# Patient Record
Sex: FEMALE | Race: WHITE | HISPANIC OR LATINO | Employment: UNEMPLOYED | URBAN - METROPOLITAN AREA
[De-identification: names, ages, dates, MRNs, and addresses within clinical notes are randomized per-mention and may not be internally consistent; named-entity substitution may affect disease eponyms.]

---

## 2017-02-01 ENCOUNTER — APPOINTMENT (EMERGENCY)
Dept: RADIOLOGY | Facility: HOSPITAL | Age: 57
End: 2017-02-01
Payer: COMMERCIAL

## 2017-02-01 ENCOUNTER — HOSPITAL ENCOUNTER (EMERGENCY)
Facility: HOSPITAL | Age: 57
Discharge: HOME | End: 2017-02-01
Attending: EMERGENCY MEDICINE | Admitting: EMERGENCY MEDICINE
Payer: COMMERCIAL

## 2017-02-01 VITALS
DIASTOLIC BLOOD PRESSURE: 63 MMHG | RESPIRATION RATE: 16 BRPM | SYSTOLIC BLOOD PRESSURE: 113 MMHG | OXYGEN SATURATION: 98 % | HEART RATE: 95 BPM | TEMPERATURE: 98.6 F

## 2017-02-01 DIAGNOSIS — K57.92 DIVERTICULITIS: Primary | ICD-10-CM

## 2017-02-01 LAB
ALBUMIN SERPL BCP-MCNC: 3.7 G/DL (ref 3.5–5)
ALP SERPL-CCNC: 72 U/L (ref 46–116)
ALT SERPL W P-5'-P-CCNC: 40 U/L (ref 12–78)
ANION GAP SERPL CALCULATED.3IONS-SCNC: 8 MMOL/L (ref 4–13)
AST SERPL W P-5'-P-CCNC: 17 U/L (ref 5–45)
BACTERIA UR QL AUTO: ABNORMAL /HPF
BASOPHILS # BLD AUTO: 0.02 THOUSANDS/ΜL (ref 0–0.1)
BASOPHILS NFR BLD AUTO: 0 % (ref 0–1)
BILIRUB SERPL-MCNC: 0.48 MG/DL (ref 0.2–1)
BILIRUB UR QL STRIP: NEGATIVE
BUN SERPL-MCNC: 13 MG/DL (ref 5–25)
CALCIUM SERPL-MCNC: 9.3 MG/DL (ref 8.3–10.1)
CHLORIDE SERPL-SCNC: 105 MMOL/L (ref 100–108)
CLARITY UR: CLEAR
CO2 SERPL-SCNC: 28 MMOL/L (ref 21–32)
COLOR UR: ABNORMAL
COLOR, POC: NORMAL
CREAT SERPL-MCNC: 1.03 MG/DL (ref 0.6–1.3)
EOSINOPHIL # BLD AUTO: 0.05 THOUSAND/ΜL (ref 0–0.61)
EOSINOPHIL NFR BLD AUTO: 1 % (ref 0–6)
ERYTHROCYTE [DISTWIDTH] IN BLOOD BY AUTOMATED COUNT: 13.4 % (ref 11.6–15.1)
GFR SERPL CREATININE-BSD FRML MDRD: 55.2 ML/MIN/1.73SQ M
GLUCOSE SERPL-MCNC: 104 MG/DL (ref 65–140)
GLUCOSE UR STRIP-MCNC: NEGATIVE MG/DL
HCG UR QL: NORMAL
HCT VFR BLD AUTO: 41.2 % (ref 34.8–46.1)
HGB BLD-MCNC: 13.8 G/DL (ref 11.5–15.4)
HGB UR QL STRIP.AUTO: ABNORMAL
HYALINE CASTS #/AREA URNS LPF: ABNORMAL /LPF
KETONES UR STRIP-MCNC: NEGATIVE MG/DL
LEUKOCYTE ESTERASE UR QL STRIP: ABNORMAL
LYMPHOCYTES # BLD AUTO: 2.55 THOUSANDS/ΜL (ref 0.6–4.47)
LYMPHOCYTES NFR BLD AUTO: 27 % (ref 14–44)
MCH RBC QN AUTO: 29 PG (ref 26.8–34.3)
MCHC RBC AUTO-ENTMCNC: 33.5 G/DL (ref 31.4–37.4)
MCV RBC AUTO: 87 FL (ref 82–98)
MONOCYTES # BLD AUTO: 1.03 THOUSAND/ΜL (ref 0.17–1.22)
MONOCYTES NFR BLD AUTO: 11 % (ref 4–12)
NEUTROPHILS # BLD AUTO: 5.87 THOUSANDS/ΜL (ref 1.85–7.62)
NEUTS SEG NFR BLD AUTO: 61 % (ref 43–75)
NITRITE UR QL STRIP: NEGATIVE
NON-SQ EPI CELLS URNS QL MICRO: ABNORMAL /HPF
NRBC BLD AUTO-RTO: 0 /100 WBCS
PH UR STRIP.AUTO: 6 [PH] (ref 4.5–8)
PLATELET # BLD AUTO: 263 THOUSANDS/UL (ref 149–390)
PMV BLD AUTO: 9.7 FL (ref 8.9–12.7)
POTASSIUM SERPL-SCNC: 4.1 MMOL/L (ref 3.5–5.3)
PROT SERPL-MCNC: 8.2 G/DL (ref 6.4–8.2)
PROT UR STRIP-MCNC: NEGATIVE MG/DL
RBC # BLD AUTO: 4.76 MILLION/UL (ref 3.81–5.12)
RBC #/AREA URNS AUTO: ABNORMAL /HPF
SODIUM SERPL-SCNC: 141 MMOL/L (ref 136–145)
SP GR UR STRIP.AUTO: 1.02 (ref 1–1.03)
UROBILINOGEN UR QL STRIP.AUTO: 0.2 E.U./DL
WBC # BLD AUTO: 9.54 THOUSAND/UL (ref 4.31–10.16)
WBC #/AREA URNS AUTO: ABNORMAL /HPF

## 2017-02-01 PROCEDURE — 80053 COMPREHEN METABOLIC PANEL: CPT | Performed by: EMERGENCY MEDICINE

## 2017-02-01 PROCEDURE — 85025 COMPLETE CBC W/AUTO DIFF WBC: CPT | Performed by: EMERGENCY MEDICINE

## 2017-02-01 PROCEDURE — 74177 CT ABD & PELVIS W/CONTRAST: CPT

## 2017-02-01 PROCEDURE — 81002 URINALYSIS NONAUTO W/O SCOPE: CPT | Performed by: EMERGENCY MEDICINE

## 2017-02-01 PROCEDURE — 87086 URINE CULTURE/COLONY COUNT: CPT

## 2017-02-01 PROCEDURE — 96360 HYDRATION IV INFUSION INIT: CPT

## 2017-02-01 PROCEDURE — 81025 URINE PREGNANCY TEST: CPT | Performed by: EMERGENCY MEDICINE

## 2017-02-01 PROCEDURE — 96361 HYDRATE IV INFUSION ADD-ON: CPT

## 2017-02-01 PROCEDURE — 99284 EMERGENCY DEPT VISIT MOD MDM: CPT

## 2017-02-01 PROCEDURE — 81001 URINALYSIS AUTO W/SCOPE: CPT

## 2017-02-01 PROCEDURE — 36415 COLL VENOUS BLD VENIPUNCTURE: CPT | Performed by: EMERGENCY MEDICINE

## 2017-02-01 RX ORDER — ACETAMINOPHEN 325 MG/1
650 TABLET ORAL ONCE
Status: COMPLETED | OUTPATIENT
Start: 2017-02-01 | End: 2017-02-01

## 2017-02-01 RX ORDER — METRONIDAZOLE 500 MG/1
500 TABLET ORAL 3 TIMES DAILY
Qty: 30 TABLET | Refills: 0 | Status: SHIPPED | OUTPATIENT
Start: 2017-02-01 | End: 2017-02-11 | Stop reason: HOSPADM

## 2017-02-01 RX ORDER — CIPROFLOXACIN 500 MG/1
500 TABLET, FILM COATED ORAL 2 TIMES DAILY
Qty: 20 TABLET | Refills: 0 | Status: SHIPPED | OUTPATIENT
Start: 2017-02-01 | End: 2017-02-11 | Stop reason: HOSPADM

## 2017-02-01 RX ORDER — CIPROFLOXACIN 500 MG/1
500 TABLET, FILM COATED ORAL ONCE
Status: COMPLETED | OUTPATIENT
Start: 2017-02-01 | End: 2017-02-01

## 2017-02-01 RX ORDER — METRONIDAZOLE 500 MG/1
500 TABLET ORAL ONCE
Status: COMPLETED | OUTPATIENT
Start: 2017-02-01 | End: 2017-02-01

## 2017-02-01 RX ORDER — ACETAMINOPHEN 500 MG
500 TABLET ORAL EVERY 6 HOURS PRN
Qty: 30 TABLET | Refills: 0 | Status: SHIPPED | OUTPATIENT
Start: 2017-02-01 | End: 2017-03-03

## 2017-02-01 RX ADMIN — IOHEXOL 100 ML: 350 INJECTION, SOLUTION INTRAVENOUS at 11:37

## 2017-02-01 RX ADMIN — ACETAMINOPHEN 650 MG: 325 TABLET, FILM COATED ORAL at 10:34

## 2017-02-01 RX ADMIN — METRONIDAZOLE 500 MG: 500 TABLET ORAL at 12:43

## 2017-02-01 RX ADMIN — SODIUM CHLORIDE 1000 ML: 0.9 INJECTION, SOLUTION INTRAVENOUS at 10:43

## 2017-02-01 RX ADMIN — CIPROFLOXACIN HYDROCHLORIDE 500 MG: 500 TABLET, FILM COATED ORAL at 12:43

## 2017-02-02 LAB — BACTERIA UR CULT: NORMAL

## 2017-02-08 ENCOUNTER — APPOINTMENT (EMERGENCY)
Dept: RADIOLOGY | Facility: HOSPITAL | Age: 57
DRG: 244 | End: 2017-02-08
Payer: COMMERCIAL

## 2017-02-08 ENCOUNTER — HOSPITAL ENCOUNTER (INPATIENT)
Facility: HOSPITAL | Age: 57
LOS: 3 days | Discharge: HOME | DRG: 244 | End: 2017-02-11
Attending: EMERGENCY MEDICINE | Admitting: SURGERY
Payer: COMMERCIAL

## 2017-02-08 DIAGNOSIS — K57.92 DIVERTICULITIS: Primary | ICD-10-CM

## 2017-02-08 LAB
ALBUMIN SERPL BCP-MCNC: 3.8 G/DL (ref 3.5–5)
ALP SERPL-CCNC: 66 U/L (ref 46–116)
ALT SERPL W P-5'-P-CCNC: 25 U/L (ref 12–78)
ANION GAP SERPL CALCULATED.3IONS-SCNC: 6 MMOL/L (ref 4–13)
AST SERPL W P-5'-P-CCNC: 14 U/L (ref 5–45)
BACTERIA UR QL AUTO: ABNORMAL /HPF
BASOPHILS # BLD AUTO: 0.03 THOUSANDS/ΜL (ref 0–0.1)
BASOPHILS NFR BLD AUTO: 0 % (ref 0–1)
BILIRUB SERPL-MCNC: 0.43 MG/DL (ref 0.2–1)
BILIRUB UR QL STRIP: NEGATIVE
BUN SERPL-MCNC: 10 MG/DL (ref 5–25)
CALCIUM SERPL-MCNC: 9.3 MG/DL (ref 8.3–10.1)
CHLORIDE SERPL-SCNC: 105 MMOL/L (ref 100–108)
CLARITY UR: CLEAR
CO2 SERPL-SCNC: 29 MMOL/L (ref 21–32)
COLOR UR: YELLOW
COLOR, POC: YELLOW
CREAT SERPL-MCNC: 1.02 MG/DL (ref 0.6–1.3)
EOSINOPHIL # BLD AUTO: 0.07 THOUSAND/ΜL (ref 0–0.61)
EOSINOPHIL NFR BLD AUTO: 1 % (ref 0–6)
ERYTHROCYTE [DISTWIDTH] IN BLOOD BY AUTOMATED COUNT: 13.5 % (ref 11.6–15.1)
GFR SERPL CREATININE-BSD FRML MDRD: 55.9 ML/MIN/1.73SQ M
GLUCOSE SERPL-MCNC: 109 MG/DL (ref 65–140)
GLUCOSE UR STRIP-MCNC: NEGATIVE MG/DL
HCG UR QL: NEGATIVE
HCT VFR BLD AUTO: 40.1 % (ref 34.8–46.1)
HGB BLD-MCNC: 13 G/DL (ref 11.5–15.4)
HGB UR QL STRIP.AUTO: NEGATIVE
HYALINE CASTS #/AREA URNS LPF: ABNORMAL /LPF
KETONES UR STRIP-MCNC: NEGATIVE MG/DL
LEUKOCYTE ESTERASE UR QL STRIP: ABNORMAL
LIPASE SERPL-CCNC: 173 U/L (ref 73–393)
LYMPHOCYTES # BLD AUTO: 1.97 THOUSANDS/ΜL (ref 0.6–4.47)
LYMPHOCYTES NFR BLD AUTO: 25 % (ref 14–44)
MCH RBC QN AUTO: 28.4 PG (ref 26.8–34.3)
MCHC RBC AUTO-ENTMCNC: 32.4 G/DL (ref 31.4–37.4)
MCV RBC AUTO: 88 FL (ref 82–98)
MONOCYTES # BLD AUTO: 0.71 THOUSAND/ΜL (ref 0.17–1.22)
MONOCYTES NFR BLD AUTO: 9 % (ref 4–12)
NEUTROPHILS # BLD AUTO: 5.18 THOUSANDS/ΜL (ref 1.85–7.62)
NEUTS SEG NFR BLD AUTO: 65 % (ref 43–75)
NITRITE UR QL STRIP: NEGATIVE
NON-SQ EPI CELLS URNS QL MICRO: ABNORMAL /HPF
NRBC BLD AUTO-RTO: 0 /100 WBCS
PH UR STRIP.AUTO: 7.5 [PH] (ref 4.5–8)
PLATELET # BLD AUTO: 329 THOUSANDS/UL (ref 149–390)
PMV BLD AUTO: 9.6 FL (ref 8.9–12.7)
POTASSIUM SERPL-SCNC: 4.3 MMOL/L (ref 3.5–5.3)
PROT SERPL-MCNC: 8.2 G/DL (ref 6.4–8.2)
PROT UR STRIP-MCNC: NEGATIVE MG/DL
RBC # BLD AUTO: 4.57 MILLION/UL (ref 3.81–5.12)
RBC #/AREA URNS AUTO: ABNORMAL /HPF
SODIUM SERPL-SCNC: 140 MMOL/L (ref 136–145)
SP GR UR STRIP.AUTO: 1.02 (ref 1–1.03)
UROBILINOGEN UR QL STRIP.AUTO: 0.2 E.U./DL
WBC # BLD AUTO: 7.97 THOUSAND/UL (ref 4.31–10.16)
WBC #/AREA URNS AUTO: ABNORMAL /HPF

## 2017-02-08 PROCEDURE — 81001 URINALYSIS AUTO W/SCOPE: CPT

## 2017-02-08 PROCEDURE — 81002 URINALYSIS NONAUTO W/O SCOPE: CPT | Performed by: EMERGENCY MEDICINE

## 2017-02-08 PROCEDURE — 81025 URINE PREGNANCY TEST: CPT | Performed by: EMERGENCY MEDICINE

## 2017-02-08 PROCEDURE — 74177 CT ABD & PELVIS W/CONTRAST: CPT

## 2017-02-08 PROCEDURE — 85025 COMPLETE CBC W/AUTO DIFF WBC: CPT | Performed by: EMERGENCY MEDICINE

## 2017-02-08 PROCEDURE — 99285 EMERGENCY DEPT VISIT HI MDM: CPT

## 2017-02-08 PROCEDURE — 87086 URINE CULTURE/COLONY COUNT: CPT

## 2017-02-08 PROCEDURE — 36415 COLL VENOUS BLD VENIPUNCTURE: CPT | Performed by: EMERGENCY MEDICINE

## 2017-02-08 PROCEDURE — 83690 ASSAY OF LIPASE: CPT | Performed by: EMERGENCY MEDICINE

## 2017-02-08 PROCEDURE — 96361 HYDRATE IV INFUSION ADD-ON: CPT

## 2017-02-08 PROCEDURE — 80053 COMPREHEN METABOLIC PANEL: CPT | Performed by: EMERGENCY MEDICINE

## 2017-02-08 PROCEDURE — 96374 THER/PROPH/DIAG INJ IV PUSH: CPT

## 2017-02-08 RX ORDER — ACETAMINOPHEN 325 MG/1
650 TABLET ORAL EVERY 6 HOURS PRN
Status: DISCONTINUED | OUTPATIENT
Start: 2017-02-08 | End: 2017-02-09

## 2017-02-08 RX ORDER — MORPHINE SULFATE 4 MG/ML
4 INJECTION, SOLUTION INTRAMUSCULAR; INTRAVENOUS ONCE
Status: COMPLETED | OUTPATIENT
Start: 2017-02-08 | End: 2017-02-08

## 2017-02-08 RX ORDER — SODIUM CHLORIDE, SODIUM LACTATE, POTASSIUM CHLORIDE, CALCIUM CHLORIDE 600; 310; 30; 20 MG/100ML; MG/100ML; MG/100ML; MG/100ML
125 INJECTION, SOLUTION INTRAVENOUS CONTINUOUS
Status: DISCONTINUED | OUTPATIENT
Start: 2017-02-08 | End: 2017-02-10

## 2017-02-08 RX ORDER — OXYCODONE HYDROCHLORIDE 5 MG/1
5 TABLET ORAL EVERY 4 HOURS PRN
Status: DISCONTINUED | OUTPATIENT
Start: 2017-02-08 | End: 2017-02-11 | Stop reason: HOSPADM

## 2017-02-08 RX ORDER — ONDANSETRON 2 MG/ML
4 INJECTION INTRAMUSCULAR; INTRAVENOUS EVERY 4 HOURS PRN
Status: DISCONTINUED | OUTPATIENT
Start: 2017-02-08 | End: 2017-02-11 | Stop reason: HOSPADM

## 2017-02-08 RX ADMIN — PIPERACILLIN AND TAZOBACTAM 3.38 G: 3; .375 INJECTION, POWDER, LYOPHILIZED, FOR SOLUTION INTRAVENOUS; PARENTERAL at 23:06

## 2017-02-08 RX ADMIN — SODIUM CHLORIDE, SODIUM LACTATE, POTASSIUM CHLORIDE, AND CALCIUM CHLORIDE 125 ML/HR: .6; .31; .03; .02 INJECTION, SOLUTION INTRAVENOUS at 13:51

## 2017-02-08 RX ADMIN — SODIUM CHLORIDE 1000 ML: 0.9 INJECTION, SOLUTION INTRAVENOUS at 12:13

## 2017-02-08 RX ADMIN — HYDROMORPHONE HYDROCHLORIDE 1 MG: 1 INJECTION, SOLUTION INTRAMUSCULAR; INTRAVENOUS; SUBCUTANEOUS at 22:20

## 2017-02-08 RX ADMIN — IOHEXOL 100 ML: 350 INJECTION, SOLUTION INTRAVENOUS at 10:18

## 2017-02-08 RX ADMIN — SODIUM CHLORIDE, SODIUM LACTATE, POTASSIUM CHLORIDE, AND CALCIUM CHLORIDE 125 ML/HR: .6; .31; .03; .02 INJECTION, SOLUTION INTRAVENOUS at 22:00

## 2017-02-08 RX ADMIN — SODIUM CHLORIDE 1000 ML: 0.9 INJECTION, SOLUTION INTRAVENOUS at 08:45

## 2017-02-08 RX ADMIN — MORPHINE SULFATE 4 MG: 4 INJECTION, SOLUTION INTRAMUSCULAR; INTRAVENOUS at 08:46

## 2017-02-08 RX ADMIN — HYDROMORPHONE HYDROCHLORIDE 1 MG: 1 INJECTION, SOLUTION INTRAMUSCULAR; INTRAVENOUS; SUBCUTANEOUS at 15:02

## 2017-02-08 RX ADMIN — PIPERACILLIN AND TAZOBACTAM 3.38 G: 3; .375 INJECTION, POWDER, LYOPHILIZED, FOR SOLUTION INTRAVENOUS; PARENTERAL at 12:23

## 2017-02-09 PROBLEM — K57.92 DIVERTICULITIS: Status: ACTIVE | Noted: 2017-02-09

## 2017-02-09 LAB
ANION GAP SERPL CALCULATED.3IONS-SCNC: 9 MMOL/L (ref 4–13)
BACTERIA UR CULT: NORMAL
BASOPHILS # BLD AUTO: 0.03 THOUSANDS/ΜL (ref 0–0.1)
BASOPHILS NFR BLD AUTO: 1 % (ref 0–1)
BUN SERPL-MCNC: 6 MG/DL (ref 5–25)
CALCIUM SERPL-MCNC: 8.5 MG/DL (ref 8.3–10.1)
CHLORIDE SERPL-SCNC: 104 MMOL/L (ref 100–108)
CO2 SERPL-SCNC: 27 MMOL/L (ref 21–32)
CREAT SERPL-MCNC: 0.88 MG/DL (ref 0.6–1.3)
EOSINOPHIL # BLD AUTO: 0.06 THOUSAND/ΜL (ref 0–0.61)
EOSINOPHIL NFR BLD AUTO: 1 % (ref 0–6)
ERYTHROCYTE [DISTWIDTH] IN BLOOD BY AUTOMATED COUNT: 13.4 % (ref 11.6–15.1)
GFR SERPL CREATININE-BSD FRML MDRD: >60 ML/MIN/1.73SQ M
GLUCOSE SERPL-MCNC: 95 MG/DL (ref 65–140)
HCT VFR BLD AUTO: 36.1 % (ref 34.8–46.1)
HGB BLD-MCNC: 11.6 G/DL (ref 11.5–15.4)
LYMPHOCYTES # BLD AUTO: 1.61 THOUSANDS/ΜL (ref 0.6–4.47)
LYMPHOCYTES NFR BLD AUTO: 32 % (ref 14–44)
MCH RBC QN AUTO: 28 PG (ref 26.8–34.3)
MCHC RBC AUTO-ENTMCNC: 32.1 G/DL (ref 31.4–37.4)
MCV RBC AUTO: 87 FL (ref 82–98)
MONOCYTES # BLD AUTO: 0.61 THOUSAND/ΜL (ref 0.17–1.22)
MONOCYTES NFR BLD AUTO: 12 % (ref 4–12)
NEUTROPHILS # BLD AUTO: 2.72 THOUSANDS/ΜL (ref 1.85–7.62)
NEUTS SEG NFR BLD AUTO: 54 % (ref 43–75)
NRBC BLD AUTO-RTO: 0 /100 WBCS
PLATELET # BLD AUTO: 296 THOUSANDS/UL (ref 149–390)
PMV BLD AUTO: 9.6 FL (ref 8.9–12.7)
POTASSIUM SERPL-SCNC: 3.9 MMOL/L (ref 3.5–5.3)
RBC # BLD AUTO: 4.14 MILLION/UL (ref 3.81–5.12)
SODIUM SERPL-SCNC: 140 MMOL/L (ref 136–145)
WBC # BLD AUTO: 5.05 THOUSAND/UL (ref 4.31–10.16)

## 2017-02-09 PROCEDURE — G8980 MOBILITY D/C STATUS: HCPCS

## 2017-02-09 PROCEDURE — G8978 MOBILITY CURRENT STATUS: HCPCS

## 2017-02-09 PROCEDURE — G8989 SELF CARE D/C STATUS: HCPCS

## 2017-02-09 PROCEDURE — 97165 OT EVAL LOW COMPLEX 30 MIN: CPT

## 2017-02-09 PROCEDURE — 97162 PT EVAL MOD COMPLEX 30 MIN: CPT

## 2017-02-09 PROCEDURE — 80048 BASIC METABOLIC PNL TOTAL CA: CPT | Performed by: SURGERY

## 2017-02-09 PROCEDURE — G8979 MOBILITY GOAL STATUS: HCPCS

## 2017-02-09 PROCEDURE — G8987 SELF CARE CURRENT STATUS: HCPCS

## 2017-02-09 PROCEDURE — 85025 COMPLETE CBC W/AUTO DIFF WBC: CPT | Performed by: SURGERY

## 2017-02-09 PROCEDURE — G8988 SELF CARE GOAL STATUS: HCPCS

## 2017-02-09 RX ORDER — BUTALBITAL, ACETAMINOPHEN AND CAFFEINE 50; 325; 40 MG/1; MG/1; MG/1
1 TABLET ORAL EVERY 4 HOURS PRN
Status: DISCONTINUED | OUTPATIENT
Start: 2017-02-09 | End: 2017-02-09

## 2017-02-09 RX ORDER — ACETAMINOPHEN 325 MG/1
325 TABLET ORAL EVERY 6 HOURS PRN
Status: DISCONTINUED | OUTPATIENT
Start: 2017-02-09 | End: 2017-02-09

## 2017-02-09 RX ORDER — BUTALBITAL, ACETAMINOPHEN AND CAFFEINE 50; 325; 40 MG/1; MG/1; MG/1
1 TABLET ORAL ONCE
Status: COMPLETED | OUTPATIENT
Start: 2017-02-09 | End: 2017-02-09

## 2017-02-09 RX ORDER — ACETAMINOPHEN 325 MG/1
650 TABLET ORAL EVERY 6 HOURS PRN
Status: DISCONTINUED | OUTPATIENT
Start: 2017-02-09 | End: 2017-02-11 | Stop reason: HOSPADM

## 2017-02-09 RX ADMIN — PIPERACILLIN AND TAZOBACTAM 3.38 G: 3; .375 INJECTION, POWDER, LYOPHILIZED, FOR SOLUTION INTRAVENOUS; PARENTERAL at 05:01

## 2017-02-09 RX ADMIN — PIPERACILLIN AND TAZOBACTAM 3.38 G: 3; .375 INJECTION, POWDER, LYOPHILIZED, FOR SOLUTION INTRAVENOUS; PARENTERAL at 12:49

## 2017-02-09 RX ADMIN — SODIUM CHLORIDE, SODIUM LACTATE, POTASSIUM CHLORIDE, AND CALCIUM CHLORIDE 125 ML/HR: .6; .31; .03; .02 INJECTION, SOLUTION INTRAVENOUS at 16:52

## 2017-02-09 RX ADMIN — ENOXAPARIN SODIUM 40 MG: 40 INJECTION SUBCUTANEOUS at 08:55

## 2017-02-09 RX ADMIN — HYDROMORPHONE HYDROCHLORIDE 1 MG: 1 INJECTION, SOLUTION INTRAMUSCULAR; INTRAVENOUS; SUBCUTANEOUS at 13:18

## 2017-02-09 RX ADMIN — PIPERACILLIN AND TAZOBACTAM 3.38 G: 3; .375 INJECTION, POWDER, LYOPHILIZED, FOR SOLUTION INTRAVENOUS; PARENTERAL at 16:54

## 2017-02-09 RX ADMIN — ACETAMINOPHEN 650 MG: 325 TABLET, FILM COATED ORAL at 05:11

## 2017-02-09 RX ADMIN — ENOXAPARIN SODIUM 40 MG: 40 INJECTION SUBCUTANEOUS at 18:38

## 2017-02-09 RX ADMIN — BUTALBITAL, ACETAMINOPHEN, AND CAFFEINE 1 TABLET: 50; 325; 40 TABLET ORAL at 14:14

## 2017-02-09 RX ADMIN — SODIUM CHLORIDE, SODIUM LACTATE, POTASSIUM CHLORIDE, AND CALCIUM CHLORIDE 125 ML/HR: .6; .31; .03; .02 INJECTION, SOLUTION INTRAVENOUS at 07:52

## 2017-02-09 RX ADMIN — OXYCODONE HYDROCHLORIDE 5 MG: 5 TABLET ORAL at 21:58

## 2017-02-10 LAB
BASOPHILS # BLD AUTO: 0.02 THOUSANDS/ΜL (ref 0–0.1)
BASOPHILS NFR BLD AUTO: 0 % (ref 0–1)
EOSINOPHIL # BLD AUTO: 0.07 THOUSAND/ΜL (ref 0–0.61)
EOSINOPHIL NFR BLD AUTO: 2 % (ref 0–6)
ERYTHROCYTE [DISTWIDTH] IN BLOOD BY AUTOMATED COUNT: 13 % (ref 11.6–15.1)
HCT VFR BLD AUTO: 36.5 % (ref 34.8–46.1)
HGB BLD-MCNC: 11.9 G/DL (ref 11.5–15.4)
LYMPHOCYTES # BLD AUTO: 1.97 THOUSANDS/ΜL (ref 0.6–4.47)
LYMPHOCYTES NFR BLD AUTO: 41 % (ref 14–44)
MCH RBC QN AUTO: 28.3 PG (ref 26.8–34.3)
MCHC RBC AUTO-ENTMCNC: 32.6 G/DL (ref 31.4–37.4)
MCV RBC AUTO: 87 FL (ref 82–98)
MONOCYTES # BLD AUTO: 0.67 THOUSAND/ΜL (ref 0.17–1.22)
MONOCYTES NFR BLD AUTO: 14 % (ref 4–12)
NEUTROPHILS # BLD AUTO: 2.03 THOUSANDS/ΜL (ref 1.85–7.62)
NEUTS SEG NFR BLD AUTO: 43 % (ref 43–75)
NRBC BLD AUTO-RTO: 0 /100 WBCS
PLATELET # BLD AUTO: 284 THOUSANDS/UL (ref 149–390)
PMV BLD AUTO: 9.4 FL (ref 8.9–12.7)
RBC # BLD AUTO: 4.21 MILLION/UL (ref 3.81–5.12)
WBC # BLD AUTO: 4.77 THOUSAND/UL (ref 4.31–10.16)

## 2017-02-10 PROCEDURE — 85025 COMPLETE CBC W/AUTO DIFF WBC: CPT | Performed by: SURGERY

## 2017-02-10 RX ORDER — CLONAZEPAM 0.5 MG/1
0.5 TABLET ORAL
Status: DISCONTINUED | OUTPATIENT
Start: 2017-02-10 | End: 2017-02-11 | Stop reason: HOSPADM

## 2017-02-10 RX ORDER — DEXTROSE, SODIUM CHLORIDE, AND POTASSIUM CHLORIDE 5; .45; .15 G/100ML; G/100ML; G/100ML
75 INJECTION INTRAVENOUS CONTINUOUS
Status: DISCONTINUED | OUTPATIENT
Start: 2017-02-10 | End: 2017-02-11 | Stop reason: HOSPADM

## 2017-02-10 RX ADMIN — DEXTROSE, SODIUM CHLORIDE, AND POTASSIUM CHLORIDE 75 ML/HR: 5; .45; .15 INJECTION INTRAVENOUS at 07:59

## 2017-02-10 RX ADMIN — PIPERACILLIN AND TAZOBACTAM 3.38 G: 3; .375 INJECTION, POWDER, LYOPHILIZED, FOR SOLUTION INTRAVENOUS; PARENTERAL at 10:45

## 2017-02-10 RX ADMIN — PIPERACILLIN AND TAZOBACTAM 3.38 G: 3; .375 INJECTION, POWDER, LYOPHILIZED, FOR SOLUTION INTRAVENOUS; PARENTERAL at 20:01

## 2017-02-10 RX ADMIN — PIPERACILLIN AND TAZOBACTAM 3.38 G: 3; .375 INJECTION, POWDER, LYOPHILIZED, FOR SOLUTION INTRAVENOUS; PARENTERAL at 05:17

## 2017-02-10 RX ADMIN — ENOXAPARIN SODIUM 40 MG: 40 INJECTION SUBCUTANEOUS at 08:02

## 2017-02-10 RX ADMIN — ENOXAPARIN SODIUM 40 MG: 40 INJECTION SUBCUTANEOUS at 20:01

## 2017-02-10 RX ADMIN — PIPERACILLIN AND TAZOBACTAM 3.38 G: 3; .375 INJECTION, POWDER, LYOPHILIZED, FOR SOLUTION INTRAVENOUS; PARENTERAL at 00:37

## 2017-02-10 RX ADMIN — DEXTROSE, SODIUM CHLORIDE, AND POTASSIUM CHLORIDE 75 ML/HR: 5; .45; .15 INJECTION INTRAVENOUS at 23:39

## 2017-02-11 VITALS
HEART RATE: 81 BPM | HEIGHT: 62 IN | SYSTOLIC BLOOD PRESSURE: 120 MMHG | RESPIRATION RATE: 18 BRPM | OXYGEN SATURATION: 98 % | BODY MASS INDEX: 27.43 KG/M2 | WEIGHT: 149.03 LBS | TEMPERATURE: 98.6 F | DIASTOLIC BLOOD PRESSURE: 68 MMHG

## 2017-02-11 RX ORDER — AMOXICILLIN AND CLAVULANATE POTASSIUM 875; 125 MG/1; MG/1
1 TABLET, FILM COATED ORAL 2 TIMES DAILY
Qty: 20 TABLET | Refills: 0 | Status: SHIPPED | OUTPATIENT
Start: 2017-02-11 | End: 2017-02-21

## 2017-02-11 RX ADMIN — PIPERACILLIN AND TAZOBACTAM 3.38 G: 3; .375 INJECTION, POWDER, LYOPHILIZED, FOR SOLUTION INTRAVENOUS; PARENTERAL at 08:41

## 2017-02-11 RX ADMIN — ENOXAPARIN SODIUM 40 MG: 40 INJECTION SUBCUTANEOUS at 08:41

## 2017-02-11 RX ADMIN — PIPERACILLIN AND TAZOBACTAM 3.38 G: 3; .375 INJECTION, POWDER, LYOPHILIZED, FOR SOLUTION INTRAVENOUS; PARENTERAL at 14:55

## 2017-02-11 RX ADMIN — PIPERACILLIN AND TAZOBACTAM 3.38 G: 3; .375 INJECTION, POWDER, LYOPHILIZED, FOR SOLUTION INTRAVENOUS; PARENTERAL at 02:07

## 2017-02-11 RX ADMIN — DEXTROSE, SODIUM CHLORIDE, AND POTASSIUM CHLORIDE 75 ML/HR: 5; .45; .15 INJECTION INTRAVENOUS at 14:55

## 2017-02-11 RX ADMIN — CLONAZEPAM 0.5 MG: 0.5 TABLET ORAL at 02:07

## 2017-02-20 ENCOUNTER — GENERIC CONVERSION - ENCOUNTER (OUTPATIENT)
Dept: OTHER | Facility: OTHER | Age: 57
End: 2017-02-20

## 2017-02-27 ENCOUNTER — ALLSCRIPTS OFFICE VISIT (OUTPATIENT)
Dept: OTHER | Facility: OTHER | Age: 57
End: 2017-02-27

## 2017-03-29 ENCOUNTER — ALLSCRIPTS OFFICE VISIT (OUTPATIENT)
Dept: OTHER | Facility: OTHER | Age: 57
End: 2017-03-29

## 2017-04-04 ENCOUNTER — APPOINTMENT (OUTPATIENT)
Dept: LAB | Facility: HOSPITAL | Age: 57
End: 2017-04-04
Payer: COMMERCIAL

## 2017-04-04 ENCOUNTER — ALLSCRIPTS OFFICE VISIT (OUTPATIENT)
Dept: OTHER | Facility: OTHER | Age: 57
End: 2017-04-04

## 2017-04-04 DIAGNOSIS — L60.9 NAIL DISORDER: ICD-10-CM

## 2017-04-04 PROCEDURE — 87102 FUNGUS ISOLATION CULTURE: CPT

## 2017-04-06 ENCOUNTER — ANESTHESIA EVENT (OUTPATIENT)
Dept: GASTROENTEROLOGY | Facility: HOSPITAL | Age: 57
End: 2017-04-06

## 2017-04-06 ENCOUNTER — ANESTHESIA (OUTPATIENT)
Dept: GASTROENTEROLOGY | Facility: HOSPITAL | Age: 57
End: 2017-04-06

## 2017-04-28 ENCOUNTER — TRANSCRIBE ORDERS (OUTPATIENT)
Dept: RADIOLOGY | Facility: HOSPITAL | Age: 57
End: 2017-04-28

## 2017-04-28 ENCOUNTER — HOSPITAL ENCOUNTER (OUTPATIENT)
Dept: RADIOLOGY | Facility: HOSPITAL | Age: 57
Discharge: HOME | End: 2017-04-28
Payer: COMMERCIAL

## 2017-04-28 DIAGNOSIS — L60.9 NAIL DISORDER: ICD-10-CM

## 2017-04-28 PROCEDURE — 73660 X-RAY EXAM OF TOE(S): CPT

## 2017-05-08 LAB — FUNGUS SPEC CULT: NORMAL

## 2017-05-09 ENCOUNTER — ALLSCRIPTS OFFICE VISIT (OUTPATIENT)
Dept: OTHER | Facility: OTHER | Age: 57
End: 2017-05-09

## 2017-05-19 ENCOUNTER — ALLSCRIPTS OFFICE VISIT (OUTPATIENT)
Dept: OTHER | Facility: OTHER | Age: 57
End: 2017-05-19

## 2017-05-28 ENCOUNTER — HOSPITAL ENCOUNTER (EMERGENCY)
Facility: HOSPITAL | Age: 57
Discharge: HOME | End: 2017-05-28
Payer: COMMERCIAL

## 2017-05-28 ENCOUNTER — APPOINTMENT (EMERGENCY)
Dept: RADIOLOGY | Facility: HOSPITAL | Age: 57
End: 2017-05-28
Payer: COMMERCIAL

## 2017-05-28 VITALS
BODY MASS INDEX: 27.44 KG/M2 | DIASTOLIC BLOOD PRESSURE: 74 MMHG | WEIGHT: 150 LBS | SYSTOLIC BLOOD PRESSURE: 148 MMHG | RESPIRATION RATE: 19 BRPM | OXYGEN SATURATION: 99 % | TEMPERATURE: 97.6 F | HEART RATE: 78 BPM

## 2017-05-28 DIAGNOSIS — S39.012A STRAIN OF MUSCLE, FASCIA AND TENDON OF LOWER BACK, INITIAL ENCOUNTER: Primary | ICD-10-CM

## 2017-05-28 LAB
ALBUMIN SERPL BCP-MCNC: 3.8 G/DL (ref 3.5–5)
ALP SERPL-CCNC: 65 U/L (ref 46–116)
ALT SERPL W P-5'-P-CCNC: 33 U/L (ref 12–78)
ANION GAP SERPL CALCULATED.3IONS-SCNC: 4 MMOL/L (ref 4–13)
AST SERPL W P-5'-P-CCNC: 24 U/L (ref 5–45)
BACTERIA UR QL AUTO: NORMAL /HPF
BASOPHILS # BLD AUTO: 0.02 THOUSANDS/ΜL (ref 0–0.1)
BASOPHILS NFR BLD AUTO: 0 % (ref 0–1)
BILIRUB SERPL-MCNC: 0.27 MG/DL (ref 0.2–1)
BILIRUB UR QL STRIP: NEGATIVE
BUN SERPL-MCNC: 13 MG/DL (ref 5–25)
CALCIUM SERPL-MCNC: 9.4 MG/DL (ref 8.3–10.1)
CHLORIDE SERPL-SCNC: 106 MMOL/L (ref 100–108)
CLARITY UR: CLEAR
CO2 SERPL-SCNC: 31 MMOL/L (ref 21–32)
COLOR UR: YELLOW
COLOR, POC: NORMAL
CREAT SERPL-MCNC: 0.9 MG/DL (ref 0.6–1.3)
EOSINOPHIL # BLD AUTO: 0.12 THOUSAND/ΜL (ref 0–0.61)
EOSINOPHIL NFR BLD AUTO: 2 % (ref 0–6)
ERYTHROCYTE [DISTWIDTH] IN BLOOD BY AUTOMATED COUNT: 14 % (ref 11.6–15.1)
GFR SERPL CREATININE-BSD FRML MDRD: >60 ML/MIN/1.73SQ M
GLUCOSE SERPL-MCNC: 103 MG/DL (ref 65–140)
GLUCOSE UR STRIP-MCNC: NEGATIVE MG/DL
HCT VFR BLD AUTO: 40.2 % (ref 34.8–46.1)
HGB BLD-MCNC: 13 G/DL (ref 11.5–15.4)
HGB UR QL STRIP.AUTO: ABNORMAL
HYALINE CASTS #/AREA URNS LPF: NORMAL /LPF
KETONES UR STRIP-MCNC: NEGATIVE MG/DL
LEUKOCYTE ESTERASE UR QL STRIP: NEGATIVE
LIPASE SERPL-CCNC: 231 U/L (ref 73–393)
LYMPHOCYTES # BLD AUTO: 2.18 THOUSANDS/ΜL (ref 0.6–4.47)
LYMPHOCYTES NFR BLD AUTO: 45 % (ref 14–44)
MCH RBC QN AUTO: 28.6 PG (ref 26.8–34.3)
MCHC RBC AUTO-ENTMCNC: 32.3 G/DL (ref 31.4–37.4)
MCV RBC AUTO: 88 FL (ref 82–98)
MONOCYTES # BLD AUTO: 0.62 THOUSAND/ΜL (ref 0.17–1.22)
MONOCYTES NFR BLD AUTO: 12 % (ref 4–12)
NEUTROPHILS # BLD AUTO: 2.03 THOUSANDS/ΜL (ref 1.85–7.62)
NEUTS SEG NFR BLD AUTO: 41 % (ref 43–75)
NITRITE UR QL STRIP: NEGATIVE
NON-SQ EPI CELLS URNS QL MICRO: NORMAL /HPF
NRBC BLD AUTO-RTO: 0 /100 WBCS
PH UR STRIP.AUTO: 6 [PH] (ref 4.5–8)
PLATELET # BLD AUTO: 232 THOUSANDS/UL (ref 149–390)
PMV BLD AUTO: 9.7 FL (ref 8.9–12.7)
POTASSIUM SERPL-SCNC: 4.3 MMOL/L (ref 3.5–5.3)
PROT SERPL-MCNC: 7.6 G/DL (ref 6.4–8.2)
PROT UR STRIP-MCNC: NEGATIVE MG/DL
RBC # BLD AUTO: 4.55 MILLION/UL (ref 3.81–5.12)
RBC #/AREA URNS AUTO: NORMAL /HPF
SODIUM SERPL-SCNC: 141 MMOL/L (ref 136–145)
SP GR UR STRIP.AUTO: 1.02 (ref 1–1.03)
UROBILINOGEN UR QL STRIP.AUTO: 0.2 E.U./DL
WBC # BLD AUTO: 4.98 THOUSAND/UL (ref 4.31–10.16)
WBC #/AREA URNS AUTO: NORMAL /HPF

## 2017-05-28 PROCEDURE — 36415 COLL VENOUS BLD VENIPUNCTURE: CPT | Performed by: PHYSICIAN ASSISTANT

## 2017-05-28 PROCEDURE — 81002 URINALYSIS NONAUTO W/O SCOPE: CPT | Performed by: PHYSICIAN ASSISTANT

## 2017-05-28 PROCEDURE — 74177 CT ABD & PELVIS W/CONTRAST: CPT

## 2017-05-28 PROCEDURE — 96374 THER/PROPH/DIAG INJ IV PUSH: CPT

## 2017-05-28 PROCEDURE — 80053 COMPREHEN METABOLIC PANEL: CPT | Performed by: PHYSICIAN ASSISTANT

## 2017-05-28 PROCEDURE — 99284 EMERGENCY DEPT VISIT MOD MDM: CPT

## 2017-05-28 PROCEDURE — 85025 COMPLETE CBC W/AUTO DIFF WBC: CPT | Performed by: PHYSICIAN ASSISTANT

## 2017-05-28 PROCEDURE — 83690 ASSAY OF LIPASE: CPT | Performed by: PHYSICIAN ASSISTANT

## 2017-05-28 PROCEDURE — 81001 URINALYSIS AUTO W/SCOPE: CPT

## 2017-05-28 RX ORDER — CYCLOBENZAPRINE HCL 5 MG
5 TABLET ORAL 3 TIMES DAILY PRN
Qty: 10 TABLET | Refills: 0 | Status: SHIPPED | OUTPATIENT
Start: 2017-05-28 | End: 2020-03-16 | Stop reason: ALTCHOICE

## 2017-05-28 RX ORDER — MORPHINE SULFATE 2 MG/ML
2 INJECTION, SOLUTION INTRAMUSCULAR; INTRAVENOUS ONCE
Status: COMPLETED | OUTPATIENT
Start: 2017-05-28 | End: 2017-05-28

## 2017-05-28 RX ORDER — NAPROXEN 500 MG/1
500 TABLET ORAL 2 TIMES DAILY WITH MEALS
Qty: 30 TABLET | Refills: 0 | Status: SHIPPED | OUTPATIENT
Start: 2017-05-28 | End: 2018-07-19 | Stop reason: ALTCHOICE

## 2017-05-28 RX ADMIN — IOHEXOL 100 ML: 350 INJECTION, SOLUTION INTRAVENOUS at 12:01

## 2017-05-28 RX ADMIN — MORPHINE SULFATE 2 MG: 2 INJECTION, SOLUTION INTRAMUSCULAR; INTRAVENOUS at 11:26

## 2017-06-05 ENCOUNTER — ALLSCRIPTS OFFICE VISIT (OUTPATIENT)
Dept: OTHER | Facility: OTHER | Age: 57
End: 2017-06-05

## 2017-06-06 ENCOUNTER — TRANSCRIBE ORDERS (OUTPATIENT)
Dept: LAB | Facility: HOSPITAL | Age: 57
End: 2017-06-06

## 2017-06-06 ENCOUNTER — APPOINTMENT (OUTPATIENT)
Dept: LAB | Facility: HOSPITAL | Age: 57
End: 2017-06-06
Payer: COMMERCIAL

## 2017-06-06 DIAGNOSIS — E88.81 METABOLIC SYNDROME: ICD-10-CM

## 2017-06-06 DIAGNOSIS — F31.0 BIPOLAR I DISORDER, MOST RECENT EPISODE HYPOMANIC (HCC): ICD-10-CM

## 2017-06-06 DIAGNOSIS — F31.0 BIPOLAR I DISORDER, MOST RECENT EPISODE HYPOMANIC (HCC): Primary | ICD-10-CM

## 2017-06-06 LAB
ALBUMIN SERPL BCP-MCNC: 3.9 G/DL (ref 3.5–5)
ALP SERPL-CCNC: 59 U/L (ref 46–116)
ALT SERPL W P-5'-P-CCNC: 23 U/L (ref 12–78)
ANION GAP SERPL CALCULATED.3IONS-SCNC: 7 MMOL/L (ref 4–13)
AST SERPL W P-5'-P-CCNC: 14 U/L (ref 5–45)
BASOPHILS # BLD AUTO: 0.03 THOUSANDS/ΜL (ref 0–0.1)
BASOPHILS NFR BLD AUTO: 1 % (ref 0–1)
BILIRUB SERPL-MCNC: 0.38 MG/DL (ref 0.2–1)
BUN SERPL-MCNC: 19 MG/DL (ref 5–25)
CALCIUM SERPL-MCNC: 9.1 MG/DL (ref 8.3–10.1)
CHLORIDE SERPL-SCNC: 108 MMOL/L (ref 100–108)
CHOLEST SERPL-MCNC: 185 MG/DL (ref 50–200)
CO2 SERPL-SCNC: 28 MMOL/L (ref 21–32)
CREAT SERPL-MCNC: 0.95 MG/DL (ref 0.6–1.3)
EOSINOPHIL # BLD AUTO: 0.13 THOUSAND/ΜL (ref 0–0.61)
EOSINOPHIL NFR BLD AUTO: 3 % (ref 0–6)
ERYTHROCYTE [DISTWIDTH] IN BLOOD BY AUTOMATED COUNT: 13.8 % (ref 11.6–15.1)
GFR SERPL CREATININE-BSD FRML MDRD: >60 ML/MIN/1.73SQ M
GLUCOSE P FAST SERPL-MCNC: 94 MG/DL (ref 65–99)
HCT VFR BLD AUTO: 40.4 % (ref 34.8–46.1)
HDLC SERPL-MCNC: 52 MG/DL (ref 40–60)
HGB BLD-MCNC: 12.7 G/DL (ref 11.5–15.4)
LDLC SERPL CALC-MCNC: 118 MG/DL (ref 0–100)
LYMPHOCYTES # BLD AUTO: 2.36 THOUSANDS/ΜL (ref 0.6–4.47)
LYMPHOCYTES NFR BLD AUTO: 44 % (ref 14–44)
MCH RBC QN AUTO: 27.7 PG (ref 26.8–34.3)
MCHC RBC AUTO-ENTMCNC: 31.4 G/DL (ref 31.4–37.4)
MCV RBC AUTO: 88 FL (ref 82–98)
MONOCYTES # BLD AUTO: 0.51 THOUSAND/ΜL (ref 0.17–1.22)
MONOCYTES NFR BLD AUTO: 10 % (ref 4–12)
NEUTROPHILS # BLD AUTO: 2.24 THOUSANDS/ΜL (ref 1.85–7.62)
NEUTS SEG NFR BLD AUTO: 42 % (ref 43–75)
NRBC BLD AUTO-RTO: 0 /100 WBCS
PLATELET # BLD AUTO: 270 THOUSANDS/UL (ref 149–390)
PMV BLD AUTO: 9.9 FL (ref 8.9–12.7)
POTASSIUM SERPL-SCNC: 4.4 MMOL/L (ref 3.5–5.3)
PROLACTIN SERPL-MCNC: 9.2 NG/ML
PROT SERPL-MCNC: 7.4 G/DL (ref 6.4–8.2)
RBC # BLD AUTO: 4.58 MILLION/UL (ref 3.81–5.12)
SODIUM SERPL-SCNC: 143 MMOL/L (ref 136–145)
TRIGL SERPL-MCNC: 77 MG/DL
TSH SERPL DL<=0.05 MIU/L-ACNC: 2.42 UIU/ML (ref 0.36–3.74)
WBC # BLD AUTO: 5.27 THOUSAND/UL (ref 4.31–10.16)

## 2017-06-06 PROCEDURE — 84443 ASSAY THYROID STIM HORMONE: CPT

## 2017-06-06 PROCEDURE — 84146 ASSAY OF PROLACTIN: CPT

## 2017-06-06 PROCEDURE — 80061 LIPID PANEL: CPT

## 2017-06-06 PROCEDURE — 85025 COMPLETE CBC W/AUTO DIFF WBC: CPT

## 2017-06-06 PROCEDURE — 36415 COLL VENOUS BLD VENIPUNCTURE: CPT

## 2017-06-06 PROCEDURE — 80053 COMPREHEN METABOLIC PANEL: CPT

## 2017-08-07 ENCOUNTER — APPOINTMENT (OUTPATIENT)
Dept: LAB | Facility: CLINIC | Age: 57
End: 2017-08-07
Payer: COMMERCIAL

## 2017-08-07 ENCOUNTER — ALLSCRIPTS OFFICE VISIT (OUTPATIENT)
Dept: OTHER | Facility: OTHER | Age: 57
End: 2017-08-07

## 2017-08-07 DIAGNOSIS — Z12.31 ENCOUNTER FOR SCREENING MAMMOGRAM FOR MALIGNANT NEOPLASM OF BREAST: ICD-10-CM

## 2017-08-07 DIAGNOSIS — R73.09 OTHER ABNORMAL GLUCOSE: ICD-10-CM

## 2017-08-07 DIAGNOSIS — R92.8 OTHER ABNORMAL AND INCONCLUSIVE FINDINGS ON DIAGNOSTIC IMAGING OF BREAST: ICD-10-CM

## 2017-08-07 LAB
EST. AVERAGE GLUCOSE BLD GHB EST-MCNC: 134 MG/DL
HBA1C MFR BLD: 6.3 % (ref 4.2–6.3)

## 2017-08-07 PROCEDURE — 36415 COLL VENOUS BLD VENIPUNCTURE: CPT

## 2017-08-07 PROCEDURE — 83036 HEMOGLOBIN GLYCOSYLATED A1C: CPT

## 2017-08-09 ENCOUNTER — TRANSCRIBE ORDERS (OUTPATIENT)
Dept: ADMINISTRATIVE | Facility: HOSPITAL | Age: 57
End: 2017-08-09

## 2017-08-09 DIAGNOSIS — G47.33 OBSTRUCTIVE SLEEP APNEA (ADULT) (PEDIATRIC): Primary | ICD-10-CM

## 2017-08-16 ENCOUNTER — HOSPITAL ENCOUNTER (OUTPATIENT)
Dept: SLEEP CENTER | Facility: CLINIC | Age: 57
Discharge: HOME | End: 2017-08-16
Payer: COMMERCIAL

## 2017-08-16 ENCOUNTER — TRANSCRIBE ORDERS (OUTPATIENT)
Dept: SLEEP CENTER | Facility: CLINIC | Age: 57
End: 2017-08-16

## 2017-08-16 DIAGNOSIS — G47.33 OBSTRUCTIVE SLEEP APNEA (ADULT) (PEDIATRIC): ICD-10-CM

## 2017-08-16 DIAGNOSIS — J44.9 OSA AND COPD OVERLAP SYNDROME (HCC): Primary | ICD-10-CM

## 2017-08-16 DIAGNOSIS — G47.33 OSA AND COPD OVERLAP SYNDROME (HCC): Primary | ICD-10-CM

## 2017-08-18 ENCOUNTER — TRANSCRIBE ORDERS (OUTPATIENT)
Dept: SLEEP CENTER | Facility: CLINIC | Age: 57
End: 2017-08-18

## 2017-08-22 ENCOUNTER — HOSPITAL ENCOUNTER (OUTPATIENT)
Dept: RADIOLOGY | Age: 57
Discharge: HOME | End: 2017-08-22
Payer: COMMERCIAL

## 2017-08-22 DIAGNOSIS — Z12.31 ENCOUNTER FOR SCREENING MAMMOGRAM FOR MALIGNANT NEOPLASM OF BREAST: ICD-10-CM

## 2017-08-22 PROCEDURE — G0202 SCR MAMMO BI INCL CAD: HCPCS

## 2017-08-28 ENCOUNTER — ALLSCRIPTS OFFICE VISIT (OUTPATIENT)
Dept: OTHER | Facility: OTHER | Age: 57
End: 2017-08-28

## 2017-08-28 ENCOUNTER — ANESTHESIA EVENT (OUTPATIENT)
Dept: GASTROENTEROLOGY | Facility: MEDICAL CENTER | Age: 57
End: 2017-08-28
Payer: COMMERCIAL

## 2017-08-28 RX ORDER — VENLAFAXINE 100 MG/1
150 TABLET ORAL
COMMUNITY
End: 2020-03-16 | Stop reason: ALTCHOICE

## 2017-08-28 RX ORDER — IBUPROFEN 400 MG/1
400 TABLET ORAL 3 TIMES DAILY PRN
COMMUNITY
End: 2020-03-16 | Stop reason: ALTCHOICE

## 2017-08-29 ENCOUNTER — HOSPITAL ENCOUNTER (OUTPATIENT)
Facility: MEDICAL CENTER | Age: 57
Setting detail: OUTPATIENT SURGERY
Discharge: HOME | End: 2017-08-29
Attending: INTERNAL MEDICINE | Admitting: INTERNAL MEDICINE
Payer: COMMERCIAL

## 2017-08-29 ENCOUNTER — ANESTHESIA (OUTPATIENT)
Dept: GASTROENTEROLOGY | Facility: MEDICAL CENTER | Age: 57
End: 2017-08-29
Payer: COMMERCIAL

## 2017-08-29 ENCOUNTER — GENERIC CONVERSION - ENCOUNTER (OUTPATIENT)
Dept: GASTROENTEROLOGY | Facility: MEDICAL CENTER | Age: 57
End: 2017-08-29

## 2017-08-29 VITALS
TEMPERATURE: 96.2 F | RESPIRATION RATE: 15 BRPM | WEIGHT: 150 LBS | DIASTOLIC BLOOD PRESSURE: 67 MMHG | OXYGEN SATURATION: 99 % | BODY MASS INDEX: 27.6 KG/M2 | SYSTOLIC BLOOD PRESSURE: 138 MMHG | HEART RATE: 80 BPM | HEIGHT: 62 IN

## 2017-08-29 DIAGNOSIS — K57.92 DIVERTICULITIS OF INTESTINE WITHOUT PERFORATION OR ABSCESS WITHOUT BLEEDING: ICD-10-CM

## 2017-08-29 DIAGNOSIS — A04.8 HELICOBACTER PYLORI INFECTION: ICD-10-CM

## 2017-08-29 DIAGNOSIS — R10.13 EPIGASTRIC PAIN: ICD-10-CM

## 2017-08-29 PROCEDURE — 88342 IMHCHEM/IMCYTCHM 1ST ANTB: CPT | Performed by: INTERNAL MEDICINE

## 2017-08-29 PROCEDURE — 88305 TISSUE EXAM BY PATHOLOGIST: CPT | Performed by: INTERNAL MEDICINE

## 2017-08-29 RX ORDER — SODIUM CHLORIDE 9 MG/ML
125 INJECTION, SOLUTION INTRAVENOUS CONTINUOUS
Status: DISCONTINUED | OUTPATIENT
Start: 2017-08-29 | End: 2017-08-29 | Stop reason: HOSPADM

## 2017-08-29 RX ORDER — PROPOFOL 10 MG/ML
INJECTION, EMULSION INTRAVENOUS AS NEEDED
Status: DISCONTINUED | OUTPATIENT
Start: 2017-08-29 | End: 2017-08-29 | Stop reason: SURG

## 2017-08-29 RX ADMIN — PROPOFOL 50 MG: 10 INJECTION, EMULSION INTRAVENOUS at 13:57

## 2017-08-29 RX ADMIN — SODIUM CHLORIDE 125 ML/HR: 0.9 INJECTION, SOLUTION INTRAVENOUS at 12:49

## 2017-08-29 RX ADMIN — PROPOFOL 200 MG: 10 INJECTION, EMULSION INTRAVENOUS at 13:50

## 2017-08-29 RX ADMIN — PROPOFOL 50 MG: 10 INJECTION, EMULSION INTRAVENOUS at 14:05

## 2017-08-31 ENCOUNTER — HOSPITAL ENCOUNTER (OUTPATIENT)
Dept: MAMMOGRAPHY | Facility: CLINIC | Age: 57
Discharge: HOME | End: 2017-08-31
Payer: COMMERCIAL

## 2017-08-31 ENCOUNTER — HOSPITAL ENCOUNTER (OUTPATIENT)
Dept: ULTRASOUND IMAGING | Facility: CLINIC | Age: 57
Discharge: HOME | End: 2017-08-31
Payer: COMMERCIAL

## 2017-08-31 DIAGNOSIS — R92.8 OTHER ABNORMAL AND INCONCLUSIVE FINDINGS ON DIAGNOSTIC IMAGING OF BREAST: ICD-10-CM

## 2017-08-31 PROCEDURE — G0279 TOMOSYNTHESIS, MAMMO: HCPCS

## 2017-08-31 PROCEDURE — G0206 DX MAMMO INCL CAD UNI: HCPCS

## 2017-09-08 ENCOUNTER — GENERIC CONVERSION - ENCOUNTER (OUTPATIENT)
Dept: OTHER | Facility: OTHER | Age: 57
End: 2017-09-08

## 2017-09-12 ENCOUNTER — GENERIC CONVERSION - ENCOUNTER (OUTPATIENT)
Dept: OTHER | Facility: OTHER | Age: 57
End: 2017-09-12

## 2017-09-28 ENCOUNTER — ALLSCRIPTS OFFICE VISIT (OUTPATIENT)
Dept: OTHER | Facility: OTHER | Age: 57
End: 2017-09-28

## 2017-10-24 NOTE — PROGRESS NOTES
Assessment  1  Abdominal pain, epigastric (789 06) (R10 13)   2  Helicobacter pylori infection (041 86) (A04 8)    Plan  Abdominal pain, epigastric    · EGD; Status:Active; Requested for:31Oct2016;    Perform:[facility]; Order Comments:west; THM:[id number]; Last Updated By:Savage Nash; 8/28/2017 10:21:06 AM;Ordered; For:Abdominal pain, epigastric; Ordered By:Bart Espinoza;  Abdominal pain, epigastric, Helicobacter pylori infection    · Follow Up After Tests Complete Evaluation and Treatment  Follow-up  Status: Hold For -  Scheduling  Requested for: 52Ttu0898   Ordered; For: Abdominal pain, epigastric, Helicobacter pylori infection; Ordered By: Claus Carrion Performed:  Due: 80JFB7321   · EGD; Status:Hold For - Scheduling; Requested for:47Nsl8207;    Perform:[facility]; POI:15XWA5302;IXPXAZR; For:Abdominal pain, epigastric, Helicobacter pylori infection; Ordered By:Bart Espinoza;  Acute diverticulitis    · MoviPrep 100 GM Oral Solution Reconstituted; USE AS DIRECTED   Rx By: Claus Carrion; Dispense: 0 Days ; #:1 Solution Reconstituted; Refill: 0;For: Acute diverticulitis; RODO = N; Verified Transmission to [address] ; Last Updated By: System, SureScriideeli; 8/28/2017 10:14:53 AM   · COLONOSCOPY (GI, SURG); Status:Active; Requested for:80Bfb9109;    Perform:[facility]; Order Comments:west; Due:96Yfo8656; Last Updated Chetna Canas; 8/28/2017 10:21:06 AM;Ordered; For:Acute diverticulitis; Ordered By:Radha Espinoza;    Discussion/Summary  Discussion Summary:   #1 Epigastric pain/Hx H pylori: Her epigastric pain could be due to recurrent Helicobacter pylori infection, peptic ulcer disease, reflux esophagitis or hiatal hernia  I will schedule her for an upper endoscopy to evaluate further  I also asked her to continue taking the omeprazole daily  Hx rectal bleeding: She was found to have hemorrhoids during her recent colonoscopy   Fortunately her rectal bleeding has resolved  Diverticulitis: She was diagnosed with acute diverticulitis and treated with antibiotics  I will schedule her for a repeat colonoscopy to evaluate for diverticulosis and to rule out for colon polyps, colon cancer, and Crohn's disease  Chief Complaint  Chief Complaint Free Text Note Form: follow up for stomach issues   Chief Complaint Chronic Condition [facility]: Patient is here today for follow up of chronic conditions described in HPI  History of Present Illness  HPI: She presents for followup of her epigastric pain and after a recent diagnosis of acute diverticulitis  She said this pain is associated with some reflux and nausea but no vomiting  She also complains of bloating and her previous lower abdominal pain and rectal bleeding have resolved  She had a colonoscopy performed last year which revealed hemorrhoids and diverticulosis but no polyps  She believes she had an upper endoscopy many years ago  She denies any dysphagia or weight loss  did not follow up for her scheduled upper endoscopy last year because she could not get a ride  In February 2017, she was diagnosed with acute diverticulitis which was treated with antibiotics and her symptoms completely resolved  History Reviewed: The history was obtained today from the patient and I agree with the documented history  Review of Systems  Complete-Female GI Adult:   Constitutional: No fever, no chills, feels well, no tiredness, no recent weight gain or weight loss  Eyes: eyesight problems, but-- No complaints of eye pain, no red eyes, no eyesight problems, no discharge, no dry eyes, no itching of eyes,-- no eye pain,-- no dryness of the eyes,-- eyes not red,-- no purulent discharge from the eyes-- and-- no itching of the eyes  ENT: no complaints of earache, no loss of hearing, no nose bleeds, no nasal discharge, no sore throat, no hoarseness     Cardiovascular: No complaints of slow heart rate, no fast heart rate, no chest pain, no palpitations, no leg claudication, no lower extremity edema  Respiratory: shortness of breath, but-- No complaints of shortness of breath, no wheezing, no cough, no SOB on exertion, no orthopnea, no PND,-- no cough,-- no orthopnea,-- no wheezing,-- no shortness of breath during exertion-- and-- no PND  Gastrointestinal: abdominal pain-- and-- hemorrhoids, but-- No complaints of abdominal pain, no constipation, no nausea or vomiting, no diarrhea, no bloody stools,-- no nausea,-- no vomiting,-- no constipation,-- no diarrhea-- and-- no blood in stools  Genitourinary: No complaints of dysuria, no incontinence, no pelvic pain, no dysmenorrhea, no vaginal discharge or bleeding  Musculoskeletal: No complaints of arthralgias, no myalgias, no joint swelling or stiffness, no limb pain or swelling  Integumentary: No complaints of skin rash or lesions, no itching, no skin wounds, no breast pain or lump  Neurological: No complaints of headache, no confusion, no convulsions, no numbness, no dizziness or fainting, no tingling, no limb weakness, no difficulty walking  Psychiatric: Not suicidal, no sleep disturbance, no anxiety or depression, no change in personality, no emotional problems  Endocrine: No complaints of proptosis, no hot flashes, no muscle weakness, no deepening of the voice, no feelings of weakness  Hematologic/Lymphatic: No complaints of swollen glands, no swollen glands in the neck, does not bleed easily, does not bruise easily  ROS Reviewed:   ROS reviewed  Active Problems  1  Abdominal pain, epigastric (789 06) (R10 13)   2  Abnormal mammogram (793 80) (R92 8)   3  Abnormal serum glucose level (790 29) (R73 09)   4  Acute diverticulitis (562 11) (K57 92)   5  Arm pain, anterior, right (729 5) (M79 601)   6  Chronic gastritis (535 10) (K29 50)   7  Colon, diverticulosis (562 10) (K57 30)   8  Constipation (564 00) (K59 00)   9   Encounter for routine gynecological examination (V72 31) (Z01 419)   10  Encounter for screening colonoscopy (V76 51) (Z12 11)   11  Encounter for screening mammogram for malignant neoplasm of breast (V76 12)    (Z12 31)   12  Fatigue (780 79) (R53 83)   13  Helicobacter pylori infection (041 86) (A04 8)   14  History of rectal bleeding (V12 79) (Z87 19)   15  Hospital discharge follow-up (V67 59) (Z09)   16  Incomplete bladder emptying (788 21) (R33 9)   17  Influenza vaccine needed (V04 81) (Z23)   18  Muscle spasm (728 85) (M62 838)   19  Nail abnormalities (703 9) (L60 9)   20  Nasal mucosa dry (478 19) (J34 89)   21  Obesity (278 00) (E66 9)   22  Obstructive sleep apnea (327 23) (G47 33)   23  Osteoarthritis of knee (715 36) (M17 10)   24  Seborrheic keratosis (702 19) (L82 1)    Past Medical History  1  History of Complaint Of Allergic Reaction Seasonal   2  History of Depression with anxiety (300 4) (F41 8)   3  History of Elective  (635 90)   4  History of Helicobacter infection (V12 09) (Z86 19)   5  Normal delivery (650) (O80,Z37 9)   6  Seborrheic keratosis (702 19) (L82 1)   7  History of Urge incontinence of urine (788 31) (N39 41)   8  History of Urinary frequency (788 41) (R35 0)   9  History of Vaginal Itching Or Burning (625 8)  Active Problems And Past Medical History Reviewed: The active problems and past medical history were reviewed and updated today  Surgical History  1  History of Abdominoplasty   2  History of Tubal Ligation  Surgical History Reviewed: The surgical history was reviewed and updated today  Family History  Mother    1  Denied: Family history of Colon cancer   2  Family history of Diabetes Mellitus (V18 0)   3  Denied: Family history of colitis   4  Denied: Family history of colonic polyps   5  Denied: Family history of Crohn's disease   6  Denied: Family history of liver disease   7  Family history of Hypertension (V17 49)   8  Family history of Reported Family History Of Heart Disease  Father    5  Denied: Family history of Colon cancer   10  Denied: Family history of colitis   11  Denied: Family history of colonic polyps   12  Denied: Family history of Crohn's disease   15  Denied: Family history of liver disease  Paternal Grandfather    15  Family history of Stomach cancer  Family History Reviewed: The family history was reviewed and updated today  Social History   · Never A Smoker   · No alcohol use   · No drug use  Social History Reviewed: The social history was reviewed and updated today  Current Meds   1  ClonazePAM 1 MG Oral Tablet; Therapy: (Recorded:21Lbd3933) to Recorded   2  GaviLyte-G 236 GM Oral Solution Reconstituted; Therapy: 88VEH0094 to Recorded   3  Ibuprofen 400 MG Oral Tablet; tid with meals prn for pain; Therapy: 43Zfx0879 to (Evaluate:19Iki0573)  Requested for: 15Ytj1959; Last   Rx:94Xtg4834 Ordered   4  Omeprazole 20 MG Oral Capsule Delayed Release; take 1 capsule by mouth once daily; Therapy: 46VLU5547 to (Evaluate:71Vuk0667)  Requested for: 51Hai5663; Last   Rx:28Baj9732 Ordered   5  PARoxetine HCl - 40 MG Oral Tablet; Therapy: 57SXR7605 to Recorded   6  RisperiDONE 2 MG Oral Tablet; Therapy: 65LOE2667 to Recorded   7  Saline Nasal Spray 0 65 % Nasal Solution; USE 1 SPRAY IN EACH NOSTRIL TWICE   DAILY; Therapy: 01PAY9379 to (Last Rx:79Gee0694)  Requested for: 52XZL4710 Ordered   8  Tums Ultra 1000 CHEW; CHEW 1 TABLET Every 6 hours; Therapy: 45ELY1032 to  Requested for: 43Mfc8032 Recorded   9  Venlafaxine HCl  MG Oral Capsule Extended Release 24 Hour; Therapy: 44Ihx4142 to Recorded  Medication List Reviewed: The medication list was reviewed and updated today  Allergies  1   No Known Drug Allergies    Vitals  Vital Signs    Recorded: 28Aug2017 09:10AM   Temperature 97 2 F, Tympanic   Heart Rate 72   Systolic 929, LUE, Sitting   Diastolic 66, LUE, Sitting   Height 5 ft 0 5 in   Weight 160 lb    BMI Calculated 30 73   BSA Calculated 1 71   O2 Saturation 97     Physical Exam    Constitutional   General appearance: No acute distress, well appearing and well nourished  Eyes   Conjunctiva and lids: No swelling, erythema or discharge  Pupils and irises: Equal, round and reactive to light  Ears, Nose, Mouth, and Throat   External inspection of ears and nose: Normal     Nasal mucosa, septum, and turbinates: Normal without edema or erythema  Oropharynx: Normal with no erythema, edema, exudate or lesions  Pulmonary   Respiratory effort: No increased work of breathing or signs of respiratory distress  Auscultation of lungs: Clear to auscultation  Cardiovascular   Auscultation of heart: Normal rate and rhythm, normal S1 and S2, without murmurs  Examination of extremities for edema and/or varicosities: Normal     Abdomen   Abdomen: Abnormal  -- tender epigastrium  Liver and spleen: No hepatomegaly or splenomegaly  Lymphatic   Palpation of lymph nodes in neck: No lymphadenopathy  Musculoskeletal   Gait and station: Normal     Digits and nails: Normal without clubbing or cyanosis  Inspection/palpation of joints, bones, and muscles: Normal     Skin   Skin and subcutaneous tissue: Normal without rashes or lesions  Psychiatric   Orientation to person, place, and time: Normal     Mood and affect: Normal          Health Management  Encounter for screening colonoscopy   COLONOSCOPY; every 10 years; Last 11Aug2016; Next Due: 11Aug2026;  Active    Future Appointments    Date/Time Provider Specialty Site   08/29/2017 02:00 PM Shelby Griffin MD Gastroenterology Adult [address] ENDOSCOPY     Signatures   Electronically signed by : Miranda Louis MD; Aug 28 2017 12:09PM EST                       (Author)

## 2017-10-27 NOTE — PROGRESS NOTES
Assessment  1  Abdominal pain, epigastric (789 06) (R10 13)   2  History of rectal bleeding (V12 79) (Z87 19)   3  Adenomatous colon polyp (211 3) (D12 6)    Plan  Abdominal pain, epigastric, Adenomatous colon polyp, History of rectal bleeding    · Follow-up visit in 3 months Evaluation and Treatment  Follow-up  Status: Hold For -  Scheduling  Requested for: 65CNF4968   Ordered; For: Abdominal pain, epigastric, Adenomatous colon polyp, History of rectal bleeding; Ordered By: Erich Zendejas Performed:  Due: 31JCL2974   · Follow-up visit in 6 months Evaluation and Treatment  Follow-up NM  Status: Hold For -  Scheduling  Requested for: 00CPN9903   Ordered; For: Abdominal pain, epigastric, Adenomatous colon polyp, History of rectal bleeding; Ordered By: Erich Zendejas Performed:  Due: 49HDN1219    Discussion/Summary  Discussion Summary:   #1 Epigastric pain/Hx H pylori: Her epigastric pain was probably due to functional dyspepsia or gastritis  Fortunately it has resolved on the omeprazole  I have asked her to continue taking omeprazole daily  Hx rectal bleeding: She was found to have hemorrhoids during her recent colonoscopy  Fortunately her rectal bleeding has resolved  Diverticulitis: She was diagnosed with acute diverticulitis and treated with antibiotics  She was noted to have diverticulosis on colonoscopy and there is no evidence of Crohn's disease or colon cancer  Serrated adenoma: She is due for her next colonoscopy in 5 years  Chief Complaint  Chief Complaint Free Text Note Form: Pt follow up after EGD;complains of abdominal pain  History of Present Illness  HPI: She presents for follow-up after her recent upper endoscopy and colonoscopy because of epigastric pain, history of diverticulitis, history of Helicobacter pylori infection, and rectal bleeding  She was found to have hemorrhoids, diverticulosis, a serrated adenoma that was removed from her colon, and gastritis   Biopsies were negative for Helicobacter pylori infection  Overall she is feeling better and has been taking omeprazole daily  She denies any new complaints and feels the bleeding and epigastric pain have resolved  History Reviewed: The history was obtained today from the patient and I agree with the documented history  Review of Systems  Complete-Female GI Adult:   Constitutional: No fever, no chills, feels well, no tiredness, no recent weight gain or weight loss  Eyes: eyesight problems, but-- No complaints of eye pain, no red eyes, no eyesight problems, no discharge, no dry eyes, no itching of eyes,-- no eye pain,-- no dryness of the eyes,-- eyes not red,-- no purulent discharge from the eyes-- and-- no itching of the eyes  ENT: no complaints of earache, no loss of hearing, no nose bleeds, no nasal discharge, no sore throat, no hoarseness  Cardiovascular: No complaints of slow heart rate, no fast heart rate, no chest pain, no palpitations, no leg claudication, no lower extremity edema  Respiratory: shortness of breath, but-- No complaints of shortness of breath, no wheezing, no cough, no SOB on exertion, no orthopnea, no PND,-- no cough,-- no orthopnea,-- no wheezing,-- no shortness of breath during exertion-- and-- no PND  Gastrointestinal: abdominal pain-- and-- hemorrhoids, but-- No complaints of abdominal pain, no constipation, no nausea or vomiting, no diarrhea, no bloody stools,-- no nausea,-- no vomiting,-- no constipation,-- no diarrhea-- and-- no blood in stools  Genitourinary: No complaints of dysuria, no incontinence, no pelvic pain, no dysmenorrhea, no vaginal discharge or bleeding  Musculoskeletal: No complaints of arthralgias, no myalgias, no joint swelling or stiffness, no limb pain or swelling  Integumentary: No complaints of skin rash or lesions, no itching, no skin wounds, no breast pain or lump     Neurological: No complaints of headache, no confusion, no convulsions, no numbness, no dizziness or fainting, no tingling, no limb weakness, no difficulty walking  Psychiatric: Not suicidal, no sleep disturbance, no anxiety or depression, no change in personality, no emotional problems  Endocrine: No complaints of proptosis, no hot flashes, no muscle weakness, no deepening of the voice, no feelings of weakness  Hematologic/Lymphatic: No complaints of swollen glands, no swollen glands in the neck, does not bleed easily, does not bruise easily  ROS Reviewed:   ROS reviewed  Active Problems  1  Abdominal pain, epigastric (789 06) (R10 13)   2  Abnormal mammogram (793 80) (R92 8)   3  Abnormal serum glucose level (790 29) (R73 09)   4  Acute diverticulitis (562 11) (K57 92)   5  Arm pain, anterior, right (729 5) (M79 601)   6  Chronic gastritis (535 10) (K29 50)   7  Colon, diverticulosis (562 10) (K57 30)   8  Constipation (564 00) (K59 00)   9  Encounter for routine gynecological examination (V72 31) (Z01 419)   10  Encounter for screening colonoscopy (V76 51) (Z12 11)   11  Encounter for screening mammogram for malignant neoplasm of breast (V76 12)    (Z12 31)   12  Fatigue (780 79) (R53 83)   13  Helicobacter pylori infection (041 86) (A04 8)   14  History of rectal bleeding (V12 79) (Z87 19)   15  Hospital discharge follow-up (V67 59) (Z09)   16  Incomplete bladder emptying (788 21) (R33 9)   17  Influenza vaccine needed (V04 81) (Z23)   18  Muscle spasm (728 85) (M62 838)   19  Nail abnormalities (703 9) (L60 9)   20  Nasal mucosa dry (478 19) (J34 89)   21  Obesity (278 00) (E66 9)   22  Obstructive sleep apnea (327 23) (G47 33)   23  Osteoarthritis of knee (715 36) (M17 10)   24  Seborrheic keratosis (702 19) (L82 1)    Past Medical History  1  History of Complaint Of Allergic Reaction Seasonal   2  History of Depression with anxiety (300 4) (F41 8)   3  History of Elective  (635 90)   4  History of Helicobacter infection (V12 09) (Z86 19)   5  Normal delivery (650) (O80,Z37 9)   6  Seborrheic keratosis (702 19) (L82 1)   7  History of Urge incontinence of urine (788 31) (N39 41)   8  History of Urinary frequency (788 41) (R35 0)   9  History of Vaginal Itching Or Burning (625 8)  Active Problems And Past Medical History Reviewed: The active problems and past medical history were reviewed and updated today  Surgical History  1  History of Abdominoplasty   2  History of Tubal Ligation  Surgical History Reviewed: The surgical history was reviewed and updated today  Family History  Mother    1  Denied: Family history of Colon cancer   2  Family history of Diabetes Mellitus (V18 0)   3  Denied: Family history of colitis   4  Denied: Family history of colonic polyps   5  Denied: Family history of Crohn's disease   6  Denied: Family history of liver disease   7  Family history of Hypertension (V17 49)   8  Family history of Reported Family History Of Heart Disease  Father    5  Denied: Family history of Colon cancer   10  Denied: Family history of colitis   11  Denied: Family history of colonic polyps   12  Denied: Family history of Crohn's disease   15  Denied: Family history of liver disease  Paternal Grandfather    15  Family history of Stomach cancer  Family History Reviewed: The family history was reviewed and updated today  Social History   · Never A Smoker   · No alcohol use   · No drug use  Social History Reviewed: The social history was reviewed and updated today  Current Meds   1  ClonazePAM 1 MG Oral Tablet; Therapy: (Recorded:14Ega1568) to Recorded   2  GaviLyte-G 236 GM Oral Solution Reconstituted; Therapy: 02KNH3665 to Recorded   3  Ibuprofen 400 MG Oral Tablet; tid with meals prn for pain; Therapy: 08Gyi6520 to (Evaluate:71Ngr7289)  Requested for: 07Aug2017; Last   Rx:07Aug2017 Ordered   4  Omeprazole 20 MG Oral Capsule Delayed Release; take 1 capsule by mouth once daily;    Therapy: 15SQZ0926 to (Evaluate:57Tut1374)  Requested for: 23Aug2017; Last Rx: 69TLN8598 Ordered   5  PARoxetine HCl - 40 MG Oral Tablet; Therapy: 33VWZ9494 to Recorded   6  RisperiDONE 2 MG Oral Tablet; Therapy: 34MXK2806 to Recorded   7  Saline Nasal Spray 0 65 % Nasal Solution; USE 1 SPRAY IN EACH NOSTRIL TWICE   DAILY; Therapy: 10QEJ8880 to (Last Rx:47Ukc4594)  Requested for: 99TMP1635 Ordered   8  Tums Ultra 1000 CHEW; CHEW 1 TABLET Every 6 hours; Therapy: 05PIY2897 to  Requested for: 75Tce4346 Recorded   9  Venlafaxine HCl  MG Oral Capsule Extended Release 24 Hour; Therapy: 80Orv5478 to Recorded  Medication List Reviewed: The medication list was reviewed and updated today  Allergies  1  No Known Drug Allergies    Vitals  Vital Signs    Recorded: 43BSQ4089 09:13AM   Temperature 98 1 F   Heart Rate 74   Systolic 386, LUE, Sitting   Diastolic 78, LUE, Sitting   Height 5 ft 0 5 in   Weight 160 lb    BMI Calculated 30 73   BSA Calculated 1 71   O2 Saturation 97, RA     Physical Exam    Constitutional   General appearance: No acute distress, well appearing and well nourished  Eyes   Conjunctiva and lids: No swelling, erythema or discharge  Pupils and irises: Equal, round and reactive to light  Ears, Nose, Mouth, and Throat   External inspection of ears and nose: Normal     Nasal mucosa, septum, and turbinates: Normal without edema or erythema  Oropharynx: Normal with no erythema, edema, exudate or lesions  Pulmonary   Respiratory effort: No increased work of breathing or signs of respiratory distress  Auscultation of lungs: Clear to auscultation  Cardiovascular   Auscultation of heart: Normal rate and rhythm, normal S1 and S2, without murmurs  Examination of extremities for edema and/or varicosities: Normal     Abdomen   Abdomen: Abnormal  -- tender epigastrium  Liver and spleen: No hepatomegaly or splenomegaly  Lymphatic   Palpation of lymph nodes in neck: No lymphadenopathy      Musculoskeletal   Gait and station: Normal     Digits and nails: Normal without clubbing or cyanosis  Inspection/palpation of joints, bones, and muscles: Normal     Skin   Skin and subcutaneous tissue: Normal without rashes or lesions  Psychiatric   Orientation to person, place, and time: Normal     Mood and affect: Normal          Health Management  Encounter for screening colonoscopy   COLONOSCOPY; every 5 years; Last 29Aug2017; Next Due: 11Aug2021;  Active    Future Appointments    Date/Time Provider Specialty Site   11/14/2017 09:15 AM Susannah Gomes,  Internal Medicine [address] PCP     Signatures   Electronically signed by : Rahgav Spring MD; Sep 28 2017  9:54AM EST                       (Author)

## 2017-11-14 ENCOUNTER — ALLSCRIPTS OFFICE VISIT (OUTPATIENT)
Dept: OTHER | Facility: OTHER | Age: 57
End: 2017-11-14

## 2017-11-14 ENCOUNTER — HOSPITAL ENCOUNTER (OUTPATIENT)
Dept: SLEEP CENTER | Facility: CLINIC | Age: 57
Discharge: HOME | End: 2017-11-14
Payer: COMMERCIAL

## 2017-11-14 ENCOUNTER — TRANSCRIBE ORDERS (OUTPATIENT)
Dept: SLEEP CENTER | Facility: CLINIC | Age: 57
End: 2017-11-14

## 2017-11-14 ENCOUNTER — APPOINTMENT (OUTPATIENT)
Dept: LAB | Facility: CLINIC | Age: 57
End: 2017-11-14
Payer: COMMERCIAL

## 2017-11-14 DIAGNOSIS — G47.33 OSA (OBSTRUCTIVE SLEEP APNEA): Primary | ICD-10-CM

## 2017-11-14 DIAGNOSIS — J44.9 OSA AND COPD OVERLAP SYNDROME (HCC): ICD-10-CM

## 2017-11-14 DIAGNOSIS — R73.09 OTHER ABNORMAL GLUCOSE: ICD-10-CM

## 2017-11-14 DIAGNOSIS — G47.33 OSA AND COPD OVERLAP SYNDROME (HCC): ICD-10-CM

## 2017-11-14 LAB
EST. AVERAGE GLUCOSE BLD GHB EST-MCNC: 131 MG/DL
HBA1C MFR BLD: 6.2 % (ref 4.2–6.3)

## 2017-11-14 PROCEDURE — 36415 COLL VENOUS BLD VENIPUNCTURE: CPT

## 2017-11-14 PROCEDURE — 83036 HEMOGLOBIN GLYCOSYLATED A1C: CPT

## 2017-11-14 NOTE — PROGRESS NOTES
Progress Note - Sleep Center   Magdiel Mayers [facility]:[birth year] MRN: [id number]      Reason for Visit:    61 y  o female with gum and dental pain when she awakens after using her CPAP    Assessment:  I suspect that mouth dryness is responsible  The patient has not been using the higher settings on her humidifier  I instructed her to do so  I also recommended that she try Biotene mouth rinse  Consideration may be given to using a nasal mask with chin strap to keep her mouth from drying out  Plan:  The patient will call in 2 weeks if she is no better  Follow up:  3 months    History of Present Illness: The patient is dentist noticed that her gums were red and her teeth hurt after using CPAP  She uses an [employer] full face mask  She sleeps with her mouth open  She does complain of dryness in the morning  Historical Information    Past Medical History:   Past Medical History:   Diagnosis Date    Anxiety     Anxiety     Arthritis     Depression     Depression     Diverticulosis     GERD (gastroesophageal reflux disease)     Helicobacter pylori infection     Hyperlipidemia     Sleep apnea     Sleep apnea          Past Surgical History:   Past Surgical History:   Procedure Laterality Date    ABDOMINOPLASTY      abdomin    TN COLONOSCOPY FLX DX W/COLLJ SPEC WHEN PFRMD N/A 8/11/2016    Procedure: COLONOSCOPY;  Surgeon: Miranda Louis MD;  Location:  GI LAB; Service: Gastroenterology    TN COLONOSCOPY FLX DX W/COLLJ [facility] WHEN PFRMD N/A 8/29/2017    Procedure: EGD AND COLONOSCOPY;  Surgeon: Miranda Louis MD;  Location: [facility] GI LAB; Service: Gastroenterology    TUBAL LIGATION      TUBAL LIGATION           Social History - see chart  History   Alcohol use: Not on file     History   Drug Use Not on file     History   Smoking Status    Not on file   Smokeless Tobacco    Not on file     Family History: No family history on file      Medications/Allergies:      Current Outpatient Prescriptions:     calcium carbonate (TUMS ULTRA) 1000 MG chewable tablet, Chew 1 tablet every 6 (six) hours as needed for indigestion or heartburn  , Disp: , Rfl:     clonazePAM (KlonoPIN) 1 mg tablet, Take 1 mg by mouth daily at bedtime as needed for anxiety  , Disp: , Rfl:     cyclobenzaprine (FLEXERIL) 5 mg tablet, Take 1 tablet by mouth 3 (three) times a day as needed for muscle spasms, Disp: 10 tablet, Rfl: 0    ibuprofen (MOTRIN) 400 mg tablet, Take 400 mg by mouth 3 (three) times a day as needed for mild pain, Disp: , Rfl:     naproxen (NAPROSYN) 500 mg tablet, Take 1 tablet by mouth 2 (two) times a day with meals, Disp: 30 tablet, Rfl: 0    omeprazole (PriLOSEC) 20 mg delayed release capsule, Take 20 mg by mouth daily  , Disp: , Rfl:     PARoxetine (PAXIL) 40 MG tablet, Take 40 mg by mouth every morning , Disp: , Rfl:     polyethylene glycol (GAVILYTE-G) 4000 mL solution, Take by mouth once, Disp: , Rfl:     ranitidine (ZANTAC) 150 mg tablet, Take 150 mg by mouth daily  , Disp: , Rfl:     risperiDONE (RisperDAL) 2 mg tablet, Take 2 mg by mouth 2 (two) times a day , Disp: , Rfl:     Saline 0 65 % SOLN, 0 65 % into each nostril daily, Disp: , Rfl:     traZODone (DESYREL) 150 mg tablet, Take 150 mg by mouth daily at bedtime  , Disp: , Rfl:     venlafaxine (EFFEXOR) 100 MG tablet, Take 150 mg by mouth, Disp: , Rfl:       Objective    Vital Signs:   See Chart    Physical Exam:    General: Alert, appropriate, cooperative, overweight    Head: NC/AT    Skin: Warm, dry    Neuro: No motor abnormalities, cranial nerves appear intact    Psych: Normal affect      Counseling / Coordination of Care  Total clinic time spent today 15 minutes  Greater than 50% of total time was spent with the patient and / or family counseling and / or coordination of care  A description of the counseling / coordination of care: treatment was discussed    JUNE Marcus    Board Certified Sleep Specialist

## 2017-11-15 NOTE — PROGRESS NOTES
Assessment    1  Obstructive sleep apnea (327 23) (G47 33)   2  Chronic gastritis (535 10) (K29 50)    Plan  Abnormal serum glucose level    · (1) HEMOGLOBIN A1C; Status:Active; Requested for:15Fxp0670;    · Follow-up visit in 6 months Evaluation and Treatment  Follow-up  Status: Hold For - Scheduling Requested for: 67QTF2909  Chronic gastritis    · Omeprazole 20 MG Oral Capsule Delayed Release; take 1 capsule by mouth once daily  Encounter for routine gynecological examination    · *1 - [facility] (Obstetrics/ Gynecology) Co-Management  *  Status: Hold For - Scheduling Requested for: 82HKP2362  Care Summary provided  : Yes  Influenza vaccine needed    · Fluzone Quadrivalent 0 5 ML Intramuscular Suspension Prefilled Syringe    Discussion/Summary  Discussion Summary:   Pre-DM: A1c 6 3% in August  Strong family history of DM  Will check lab A1c today  Gastritis- Per EGD 8/2017  EGD otherwise normal  C/t Protonix, sx currently well-controlled  Follows with GI  [facility]- uses CPAP nightly  Seeing Sleep medicine doctor today  colonoscopy done on 08/17: Serrated polyp removed  Repeat C-scope in 5 years  Diagnostic Mammogram 8/2017- scattered fibroglandular densities, otherwise normal  Follow-up in 1 year  Lipids June 2017 were normal  No history of hypertension  in 6 months  Counseling Documentation With Imm: The patient was counseled regarding diagnostic results,-- instructions for management,-- risks and benefits of treatment options,-- importance of compliance with treatment  total time of encounter was 25 minutes  Medication SE Review and Pt Understands Tx: Possible side effects of new medications were reviewed with the patient/guardian today  The treatment plan was reviewed with the patient/guardian   The patient/guardian understands and agrees with the treatment plan      Chief Complaint  Chief Complaint Free Text Note Form: Pt here for test result follow-up      History of Present Illness  HPI: Pt here for 4 month follow-up and for confirmation of her EGD, colonoscopy and diagnostic Mammogram results  She would also like to see OB/GYN for an annual visit  She was inquiring about her lipid panel from June as well  She has been using her CPAP nightly  She is feeling well  No new events since last visit  Hospital Based Practices Required Assessment:  Pain Assessment  the patient states they do not have pain  (on a scale of 0 to 10, the patient rates the pain at 0 )   Prefered Language is  Norwegian  Primary Language is  Norwegian  Review of Systems  Complete-Female:  Constitutional: no fever,-- not feeling poorly,-- no recent weight gain-- and-- no chills  Cardiovascular: no chest pain  Respiratory: no shortness of breath  Genitourinary: no pelvic pain  ROS Reviewed:   ROS reviewed  Active Problems  1  Abdominal pain, epigastric (789 06) (R10 13)   2  Abnormal serum glucose level (790 29) (R73 09)   3  Acute diverticulitis (562 11) (K57 92)   4  Adenomatous colon polyp (211 3) (D12 6)   5  Arm pain, anterior, right (729 5) (M79 601)   6  Chronic gastritis (535 10) (K29 50)   7  Colon, diverticulosis (562 10) (K57 30)   8  Constipation (564 00) (K59 00)   9  Encounter for routine gynecological examination (V72 31) (Z01 419)   10  Encounter for screening colonoscopy (V76 51) (Z12 11)   11  Encounter for screening mammogram for malignant neoplasm of breast (V76 12) (Z12 31)   12  Fatigue (780 79) (R53 83)   13  Helicobacter pylori infection (041 86) (A04 8)   14  History of rectal bleeding (V12 79) (Z87 19)   15  Hospital discharge follow-up (V67 59) (Z09)   16  Incomplete bladder emptying (788 21) (R33 9)   17  Influenza vaccine needed (V04 81) (Z23)   18  Muscle spasm (728 85) (M62 838)   19  Nail abnormalities (703 9) (L60 9)   20  Nasal mucosa dry (478 19) (J34 89)   21  Obesity (278 00) (E66 9)   22  Obstructive sleep apnea (327 23) (G47 33)   23  Osteoarthritis of knee (715 36) (M17 10)   24   Seborrheic keratosis (702 19) (L82 1)    Past Medical History  1  History of Complaint Of Allergic Reaction Seasonal   2  History of Depression with anxiety (300 4) (F41 8)   3  History of Elective  (635 90)   4  History of abnormal mammogram (V15 89) (Z20 325)   5  History of Helicobacter infection (V12 09) (Z86 19)   6  Normal delivery (650) (O80,Z37 9)   7  Seborrheic keratosis (702 19) (L82 1)   8  History of Urge incontinence of urine (788 31) (N39 41)   9  History of Urinary frequency (788 41) (R35 0)   10  History of Vaginal Itching Or Burning (625 8)  Active Problems And Past Medical History Reviewed: The active problems and past medical history were reviewed and updated today  Surgical History  1  History of Abdominoplasty   2  History of Tubal Ligation  Surgical History Reviewed: The surgical history was reviewed and updated today  Family History  Mother    1  Denied: Family history of Colon cancer   2  Family history of Diabetes Mellitus (V18 0)   3  Denied: Family history of colitis   4  Denied: Family history of colonic polyps   5  Denied: Family history of Crohn's disease   6  Denied: Family history of liver disease   7  Family history of Hypertension (V17 49)   8  Family history of Reported Family History Of Heart Disease  Father    5  Denied: Family history of Colon cancer   10  Denied: Family history of colitis   11  Denied: Family history of colonic polyps   12  Denied: Family history of Crohn's disease   15  Denied: Family history of liver disease  Paternal Grandfather    15  Family history of Stomach cancer  Family History Reviewed: The family history was reviewed and updated today  Social History     · Never A Smoker   · No alcohol use   · No drug use  Social History Reviewed: The social history was reviewed and updated today  The social history was reviewed and is unchanged  Current Meds   1  ClonazePAM 1 MG Oral Tablet; Therapy: (Recorded:15Tbk2142) to Recorded   2  GaviLyte-G 236 GM Oral Solution Reconstituted; Therapy: 97GVG0058 to Recorded   3  Ibuprofen 400 MG Oral Tablet; tid with meals prn for pain; Therapy: 94But4763 to (Evaluate:76Rme3857)  Requested for: 74Uai2593; Last Rx:07Uud1246 Ordered   4  Omeprazole 20 MG Oral Capsule Delayed Release; take 1 capsule by mouth once daily; Therapy: 38NXR0845 to (Evaluate:13Rld1309)  Requested for: 79Ljx6548; Last Rx:75Ifu9084 Ordered   5  PARoxetine HCl - 40 MG Oral Tablet; Therapy: 43NMO4666 to Recorded   6  RisperiDONE 2 MG Oral Tablet; Therapy: 15QDO1435 to Recorded   7  Saline Nasal Spray 0 65 % Nasal Solution; USE 1 SPRAY IN EACH NOSTRIL TWICE DAILY; Therapy: 63XFZ9635 to (Last Rx:01Stg3380)  Requested for: 78UME4221 Ordered   8  Tums Ultra 1000 CHEW; CHEW 1 TABLET Every 6 hours; Therapy: 08IMC2942 to  Requested for: 45Kzn2783 Recorded   9  Venlafaxine HCl  MG Oral Capsule Extended Release 24 Hour; Therapy: 57Sgr9556 to Recorded    Allergies  1  No Known Drug Allergies    Vitals  Vital Signs    Recorded: 76NUK4346 09:12AM   Temperature 97 9 F   Heart Rate 68   Systolic 531   Diastolic 70   Height 5 ft 0 5 in   Weight 162 lb 4 11 oz   BMI Calculated 31 17   BSA Calculated 1 72       Physical Exam   Constitutional  General appearance: Abnormal  -- Slightly overweight  Eyes  Conjunctiva and lids: No swelling, erythema or discharge  Ears, Nose, Mouth, and Throat  External inspection of ears and nose: Normal    Nasal mucosa, septum, and turbinates: Normal without edema or erythema  Oropharynx: Normal with no erythema, edema, exudate or lesions  Pulmonary  Auscultation of lungs: Clear to auscultation  Cardiovascular  Auscultation of heart: Normal rate and rhythm, normal S1 and S2, without murmurs  Skin  Skin and subcutaneous tissue: Normal without rashes or lesions     Psychiatric  Orientation to person, place, and time: Normal    Mood and affect: Normal          Health Management  Encounter for screening colonoscopy COLONOSCOPY; every 5 years; Last 29Aug2017; Next Due: 23Xmj2937; Active    Attending Note  Attending Note: Attending Note: I discussed the case with the Resident and reviewed the Resident's note,-- I supervised the Resident-- and-- I agree with the Resident management plan as it was presented to me  Level of Participation: I was present in clinic, but did not examine the patient  Patient's History: Patient is a 29-year-old female who presents for four-month follow-up and review of recent EGD colonoscopy and diagnostic mammogram  Patient also requesting gyn appointment  has been using CPAP as directed and has no acute complaints  Key Parts of the Exam: Recent A1c 6 3%has strong family history of type 2 diabetes mellitus  Diagnosis and Plan: 1  Pre diabetes-will recheck patient's A1c to see if her levels have progressedGastritis-per EGD symptoms currently well controlled on Protonix, patient to follow up with GIIsolated adenomatous polyp, sessile recent C scope patient have repeat colonoscopy in 5 yearsOSA patient to continue nightly CPAP usageHealth maintenance-recent diagnostic mammogram notable in for scattered fibroglandular changes 1 year follow-up recommended  I agree with the Resident's note        Future Appointments    Date/Time Provider Specialty Site   12/07/2017 01:00 PM Mauri Carlin [facility] G[facility]enterology Adult [facility] 9       Signatures   Electronically signed by : Lola Bledsoe DO; Nov 14 2017  9:55AM EST                       (Author)    Electronically signed by : JUNE Gonzalez ; Nov 14 2017 10:29AM EST                       (Author)

## 2017-12-04 ENCOUNTER — GENERIC CONVERSION - ENCOUNTER (OUTPATIENT)
Dept: OTHER | Facility: OTHER | Age: 57
End: 2017-12-04

## 2017-12-07 ENCOUNTER — ALLSCRIPTS OFFICE VISIT (OUTPATIENT)
Dept: OTHER | Facility: OTHER | Age: 57
End: 2017-12-07

## 2017-12-07 ENCOUNTER — GENERIC CONVERSION - ENCOUNTER (OUTPATIENT)
Dept: OTHER | Facility: OTHER | Age: 57
End: 2017-12-07

## 2017-12-09 NOTE — PROGRESS NOTES
Assessment    1  Abdominal pain, epigastric (789 06) (R10 13)   2  History of rectal bleeding (V12 79) (Z87 19)   3  Colon, diverticulosis (562 10) (K57 30)   4  Chronic gastritis (535 10) (K29 50)    Plan  Chronic gastritis    · Omeprazole 20 MG Oral Capsule Delayed Release; take 1 capsule by mouthonce daily   Rx By: Bart Mendenhall; Dispense: 90 Days ; #:1 X 90 Capsule Delayed Release Bottle; Refill: 3;Chronic gastritis; RODO = N; Verified Transmission to [address] ; Last Updated By: SystemTip or Skip; 12/7/2017 1:34:13 PM   · Follow-up visit in 6 months Evaluation and Treatment  Follow-up  Status: Hold For -Scheduling  Requested for: 05BFD6465   Ordered;Chronic gastritis; Ordered By: Bart Mendenhall Performed:  Due: 70WPP1904    Discussion/Summary  Discussion Summary:   Pleasant 58-year-old Macedonian-speaking only female here for follow-up of chronic gastritis and epigastric abdominal painchronic gastritis: EGD August 2017 revealed gastritis biopsies negative for H pylori  Her epigastric abdominal pain resolved with omeprazole  We discussed tapering her omeprazole to every other day for 2 weeks and then stopping  She knows that if she has recurrence of symptoms she may go back on omeprazole daily  We will follow up with her in 6 months timetake omeprazole 20 mg every other day for 2 weeks, then stopif symptoms recur may continue taking omeprazole 20 mg dailyfollow-up in 6 months, if she is off omeprazole and doing well at that time she may cancel her appointment and follow up as needed  diverticulosis with history of diverticulitis: colonoscopy done August 2017 revealed diverticulosis  Polypectomy revealed serrated adenoma  Recommended repeat colonoscopy in 5 years  Will add a reminder  history of rectal bleeding: patient had colonoscopy done August 2017, which revealed hemorrhoids, diverticulosis and serrated adenoma  Recommended repeat colonoscopy in 5 years   Fortunately she has had no evidence of rectal bleeding  Counseling Documentation With Imm: The patient was counseled regarding diagnostic results,-- instructions for management  Patient's Capacity to Self-Care: Patient is able to Self-Care  Patient Education: Educational resources provided: Diverticulosis handout  Medication SE Review and Pt Understands Tx: Possible side effects of new medications were reviewed with the patient/guardian today  Chief Complaint  Chief Complaint Free Text Note Form: follow up for abd pain      History of Present Illness  HPI: 79-year-old Setswana-speaking female here for follow-up of epigastric abdominal pain and follow up after EGD colonoscopy  Patient eating colonoscopy done August 2017  EGD revealed gastritis biopsies negative for H pylori  Colonoscopy revealed hemorrhoids, diverticulosis and serrated adenoma  She is recommended to have repeat colonoscopy in 5 years  She had questions today regarding her diverticulosis this and diverticulitis  All questions were answered  Today she feels well admits to improvement in her abdominal pain after starting omeprazole  She denies any nausea, vomiting, abdominal pain, diarrhea, melena or hematochezia   History Reviewed: The history was obtained today from the patient and I agree with the documented history  Review of Systems  Complete-Female GI Adult:  Constitutional: No fever, no chills, feels well, no tiredness, no recent weight gain or weight loss  Eyes: No complaints of eye pain, no red eyes, no eyesight problems, no discharge, no dry eyes, no itching of eyes  ENT: no complaints of earache, no loss of hearing, no nose bleeds, no nasal discharge, no sore throat, no hoarseness  Cardiovascular: No complaints of slow heart rate, no fast heart rate, no chest pain, no palpitations, no leg claudication, no lower extremity edema  Respiratory: No complaints of shortness of breath, no wheezing, no cough, no SOB on exertion, no orthopnea, no PND    Gastrointestinal: No complaints of abdominal pain, no constipation, no nausea or vomiting, no diarrhea, no bloody stools,-- as noted in HPI-- and-- no abdominal pain  Genitourinary: No complaints of dysuria, no incontinence, no pelvic pain, no dysmenorrhea, no vaginal discharge or bleeding  Musculoskeletal: No complaints of arthralgias, no myalgias, no joint swelling or stiffness, no limb pain or swelling  Integumentary: No complaints of skin rash or lesions, no itching, no skin wounds, no breast pain or lump  Neurological: No complaints of headache, no confusion, no convulsions, no numbness, no dizziness or fainting, no tingling, no limb weakness, no difficulty walking  Psychiatric: Not suicidal, no sleep disturbance, no anxiety or depression, no change in personality, no emotional problems  Endocrine: No complaints of proptosis, no hot flashes, no muscle weakness, no deepening of the voice, no feelings of weakness  Hematologic/Lymphatic: No complaints of swollen glands, no swollen glands in the neck, does not bleed easily, does not bruise easily  ROS Reviewed:   ROS reviewed  Active Problems    1  Abdominal pain, epigastric (789 06) (R10 13)   2  Abnormal serum glucose level (790 29) (R73 09)   3  Acute diverticulitis (562 11) (K57 92)   4  Adenomatous colon polyp (211 3) (D12 6)   5  Arm pain, anterior, right (729 5) (M79 601)   6  Chronic gastritis (535 10) (K29 50)   7  Colon, diverticulosis (562 10) (K57 30)   8  Constipation (564 00) (K59 00)   9  Encounter for routine gynecological examination (V72 31) (Z01 419)   10  Encounter for screening colonoscopy (V76 51) (Z12 11)   11  Encounter for screening mammogram for malignant neoplasm of breast (V76 12)  (Z12 31)   12  Fatigue (780 79) (R53 83)   13  Helicobacter pylori infection (041 86) (A04 8)   14  History of rectal bleeding (V12 79) (Z87 19)   15  Hospital discharge follow-up (V67 59) (Z09)   16  Incomplete bladder emptying (788 21) (R33 9)   17   Influenza vaccine needed (V04 81) (Z23)   18  Muscle spasm (728 85) (M62 838)   19  Nail abnormalities (703 9) (L60 9)   20  Nasal mucosa dry (478 19) (J34 89)   21  Obesity (278 00) (E66 9)   22  Obstructive sleep apnea (327 23) (G47 33)   23  Osteoarthritis of knee (715 36) (M17 10)   24  Pre-diabetes (790 29) (R73 03)   25  Seborrheic keratosis (702 19) (L82 1)    Past Medical History  1  History of Complaint Of Allergic Reaction Seasonal   2  History of Depression with anxiety (300 4) (F41 8)   3  History of Elective  (635 90)   4  History of abnormal mammogram (V15 89) (D61 474)   5  History of Helicobacter infection (V12 09) (Z86 19)   6  Normal delivery (650) (O80,Z37 9)   7  Seborrheic keratosis (702 19) (L82 1)   8  History of Urge incontinence of urine (788 31) (N39 41)   9  History of Urinary frequency (788 41) (R35 0)   10  History of Vaginal Itching Or Burning (625 8)  Active Problems And Past Medical History Reviewed: The active problems and past medical history were reviewed and updated today  Surgical History  1  History of Abdominoplasty   2  History of Tubal Ligation  Surgical History Reviewed: The surgical history was reviewed and updated today  Family History  Mother    1  Denied: Family history of Colon cancer   2  Family history of Diabetes Mellitus (V18 0)   3  Denied: Family history of colitis   4  Denied: Family history of colonic polyps   5  Denied: Family history of Crohn's disease   6  Denied: Family history of liver disease   7  Family history of Hypertension (V17 49)   8  Family history of Reported Family History Of Heart Disease  Father    5  Denied: Family history of Colon cancer   10  Denied: Family history of colitis   11  Denied: Family history of colonic polyps   12  Denied: Family history of Crohn's disease   15  Denied: Family history of liver disease  Paternal Grandfather    15  Family history of Stomach cancer  Family History Reviewed:    The family history was reviewed and updated today  Social History     · Never A Smoker   · No alcohol use   · No drug use  Social History Reviewed: The social history was reviewed and updated today  Current Meds   1  ClonazePAM 1 MG Oral Tablet; Therapy: (Recorded:14Jan2016) to Recorded   2  Ibuprofen 400 MG Oral Tablet; tid with meals prn for pain; Therapy: 00Jpi5775 to (Evaluate:58Ved2575)  Requested for: 92Nfw2839; Last Rx:46Axl2342 Ordered   3  Omeprazole 20 MG Oral Capsule Delayed Release; take 1 capsule by mouth once daily; Therapy: 66JCQ6969 to (Gama Navarro)  Requested for: 28OVT2322; Last Rx:73Xmi9474 Ordered   4  RisperiDONE 2 MG Oral Tablet; Therapy: 18DSW4294 to Recorded   5  Saline Nasal Spray 0 65 % Nasal Solution; USE 1 SPRAY IN EACH NOSTRIL TWICE DAILY; Therapy: 62HWY0642 to (Last Rx:77Cub1336)  Requested for: 51LPP7391 Ordered   6  Tums Ultra 1000 CHEW; CHEW 1 TABLET Every 6 hours; Therapy: 73QPR2083 to  Requested for: 60Mqv6031 Recorded   7  Venlafaxine HCl  MG Oral Capsule Extended Release 24 Hour; Therapy: 09Xlc0517 to Recorded  Medication List Reviewed: The medication list was reviewed and updated today  Allergies  1  No Known Drug Allergies    Vitals  Vital Signs    Recorded: 34ABG6039 01:04PM   Temperature 98 5 F, Tympanic   Heart Rate 80   Systolic 204, LUE, Sitting   Diastolic 62, LUE, Sitting   Height 5 ft    Weight 163 lb    BMI Calculated 31 83   BSA Calculated 1 71   O2 Saturation 98       Physical Exam   Constitutional  General appearance: No acute distress, well appearing and well nourished  Eyes  Conjunctiva and lids: No swelling, erythema or discharge  Ears, Nose, Mouth, and Throat  External inspection of ears and nose: Normal    Pulmonary  Respiratory effort: No increased work of breathing or signs of respiratory distress  Auscultation of lungs: Clear to auscultation  Cardiovascular  Auscultation of heart: Normal rate and rhythm, normal S1 and S2, without murmurs  Abdomen  Abdomen: Non-tender, no masses  The abdomen was rounded  Bowel sounds were normal  The abdomen was soft and nontender  The abdomen was not firm-- and-- not rigid  No rebound tenderness  No guarding  Liver and spleen: No hepatomegaly or splenomegaly  Musculoskeletal  Gait and station: Normal    Psychiatric  Orientation to person, place, and time: Normal    Mood and affect: Normal          Health Management  Encounter for screening colonoscopy   COLONOSCOPY; every 5 years; Last 29Aug2017; Next Due: 11Aug2021;  Active    Future Appointments    Date/Time Provider Specialty Site   01/23/2018 09:45 AM Mina Tidwell,  Internal Medicine [address] PCP       Signatures   Electronically signed by : Neema Rutherford, [facility]; Dec  7 2017  1:37PM EST                       (Author)    Electronically signed by : Keenan Vazquez MD; Dec  8 2017  8:37AM EST                       (Author)    Electronically signed by : Keenan Vazquez MD; Dec  8 2017  8:46AM EST                       (Author)

## 2018-01-09 NOTE — MISCELLANEOUS
Message  GI Reminder Recall [address] [place] [address] [address]:   Date: 09/12/2017   Dear Francis Leigh:     Review of our records shows you are due for the following: Follow Up Visit  Please call the following office to schedule your appointment:   [address], [address], [place], [place], [address] [facility] [phone]  We look forward to hearing from you!      Sincerely,     [facility]      Signatures   Electronically signed by : Rodger Ramachandran, ; Sep 12 2017  2:58PM EST                       (Author)

## 2018-01-12 VITALS
TEMPERATURE: 97.2 F | OXYGEN SATURATION: 97 % | HEIGHT: 61 IN | HEART RATE: 72 BPM | DIASTOLIC BLOOD PRESSURE: 66 MMHG | BODY MASS INDEX: 30.21 KG/M2 | SYSTOLIC BLOOD PRESSURE: 116 MMHG | WEIGHT: 160 LBS

## 2018-01-12 VITALS
HEART RATE: 74 BPM | OXYGEN SATURATION: 97 % | HEIGHT: 61 IN | BODY MASS INDEX: 30.21 KG/M2 | DIASTOLIC BLOOD PRESSURE: 78 MMHG | SYSTOLIC BLOOD PRESSURE: 117 MMHG | TEMPERATURE: 98.1 F | WEIGHT: 160 LBS

## 2018-01-13 VITALS
HEART RATE: 92 BPM | DIASTOLIC BLOOD PRESSURE: 70 MMHG | HEIGHT: 61 IN | TEMPERATURE: 97.9 F | SYSTOLIC BLOOD PRESSURE: 110 MMHG | WEIGHT: 156.09 LBS | BODY MASS INDEX: 29.47 KG/M2

## 2018-01-13 VITALS
SYSTOLIC BLOOD PRESSURE: 102 MMHG | HEART RATE: 92 BPM | HEIGHT: 61 IN | WEIGHT: 154.32 LBS | BODY MASS INDEX: 29.14 KG/M2 | DIASTOLIC BLOOD PRESSURE: 70 MMHG | TEMPERATURE: 97.7 F

## 2018-01-13 VITALS
WEIGHT: 150.35 LBS | TEMPERATURE: 97.9 F | BODY MASS INDEX: 28.39 KG/M2 | DIASTOLIC BLOOD PRESSURE: 62 MMHG | SYSTOLIC BLOOD PRESSURE: 110 MMHG | HEIGHT: 61 IN | HEART RATE: 68 BPM

## 2018-01-13 VITALS
TEMPERATURE: 97.9 F | SYSTOLIC BLOOD PRESSURE: 110 MMHG | WEIGHT: 153.88 LBS | BODY MASS INDEX: 30.21 KG/M2 | DIASTOLIC BLOOD PRESSURE: 78 MMHG | HEART RATE: 74 BPM | HEIGHT: 60 IN

## 2018-01-14 VITALS
HEIGHT: 61 IN | BODY MASS INDEX: 30.05 KG/M2 | TEMPERATURE: 97.9 F | HEART RATE: 72 BPM | DIASTOLIC BLOOD PRESSURE: 72 MMHG | SYSTOLIC BLOOD PRESSURE: 118 MMHG | WEIGHT: 159.17 LBS

## 2018-01-14 VITALS
SYSTOLIC BLOOD PRESSURE: 120 MMHG | HEIGHT: 61 IN | BODY MASS INDEX: 29.55 KG/M2 | HEART RATE: 80 BPM | DIASTOLIC BLOOD PRESSURE: 80 MMHG | WEIGHT: 156.53 LBS | TEMPERATURE: 97.4 F

## 2018-01-15 VITALS
HEART RATE: 64 BPM | WEIGHT: 153 LBS | SYSTOLIC BLOOD PRESSURE: 108 MMHG | BODY MASS INDEX: 28.89 KG/M2 | TEMPERATURE: 97.6 F | DIASTOLIC BLOOD PRESSURE: 80 MMHG | HEIGHT: 61 IN

## 2018-01-15 VITALS
BODY MASS INDEX: 30.63 KG/M2 | TEMPERATURE: 97.9 F | HEART RATE: 68 BPM | DIASTOLIC BLOOD PRESSURE: 70 MMHG | SYSTOLIC BLOOD PRESSURE: 110 MMHG | HEIGHT: 61 IN | WEIGHT: 162.26 LBS

## 2018-01-15 NOTE — RESULT NOTES
Discussion/Summary   Stomach biopsy was negative and colon polyp was serrated adenoma  Repeat colonoscopy in 5 years  Verified Results  (1) TISSUE EXAM 84Wjq7504 01:52PM Stu Cos     Test Name Result Flag Reference   LAB AP CASE REPORT (Report)     Surgical Pathology Report             Case: [id number]                   Authorizing Provider: Donte Mckeon MD      Collected:      08/29/2017 1352        Ordering Location:   [address] [place] [address]    Received:      08/30/2017 [facility] End[facility]                            Pathologist:      Chester Cerda MD                                 Specimens:  A) - Stomach, gastric r/o h pylori                                   B) - Polyp, Colorectal, ascending colon polyp (cold forceps)   LAB AP FINAL DIAGNOSIS (Report)     A  Stomach (biopsy):    - Mild chronic inactive gastritis with lymphoid aggregate formation   involving oxyntic and foveolar mucosa  - Immunostain for H  pylori (with appropriate positive control) is   negative  - No intestinal metaplasia, dysplasia or neoplasia identified  B  Ascending colon polyp (cold forceps):    - Polypoid colonic mucosa with focal serrated features  - No high-grade dysplasia or malignancy identified  Comment:  Serrated colon polyps from the ascending colon are more likely to   represent sessile serrated adenoma rather than hyperplastic polyp  Electronically signed by Chester Cerda MD on 9/1/2017 at 11:01 AM   LAB AP NOTE      Interpretation performed at [facility], [address] [place] [address], [address] [place] [address]  LAB AP SURGICAL ADDITIONAL INFORMATION (Report)     All controls performed with the immunohistochemical stains reported above   reacted appropriately  These tests were developed and their performance   characteristics determined by [facility] or   [facility]  They may not be cleared or approved by the [place]  Food and Drug Administration   The FDA has determined that such clearance   or approval is not necessary  These tests are used for clinical purposes  They should not be regarded as investigational or for research  This   laboratory has been approved by [place] [address], designated as a high-complexity   laboratory and is qualified to perform these tests  LAB AP GROSS DESCRIPTION (Report)     A  The specimen is received in formalin, labeled with the patient's name   and hospital number, and is designated Gastric biopsy  The specimen   consists of 4 tan soft tissue fragments ranging from 0 2-0 3 cm in   greatest dimension  Entire submitted  One cassette  B  The specimen is received in formalin, labeled with the patient's name   and hospital number, and is designated Ascending colon polyp  The   specimen consists of one tan soft tissue fragment measuring 0 4 cm in   greatest dimension  Entire submitted  One cassette  Note: The estimated total formalin fixation time based upon information   provided by the submitting clinician and the standard processing schedule   is 24 0 hours  AEK   LAB AP CLINICAL INFORMATION      Epigastric pain  Helicobacter pylori infection  Diverticulitis of intestine without perforation or abscess without bleeding

## 2018-01-22 VITALS
BODY MASS INDEX: 27.26 KG/M2 | TEMPERATURE: 97.6 F | HEIGHT: 62 IN | WEIGHT: 148.15 LBS | SYSTOLIC BLOOD PRESSURE: 110 MMHG | HEART RATE: 72 BPM | DIASTOLIC BLOOD PRESSURE: 78 MMHG

## 2018-01-23 VITALS
SYSTOLIC BLOOD PRESSURE: 118 MMHG | BODY MASS INDEX: 32 KG/M2 | TEMPERATURE: 98.5 F | OXYGEN SATURATION: 98 % | WEIGHT: 163 LBS | DIASTOLIC BLOOD PRESSURE: 62 MMHG | HEART RATE: 80 BPM | HEIGHT: 60 IN

## 2018-01-23 NOTE — MISCELLANEOUS
Reason For Visit  Reason For Visit Free Text Note Form: ALMA MET WITH 62 Y/O- S- P5-  Persian SPEAKING DISABLED WOMAN  PATIENT WAS SEEN FOR ANNUAL EXAM  PATIENT REPORTED IS SUFFERING FROM DEPRESSION  CURRENTLY RECEIVING TREATMENT  PRESENT TEARFUL AND DENIES ANY SI / HI  ASSISTED WITH INTERPRETATION DURING PROVIDER ASSESS  Active Problems    1  Abdominal pain, epigastric (789 06) (R10 13)   2  Abnormal serum glucose level (790 29) (R73 09)   3  Acute diverticulitis (562 11) (K57 92)   4  Adenomatous colon polyp (211 3) (D12 6)   5  Arm pain, anterior, right (729 5) (M79 601)   6  Chronic gastritis (535 10) (K29 50)   7  Colon, diverticulosis (562 10) (K57 30)   8  Constipation (564 00) (K59 00)   9  Encounter for routine gynecological examination (V72 31) (Z01 419)   10  Encounter for screening colonoscopy (V76 51) (Z12 11)   11  Encounter for screening mammogram for malignant neoplasm of breast (V76 12)    (Z12 31)   12  Fatigue (780 79) (R53 83)   13  Helicobacter pylori infection (041 86) (A04 8)   14  History of rectal bleeding (V12 79) (Z87 19)   15  Hospital discharge follow-up (V67 59) (Z09)   16  Incomplete bladder emptying (788 21) (R33 9)   17  Influenza vaccine needed (V04 81) (Z23)   18  Muscle spasm (728 85) (M62 838)   19  Nail abnormalities (703 9) (L60 9)   20  Nasal mucosa dry (478 19) (J34 89)   21  Obesity (278 00) (E66 9)   22  Obstructive sleep apnea (327 23) (G47 33)   23  Osteoarthritis of knee (715 36) (M17 10)   24  Pre-diabetes (790 29) (R73 03)   25  Seborrheic keratosis (702 19) (L82 1)    Current Meds   1  ClonazePAM 1 MG Oral Tablet; Therapy: (Recorded:14Jan2016) to Recorded   2  GaviLyte-G 236 GM Oral Solution Reconstituted; Therapy: 63PJB7637 to Recorded   3  Ibuprofen 400 MG Oral Tablet; tid with meals prn for pain; Therapy: 84Bat9231 to (Evaluate:50Uib3160)  Requested for: 81Dvc9785; Last   Rx:13Mhw3232 Ordered   4   Omeprazole 20 MG Oral Capsule Delayed Release; take 1 capsule by mouth once daily; Therapy: 01BDI9076 to (Yuridia Deleon)  Requested for: 87HTQ4950; Last   Rx:80Six1723 Ordered   5  PARoxetine HCl - 40 MG Oral Tablet; Therapy: 50ADF5750 to Recorded   6  RisperiDONE 2 MG Oral Tablet; Therapy: 85TDM8267 to Recorded   7  Saline Nasal Spray 0 65 % Nasal Solution; USE 1 SPRAY IN EACH NOSTRIL TWICE   DAILY; Therapy: 54LFE9669 to (Last Rx:70Ago7324)  Requested for: 17NPO3523 Ordered   8  Tums Ultra 1000 CHEW; CHEW 1 TABLET Every 6 hours; Therapy: 33NVY4090 to  Requested for: 88Sqf7251 Recorded   9  Venlafaxine HCl  MG Oral Capsule Extended Release 24 Hour; Therapy: 71Kqy6106 to Recorded    Allergies    1   No Known Drug Allergies    Future Appointments    Date/Time Provider Specialty Site   12/07/2017 01:00 PM Miguel Hardin [facility] Adult  [facility]     Signatures   Electronically signed by : EMMETT Ellison; Dec  4 2017  1:59PM EST                       (Author)

## 2018-01-24 VITALS
SYSTOLIC BLOOD PRESSURE: 128 MMHG | HEIGHT: 60 IN | DIASTOLIC BLOOD PRESSURE: 80 MMHG | WEIGHT: 165 LBS | BODY MASS INDEX: 32.39 KG/M2

## 2018-02-13 ENCOUNTER — OFFICE VISIT (OUTPATIENT)
Dept: SLEEP CENTER | Facility: CLINIC | Age: 58
End: 2018-02-13
Payer: COMMERCIAL

## 2018-02-13 VITALS
WEIGHT: 166.2 LBS | SYSTOLIC BLOOD PRESSURE: 110 MMHG | BODY MASS INDEX: 30.59 KG/M2 | HEART RATE: 80 BPM | DIASTOLIC BLOOD PRESSURE: 80 MMHG | HEIGHT: 62 IN

## 2018-02-13 DIAGNOSIS — G47.33 OSA (OBSTRUCTIVE SLEEP APNEA): ICD-10-CM

## 2018-02-13 PROCEDURE — 99213 OFFICE O/P EST LOW 20 MIN: CPT | Performed by: INTERNAL MEDICINE

## 2018-02-13 NOTE — PROGRESS NOTES
Progress Note - Sleep Center   Flo Koch OHJ:[birth year] MRN: [id number]      Reason for Visit:  61 y  o female here for annual follow-up    Assessment:  Doing well on current therapy of CPAP 13 cm for mild obstructive sleep apnea (AHI = 9)  Plan:  Continue same    Follow up: One year    History of Present Illness:  History of ALEC on PAP therapy  Fully compliant and deriving benefit  Historical Information    Past Medical History:   Past Medical History:   Diagnosis Date    Anxiety     Anxiety     Arthritis     Depression     Depression     Diverticulosis     GERD (gastroesophageal reflux disease)     Helicobacter pylori infection     Hyperlipidemia     Sleep apnea     Sleep apnea          Past Surgical History:   Past Surgical History:   Procedure Laterality Date    ABDOMINOPLASTY      abdomin    PA COLONOSCOPY FLX DX W/COLLJ SPEC WHEN PFRMD N/A 8/11/2016    Procedure: COLONOSCOPY;  Surgeon: Frankie Wu MD;  Location:  GI LAB; Service: Gastroenterology    PA COLONOSCOPY FLX DX W/COLLJ [facility] WHEN PFRMD N/A 8/29/2017    Procedure: EGD AND COLONOSCOPY;  Surgeon: Frankie Wu MD;  Location: [facility] GI LAB; Service: Gastroenterology    TUBAL LIGATION      TUBAL LIGATION           Social History - see chart  History   Alcohol use: Not on file     History   Drug Use Not on file     History   Smoking Status    Not on file   Smokeless Tobacco    Not on file     Family History: History reviewed  No pertinent family history  Medications/Allergies:      Current Outpatient Prescriptions:     calcium carbonate (TUMS ULTRA) 1000 MG chewable tablet, Chew 1 tablet every 6 (six) hours as needed for indigestion or heartburn  , Disp: , Rfl:     clonazePAM (KlonoPIN) 1 mg tablet, Take 1 mg by mouth daily at bedtime as needed for anxiety    , Disp: , Rfl:     ibuprofen (MOTRIN) 400 mg tablet, Take 400 mg by mouth 3 (three) times a day as needed for mild pain, Disp: , Rfl:     omeprazole (PriLOSEC) 20 mg delayed release capsule, Take 20 mg by mouth daily  , Disp: , Rfl:     risperiDONE (RisperDAL) 2 mg tablet, Take 2 mg by mouth 2 (two) times a day , Disp: , Rfl:     venlafaxine (EFFEXOR) 100 MG tablet, Take 150 mg by mouth, Disp: , Rfl:     cyclobenzaprine (FLEXERIL) 5 mg tablet, Take 1 tablet by mouth 3 (three) times a day as needed for muscle spasms, Disp: 10 tablet, Rfl: 0    naproxen (NAPROSYN) 500 mg tablet, Take 1 tablet by mouth 2 (two) times a day with meals, Disp: 30 tablet, Rfl: 0    PARoxetine (PAXIL) 40 MG tablet, Take 40 mg by mouth every morning , Disp: , Rfl:     polyethylene glycol (GAVILYTE-G) 4000 mL solution, Take by mouth once, Disp: , Rfl:     ranitidine (ZANTAC) 150 mg tablet, Take 150 mg by mouth daily  , Disp: , Rfl:     Saline 0 65 % SOLN, 0 65 % into each nostril daily, Disp: , Rfl:     traZODone (DESYREL) 150 mg tablet, Take 150 mg by mouth daily at bedtime  , Disp: , Rfl:       Review of Systems      Genitourinary hot flashes   Cardiology palpitations/fluttering feeling in your chest   Gastrointestinal heartburn/acid reflux and abdominal pain disturbing sleep   Neurology headaches, muscle weakness, forgetfulness, decreased concentration, confusion and loss/change of vision   Constitutional chills and weight change of 10 or more pounds in the past year gain of 20 pounds   Integumentary easy brusing   Psychiatry anxiety, depression and irritability   Musculoskeletal joint pain, muscle tenderness/aching, back pain and muscle twitching   Pulmonary shortness of breath and chest tightness   ENT nasal or sinus congestion, post nasal drip, throat clearing, ringing in ears, decreased hearing and trouble swallowing   Endocrine excessive thirst and excessive hunger   Hematological easy brusing       Objective      Vital Signs:   Vitals:    02/13/18 1000   BP: 110/80   Pulse: 80         Physical Exam:    General: Alert, appropriate, cooperative, overweight    Head: NC/AT    Skin: Warm, dry    Neuro: No motor abnormalities, cranial nerves appear intact    Extremity: No clubbing, cyanosis    PAP settin cm  DME Provider:  Franco Cook / Coordination of Care  Total clinic time spent today 15 minutes  Greater than 50% of total time was spent with the patient and / or family counseling and / or coordination of care  A description of the counseling / coordination of care: Discussed equipment and response to treatment  Baby Primrose, M D    Board Certified Sleep Specialist

## 2018-03-06 ENCOUNTER — OFFICE VISIT (OUTPATIENT)
Dept: GASTROENTEROLOGY | Facility: MEDICAL CENTER | Age: 58
End: 2018-03-06
Payer: COMMERCIAL

## 2018-03-06 VITALS
HEIGHT: 62 IN | WEIGHT: 166 LBS | DIASTOLIC BLOOD PRESSURE: 60 MMHG | HEART RATE: 74 BPM | TEMPERATURE: 97.3 F | SYSTOLIC BLOOD PRESSURE: 120 MMHG | BODY MASS INDEX: 30.55 KG/M2

## 2018-03-06 DIAGNOSIS — R10.13 ABDOMINAL PAIN, EPIGASTRIC: Primary | ICD-10-CM

## 2018-03-06 DIAGNOSIS — D12.6 ADENOMATOUS POLYP OF COLON, UNSPECIFIED PART OF COLON: ICD-10-CM

## 2018-03-06 DIAGNOSIS — K57.32 DIVERTICULITIS OF LARGE INTESTINE WITHOUT PERFORATION OR ABSCESS WITHOUT BLEEDING: ICD-10-CM

## 2018-03-06 DIAGNOSIS — E66.09 CLASS 1 OBESITY DUE TO EXCESS CALORIES IN ADULT, UNSPECIFIED BMI, UNSPECIFIED WHETHER SERIOUS COMORBIDITY PRESENT: ICD-10-CM

## 2018-03-06 PROBLEM — E66.811 CLASS 1 OBESITY DUE TO EXCESS CALORIES IN ADULT: Status: ACTIVE | Noted: 2018-03-06

## 2018-03-06 PROCEDURE — 99214 OFFICE O/P EST MOD 30 MIN: CPT | Performed by: INTERNAL MEDICINE

## 2018-03-06 NOTE — PROGRESS NOTES
[facility] - Outpatient Follow-up Note  Sunshine Douglas 61 y o  female MRN: [id number]  Encounter: [id number]          ASSESSMENT AND PLAN:      #1 Epigastric pain/Hx H pylori: Her epigastric pain was probably due to functional dyspepsia or gastritis  Fortunately it has resolved on the omeprazole  I have asked her to continue taking omeprazole daily      #2 Hx rectal bleeding: She was found to have hemorrhoids during her recent colonoscopy  Fortunately her rectal bleeding has resolved      #3 Diverticulitis: She was diagnosed with acute diverticulitis and treated with antibiotics  She was noted to have diverticulosis on colonoscopy and there was no evidence of Crohn's disease or colon cancer      #4 Serrated adenoma: She is due for her next colonoscopy in 5 years  #5 Obesity:  She is interested in losing weight and would like a referral to bariatric surgery  I will refer her to Dr Eliana Blanchard to discuss her nonsurgical and surgical options  ______________________________________________________________________    SUBJECTIVE:    She presents for follow-up after her upper endoscopy and colonoscopy because of epigastric pain, history of diverticulitis, history of Helicobacter pylori infection, and rectal bleeding  She was found to have hemorrhoids, diverticulosis, a serrated adenoma that was removed from her colon, and gastritis  Biopsies were negative for Helicobacter pylori infection      Overall she is feeling better and has been taking omeprazole daily  She occasionally complains of suprapubic pain, but denies any bleeding or weight loss  REVIEW OF SYSTEMS IS OTHERWISE NEGATIVE        Historical Information   Past Medical History:   Diagnosis Date    Anxiety     Anxiety     Arthritis     Depression     Depression     Diverticulosis     GERD (gastroesophageal reflux disease)     Helicobacter pylori infection     Hyperlipidemia     Sleep apnea     Sleep apnea Past Surgical History:   Procedure Laterality Date    ABDOMINOPLASTY      abdomin    IN COLONOSCOPY FLX DX W/COLLJ SPEC WHEN PFRMD N/A 8/11/2016    Procedure: COLONOSCOPY;  Surgeon: Frankie Wu MD;  Location:  GI LAB; Service: Gastroenterology    IN COLONOSCOPY FLX DX W/COLLJ [facility] WHEN PFRMD N/A 8/29/2017    Procedure: EGD AND COLONOSCOPY;  Surgeon: Frankie Wu MD;  Location: [facility] GI LAB; Service: Gastroenterology    TUBAL LIGATION      TUBAL LIGATION       Social History   History   Alcohol Use No     History   Drug Use No     History   Smoking Status    Never Smoker   Smokeless Tobacco    Never Used     No family history on file  Meds/Allergies       Current Outpatient Prescriptions:     calcium carbonate (TUMS ULTRA) 1000 MG chewable tablet    clonazePAM (KlonoPIN) 1 mg tablet    cyclobenzaprine (FLEXERIL) 5 mg tablet    ibuprofen (MOTRIN) 400 mg tablet    naproxen (NAPROSYN) 500 mg tablet    omeprazole (PriLOSEC) 20 mg delayed release capsule    PARoxetine (PAXIL) 40 MG tablet    risperiDONE (RisperDAL) 2 mg tablet    Saline 0 65 % SOLN    traZODone (DESYREL) 150 mg tablet    venlafaxine (EFFEXOR) 100 MG tablet    polyethylene glycol (GAVILYTE-G) 4000 mL solution    ranitidine (ZANTAC) 150 mg tablet    No Known Allergies        Objective     Blood pressure 120/60, pulse 74, temperature (!) 97 3 °F (36 3 °C), temperature source Tympanic, height 5' 2" (1 575 m), weight 75 3 kg (166 lb)  Body mass index is 30 36 kg/m²  PHYSICAL EXAM:      General Appearance:   Alert, cooperative, no distress   HEENT:   Normocephalic, atraumatic, anicteric      Neck:  Supple, symmetrical, trachea midline   Lungs:   Clear to auscultation bilaterally; no rales, rhonchi or wheezing; respirations unlabored    Heart[de-identified]   Regular rate and rhythm; no murmur, rub, or gallop     Abdomen:   Soft, epigastric tenderness, non-distended; normal bowel sounds; no masses, no organomegaly    Genitalia:   Deferred    Rectal:   Deferred    Extremities:  No cyanosis, clubbing or edema    Pulses:  2+ and symmetric    Skin:  No jaundice, rashes, or lesions    Lymph nodes:  No palpable cervical lymphadenopathy        Lab Results:   No visits with results within 1 Day(s) from this visit  Latest known visit with results is:   Appointment on 11/14/2017   Component Date Value    Hemoglobin A1C 11/14/2017 6 2     EAG 11/14/2017 131          Radiology Results:   No results found

## 2018-03-06 NOTE — LETTER
March 6, 2018     [address]la [address] [address]    Patient: Chava Santana   YOB: 1960   Date of Visit: 3/6/2018       Dear Dr Maribell Ferguson:    Thank you for referring Chava Santana to me for evaluation  Below are my notes for this consultation  If you have questions, please do not hesitate to call me  I look forward to following your patient along with you           Sincerely,        Priscilla Rios MD        CC: Priscilla Rios MD

## 2018-06-18 ENCOUNTER — APPOINTMENT (OUTPATIENT)
Dept: LAB | Facility: CLINIC | Age: 58
End: 2018-06-18
Payer: COMMERCIAL

## 2018-06-18 ENCOUNTER — OFFICE VISIT (OUTPATIENT)
Dept: INTERNAL MEDICINE CLINIC | Facility: CLINIC | Age: 58
End: 2018-06-18
Payer: COMMERCIAL

## 2018-06-18 VITALS
HEART RATE: 84 BPM | BODY MASS INDEX: 31.05 KG/M2 | WEIGHT: 164.46 LBS | TEMPERATURE: 98.1 F | SYSTOLIC BLOOD PRESSURE: 118 MMHG | HEIGHT: 61 IN | DIASTOLIC BLOOD PRESSURE: 88 MMHG

## 2018-06-18 DIAGNOSIS — G47.33 OBSTRUCTIVE SLEEP APNEA: Primary | ICD-10-CM

## 2018-06-18 DIAGNOSIS — R73.03 PRE-DIABETES: ICD-10-CM

## 2018-06-18 DIAGNOSIS — F32.A DEPRESSION, UNSPECIFIED DEPRESSION TYPE: ICD-10-CM

## 2018-06-18 DIAGNOSIS — K29.60 OTHER GASTRITIS WITHOUT HEMORRHAGE, UNSPECIFIED CHRONICITY: ICD-10-CM

## 2018-06-18 PROBLEM — K29.70 GASTRITIS: Status: ACTIVE | Noted: 2018-06-18

## 2018-06-18 PROBLEM — L82.1 SEBORRHEIC KERATOSIS: Status: ACTIVE | Noted: 2017-05-19

## 2018-06-18 PROBLEM — Z86.19 HISTORY OF HELICOBACTER PYLORI INFECTION: Status: ACTIVE | Noted: 2018-06-18

## 2018-06-18 LAB
EST. AVERAGE GLUCOSE BLD GHB EST-MCNC: 146 MG/DL
HBA1C MFR BLD: 6.7 % (ref 4.2–6.3)

## 2018-06-18 PROCEDURE — 36415 COLL VENOUS BLD VENIPUNCTURE: CPT

## 2018-06-18 PROCEDURE — 99213 OFFICE O/P EST LOW 20 MIN: CPT | Performed by: INTERNAL MEDICINE

## 2018-06-18 PROCEDURE — 83036 HEMOGLOBIN GLYCOSYLATED A1C: CPT

## 2018-06-18 RX ORDER — OMEPRAZOLE 20 MG/1
20 CAPSULE, DELAYED RELEASE ORAL DAILY
Qty: 30 CAPSULE | Refills: 2 | Status: SHIPPED | OUTPATIENT
Start: 2018-06-18 | End: 2018-08-27 | Stop reason: ALTCHOICE

## 2018-06-18 NOTE — PROGRESS NOTES
ASSESSMENT/PLAN:  Diagnoses and all orders for this visit:    Obstructive sleep apnea    Pre-diabetes      Plan:  Pre-DM: A1c 6 3% in August  Strong family history of DM  Will check lab A1c today given significant weight gain recently and c/o dry mouth  Gastritis/ hx h  pylori- Per EGD 8/2017  EGD otherwise normal  Likely component of dyspepsia  Saw Gi this past March  Continue Protonix per Gi recommendations  Depression- Follows with psychiatry  Continue Effexor, Trazadone, Paxil, Klonopin, risperdal    ALEC- uses CPAP nightly  Follows with Sleep Medicine, saw them recently  Health Maintenance:  Colonoscopy done on 08/17: Serrated polyp removed  Repeat C-scope in 5 years (2023)  Diagnostic Mammogram 8/2017- scattered fibroglandular densities, otherwise normal  Follow-up in 1 year  Lipids June 2017 were normal  Due for annual OB/GYN (saw 12/2017) - will give referral at next visit  Discussed the importance of proper diet and exercise as well as stress management  Follow-up: In 3 months for chronic conditions    CHIEF COMPLAINT: Follow-up    HISTORY OF PRESENT ILLNESS:  Kyung Foster provided translation for this visit  The patient is here for routine 6 month follow-up  She did not sleep well last night, so she has a HA this morning  She gained weight recently from the stress of taking care of her family  She is 164 lb today  She exercises only infrequently  She has no other complaints today  Review of Systems   Constitutional: Negative for appetite change  +fatigue, + weight gain +stress  Respiratory: Negative for shortness of breath, wheezing, cough  Cardiovascular: Negative for chest pain and palpitations  Gastrointestinal: Negative for abdominal pain, nausea and vomiting  Negative for diarrhea or constipation     Neurological: +headache       OBJECTIVE:  Vitals:    06/18/18 0902   BP: 118/88   BP Location: Right arm   Patient Position: Sitting   Cuff Size: Adult   Pulse: 84   Temp: 98 1 °F (36 7 °C)   TempSrc: Oral   Weight: 74 6 kg (164 lb 7 4 oz)   Height: 5' 0 75" (1 543 m)       Physical Exam  GEN: AAOx3, NAD, tired/fatigued, obese/gained weight  HEENT: PEERL, MM moist  No scleral icterus  CV: RRR, nml S1/S2, no murmur appreciated  Lungs: CTA bilaterally, no w/r/r  Skin: No rashes or lesions noted  Psych: Depressed      Current Outpatient Prescriptions:     calcium carbonate (TUMS ULTRA) 1000 MG chewable tablet, Chew 1 tablet (1,000 mg total) every 6 (six) hours as needed for indigestion or heartburn, Disp: 120 tablet, Rfl: 1    clonazePAM (KlonoPIN) 1 mg tablet, Take 1 mg by mouth daily at bedtime as needed for anxiety  , Disp: , Rfl:     cyclobenzaprine (FLEXERIL) 5 mg tablet, Take 1 tablet by mouth 3 (three) times a day as needed for muscle spasms, Disp: 10 tablet, Rfl: 0    ibuprofen (MOTRIN) 400 mg tablet, Take 400 mg by mouth 3 (three) times a day as needed for mild pain, Disp: , Rfl:     omeprazole (PriLOSEC) 20 mg delayed release capsule, Take 1 capsule (20 mg total) by mouth daily, Disp: 30 capsule, Rfl: 2    PARoxetine (PAXIL) 40 MG tablet, Take 40 mg by mouth every morning , Disp: , Rfl:     risperiDONE (RisperDAL) 2 mg tablet, Take 2 mg by mouth 2 (two) times a day , Disp: , Rfl:     traZODone (DESYREL) 150 mg tablet, Take 150 mg by mouth daily at bedtime  , Disp: , Rfl:     venlafaxine (EFFEXOR) 100 MG tablet, Take 150 mg by mouth, Disp: , Rfl:     naproxen (NAPROSYN) 500 mg tablet, Take 1 tablet by mouth 2 (two) times a day with meals, Disp: 30 tablet, Rfl: 0    polyethylene glycol (GAVILYTE-G) 4000 mL solution, Take by mouth once, Disp: , Rfl:     Saline 0 65 % SOLN, 0 65 % into each nostril daily, Disp: , Rfl:     Past Medical History:   Diagnosis Date    Abnormal mammogram     RESOLVED: [id number]    Anxiety     Anxiety     Arthritis     Depression     Depression     Diverticulosis     GERD (gastroesophageal reflux disease)     Helicobacter pylori infection  Hyperlipidemia     Seborrheic keratosis     LAST ASSESSED: [id number]    Sleep apnea     Sleep apnea      Past Surgical History:   Procedure Laterality Date    ABDOMINOPLASTY      abdomin    [place] COLONOSCOPY FLX DX W/COLLJ SPEC WHEN PFRMD N/A 8/11/2016    Procedure: COLONOSCOPY;  Surgeon: Tommie Smith MD;  Location:  GI LAB; Service: Gastroenterology    [place] COLONOSCOPY FLX DX W/COLLJ [facility] WHEN PFRMD N/A 8/29/2017    Procedure: EGD AND COLONOSCOPY;  Surgeon: Tommie Smith MD;  Location: [facility] GI LAB; Service: Gastroenterology    TUBAL LIGATION      TUBAL LIGATION       Social History     Social History    Marital status: Single     Spouse name: N/A    Number of children: N/A    Years of education: N/A     Occupational History    Not on file  Social History Main Topics    Smoking status: Never Smoker    Smokeless tobacco: Never Used    Alcohol use No    Drug use: No    Sexual activity: Yes     Partners: Male     Birth control/ protection: None, Post-menopausal     Other Topics Concern    Not on file     Social History Narrative    No narrative on file     Family History   Problem Relation Age of Onset    Diabetes Mother     Hypertension Mother     Heart disease Mother     Stomach cancer Paternal Grandfather        DO Chinyere Lowery Internal Medicine PGY-2    [facility]  [address]  [address] [facility], [address] [place] [address]  [phone]

## 2018-06-19 DIAGNOSIS — E11.9 TYPE 2 DIABETES MELLITUS WITHOUT COMPLICATION, WITHOUT LONG-TERM CURRENT USE OF INSULIN (HCC): Primary | ICD-10-CM

## 2018-07-19 ENCOUNTER — OFFICE VISIT (OUTPATIENT)
Dept: INTERNAL MEDICINE CLINIC | Facility: CLINIC | Age: 58
End: 2018-07-19
Payer: COMMERCIAL

## 2018-07-19 VITALS
BODY MASS INDEX: 31.13 KG/M2 | DIASTOLIC BLOOD PRESSURE: 72 MMHG | HEIGHT: 61 IN | WEIGHT: 164.9 LBS | HEART RATE: 76 BPM | TEMPERATURE: 98.1 F | SYSTOLIC BLOOD PRESSURE: 110 MMHG

## 2018-07-19 DIAGNOSIS — R51.9 HEADACHE: Chronic | ICD-10-CM

## 2018-07-19 DIAGNOSIS — E11.9 TYPE 2 DIABETES MELLITUS (HCC): Primary | ICD-10-CM

## 2018-07-19 PROBLEM — E66.9 OBESITY (BMI 30-39.9): Chronic | Status: ACTIVE | Noted: 2018-07-19

## 2018-07-19 PROBLEM — R73.03 PRE-DIABETES: Status: RESOLVED | Noted: 2017-11-14 | Resolved: 2018-07-19

## 2018-07-19 PROCEDURE — 99213 OFFICE O/P EST LOW 20 MIN: CPT | Performed by: INTERNAL MEDICINE

## 2018-07-19 RX ORDER — ACETAMINOPHEN 500 MG
500 TABLET ORAL DAILY PRN
Qty: 30 TABLET | Refills: 3 | Status: SHIPPED | OUTPATIENT
Start: 2018-07-19 | End: 2019-01-30 | Stop reason: SDUPTHER

## 2018-07-19 NOTE — PROGRESS NOTES
FOLLOW UP VISIT    ASSESSMENT/PLAN:  Diagnoses and all orders for this visit:    Type 2 diabetes mellitus ([facility] [address] )  · Patient recently diagnosed in June of 2018 with type 2 diabetes mellitus 6 7%  · Continue metformin daily and encourage lifestyle modifications- diet and exercise    ·  Patient wants to check blood sugars at home- order glucometer and testing strips     Headache   · Patient has long tern history of Migraines per patient but does not remember what medications she used  She takes Ibuprofen to relieve headache but she is not supposed to use NSAIDs secondary to history of gastritis   · Patient started having headaches again 4 weeks ago but progressively worse/daily for the past week  Not associated with photophobia, nausea, vomiting  She states closing her eyes and ibuprofen help  · Recommend Tylenol as needed and avoid NSAIDs  Can consider riboflavin 400 mg daily  · Patient scheduled to see optometrist as she says her vision is getting weaker  Obesity   · Patient has a BMI of 31  States weight gain  · Diet consists of Rice, Meat [not fried],     Scheduled appointment in [address] [place] [address] with PCP or sooner if symptoms worsen or do not resolve  HISTORY OF PRESENTING ILLNESS:  Chester Brito is a 61 y o  with PMH significant for recently diagnosed Type 2 diabetes Mellitus, Gastritis, Obstructive Sleep Apnea on CPAP,  Obesity, and Anxiety and Depression  Chester Brito is in clinic today for follow up new diagnosis of Type 2 DM and headaches  Patient reports she started taking metformin once her diagnosis was made one month ago  Denies polyuria ,dry mouth, polyphagia, blurry vision, chest pain, palpitations  Patient has no intolerance to the metformin  Patient eats rice, meat, salads, vegetables, fruit smoothies, and diet ice creams  Patient does not exercise at all  She does not stay active     Patient also reports history of worsening frontal headaches that are not associated with photophobia, nausea, vomiting, weight loss,   Or aura  Headache does not wake patient up from sleep  Patient reports closing her eyes and taking ibuprofen relieves her headaches  Patient reports she has history of migraines in the past does not remember what medications controlled her migraines  Review of Systems   Constitutional: Negative for activity change, appetite change, chills, diaphoresis, fatigue, fever and unexpected weight change  HENT: Negative for congestion, ear discharge, ear pain, postnasal drip, sinus pain, sinus pressure, sneezing, sore throat and trouble swallowing  Eyes: Positive for visual disturbance  Negative for photophobia, pain, discharge, redness and itching  Respiratory: Positive for shortness of breath ( when climbing 18 stairs at home  )  Negative for apnea, cough, choking, chest tightness, wheezing and stridor  Cardiovascular: Negative for chest pain, palpitations and leg swelling  Gastrointestinal: Negative for abdominal distention, abdominal pain, constipation, diarrhea, nausea and vomiting  Endocrine: Negative for polyuria  Genitourinary: Negative for dysuria  Musculoskeletal: Negative for arthralgias, back pain and gait problem  Skin: Negative for color change and pallor  Neurological: Positive for headaches  Negative for dizziness, tremors, seizures, syncope, facial asymmetry, speech difficulty, weakness and light-headedness  Hematological: Negative for adenopathy  Does not bruise/bleed easily  Psychiatric/Behavioral: Negative for agitation and behavioral problems  migraines    This patient  has a past surgical history that includes Tubal ligation; Abdominoplasty; Tubal ligation; pr colonoscopy flx dx w/collj spec when pfrmd (N/A, 8/11/2016); and pr colonoscopy flx dx w/collj spec when pfrmd (N/A, 8/29/2017)      Never smoker and Alcohol: none      OBJECTIVE:  Vitals:    07/19/18 0957   BP: 110/72   Pulse: 76   Temp: 98 1 °F (36 7 °C) Weight: 74 8 kg (164 lb 14 5 oz)   Height: 5' 0 75" (1 543 m)     Physical Exam   Constitutional: She is oriented to person, place, and time  She appears well-developed and well-nourished  No distress  HENT:   Head: Normocephalic and atraumatic  Mouth/Throat: No oropharyngeal exudate  Eyes: Conjunctivae are normal  Pupils are equal, round, and reactive to light  No scleral icterus  Neck: Normal range of motion  No thyromegaly present  Cardiovascular: Normal rate, regular rhythm, normal heart sounds and intact distal pulses  Exam reveals no gallop and no friction rub  No murmur heard  Pulmonary/Chest: Effort normal and breath sounds normal  No respiratory distress  She has no wheezes  She exhibits no tenderness  Abdominal: Soft  Bowel sounds are normal  She exhibits no distension and no mass  There is no tenderness  There is no rebound and no guarding  Musculoskeletal: Normal range of motion  She exhibits no edema or tenderness  Lymphadenopathy:     She has no cervical adenopathy  Neurological: She is alert and oriented to person, place, and time  No cranial nerve deficit or sensory deficit  She exhibits normal muscle tone  Coordination normal    Skin: Skin is warm and dry  She is not diaphoretic  No erythema  Psychiatric: She has a normal mood and affect  Her behavior is normal    Vitals reviewed  Current Outpatient Prescriptions:     calcium carbonate (TUMS ULTRA) 1000 MG chewable tablet, Chew 1 tablet (1,000 mg total) every 6 (six) hours as needed for indigestion or heartburn, Disp: 120 tablet, Rfl: 1    clonazePAM (KlonoPIN) 1 mg tablet, Take 1 mg by mouth daily at bedtime as needed for anxiety    , Disp: , Rfl:     cyclobenzaprine (FLEXERIL) 5 mg tablet, Take 1 tablet by mouth 3 (three) times a day as needed for muscle spasms, Disp: 10 tablet, Rfl: 0    ibuprofen (MOTRIN) 400 mg tablet, Take 400 mg by mouth 3 (three) times a day as needed for mild pain, Disp: , Rfl:    metFORMIN (GLUCOPHAGE) 500 mg tablet, Take 1 tablet (500 mg total) by mouth daily with breakfast, Disp: 30 tablet, Rfl: 1    naproxen (NAPROSYN) 500 mg tablet, Take 1 tablet by mouth 2 (two) times a day with meals, Disp: 30 tablet, Rfl: 0    omeprazole (PriLOSEC) 20 mg delayed release capsule, Take 1 capsule (20 mg total) by mouth daily, Disp: 30 capsule, Rfl: 2    PARoxetine (PAXIL) 40 MG tablet, Take 40 mg by mouth every morning , Disp: , Rfl:     risperiDONE (RisperDAL) 2 mg tablet, Take 2 mg by mouth 2 (two) times a day , Disp: , Rfl:     Saline 0 65 % SOLN, 0 65 % into each nostril daily, Disp: , Rfl:     traZODone (DESYREL) 150 mg tablet, Take 150 mg by mouth daily at bedtime  , Disp: , Rfl:     venlafaxine (EFFEXOR) 100 MG tablet, Take 150 mg by mouth, Disp: , Rfl:     polyethylene glycol (GAVILYTE-G) 4000 mL solution, Take by mouth once, Disp: , Rfl:     Past Medical History:   Diagnosis Date    Abnormal mammogram     RESOLVED: [id number]    Anxiety     Anxiety     Arthritis     Depression     Depression     Diverticulosis     GERD (gastroesophageal reflux disease)     Helicobacter pylori infection     Hyperlipidemia     Seborrheic keratosis     LAST ASSESSED: [id number]    Sleep apnea     Sleep apnea      Past Surgical History:   Procedure Laterality Date    ABDOMINOPLASTY      abdomin    [place] COLONOSCOPY FLX DX W/COLLJ SPEC WHEN PFRMD N/A 8/11/2016    Procedure: COLONOSCOPY;  Surgeon: Laura Myles MD;  Location:  GI LAB; Service: Gastroenterology    DC COLONOSCOPY FLX DX W/COLLJ [facility] WHEN PFRMD N/A 8/29/2017    Procedure: EGD AND COLONOSCOPY;  Surgeon: Laura Myles MD;  Location: [facility] GI LAB; Service: Gastroenterology    TUBAL LIGATION      TUBAL LIGATION       Social History     Social History    Marital status: Single     Spouse name: N/A    Number of children: N/A    Years of education: N/A     Occupational History    Not on file       Social History Main Topics    Smoking status: Never Smoker    Smokeless tobacco: Never Used    Alcohol use No    Drug use: No    Sexual activity: Yes     Partners: Male     Birth control/ protection: None, Post-menopausal     Other Topics Concern    Not on file     Social History Narrative    No narrative on file     Family History   Problem Relation Age of Onset    Diabetes Mother     Hypertension Mother     Heart disease Mother     Stomach cancer Paternal Grandfather        ==  Constance Beltran MD   Internal Medicine PGY-2  7/19/2018 10:00 AM    [address] [facility] , [address] [address] [facility] [facility]  Office: [phone]  Fax: [phone]

## 2018-07-19 NOTE — PATIENT INSTRUCTIONS
PATIENT HAS NEW DIAGNOSIS OF TYPE 2 DIABETES MELLITUS WITH A HEMOGLOBIN A1C of 6 7%    Diabetes tipo 2 en adultos   CUIDADO AMBULATORIO:   La diabetes tipo 2  es mango enfermedad que afecta la forma en que el cuerpo utiliza la glucosa (azúcar)  Generalmente, cuando el nivel de azúcar en la juvenal [place], el páncreas produce más insulina  La insulina ayuda al cuerpo a extraer el azúcar de la juvenal con el fin de usarla rafael alicia de [place]  La diabetes mellitus tipo 2 se desarrolla ya sea porque el cuerpo no puede producir suficiente insulina, o es incapaz de usarla adecuadamente  Después de [place], cain páncreas podría dejar de producir insulina  Los síntomas más comunes incluyen los siguientes:   · Más hambre o sed de lo usual     · Orina con frecuencia     · Pérdida de peso involuntaria     · Visión borrosa  Llame al 911 en bravo de presentar alguno de los siguientes:   · Usted tiene alguno de los siguientes signos de derrame cerebral:      ¨ Adormecimiento o caída de un lado de acin angie     ¨ Debilidad en un brazo o mango pierna    ¨ Confusión o debilidad para hablar    ¨ Mareos o dolor de gurinder intenso, o pérdida de la visión  · Usted tiene alguno de los siguientes signos de un ataque cardíaco:      ¨ Estornudos, presión, o dolor en cain pecho que dura mas de 5 minutos o regresa  ¨ Malestar o dolor en cain espalda, carline, mandíbula, abdomen, o brazo     ¨ Dificultad para respirar    ¨ Náuseas o vómito    ¨ Siente un desvanecimiento o tiene sudores fríos especialmente en el pecho o dificultad para respirar  Busque atención médica de inmediato si:   · Usted tiene dolor abdominal intenso, o el dolor se extiende a cain espalda  Es posible que también esté vomitando  · Usted tiene dificultad para permanecer despierto o concentrarse  · Usted está temblando o sudando  · Usted tiene visión borrosa o doble  · Cain aliento huele a fruta o víctor       · Cain respiración es profunda y [place], o rápida y superficial      · Cain ritmo cardíaco es acelerado y débil  Pregúntele a cain Wenda Gamma vitaminas y minerales son adecuados para usted  · Tiene vómitos o diarrea  · Tiene malestar estomacal y no puede ingerir los alimentos de cain plan de comidas  · Usted se siente débil o más cansado de lo habitual      · Usted tiene [place], dunia de [place] o se irrita con facilidad  · Cain piel está nelly, tibia, seca o inflamada  · Usted tiene mango herida que no cicatriza  · Usted tiene entumecimiento en los brazos o piernas  · Usted tiene problemas para sobrellevar cain enfermedad o se siente ansioso o deprimido  · Usted tiene preguntas o inquietudes acerca de cain condición o cuidado  El tratamiento para la diabetes tipo 2  incluye el mantener el azúcar en la juvenal a un nivel normal  Usted debe comer los alimentos adecuados y ejercitarse regularmente  Es posible que necesite medicamentos si no puede controlar cain nivel de azúcar en la juvenal con nutrición y ejercicios  Es posible que también necesite un medicamento para prevenir enfermedades del corazón, mango complicación de la diabetes tipo 2  Es posible que usted necesite alguno de los siguientes:  · Pueden recetarse medicamentos hipoglucémicos o la insulina  se puede administrar para disminuir la cantidad de azúcar en cain juvenal  · Medicamentos para la presión arterial  podrían administrarse para disminuir cain presión arterial  Cain presión arterial debería estar a menos de 140/90  · El medicamento para disminuir el colesterol  podría administrarse para evitar mango enfermedad cardíaca  · Los medicamentos antiplaquetarios , rafael la aspirina, [facility] a prevenir coágulos de [place]  [place] alice antiplaquetarios exactamente rafael se le haya indicado  Estos medicamentos podrían causar mas probabilidad para sangrado y para desarrollar moretones  Si le araya indicado el uso de aspirina, no tome acetaminofén o ibuprofeno en cain lugar       · [place] alice medicamentos rafael se le haya indicado  Consulte con cain médico si usted tristin que cain medicamento no le está ayudando o si presenta efectos secundarios  Infórmele si es alérgico a cualquier medicamento  Mantenga mango lista actualizada de los [place], las vitaminas y los productos herbales que cruz  Incluya los siguientes datos de los medicamentos: cantidad, frecuencia y motivo de administración  Traiga con usted la lista o los envases de la píldoras a alice citas de seguimiento  Lleve la lista de los medicamentos con usted en bravo de mango emergencia  Revise cain nivel de azúcar en la juvenal  A usted le enseñarán cómo revisar mango pequeña gota de juvenal en un monitor de glucosa (glucómetro)  Usted tendrá que revisar cain nivel de azúcar en la juvenal por lo menos 3 veces al día si está usando insulina  Pregunte a cain médico cuándo y con qué frecuencia revisarlo cecilia el día  Si usted revisa cain nivel de azúcar en la juvenal  antes de mango comida , debe estar entre 80 y 130 mg/dL  Si usted revisa cain nivel de azúcar en la juvenal de 1 a 2 horas  después de mango comida , debe estar por debajo de 180 mg/dL  Pregúntele a cain médico si estas son buenas metas para usted  Anote alice resultados y muéstreselos a cain médico  Él podría usar los resultados para hacer cambios en alice medicamentos, alimentación o programa de ejercicios  En bravo que cain nivel de azúcar esté demasiado bajo: Cain nivel de azúcar en juvenal está demasiado bajo si llega a menos de 70 mg/dL  Si el nivel es demasiado bajo, coma o ziggy 15 gramos de carbohidratos de acción rápida  Estos se encuentran naturalmente en las frutas  Los carbohidratos de acción rápida aumentarán cain nivel de azúcar en la juvenal enseguida  Ejemplos de 15 gramos de carbohidrato de acción rápida son 4 onzas (1/2 taza) de jugo de fruta o 4 onzas de gaseosa regular  Otros ejemplos son 2 cucharadas de pasas de uva o entre 3 y 4 tabletas de glucosa  Revise cain nivel de azúcar en la juvenal 15 minutos más tarde  Si el nivel es todavía bajo (menos de 100 mg/dL), ingiera otros 15 gramos de carbohidratos  Cuando el nivel vuelva a 100 mg/dL, coma un refrigerio o alimento que contenga carbohidratos  [place] ayudará a evitar otra caída de cain nivel de azúcar en la juvenal  Siempre siga cuidadosamente las instrucciones de cain médico acerca de rafael tratar los niveles bajos de azúcar en la juvenal  Revise alice pies todos los días para gloria si tienen llagas:  Use zapatos y calcetines que le queden [place]  No se recorte las [place] [employer]  Consulte con cain médico para obtener más información acerca del cuidado de los pies  Siga cain plan de comidas:  Un dietista le ayudará a diseñar un plan de alimentación para mantener estable cain nivel de azúcar en la juvenal  No se salte ninguna comida  Cain nivel de azúcar en la juvenal puede bajar demasiado si usted ha tomado medicamento para la diabetes y no ha comido  · Mantenga un registro de los carbohidratos (alimentos dulces o de almidón)  Cain nivel de azúcar en la juvenal puede aumentar demasiado si usted consume demasiados carbohidratos  Cain dietista puede ayudarlo a diseñar comidas y meriendas que tengan la cantidad [place] de carbohidratos  · Consuma alimentos bajos en grasa , rafael por ejemplo presas de grant sin piel y [place] baja en grasa  · [place] menos sodio (sal)  Limite los alimentos altos en sodio rafael por ejemplo la salsa de soya, rafaela tostadas y sopas  No añada sal a la comida que usted prepare  Restrinja el uso de sal de valencia  Usted debe consumir menos de 2,300 mg de sodio por día  · Consuma alimentos altos en fibra rafael verduras, pan integral y frijoles  · Limite el consumo de alcohol  El alcohol afecta cain azúcar en la juvenal y puede hacer más difícil el control de cain diabetes  Limite el consumo de alcohol a 1 trago por día si usted es abner  Si usted es hombre, limite el consumo de alcohol a 2 tragos por día   Un trago equivale a 12 onzas de cerveza, 5 onzas de vino o 1 onza y ½ de [facility]  Mantenga un peso saludable:  Consulte con cain médico cuánto debería pesar  Un peso saludable puede ayudarlo a controlar cain diabetes  Solicite a cain médico que le ayude a crear un plan para perder peso de mango forma carpenter si usted tiene sobrepeso  Juntos podrán fijar metas de pérdida de peso alcanzables  Ejercítese según indicaciones:  La actividad física puede ayudarlo a mantener el nivel de glucosa en la juvenal estable, disminuir cain riesgo de insuficiencia cardíaca y Lindalee Stefany a bajar de [place]  [place] estiramiento antes y después de [address]  Realice mango actividad física al menos 150 minutos cada semana  Reparta esta cantidad de ejercicio cecilia al menos 3 días a la semana  No deje de ejercitar por más de 2 días seguidos  Incluya ejercicios para fortalecer los músculos de 2 a 3 días a la semana  Los [employer] deben incluir entrenamiento del equilibrio de 2 a 3 veces a la semana  Actividades que ayudan a aumentar el equilibrio incluyen yoga y jean claude chi  Colabore con cain médico para elaborar un plan de ejercicios  · Revise cain nivel de azúcar en la juvenal antes y después de hacer mango [place]  Puede que los [employer] sugieran que cambie la cantidad de insulina que usted cruz o los alimentos que consume  Si cain nivel de azúcar en la juvenal es alto, revise cain juvenal u [facility] para gloria si hay cetonas y hágalo antes de ejercitarse  No alecia ejercicio si cain nivel de azúcar en la juvenal es alto y usted tiene cetonas  · Si cain nivel de azúcar en la juvenal está por debajo de los 100 mg/dL  , coma mango merienda con carbohidratos antes de hacer ejercicio  Por ejemplo 4 a 6 galletas de soda, 1/2 banano, 1 taza (8 onzas) de [place], medio vaso (4 onzas) de jugo  [place] agua o líquidos que no contengan azúcar antes, cecilia y [facility] la [facility]  Pregúntele a cain dietista o médico cuáles líquidos debería keyshawn cuando se ejercite  · No se siente por más de 30 minutos   Si usted no puede caminar, al menos póngase de pie  Nitta Yuma le ayudará a permanecer activo y mantener la circulación sanguínea  No fume:  La nicotina puede perjudicar los vasos sanguíneos e impedir que pueda controlar cain diabetes  Pida información a cain médico si usted actualmente fuma y necesita ayuda para dejar de fumar  No use cigarrillos electrónicos o tabaco sin humo en vez de cigarrillos o para tratar de dejar de fumar  Todos estos aún contienen nicotina  Tómese la presión arterial keo rafael le indicaron:  Pregúntele a cain médico cuál debería ser cain presión arterial  La mayoría de los adultos con diabetes y presión arterial arleen deben tener mango presión arterial sistólica (primer número) inferior a 140  La presión arterial diastólica (francis número) debe ser inferior a 90  Use identificación de alerta médica:  Use un brazalete o collar de alerta médica o lleve consigo mango tarjeta que indique que tiene diabetes  Pregúntele a cain médico dónde conseguir estos artículos  Pregunte sobre las vacunas:  Usted corre un mayor riesgo de presentar enfermedades graves si usted contrae la gripe, neumonía o hepatitis  Pregúntele a cain médico si usted debe ponerse la vacuna contra la gripe, neumonía o hepatitis B y cuándo debe vacunarse  Acuda a alice consultas de control con cain médico según le indicaron  Necesitará regresar para que le realicen el examen de hemoglobina A1c cada 3 meses  Tendrá que regresar por lo menos mango vez al año para que le revisen los pies  Necesitará de un examen de la vista mango vez al año para revisar si tiene retinopatía  También necesitará exámenes de orina anuales para revisar si hay problemas renales  Es posible que usted necesite más exámenes para determinar si hay enfermedad cardíaca rafael un electrocardiograma, un examen de estrés, monitoreo de cain presión arterial y exámenes de [place]  Anote alice preguntas para que se acuerde de hacerlas cecilia alice visitas     © 2017 [facility] is for End User's use only and may not be sold, redistributed or otherwise used for commercial purposes  All illustrations and images included in CareNotes® are the copyrighted property of A D A M , Inc  or Ángel Bella  Esta información es sólo para uso en educación  Mota intención no es darle un consejo médico sobre enfermedades o tratamientos  Colsulte con mota Petra Every farmacéutico antes de seguir cualquier régimen médico para saber si es seguro y efectivo para usted  Relajación y meditación   LO QUE NECESITA SABER:   La relajación y la meditación pueden ayudar a disminuir el dolor, el estrés y la ansiedad  La relajación y meditación también sirven para regular mota respiración y disminuir mota presión arterial y latidos del corazón  INSTRUCCIONES SOBRE [facility]:   Tipos de relajación:   · La respiración lenta y profunda  puede ayudar a relajar mota cuerpo y mente  La respiración profunda se puede hacer en cualquier momento  · La relajación progresiva de los músculos  disminuye la tensión muscular  Con esta terapia usted relaja cierto carmen de músculos hasta que mota cuerpo entero esté relajado  Empiece en un extremo de mota cuerpo y Rufino Cullens hasta el otro extremo, rafael por ejemplo empiece en mota pies hasta mota gurinder  · El entrenamiento autógeno o relajación auto controlada , [place] a aumentar el flujo de [place] a alice extremidades y lo ayuda a conciliar el sueño  Con esta terapia usted reemplaza pensamientos y sentimientos incómodos o dolorosos por unos más placenteros  · La imaginación guiada  [place] mota imaginación para ayudarlo a sentirse en chandler y calmado  Usted trata de gloria, oír, oler y saborear cosas que usted imagina en mota mente  Por ejemplo, usted se puede imaginar que está tomando el sol en la playa, siente la arena y está escuchando las olas ester  · La distracción  [place] actividades que usted disfruta para ayudarle a disipar mota mente del dolor, estrés o ansiedad   David Rosas distracción también puede incluir, escuchar música, pintar, leer un libro o hacer ejercicio  Tipos de meditación:  La meditación es un ejercicio mental que ayuda a relajar mota cuerpo y liberar mota mente de preocupaciones  Usted se sienta en silencio en mango posición cómoda, imelda lala ojos y relaja lala músculos  Lala pensamientos están relajados mientras mota cuerpo se mantiene alerta  La meditación se puede realizar a solas o con otras personas  · La meditación con mantra  es cuando usted piensa o dice cierta palabra o frase mango y [place]  La palabra o frase usualmente tiene un mack [place]  El mantra se [place] rafael mango [place] de ayudarlo a concentrarse  La creencia es que romi mack produce vibraciones que tienen diferentes efectos en cada persona  · La meditación consciente  es cuando usted se concentra en lo que está pasando en mota quang en dinesh momento  Usted cruz conciencia de lala pensamientos y sentimientos en el presente, sin juzgarlos  Usted aprende a no preocuparse sobre mota pasado ni mota futuro  © 2017 2600 [address]  Information is for End User's use only and may not be sold, redistributed or otherwise used for commercial purposes  All illustrations and images included in CareNotes® are the copyrighted property of A D A M , Inc  or Ángel Bella  Esta información es sólo para uso en educación  Mota intención no es darle un consejo médico sobre enfermedades o tratamientos  Colsulte con mota Uriel So farmacéutico antes de seguir cualquier régimen médico para saber si es seguro y efectivo para usted  Dieta saludable para el corazón   LO QUE NECESITA SABER:   Leigha Shackleton vera para el corazón es un plan alimenticio bajo en grasas totales, grasas no saludables y sodio (jerome)   Leigha Shackleton saludable para el corazón ayuda a disminuir mota riesgo de sufrir de enfermedades del [facility] y un derrame cerebral  Limite la cantidad de grasa que consume entre un 25% a 35% del total de lala calorías diarias  Limite el sodio por debajo de los 2,300 mg al día  [address]:   Grasas saludables:  Las grasas saludables ayudan a mejorar los niveles de [place]  El riesgo de mango enfermedad cardíaca se disminuye cuando los niveles de colesterol son normales  Escoja grasas saludables rafael las siguientes:  · Las grasas no saturadas  se encuentran en alimentos rafael el frijol de soja, aceites de canola, de [place], de [place] y de [place]  Se encuentra también en la margarina suave hecha con aceite líquido vegetal      · Las grasas Omega 3  se encuentran en ciertos pescados, rafael el salmón, el atún y la [place], en las nueces y en la semilla de milan  Grasas no saludables:  Las grasas "malas" pueden causar niveles perjudiciales de colesterol en cain juvenal y aumentar el riesgo de mango enfermedad cardíaca  Limite el consumo de grasas no saludables, rafael los siguientes:  · El colesterol  se encuentra en alimentos de origen animal, rafael los huevos y la langosta al igual que en productos lácteos hechos con leche entera  Limite el consumo de colesterol a menos de 300 milligramos (mg) al día  Es posible que necesite limitar el colesterol a 200 mg al día si sufre de mango enfermedad cardíaca  · Las grasas saturadas  se encuentran en gary rafale el tocino y la hamburguesa  Estas se encuentran también en la piel del grant y del [place], [place] entera y [place]  Limite el consumo de grasas saturadas a menos del 7% del total de alice calorías diarias  Limite las grasas saturadas a menos del 6% si usted tiene enfermedad cardíaca o tiene un mayor riesgo para esto  · La grasa trans  se encuentran en los alimentos envasados, rafael las papitas de bolsa y las [place]  También se encuentra en la margarina dura, algunos alimentos fritos y la manteca  Evite lo más que [employer] trans    Alimentos y bebidas saludables para el corazón para incluir:  Solicite que cain nutricionista o el médico le indique cuántas porciones necesita consumir de los siguientes alimentos de cada carmen alimenticio:  · Granos:      ¨ Panes, cereales y pastas de [facility] [place] deg [place]) integral y [place] integral    ¨ Patatas fritas y galletas saladas bajas en grasa, bajas en sodio    · Verduras:      ¨ Brócoli, judías verdes, guisantes y espinacas    ¨ [place], col y habas    ¨ Zanahorias, patatas dulces, tomates y pimientos    ¨ Vegetales enlatados sin jerome añadida    · Frutas:      ¨ Plátanos, melocotones, peras y sewell    ¨ Uvas, pasas y dátiles    ¨ [place], [place], [facility], [facility] de [facility] y [facility]    ¨ [place], [place], melón y papaya    ¨ [place] y fresas    ¨ Conservas de frutas sin azúcares añadidos    · Productos lácteos bajos en grasa:      ¨ Leche sin grasa (descremada), leche 1%, leche baja en grasa de [place], anacardo o leche de soja fortificada con calcio    ¨ Queso cottage, queso bajo en grasa y yogur regular o congelado    · Bri y proteínas , islas de carne [place] de res o cerdo (chuletas, pierna, janet), el grant y el pavo sin piel, legumbres, productos de soya, las claras del huevo y nueces o guerrero secos    Alimentos y bebidas que debe limitar o evitar:  Pregúntele a cain médico o nutricionista sobre estos y otros alimentos que contienen altos niveles de miguel Gu y Moustapha que no son saludables:  · [employer] , rafael las comidas congeladas, [address], macaón con queso y cereales con más de 300 mg de sodio por porción    · Productos enlatados o mezclas secas  para pasteles, sopas o salsas    · Verduras con sodio agregada , rafael las rafaela instantáneas, verduras con salsas agregadas o conservas regulares de verduras    · Otros alimentos altos en sodio , rafael la salsa de [place], salsa de [facility], aderezo para [place], encurtidos de [place], U[place]ER ST[place], salsa de soya y miso    · Alimentos lácteos con alto contenido de grasa  rafael leche entera o 2%, queso crema o crema agria y quesos     · Alimentos con proteínas altas en grasa  islas de carne ([address], las chuletas con hueso), el grant o pavo sin piel y las vísceras de animal rafael el hígado    · Bri curadas o [place] , rafael las salchichas, el tocino y salchichones    · Aceites y grasas poco saludables , rafael la [place], margarina en Jadyn Single y aceites para cocinar rafael el aceite de cam o villa    · Alimentos y bebidas altas en azúcar , tales rafael refrescos (gaseosas), bebidas deportivas, té azucarado, dulces, pasteles, galletas, tartas y donas  Otras pautas dietéticas que debe seguir:   · Consuma más alimentos que contengan grasas Omega-3  Consuma pescado con altas cantidades de Omega-3 por lo menos 2 veces a la semana  · Limite el consumo de alcohol  Demasiado alcohol puede dañar cain corazón y elevar cain presión arterial  Las mujeres deberían limitar el consumo de alcohol a 1 bebida por día  Los hombres deberían limitar el consumo de alcohol a 2 tragos al día  Un trago equivale a 12 onzas de cerveza, 5 onzas de vino o 1 onza y ½ de licor  · Escoja alimentos bajos en sodio  Alimentos altos de sodio pueden conducir a la hipertensión  Al preparar la comida añada muy poca sal o no use sal  Use hierbas y especias en vez de la sal     · Consuma más fibra  para ayudar a bajar los niveles de [place]  Consuma al menos 5 porciones de frutas y verduras todos los días  Consuma 3 onzas de alimentos integrales al día  Senait persaud) son Girma gonzalez alicia de [place]  Pautas de estilo de quang:   · No fume  La nicotina y otros químicos contenidos en los cigarrillos y cigarros pueden causar daño a alice pulmones y el corazón  Pida información a cain médico si usted actualmente fuma y necesita ayuda para dejar de fumar  Los cigarrillos electrónicos o tabaco sin humo todavía contienen nicotina  Consulte con cain médico antes de [employer]       · Asha ejercicio regularmente  para que le ayude a mantener un peso saludable y mejorar cain presión arterial y niveles de [place]  Pregunte a mota médico acerca del mejor plan de ejercicio para usted  No empiece un programa de ejercicios sin antes consultar con mota Leandrew Lob a alice consultas de control con mota médico según le indicaron  Anote alice preguntas para que se acuerde de hacerlas cecilia alice visitas  © 2017 26[address] Dion Stewart Information is for End User's use only and may not be sold, redistributed or otherwise used for commercial purposes  All illustrations and images included in CareNotes® are the copyrighted property of A D A M , Inc  or Ángel Bella  Esta información es sólo para uso en educación  Mota intención no es darle un consejo médico sobre enfermedades o tratamientos  Colsulte con mota Melven Rimes farmacéutico antes de seguir cualquier régimen médico para saber si es seguro y efectivo para usted

## 2018-08-07 ENCOUNTER — APPOINTMENT (OUTPATIENT)
Dept: LAB | Facility: CLINIC | Age: 58
End: 2018-08-07
Payer: COMMERCIAL

## 2018-08-07 DIAGNOSIS — Z79.899 ENCOUNTER FOR LONG-TERM (CURRENT) USE OF MEDICATIONS: Primary | ICD-10-CM

## 2018-08-07 LAB
25(OH)D3 SERPL-MCNC: 21.7 NG/ML (ref 30–100)
ALBUMIN SERPL BCP-MCNC: 4 G/DL (ref 3.5–5)
ALP SERPL-CCNC: 56 U/L (ref 46–116)
ALT SERPL W P-5'-P-CCNC: 25 U/L (ref 12–78)
ANION GAP SERPL CALCULATED.3IONS-SCNC: 6 MMOL/L (ref 4–13)
AST SERPL W P-5'-P-CCNC: 19 U/L (ref 5–45)
BASOPHILS # BLD AUTO: 0.04 THOUSANDS/ΜL (ref 0–0.1)
BASOPHILS NFR BLD AUTO: 1 % (ref 0–1)
BILIRUB SERPL-MCNC: 0.36 MG/DL (ref 0.2–1)
BUN SERPL-MCNC: 13 MG/DL (ref 5–25)
CALCIUM SERPL-MCNC: 9 MG/DL (ref 8.3–10.1)
CHLORIDE SERPL-SCNC: 109 MMOL/L (ref 100–108)
CHOLEST SERPL-MCNC: 149 MG/DL (ref 50–200)
CO2 SERPL-SCNC: 26 MMOL/L (ref 21–32)
CREAT SERPL-MCNC: 1.04 MG/DL (ref 0.6–1.3)
EOSINOPHIL # BLD AUTO: 0.1 THOUSAND/ΜL (ref 0–0.61)
EOSINOPHIL NFR BLD AUTO: 2 % (ref 0–6)
ERYTHROCYTE [DISTWIDTH] IN BLOOD BY AUTOMATED COUNT: 13.6 % (ref 11.6–15.1)
GFR SERPL CREATININE-BSD FRML MDRD: 59 ML/MIN/1.73SQ M
GLUCOSE P FAST SERPL-MCNC: 96 MG/DL (ref 65–99)
HCT VFR BLD AUTO: 40.9 % (ref 34.8–46.1)
HDLC SERPL-MCNC: 46 MG/DL (ref 40–60)
HGB BLD-MCNC: 12.5 G/DL (ref 11.5–15.4)
IMM GRANULOCYTES # BLD AUTO: 0.01 THOUSAND/UL (ref 0–0.2)
IMM GRANULOCYTES NFR BLD AUTO: 0 % (ref 0–2)
LDLC SERPL CALC-MCNC: 88 MG/DL (ref 0–100)
LYMPHOCYTES # BLD AUTO: 1.84 THOUSANDS/ΜL (ref 0.6–4.47)
LYMPHOCYTES NFR BLD AUTO: 40 % (ref 14–44)
MCH RBC QN AUTO: 27.7 PG (ref 26.8–34.3)
MCHC RBC AUTO-ENTMCNC: 30.6 G/DL (ref 31.4–37.4)
MCV RBC AUTO: 91 FL (ref 82–98)
MONOCYTES # BLD AUTO: 0.56 THOUSAND/ΜL (ref 0.17–1.22)
MONOCYTES NFR BLD AUTO: 12 % (ref 4–12)
NEUTROPHILS # BLD AUTO: 2 THOUSANDS/ΜL (ref 1.85–7.62)
NEUTS SEG NFR BLD AUTO: 45 % (ref 43–75)
NONHDLC SERPL-MCNC: 103 MG/DL
NRBC BLD AUTO-RTO: 0 /100 WBCS
PLATELET # BLD AUTO: 285 THOUSANDS/UL (ref 149–390)
PMV BLD AUTO: 10.5 FL (ref 8.9–12.7)
POTASSIUM SERPL-SCNC: 4.3 MMOL/L (ref 3.5–5.3)
PROLACTIN SERPL-MCNC: 9 NG/ML
PROT SERPL-MCNC: 7.6 G/DL (ref 6.4–8.2)
RBC # BLD AUTO: 4.52 MILLION/UL (ref 3.81–5.12)
SODIUM SERPL-SCNC: 141 MMOL/L (ref 136–145)
TRIGL SERPL-MCNC: 77 MG/DL
TSH SERPL DL<=0.05 MIU/L-ACNC: 1.51 UIU/ML (ref 0.36–3.74)
WBC # BLD AUTO: 4.55 THOUSAND/UL (ref 4.31–10.16)

## 2018-08-07 PROCEDURE — 80061 LIPID PANEL: CPT

## 2018-08-07 PROCEDURE — 84443 ASSAY THYROID STIM HORMONE: CPT

## 2018-08-07 PROCEDURE — 85025 COMPLETE CBC W/AUTO DIFF WBC: CPT

## 2018-08-07 PROCEDURE — 80053 COMPREHEN METABOLIC PANEL: CPT

## 2018-08-07 PROCEDURE — 36415 COLL VENOUS BLD VENIPUNCTURE: CPT

## 2018-08-07 PROCEDURE — 84146 ASSAY OF PROLACTIN: CPT

## 2018-08-07 PROCEDURE — 82306 VITAMIN D 25 HYDROXY: CPT

## 2018-08-11 DIAGNOSIS — E11.9 TYPE 2 DIABETES MELLITUS WITHOUT COMPLICATION, WITHOUT LONG-TERM CURRENT USE OF INSULIN (HCC): ICD-10-CM

## 2018-08-27 ENCOUNTER — OFFICE VISIT (OUTPATIENT)
Dept: GASTROENTEROLOGY | Facility: CLINIC | Age: 58
End: 2018-08-27
Payer: COMMERCIAL

## 2018-08-27 VITALS
HEART RATE: 81 BPM | TEMPERATURE: 97.3 F | SYSTOLIC BLOOD PRESSURE: 126 MMHG | HEIGHT: 60 IN | BODY MASS INDEX: 31.73 KG/M2 | WEIGHT: 161.6 LBS | DIASTOLIC BLOOD PRESSURE: 79 MMHG

## 2018-08-27 DIAGNOSIS — E66.9 OBESITY (BMI 30-39.9): Chronic | ICD-10-CM

## 2018-08-27 DIAGNOSIS — D12.6 ADENOMATOUS POLYP OF COLON, UNSPECIFIED PART OF COLON: ICD-10-CM

## 2018-08-27 DIAGNOSIS — R19.4 CHANGE IN BOWEL HABIT: ICD-10-CM

## 2018-08-27 DIAGNOSIS — R10.84 GENERALIZED ABDOMINAL CRAMPING: Primary | ICD-10-CM

## 2018-08-27 PROBLEM — K57.92 DIVERTICULITIS: Status: RESOLVED | Noted: 2017-02-09 | Resolved: 2018-08-27

## 2018-08-27 PROCEDURE — 99214 OFFICE O/P EST MOD 30 MIN: CPT | Performed by: PHYSICIAN ASSISTANT

## 2018-08-27 RX ORDER — DICYCLOMINE HYDROCHLORIDE 10 MG/1
10 CAPSULE ORAL 4 TIMES DAILY PRN
Qty: 30 CAPSULE | Refills: 2 | Status: SHIPPED | OUTPATIENT
Start: 2018-08-27 | End: 2019-06-03 | Stop reason: SDUPTHER

## 2018-08-27 NOTE — LETTER
August 27, 2018     Referral [address] [facility] [address]    Patient: David Monge   YOB: 1960   Date of Visit: 8/27/2018       Dear Dr Zackary Motta:    Thank you for referring David Monge to me for evaluation  Below are my notes for this consultation  If you have questions, please do not hesitate to call me  I look forward to following your patient along with you  Sincerely,        Nathalie Barcenas PA-C        CC: Kai Brewster, [facility], PRASHANT  8/27/2018  2:23 PM  Sign at close encounter  [facility] - Outpatient Follow-up Note  David Monge 60 y o  female MRN: [id number]  Encounter: [id number]          ASSESSMENT AND PLAN:      1  Generalized abdominal cramping  2  Change in bowel habit  She reports intermittent abdominal cramping and diarrhea likely secondary to irritable bowel syndrome  She denies alarm symptoms like abnormal weight loss and rectal bleeding  Recent colonoscopy from 2017 only revealed 1 small colon polyp, diverticulosis, and external hemorrhoids  Recent CBC and TSH within normal limits  Recommend dietary changes such as avoiding dairy and artificial sugar  Start a probiotic daily such as [address]  Stop omeprazole 20 mg daily as this may be contributing to her diarrhea  Trial dicyclomine 10 mg 4 times daily as needed for abdominal pain  We discussed common side effects such as dry eye, dry mouth, blurry vision, dizziness, drowsiness  Follow-up with PCP or GI as needed  3  Adenomatous polyp of colon, unspecified part of colon  She had a 5 mm sessile serrated adenoma removed on recent colonoscopy from 2017  She will be due for repeat colonoscopy in 5 years (2022)  4  Obesity (BMI 30-39  9)  Congratulated patient on recent weight loss and encouraged continue diet, weight loss, and exercise  ______________________________________________________________________    SUBJECTIVE:  58-year-old female with history of abdominal pain presenting for office follow-up  Patient is Estonian-speaking so  number [id number] was used to communicate  She reports ongoing abdominal pain for the past few months  She states the pain is located in her epigastric region and lower abdomen  The pain feels like a cramping sensation  The pain comes and goes, lasting about 5 min  The pain is not associated with food intake  The pain does come randomly  She has diarrhea with the abdominal pain  The abdominal cramping and diarrhea happens about 3 times per week  The cramping is relieved after having a bowel movement  She states her symptoms are mild in severity  She denies any rectal bleeding or up abnormal weight loss  She has been trying to lose weight as she was recently diagnosed with prediabetes  She is not interested in starting medication  She denies any nausea, vomiting, heartburn  She does still take omeprazole 20 mg daily  She was found to have gastritis on prior EGD  Biopsies negative for H pylori  Recent colonoscopy showed 1 small adenomatous colon polyp, diverticulosis, and external hemorrhoids  REVIEW OF SYSTEMS IS OTHERWISE NEGATIVE  Historical Information   Past Medical History:   Diagnosis Date    Abnormal mammogram     RESOLVED: [id number]    Anxiety     Anxiety     Arthritis     Depression     Depression     Diverticulosis     GERD (gastroesophageal reflux disease)     Helicobacter pylori infection     Hyperlipidemia     Seborrheic keratosis     LAST ASSESSED: [id number]    Sleep apnea     Sleep apnea      Past Surgical History:   Procedure Laterality Date    ABDOMINOPLASTY      abdomin    NC COLONOSCOPY FLX DX W/COLLJ SPEC WHEN PFRMD N/A 8/11/2016    Procedure: COLONOSCOPY;  Surgeon: Shanita Kim MD;  [facility];   Service: Gastroenterology    NC COLONOSCOPY FLX DX W/COLLJ [facility] WHEN PFRMD N/A 8/29/2017    Procedure: EGD AND COLONOSCOPY;  Surgeon: Lindsay Edge MD;  Location: [facility]; Service: Gastroenterology    TUBAL LIGATION      TUBAL LIGATION       Social History   History   Alcohol Use No     History   Drug Use No     History   Smoking Status    Never Smoker   Smokeless Tobacco    Never Used     Family History   Problem Relation Age of Onset    Diabetes Mother     Hypertension Mother     Heart disease Mother     Stomach cancer Paternal Grandfather        Meds/Allergies       Current Outpatient Prescriptions:     acetaminophen (TYLENOL) 500 mg tablet    calcium carbonate (TUMS ULTRA) 1000 MG chewable tablet    clonazePAM (KlonoPIN) 1 mg tablet    cyclobenzaprine (FLEXERIL) 5 mg tablet    metFORMIN (GLUCOPHAGE) 500 mg tablet    PARoxetine (PAXIL) 40 MG tablet    polyethylene glycol (GAVILYTE-G) 4000 mL solution    risperiDONE (RisperDAL) 2 mg tablet    Saline 0 65 % SOLN    venlafaxine (EFFEXOR) 100 MG tablet    ibuprofen (MOTRIN) 400 mg tablet    Riboflavin 100 MG TABS    No Known Allergies        Objective     Blood pressure 126/79, pulse 81, temperature (!) 97 3 °F (36 3 °C), temperature source Tympanic, height 5' (1 524 m), weight 73 3 kg (161 lb 9 6 oz)  Body mass index is 31 56 kg/m²  PHYSICAL EXAM:      General Appearance:   Alert, cooperative, no distress   HEENT:   Normocephalic, atraumatic, anicteric      Neck:  Supple, symmetrical, trachea midline   Lungs:   Clear to auscultation bilaterally; no rales, rhonchi or wheezing; respirations unlabored    Heart[de-identified]   Regular rate and rhythm; no murmur, rub, or gallop     Abdomen:   Soft, non-tender, non-distended; normal bowel sounds; no masses, no organomegaly    Genitalia:   Deferred    Rectal:   Deferred    Extremities:  No cyanosis, clubbing or edema    Pulses:  2+ and symmetric    Skin:  No jaundice, rashes, or lesions    Lymph nodes:  No palpable cervical lymphadenopathy        Lab Results: No visits with results within 1 Day(s) from this visit  Latest known visit with results is:   Appointment on 08/07/2018   Component Date Value    WBC 08/07/2018 4 55     RBC 08/07/2018 4 52     Hemoglobin 08/07/2018 12 5     Hematocrit 08/07/2018 40 9     MCV 08/07/2018 91     MCH 08/07/2018 27 7     MCHC 08/07/2018 30 6*    RDW 08/07/2018 13 6     MPV 08/07/2018 10 5     Platelets 23/71/3917 285     nRBC 08/07/2018 0     Neutrophils Relative 08/07/2018 45     Immat GRANS % 08/07/2018 0     Lymphocytes Relative 08/07/2018 40     Monocytes Relative 08/07/2018 12     Eosinophils Relative 08/07/2018 2     Basophils Relative 08/07/2018 1     Neutrophils Absolute 08/07/2018 2 00     Immature Grans Absolute 08/07/2018 0 01     Lymphocytes Absolute 08/07/2018 1 84     Monocytes Absolute 08/07/2018 0 56     Eosinophils Absolute 08/07/2018 0 10     Basophils Absolute 08/07/2018 0 04     Sodium 08/07/2018 141     Potassium 08/07/2018 4 3     Chloride 08/07/2018 109*    CO2 08/07/2018 26     ANION GAP 08/07/2018 6     BUN 08/07/2018 13     Creatinine 08/07/2018 1 04     Glucose, Fasting 08/07/2018 96     Calcium 08/07/2018 9 0     AST 08/07/2018 19     ALT 08/07/2018 25     Alkaline Phosphatase 08/07/2018 56     Total Protein 08/07/2018 7 6     Albumin 08/07/2018 4 0     Total Bilirubin 08/07/2018 0 36     eGFR 08/07/2018 59     Cholesterol 08/07/2018 149     Triglycerides 08/07/2018 77     HDL, Direct 08/07/2018 46     LDL Calculated 08/07/2018 88     Non-HDL-Chol (CHOL-HDL) 08/07/2018 103     TSH 3RD GENERATON 08/07/2018 1 510     Vit D, 25-Hydroxy 08/07/2018 21 7*    Prolactin 08/07/2018 9 0          Radiology Results:   No results found

## 2018-08-27 NOTE — PROGRESS NOTES
[facility] - Outpatient Follow-up Note  Maribel Swain 60 y o  female MRN: [id number]  Encounter: [id number]          ASSESSMENT AND PLAN:      1  Generalized abdominal cramping  2  Change in bowel habit  She reports intermittent abdominal cramping and diarrhea likely secondary to irritable bowel syndrome  She denies alarm symptoms like abnormal weight loss and rectal bleeding  Recent colonoscopy from 2017 only revealed 1 small colon polyp, diverticulosis, and external hemorrhoids  Recent CBC and TSH within normal limits  Recommend dietary changes such as avoiding dairy and artificial sugar  Start a probiotic daily such as [address]  Stop omeprazole 20 mg daily as this may be contributing to her diarrhea  Trial dicyclomine 10 mg 4 times daily as needed for abdominal pain  We discussed common side effects such as dry eye, dry mouth, blurry vision, dizziness, drowsiness  Follow-up with PCP or GI as needed  3  Adenomatous polyp of colon, unspecified part of colon  She had a 5 mm sessile serrated adenoma removed on recent colonoscopy from 2017  She will be due for repeat colonoscopy in 5 years (2022)  4  Obesity (BMI 30-39  9)  Congratulated patient on recent weight loss and encouraged continue diet, weight loss, and exercise    ______________________________________________________________________    SUBJECTIVE:  58-year-old female with history of abdominal pain presenting for office follow-up  Patient is Peruvian-speaking so  number [id number] was used to communicate  She reports ongoing abdominal pain for the past few months  She states the pain is located in her epigastric region and lower abdomen  The pain feels like a cramping sensation  The pain comes and goes, lasting about 5 min  The pain is not associated with food intake  The pain does come randomly  She has diarrhea with the abdominal pain   The abdominal cramping and diarrhea happens about 3 times per week  The cramping is relieved after having a bowel movement  She states her symptoms are mild in severity  She denies any rectal bleeding or up abnormal weight loss  She has been trying to lose weight as she was recently diagnosed with prediabetes  She is not interested in starting medication  She denies any nausea, vomiting, heartburn  She does still take omeprazole 20 mg daily  She was found to have gastritis on prior EGD  Biopsies negative for H pylori  Recent colonoscopy showed 1 small adenomatous colon polyp, diverticulosis, and external hemorrhoids  REVIEW OF SYSTEMS IS OTHERWISE NEGATIVE  Historical Information   Past Medical History:   Diagnosis Date    Abnormal mammogram     RESOLVED: [id number]    Anxiety     Anxiety     Arthritis     Depression     Depression     Diverticulosis     GERD (gastroesophageal reflux disease)     Helicobacter pylori infection     Hyperlipidemia     Seborrheic keratosis     LAST ASSESSED: [id number]    Sleep apnea     Sleep apnea      Past Surgical History:   Procedure Laterality Date    ABDOMINOPLASTY      abdomin    TX COLONOSCOPY FLX DX W/COLLJ SPEC WHEN PFRMD N/A 8/11/2016    Procedure: COLONOSCOPY;  Surgeon: Lindsay Edge MD;  Location:  GI LAB; Service: Gastroenterology    TX COLONOSCOPY FLX DX W/COLLJ [address] [facility] [address] WHEN PFRMD N/A 8/29/2017    Procedure: EGD AND COLONOSCOPY;  Surgeon: Lindsay Edge MD;  Location: [facility];   Service: Gastroenterology    TUBAL LIGATION      TUBAL LIGATION       Social History   History   Alcohol Use No     History   Drug Use No     History   Smoking Status    Never Smoker   Smokeless Tobacco    Never Used     Family History   Problem Relation Age of Onset    Diabetes Mother     Hypertension Mother     Heart disease Mother     Stomach cancer Paternal Grandfather        Meds/Allergies       Current Outpatient Prescriptions:     acetaminophen (TYLENOL) 500 mg tablet    calcium carbonate (TUMS ULTRA) 1000 MG chewable tablet    clonazePAM (KlonoPIN) 1 mg tablet    cyclobenzaprine (FLEXERIL) 5 mg tablet    metFORMIN (GLUCOPHAGE) 500 mg tablet    PARoxetine (PAXIL) 40 MG tablet    polyethylene glycol (GAVILYTE-G) 4000 mL solution    risperiDONE (RisperDAL) 2 mg tablet    Saline 0 65 % SOLN    venlafaxine (EFFEXOR) 100 MG tablet    ibuprofen (MOTRIN) 400 mg tablet    Riboflavin 100 MG TABS    No Known Allergies        Objective     Blood pressure 126/79, pulse 81, temperature (!) 97 3 °F (36 3 °C), temperature source Tympanic, height 5' (1 524 m), weight 73 3 kg (161 lb 9 6 oz)  Body mass index is 31 56 kg/m²  PHYSICAL EXAM:      General Appearance:   Alert, cooperative, no distress   HEENT:   Normocephalic, atraumatic, anicteric      Neck:  Supple, symmetrical, trachea midline   Lungs:   Clear to auscultation bilaterally; no rales, rhonchi or wheezing; respirations unlabored    Heart[de-identified]   Regular rate and rhythm; no murmur, rub, or gallop  Abdomen:   Soft, non-tender, non-distended; normal bowel sounds; no masses, no organomegaly    Genitalia:   Deferred    Rectal:   Deferred    Extremities:  No cyanosis, clubbing or edema    Pulses:  2+ and symmetric    Skin:  No jaundice, rashes, or lesions    Lymph nodes:  No palpable cervical lymphadenopathy        Lab Results:   No visits with results within 1 Day(s) from this visit     Latest known visit with results is:   Appointment on 08/07/2018   Component Date Value    WBC 08/07/2018 4 55     RBC 08/07/2018 4 52     Hemoglobin 08/07/2018 12 5     Hematocrit 08/07/2018 40 9     MCV 08/07/2018 91     MCH 08/07/2018 27 7     MCHC 08/07/2018 30 6*    RDW 08/07/2018 13 6     MPV 08/07/2018 10 5     Platelets 13/53/9125 285     nRBC 08/07/2018 0     Neutrophils Relative 08/07/2018 45     Immat GRANS % 08/07/2018 0     Lymphocytes Relative 08/07/2018 40     Monocytes Relative 08/07/2018 12     Eosinophils Relative 08/07/2018 2     Basophils Relative 08/07/2018 1     Neutrophils Absolute 08/07/2018 2 00     Immature Grans Absolute 08/07/2018 0 01     Lymphocytes Absolute 08/07/2018 1 84     Monocytes Absolute 08/07/2018 0 56     Eosinophils Absolute 08/07/2018 0 10     Basophils Absolute 08/07/2018 0 04     Sodium 08/07/2018 141     Potassium 08/07/2018 4 3     Chloride 08/07/2018 109*    CO2 08/07/2018 26     ANION GAP 08/07/2018 6     BUN 08/07/2018 13     Creatinine 08/07/2018 1 04     Glucose, Fasting 08/07/2018 96     Calcium 08/07/2018 9 0     AST 08/07/2018 19     ALT 08/07/2018 25     Alkaline Phosphatase 08/07/2018 56     Total Protein 08/07/2018 7 6     Albumin 08/07/2018 4 0     Total Bilirubin 08/07/2018 0 36     eGFR 08/07/2018 59     Cholesterol 08/07/2018 149     Triglycerides 08/07/2018 77     HDL, Direct 08/07/2018 46     LDL Calculated 08/07/2018 88     Non-HDL-Chol (CHOL-HDL) 08/07/2018 103     TSH 3RD GENERATON 08/07/2018 1 510     Vit D, 25-Hydroxy 08/07/2018 21 7*    Prolactin 08/07/2018 9 0          Radiology Results:   No results found

## 2018-08-28 ENCOUNTER — HOSPITAL ENCOUNTER (OUTPATIENT)
Dept: RADIOLOGY | Age: 58
Discharge: HOME | End: 2018-08-28
Payer: COMMERCIAL

## 2018-08-28 ENCOUNTER — TELEPHONE (OUTPATIENT)
Dept: INTERNAL MEDICINE CLINIC | Facility: CLINIC | Age: 58
End: 2018-08-28

## 2018-08-28 ENCOUNTER — TRANSCRIBE ORDERS (OUTPATIENT)
Dept: ADMINISTRATIVE | Age: 58
End: 2018-08-28

## 2018-08-28 DIAGNOSIS — E11.9 TYPE 2 DIABETES MELLITUS (HCC): Primary | ICD-10-CM

## 2018-08-28 DIAGNOSIS — Z12.31 SCREENING MAMMOGRAM, ENCOUNTER FOR: ICD-10-CM

## 2018-08-28 DIAGNOSIS — Z12.31 SCREENING MAMMOGRAM, ENCOUNTER FOR: Primary | ICD-10-CM

## 2018-08-28 PROCEDURE — 77067 SCR MAMMO BI INCL CAD: CPT

## 2018-08-28 RX ORDER — BLOOD SUGAR DIAGNOSTIC
STRIP MISCELLANEOUS
Qty: 50 EACH | Refills: 4 | Status: SHIPPED | OUTPATIENT
Start: 2018-08-28 | End: 2020-03-19

## 2018-08-28 NOTE — TELEPHONE ENCOUNTER
Patient is currently at her mammo appointment  ordering doc was alberto but There was no order put into the system for her to get it done

## 2018-09-11 ENCOUNTER — OFFICE VISIT (OUTPATIENT)
Dept: INTERNAL MEDICINE CLINIC | Facility: CLINIC | Age: 58
End: 2018-09-11
Payer: COMMERCIAL

## 2018-09-11 VITALS
BODY MASS INDEX: 29.21 KG/M2 | HEART RATE: 68 BPM | HEIGHT: 62 IN | TEMPERATURE: 97.7 F | WEIGHT: 158.73 LBS | DIASTOLIC BLOOD PRESSURE: 80 MMHG | SYSTOLIC BLOOD PRESSURE: 112 MMHG

## 2018-09-11 DIAGNOSIS — E11.9 TYPE 2 DIABETES MELLITUS WITHOUT COMPLICATION, WITHOUT LONG-TERM CURRENT USE OF INSULIN (HCC): Primary | ICD-10-CM

## 2018-09-11 DIAGNOSIS — Z23 NEED FOR INFLUENZA VACCINATION: ICD-10-CM

## 2018-09-11 DIAGNOSIS — G47.33 OBSTRUCTIVE SLEEP APNEA: ICD-10-CM

## 2018-09-11 DIAGNOSIS — Z23 NEED FOR PNEUMOCOCCAL VACCINATION: ICD-10-CM

## 2018-09-11 PROCEDURE — 1036F TOBACCO NON-USER: CPT | Performed by: INTERNAL MEDICINE

## 2018-09-11 PROCEDURE — 99213 OFFICE O/P EST LOW 20 MIN: CPT | Performed by: INTERNAL MEDICINE

## 2018-09-11 PROCEDURE — 90732 PPSV23 VACC 2 YRS+ SUBQ/IM: CPT | Performed by: INTERNAL MEDICINE

## 2018-09-11 PROCEDURE — 90472 IMMUNIZATION ADMIN EACH ADD: CPT | Performed by: INTERNAL MEDICINE

## 2018-09-11 PROCEDURE — 90471 IMMUNIZATION ADMIN: CPT | Performed by: INTERNAL MEDICINE

## 2018-09-11 PROCEDURE — 90682 RIV4 VACC RECOMBINANT DNA IM: CPT | Performed by: INTERNAL MEDICINE

## 2018-09-11 NOTE — PROGRESS NOTES
ASSESSMENT/PLAN:  Diagnoses and all orders for this visit:    Need for pneumococcal vaccination  -     PPSV 23    Type 2 diabetes mellitus without complication, without long-term current use of insulin ([address] )  -     Hemoglobin A1C; Future    Obstructive sleep apnea      Plan:  DM II- Pt stopped taking Metformin 2 weeks ago due to side effects  A1c was 6 7% in June  She is now 158 pounds down from 164 last visit  Following a better diet  Check Lab A1c today  If normal, will d/c Metformin  If still in diabetic range, will change Metformin to XL form  Gastritis/ hx h  pylori- Per EGD 8/2017  EGD otherwise normal  Likely component of dyspepsia  Saw Gi this past August  Was given Bentyl for "gas pains"/change in bowel habits which is likely from the Metformin  Continue Protonix per Gi recommendations      Depression- Follows with psychiatry  Continue Effexor, Trazadone, Paxil, Klonopin, risperdal     ALEC- uses CPAP nightly  Follows with Sleep Medicine      Follow-up: In 3 months for chronic conditions    Health Maintenance:  Diabetic foot exam performed in office today  Mammo done 8/18 and was normal  Due for micro:cr ratio and diabetic eye exam - address at next visit  Colonoscopy done on 08/17: Serrated polyp removed  Repeat C-scope in 5 years (2023)  PPSV 23 given today as well as flu shot      CHIEF COMPLAINT: Follow-up    HISTORY OF PRESENT ILLNESS:  The patient is here for routine follow-up  She has not been taking Metformin for 2 weeks due to diarrhea and abdominal pain  She has been taking herbal supplements instead  She has been eating a better diet and has lost some weight recently  She has no other complaints today  Review of Systems   Constitutional: Negative for chills and fever  Respiratory: Negative for cough, chest tightness and shortness of breath  Cardiovascular: Negative for chest pain  Gastrointestinal: Positive for abdominal pain, diarrhea and nausea  Negative for vomiting  Musculoskeletal: Negative for arthralgias  OBJECTIVE:  Vitals:    09/11/18 1017   BP: 112/80   BP Location: Left arm   Patient Position: Sitting   Cuff Size: Adult   Pulse: 68   Temp: 97 7 °F (36 5 °C)   TempSrc: Oral   Weight: 72 kg (158 lb 11 7 oz)   Height: 5' 1 5" (1 562 m)       Physical Exam   Cardiovascular:   Pulses:       Dorsalis pedis pulses are 2+ on the right side, and 2+ on the left side  Musculoskeletal:        Feet:    Feet:   Right Foot:   Skin Integrity: Negative for ulcer, skin breakdown, erythema, warmth, callus or dry skin  Left Foot:   Skin Integrity: Negative for ulcer, skin breakdown, erythema, warmth, callus or dry skin  Patient's shoes and socks removed  Right Foot/Ankle   Right Foot Inspection  Skin Exam: skin normal and skin intact no dry skin, no warmth, no callus, no erythema, no maceration, no abnormal color, no pre-ulcer, no ulcer and no callus                            Sensory   Vibration: intact  Proprioception: intact   Monofilament testing: intact  Vascular    The right DP pulse is 2+  Left Foot/Ankle  Left Foot Inspection  Skin Exam: skin normal and skin intactno dry skin, no warmth, no erythema, no maceration, normal color, no pre-ulcer, no ulcer and no callus                                         Sensory   Vibration: intact  Proprioception: intact  Monofilament: intact  Vascular    The left DP pulse is 2+  Assign Risk Category:  No deformity present;  No loss of protective sensation;        Risk: 0      GEN: AAOx3, NAD, overweight  HEENT: MM moist  No scleral icterus  CV: RRR, nml S1/S2, no murmur appreciated  Lungs: CTA bilaterally, no w/r/r  Psych: Pleasant mood        Current Outpatient Prescriptions:     acetaminophen (TYLENOL) 500 mg tablet, Take 1 tablet (500 mg total) by mouth daily as needed for headaches, Disp: 30 tablet, Rfl: 3    calcium carbonate (TUMS ULTRA) 1000 MG chewable tablet, Chew 1 tablet (1,000 mg total) every 6 (six) hours as needed for indigestion or heartburn, Disp: 120 tablet, Rfl: 1    clonazePAM (KlonoPIN) 1 mg tablet, Take 1 mg by mouth daily at bedtime as needed for anxiety  , Disp: , Rfl:     cyclobenzaprine (FLEXERIL) 5 mg tablet, Take 1 tablet by mouth 3 (three) times a day as needed for muscle spasms, Disp: 10 tablet, Rfl: 0    dicyclomine (BENTYL) 10 mg capsule, Take 1 capsule (10 mg total) by mouth 4 (four) times a day as needed (abdominal pain), Disp: 30 capsule, Rfl: 2    PARoxetine (PAXIL) 40 MG tablet, Take 40 mg by mouth every morning , Disp: , Rfl:     venlafaxine (EFFEXOR) 100 MG tablet, Take 150 mg by mouth, Disp: , Rfl:     ACCU-CHEK GUIDE test strip, TEST every morning, Disp: 50 each, Rfl: 4    ibuprofen (MOTRIN) 400 mg tablet, Take 400 mg by mouth 3 (three) times a day as needed for mild pain, Disp: , Rfl:     metFORMIN (GLUCOPHAGE) 500 mg tablet, TAKE 1 TABLET (500 MG TOTAL) BY MOUTH DAILY WITH BREAKFAST   (Patient not taking: Reported on 9/11/2018 ), Disp: 90 tablet, Rfl: 1    polyethylene glycol (GAVILYTE-G) 4000 mL solution, Take by mouth once, Disp: , Rfl:     Riboflavin 100 MG TABS, Take 1 tablet (100 mg total) by mouth daily, Disp: 90 tablet, Rfl: 1    risperiDONE (RisperDAL) 2 mg tablet, Take 2 mg by mouth 2 (two) times a day , Disp: , Rfl:     Saline 0 65 % SOLN, 0 65 % into each nostril daily, Disp: , Rfl:     Past Medical History:   Diagnosis Date    Abnormal mammogram     RESOLVED: [id number]    Anxiety     Anxiety     Arthritis     Depression     Depression     Diverticulosis     GERD (gastroesophageal reflux disease)     Helicobacter pylori infection     Hyperlipidemia     Seborrheic keratosis     LAST ASSESSED: [id number]    Sleep apnea     Sleep apnea      Past Surgical History:   Procedure Laterality Date    ABDOMINOPLASTY      abdomin    SD COLONOSCOPY FLX DX W/COLLJ SPEC WHEN PFRMD N/A 8/11/2016    Procedure: COLONOSCOPY;  Surgeon: Shanita Kim MD;  [facility]; Service: Gastroenterology    GA COLONOSCOPY FLX DX W/COLLJ [facility] WHEN PFRMD N/A 8/29/2017    Procedure: EGD AND COLONOSCOPY;  Surgeon: Sudha Hall MD;  Location: [facility]; Service: Gastroenterology    TUBAL LIGATION      TUBAL LIGATION       Social History     Social History    Marital status: Single     Spouse name: N/A    Number of children: N/A    Years of education: N/A     Occupational History    Not on file  Social History Main Topics    Smoking status: Never Smoker    Smokeless tobacco: Never Used    Alcohol use No    Drug use: No    Sexual activity: Yes     Partners: Male     Birth control/ protection: None, Post-menopausal     Other Topics Concern    Not on file     Social History Narrative    No narrative on file     Family History   Problem Relation Age of Onset    Diabetes Mother     Hypertension Mother     Heart disease Mother     Stomach cancer Paternal Grandfather        DO [facility] Internal Medicine PGY-3    [facility]  [address]  [address] [facility], [address] [place] [address]  [phone]

## 2018-09-28 ENCOUNTER — APPOINTMENT (OUTPATIENT)
Dept: LAB | Facility: CLINIC | Age: 58
End: 2018-09-28
Payer: COMMERCIAL

## 2018-09-28 DIAGNOSIS — E11.9 TYPE 2 DIABETES MELLITUS WITHOUT COMPLICATION, WITHOUT LONG-TERM CURRENT USE OF INSULIN (HCC): ICD-10-CM

## 2018-09-28 LAB
EST. AVERAGE GLUCOSE BLD GHB EST-MCNC: 131 MG/DL
HBA1C MFR BLD: 6.2 % (ref 4.2–6.3)

## 2018-09-28 PROCEDURE — 36415 COLL VENOUS BLD VENIPUNCTURE: CPT

## 2018-09-28 PROCEDURE — 83036 HEMOGLOBIN GLYCOSYLATED A1C: CPT

## 2018-10-18 DIAGNOSIS — E11.9 DIABETES (HCC): Primary | ICD-10-CM

## 2018-10-18 RX ORDER — LANCETS
EACH MISCELLANEOUS
Qty: 102 EACH | Refills: 0 | Status: SHIPPED | OUTPATIENT
Start: 2018-10-18 | End: 2019-01-30 | Stop reason: SDUPTHER

## 2018-12-11 DIAGNOSIS — K21.9 GASTROESOPHAGEAL REFLUX DISEASE, ESOPHAGITIS PRESENCE NOT SPECIFIED: Primary | ICD-10-CM

## 2018-12-11 RX ORDER — OMEPRAZOLE 20 MG/1
CAPSULE, DELAYED RELEASE ORAL
Qty: 90 CAPSULE | Refills: 3 | Status: SHIPPED | OUTPATIENT
Start: 2018-12-11 | End: 2019-06-03 | Stop reason: SDUPTHER

## 2019-01-30 ENCOUNTER — OFFICE VISIT (OUTPATIENT)
Dept: INTERNAL MEDICINE CLINIC | Facility: CLINIC | Age: 59
End: 2019-01-30

## 2019-01-30 VITALS
BODY MASS INDEX: 28.3 KG/M2 | SYSTOLIC BLOOD PRESSURE: 110 MMHG | TEMPERATURE: 98 F | WEIGHT: 149.91 LBS | HEIGHT: 61 IN | HEART RATE: 76 BPM | DIASTOLIC BLOOD PRESSURE: 70 MMHG

## 2019-01-30 DIAGNOSIS — Z00.00 ANNUAL PHYSICAL EXAM: ICD-10-CM

## 2019-01-30 DIAGNOSIS — E11.9 TYPE 2 DIABETES MELLITUS WITHOUT COMPLICATION, WITHOUT LONG-TERM CURRENT USE OF INSULIN (HCC): Primary | ICD-10-CM

## 2019-01-30 DIAGNOSIS — F32.A DEPRESSION, UNSPECIFIED DEPRESSION TYPE: ICD-10-CM

## 2019-01-30 DIAGNOSIS — G44.219 EPISODIC TENSION-TYPE HEADACHE, NOT INTRACTABLE: ICD-10-CM

## 2019-01-30 DIAGNOSIS — Z86.19 HISTORY OF HELICOBACTER PYLORI INFECTION: ICD-10-CM

## 2019-01-30 DIAGNOSIS — E11.9 DIET-CONTROLLED DIABETES MELLITUS (HCC): ICD-10-CM

## 2019-01-30 LAB
LEFT EYE DIABETIC RETINOPATHY: NORMAL
LEFT EYE IMAGE QUALITY: NORMAL
LEFT EYE MACULAR EDEMA: NORMAL
LEFT EYE OTHER RETINOPATHY: NORMAL
RIGHT EYE DIABETIC RETINOPATHY: NORMAL
RIGHT EYE IMAGE QUALITY: NORMAL
RIGHT EYE MACULAR EDEMA: NORMAL
RIGHT EYE OTHER RETINOPATHY: NORMAL
SEVERITY (EYE EXAM): NORMAL

## 2019-01-30 PROCEDURE — 99213 OFFICE O/P EST LOW 20 MIN: CPT | Performed by: INTERNAL MEDICINE

## 2019-01-30 PROCEDURE — 92250 FUNDUS PHOTOGRAPHY W/I&R: CPT | Performed by: INTERNAL MEDICINE

## 2019-01-30 RX ORDER — LANCETS
EACH MISCELLANEOUS
Qty: 102 EACH | Refills: 3 | Status: SHIPPED | OUTPATIENT
Start: 2019-01-30 | End: 2020-03-19

## 2019-01-30 RX ORDER — ACETAMINOPHEN 500 MG
500 TABLET ORAL DAILY PRN
Qty: 30 TABLET | Refills: 3 | Status: SHIPPED | OUTPATIENT
Start: 2019-01-30 | End: 2022-03-08 | Stop reason: HOSPADM

## 2019-01-30 NOTE — PROGRESS NOTES
ASSESSMENT/PLAN:  Diagnoses and all orders for this visit:    Type 2 diabetes mellitus without complication, without long-term current use of insulin ([facility] [address] )  -     IRIS Diabetc eye exam  -     ACCU-CHEK FASTCLIX LANCETS MISC; Check sugars every few days  -     glucose blood (ACCU-CHEK GUIDE) test strip; Check sugars every few days    Diet-controlled diabetes mellitus ([address] )    Depression, unspecified depression type    History of Helicobacter pylori infection    Episodic tension-type headache, not intractable  -     acetaminophen (TYLENOL) 500 mg tablet; Take 1 tablet (500 mg total) by mouth daily as needed for headaches    Annual physical exam  -     Ambulatory referral to Obstetrics / Gynecology; Future    Plan:    Diet-controlled DM: A1c 6 2% in Sept down from 6 7%  Was started on Metformin previously but has not been taking it due to side effects  No need to continue Metformin at this time  Diabetic eye exam done in office today  Continue proper diabetic diet and weight management      Gastritis/ hx h  pylori- Per EGD 8/2017  EGD otherwise normal  Likely component of dyspepsia  Saw Gi recently  Continue Protonix per Gi recommendations      Depression- Follows with psychiatry  Continue Effexor, Trazadone, Paxil, Klonopin, risperdal     ALEC- uses CPAP nightly  Follows with Sleep Medicine, saw them recently       Health Maintenance:  Colonoscopy done on 08/17: Serrated polyp removed  Repeat C-scope in 5 years (2023)  Diagnostic Mammogram 8/2017- scattered fibroglandular densities, otherwise normal  Follow-up in 1 year  Lipids June 2017 were normal  Due for annual OB/GYN (saw 12/2017) - will give referral at next visit      Discussed the importance of proper diet and exercise as well as stress management      Follow-up: In 4 months for chronic conditions      CHIEF COMPLAINT: Follow-up    HISTORY OF PRESENT ILLNESS:  The patient is here for routine follow-up  She has a cut on her left thumb   It has throbbing pain but no surrounding redness or drainage  Tylenol relieves her pain  She is wondering the results of her recent mammogram which was normal  She is following a proper diet and has lost 9 lb since last visit  She has no complaints today  Review of Systems  Constitutional: Negative for appetite change  Negative for activity change, fatigue and unexpected weight change  Respiratory: Negative for shortness of breath, wheezing, cough  Cardiovascular: Negative for chest pain and palpitations  Gastrointestinal: Negative for abdominal pain, nausea and vomiting  Negative for diarrhea or constipation  Negative for blood in stool  Musculoskeletal: Negative for arthralgias  Neurological: Negative for dizziness, light-headedness and headaches  OBJECTIVE:  Vitals:    01/30/19 0945   BP: 110/70   BP Location: Right arm   Patient Position: Sitting   Cuff Size: Adult   Pulse: 76   Temp: 98 °F (36 7 °C)   TempSrc: Oral   Weight: 68 kg (149 lb 14 6 oz)   Height: 5' 0 5" (1 537 m)       Physical Exam  GEN: AAOx3, NAD, appears well-nourished  HEENT: MM moist  No scleral icterus  CV: RRR, nml S1/S2, no murmur appreciated  Lungs: CTA bilaterally, no w/r/r  Abd: Soft, NT, ND  +NABS  No rebound/guarding  Skin: No rashes or lesions noted  Psych: Normal mood and affect      Current Outpatient Prescriptions:     ACCU-CHEK FASTCLIX LANCETS MISC, TEST every morning, Disp: 102 each, Rfl: 0    ACCU-CHEK GUIDE test strip, TEST every morning, Disp: 50 each, Rfl: 4    acetaminophen (TYLENOL) 500 mg tablet, Take 1 tablet (500 mg total) by mouth daily as needed for headaches, Disp: 30 tablet, Rfl: 3    calcium carbonate (TUMS ULTRA) 1000 MG chewable tablet, Chew 1 tablet (1,000 mg total) every 6 (six) hours as needed for indigestion or heartburn, Disp: 120 tablet, Rfl: 1    clonazePAM (KlonoPIN) 1 mg tablet, Take 1 mg by mouth daily at bedtime as needed for anxiety    , Disp: , Rfl:     cyclobenzaprine (FLEXERIL) 5 mg tablet, Take 1 tablet by mouth 3 (three) times a day as needed for muscle spasms, Disp: 10 tablet, Rfl: 0    metFORMIN (GLUCOPHAGE) 500 mg tablet, TAKE 1 TABLET (500 MG TOTAL) BY MOUTH DAILY WITH BREAKFAST , Disp: 90 tablet, Rfl: 1    omeprazole (PriLOSEC) 20 mg delayed release capsule, take 1 capsule by mouth once daily, Disp: 90 capsule, Rfl: 3    PARoxetine (PAXIL) 40 MG tablet, Take 40 mg by mouth every morning , Disp: , Rfl:     risperiDONE (RisperDAL) 2 mg tablet, Take 2 mg by mouth 2 (two) times a day , Disp: , Rfl:     venlafaxine (EFFEXOR) 100 MG tablet, Take 150 mg by mouth, Disp: , Rfl:     dicyclomine (BENTYL) 10 mg capsule, Take 1 capsule (10 mg total) by mouth 4 (four) times a day as needed (abdominal pain), Disp: 30 capsule, Rfl: 2    ibuprofen (MOTRIN) 400 mg tablet, Take 400 mg by mouth 3 (three) times a day as needed for mild pain, Disp: , Rfl:     polyethylene glycol (GAVILYTE-G) 4000 mL solution, Take by mouth once, Disp: , Rfl:     Riboflavin 100 MG TABS, Take 1 tablet (100 mg total) by mouth daily, Disp: 90 tablet, Rfl: 1    Saline 0 65 % SOLN, 0 65 % into each nostril daily, Disp: , Rfl:     Past Medical History:   Diagnosis Date    Abnormal mammogram     RESOLVED: [id number]    Anxiety     Anxiety     Arthritis     Depression     Depression     Diverticulosis     GERD (gastroesophageal reflux disease)     Helicobacter pylori infection     Hyperlipidemia     Seborrheic keratosis     LAST ASSESSED: [id number]    Sleep apnea     Sleep apnea      Past Surgical History:   Procedure Laterality Date    ABDOMINOPLASTY      abdomin    DC COLONOSCOPY FLX DX W/COLLJ SPEC WHEN PFRMD N/A 8/11/2016    Procedure: COLONOSCOPY;  Surgeon: Luann Agosto MD;  Location:  GI LAB; Service: Gastroenterology    DC COLONOSCOPY FLX DX W/COLLJ [address] [facility] [address] WHEN PFRMD N/A 8/29/2017    Procedure: EGD AND COLONOSCOPY;  Surgeon: Luann Agosto MD;  Location: [facility];   Service: Gastroenterology    TUBAL LIGATION      TUBAL LIGATION       Social History     Social History    Marital status: Single     Spouse name: N/A    Number of children: N/A    Years of education: N/A     Occupational History    Not on file  Social History Main Topics    Smoking status: Never Smoker    Smokeless tobacco: Never Used    Alcohol use No    Drug use: No    Sexual activity: Yes     Partners: Male     Birth control/ protection: None, Post-menopausal     Other Topics Concern    Not on file     Social History Narrative    No narrative on file     Family History   Problem Relation Age of Onset    Diabetes Mother     Hypertension Mother     Heart disease Mother     Stomach cancer Paternal Grandfather        DO Dotty Bass [facility] Internal Medicine PGY-3    [facility] [place]  [address]  [address] [facility], [address] [place] [address]  [phone]

## 2019-02-05 ENCOUNTER — TELEPHONE (OUTPATIENT)
Dept: INTERNAL MEDICINE CLINIC | Facility: CLINIC | Age: 59
End: 2019-02-05

## 2019-02-05 NOTE — TELEPHONE ENCOUNTER
Hi this is a patient of Dr Anai Freitas, she has an upcoming appointment for sleep medicine   Please add the referral for Sleep Medicine diagnosis code [id number], thank you

## 2019-02-06 DIAGNOSIS — G47.33 OBSTRUCTIVE SLEEP APNEA: Primary | ICD-10-CM

## 2019-02-07 DIAGNOSIS — G47.30 SLEEP APNEA, UNSPECIFIED TYPE: Primary | ICD-10-CM

## 2019-02-08 ENCOUNTER — OFFICE VISIT (OUTPATIENT)
Dept: SLEEP CENTER | Facility: CLINIC | Age: 59
End: 2019-02-08
Payer: COMMERCIAL

## 2019-02-08 VITALS
SYSTOLIC BLOOD PRESSURE: 120 MMHG | HEIGHT: 60 IN | WEIGHT: 151 LBS | DIASTOLIC BLOOD PRESSURE: 76 MMHG | BODY MASS INDEX: 29.64 KG/M2 | HEART RATE: 68 BPM

## 2019-02-08 DIAGNOSIS — E11.9 TYPE 2 DIABETES MELLITUS WITHOUT COMPLICATION, WITHOUT LONG-TERM CURRENT USE OF INSULIN (HCC): ICD-10-CM

## 2019-02-08 DIAGNOSIS — G47.30 SLEEP APNEA, UNSPECIFIED TYPE: ICD-10-CM

## 2019-02-08 PROCEDURE — 99213 OFFICE O/P EST LOW 20 MIN: CPT | Performed by: INTERNAL MEDICINE

## 2019-03-11 ENCOUNTER — OFFICE VISIT (OUTPATIENT)
Dept: OBGYN CLINIC | Facility: CLINIC | Age: 59
End: 2019-03-11

## 2019-03-11 VITALS
HEIGHT: 62 IN | BODY MASS INDEX: 27.97 KG/M2 | SYSTOLIC BLOOD PRESSURE: 118 MMHG | WEIGHT: 152 LBS | HEART RATE: 87 BPM | DIASTOLIC BLOOD PRESSURE: 70 MMHG

## 2019-03-11 DIAGNOSIS — Z01.419 WOMEN'S ANNUAL ROUTINE GYNECOLOGICAL EXAMINATION: Primary | ICD-10-CM

## 2019-03-11 PROCEDURE — 99386 PREV VISIT NEW AGE 40-64: CPT | Performed by: OBSTETRICS & GYNECOLOGY

## 2019-03-11 NOTE — PROGRESS NOTES
Subjective      Sunshine Douglas is a 60 y o  female who presents for annual well woman exam  Pt is Belgian speaker and blue phone was used  Pt is post menopausal  Pt denies bleeding, spotting, or discharge  GYN:  · No vaginal discharge, labial erythema or lesions, dyspareunia  · Menarche at 14  · Pt is post-menopausal   · Patient is  sexually active with one partner  Pt does not use condoms  · Last pap smear was in   HPV negative, negative intraepithelial lesion or malignancy   Pt is due in   · No gynecologic surgeries  OB:  ·  female  · Pregnancies were normal     :  · no dysuria increased urinary frequency no urgency  · no hematuria, flank pain, incontinence  Breast:  · no skin changes, dimpling, reddening, nipple retraction  Fibroglandular structures palpated  · no breast discharge  · Last mammogram was in 2018  No evidence of malignancy   · Patient does not have a family history of breast, endometrial, or ovarian ca  General:  · Diet: rice twice a month, grilled meat, vegetables,   · Exercise: no right now  · Work: unemployed  · ETOH use: never  · Tobacco use: never  · Recreational drug use: never    Screening:  · Cervical cancer: last pap smear in   Results were negative for high risk HPV and intra-epithelial malignancy  · Breast cancer: last mammogram in 2018  Results were negative for any malignancy  · Colon cancer: last colonoscopy in 2017  Serrated adenoma found  Pt due for next colonoscopy five years from last one    Review of Systems  Pertinent items are noted in HPI  Objective      There were no vitals taken for this visit      General:   alert and oriented, in no acute distress, alert, appears stated age and cooperative   Heart: regular rate and rhythm, S1, S2 normal, no murmur, click, rub or gallop   Lungs: clear to auscultation bilaterally   Abdomen: soft, non-tender, without masses or organomegaly   Vulva: normal   Vagina: normal mucosa   Cervix: no cervical motion tenderness and no lesions   Uterus: normal size   Adnexa: normal adnexa       Assessment/Plan 60year old  here for annual well visit  -Bilateral mammogram ordered due to fibroglandular masses palpated on physical examination     -fu in 1 year for next annual well visit or sooner if needed

## 2019-03-18 ENCOUNTER — HOSPITAL ENCOUNTER (OUTPATIENT)
Dept: MAMMOGRAPHY | Facility: CLINIC | Age: 59
Discharge: HOME | End: 2019-03-18
Payer: COMMERCIAL

## 2019-03-18 VITALS — BODY MASS INDEX: 27.97 KG/M2 | WEIGHT: 152 LBS | HEIGHT: 62 IN

## 2019-03-18 DIAGNOSIS — N63.20 BILATERAL BREAST LUMP: ICD-10-CM

## 2019-03-18 DIAGNOSIS — Z01.419 WOMEN'S ANNUAL ROUTINE GYNECOLOGICAL EXAMINATION: ICD-10-CM

## 2019-03-18 DIAGNOSIS — N63.10 BILATERAL BREAST LUMP: ICD-10-CM

## 2019-03-18 PROCEDURE — G0279 TOMOSYNTHESIS, MAMMO: HCPCS

## 2019-03-18 PROCEDURE — 77066 DX MAMMO INCL CAD BI: CPT

## 2019-06-03 ENCOUNTER — OFFICE VISIT (OUTPATIENT)
Dept: INTERNAL MEDICINE CLINIC | Facility: CLINIC | Age: 59
End: 2019-06-03

## 2019-06-03 VITALS
DIASTOLIC BLOOD PRESSURE: 86 MMHG | SYSTOLIC BLOOD PRESSURE: 112 MMHG | WEIGHT: 153.88 LBS | HEART RATE: 72 BPM | HEIGHT: 61 IN | BODY MASS INDEX: 29.05 KG/M2 | TEMPERATURE: 98.1 F

## 2019-06-03 DIAGNOSIS — R10.84 GENERALIZED ABDOMINAL CRAMPING: Primary | ICD-10-CM

## 2019-06-03 DIAGNOSIS — E11.9 DIET-CONTROLLED DIABETES MELLITUS (HCC): ICD-10-CM

## 2019-06-03 DIAGNOSIS — R09.81 NASAL CONGESTION: ICD-10-CM

## 2019-06-03 DIAGNOSIS — K21.9 GASTROESOPHAGEAL REFLUX DISEASE, ESOPHAGITIS PRESENCE NOT SPECIFIED: ICD-10-CM

## 2019-06-03 DIAGNOSIS — B35.1 TOENAIL FUNGUS: ICD-10-CM

## 2019-06-03 LAB — SL AMB POCT HEMOGLOBIN AIC: 6 (ref ?–6.5)

## 2019-06-03 PROCEDURE — 83036 HEMOGLOBIN GLYCOSYLATED A1C: CPT | Performed by: INTERNAL MEDICINE

## 2019-06-03 PROCEDURE — 99213 OFFICE O/P EST LOW 20 MIN: CPT | Performed by: INTERNAL MEDICINE

## 2019-06-03 RX ORDER — OMEPRAZOLE 20 MG/1
20 CAPSULE, DELAYED RELEASE ORAL DAILY
Qty: 90 CAPSULE | Refills: 3 | Status: SHIPPED | OUTPATIENT
Start: 2019-06-03 | End: 2020-06-03 | Stop reason: SDUPTHER

## 2019-06-03 RX ORDER — KETOCONAZOLE 20 MG/G
CREAM TOPICAL DAILY
Qty: 15 G | Refills: 0 | Status: SHIPPED | OUTPATIENT
Start: 2019-06-03 | End: 2020-03-16 | Stop reason: ALTCHOICE

## 2019-06-03 RX ORDER — DICYCLOMINE HYDROCHLORIDE 10 MG/1
10 CAPSULE ORAL 4 TIMES DAILY PRN
Qty: 30 CAPSULE | Refills: 2 | Status: SHIPPED | OUTPATIENT
Start: 2019-06-03 | End: 2019-06-03 | Stop reason: SDUPTHER

## 2019-06-03 RX ORDER — DICYCLOMINE HYDROCHLORIDE 10 MG/1
10 CAPSULE ORAL 4 TIMES DAILY PRN
Qty: 30 CAPSULE | Refills: 2 | Status: SHIPPED | OUTPATIENT
Start: 2019-06-03 | End: 2020-04-28 | Stop reason: SDUPTHER

## 2019-08-08 ENCOUNTER — CLINICAL SUPPORT (OUTPATIENT)
Dept: INTERNAL MEDICINE CLINIC | Facility: CLINIC | Age: 59
End: 2019-08-08

## 2019-08-08 ENCOUNTER — APPOINTMENT (OUTPATIENT)
Dept: LAB | Facility: CLINIC | Age: 59
End: 2019-08-08
Payer: COMMERCIAL

## 2019-08-08 DIAGNOSIS — E11.9 DIET-CONTROLLED DIABETES MELLITUS (HCC): Primary | ICD-10-CM

## 2019-08-08 LAB
CREAT UR-MCNC: 65.3 MG/DL
MICROALBUMIN UR-MCNC: <5 MG/L (ref 0–20)
MICROALBUMIN/CREAT 24H UR: <8 MG/G CREATININE (ref 0–30)

## 2019-08-08 PROCEDURE — 82570 ASSAY OF URINE CREATININE: CPT

## 2019-08-08 PROCEDURE — 82043 UR ALBUMIN QUANTITATIVE: CPT

## 2019-08-08 NOTE — PROGRESS NOTES
Patient here today on nurse schedue for [employer] screening  Gave patient order for microalbumin and sent over to lab in office

## 2019-09-12 ENCOUNTER — APPOINTMENT (OUTPATIENT)
Dept: LAB | Facility: CLINIC | Age: 59
End: 2019-09-12
Payer: COMMERCIAL

## 2019-09-12 DIAGNOSIS — Z79.899 ENCOUNTER FOR LONG-TERM (CURRENT) USE OF OTHER MEDICATIONS: Primary | ICD-10-CM

## 2019-09-12 LAB
25(OH)D3 SERPL-MCNC: 22.1 NG/ML (ref 30–100)
ALBUMIN SERPL BCP-MCNC: 3.7 G/DL (ref 3.5–5)
ALP SERPL-CCNC: 71 U/L (ref 46–116)
ALT SERPL W P-5'-P-CCNC: 19 U/L (ref 12–78)
ANION GAP SERPL CALCULATED.3IONS-SCNC: 3 MMOL/L (ref 4–13)
AST SERPL W P-5'-P-CCNC: 14 U/L (ref 5–45)
BASOPHILS # BLD AUTO: 0.06 THOUSANDS/ΜL (ref 0–0.1)
BASOPHILS NFR BLD AUTO: 1 % (ref 0–1)
BILIRUB SERPL-MCNC: 0.25 MG/DL (ref 0.2–1)
BUN SERPL-MCNC: 9 MG/DL (ref 5–25)
CALCIUM SERPL-MCNC: 8.8 MG/DL (ref 8.3–10.1)
CHLORIDE SERPL-SCNC: 109 MMOL/L (ref 100–108)
CHOLEST SERPL-MCNC: 167 MG/DL (ref 50–200)
CO2 SERPL-SCNC: 28 MMOL/L (ref 21–32)
CREAT SERPL-MCNC: 0.94 MG/DL (ref 0.6–1.3)
EOSINOPHIL # BLD AUTO: 0.15 THOUSAND/ΜL (ref 0–0.61)
EOSINOPHIL NFR BLD AUTO: 4 % (ref 0–6)
ERYTHROCYTE [DISTWIDTH] IN BLOOD BY AUTOMATED COUNT: 13.7 % (ref 11.6–15.1)
GFR SERPL CREATININE-BSD FRML MDRD: 66 ML/MIN/1.73SQ M
GLUCOSE P FAST SERPL-MCNC: 93 MG/DL (ref 65–99)
HCT VFR BLD AUTO: 41.7 % (ref 34.8–46.1)
HDLC SERPL-MCNC: 52 MG/DL (ref 40–60)
HGB BLD-MCNC: 12.9 G/DL (ref 11.5–15.4)
IMM GRANULOCYTES # BLD AUTO: 0.02 THOUSAND/UL (ref 0–0.2)
IMM GRANULOCYTES NFR BLD AUTO: 1 % (ref 0–2)
LDLC SERPL CALC-MCNC: 101 MG/DL (ref 0–100)
LYMPHOCYTES # BLD AUTO: 1.43 THOUSANDS/ΜL (ref 0.6–4.47)
LYMPHOCYTES NFR BLD AUTO: 34 % (ref 14–44)
MCH RBC QN AUTO: 28.4 PG (ref 26.8–34.3)
MCHC RBC AUTO-ENTMCNC: 30.9 G/DL (ref 31.4–37.4)
MCV RBC AUTO: 92 FL (ref 82–98)
MONOCYTES # BLD AUTO: 0.42 THOUSAND/ΜL (ref 0.17–1.22)
MONOCYTES NFR BLD AUTO: 10 % (ref 4–12)
NEUTROPHILS # BLD AUTO: 2.13 THOUSANDS/ΜL (ref 1.85–7.62)
NEUTS SEG NFR BLD AUTO: 50 % (ref 43–75)
NONHDLC SERPL-MCNC: 115 MG/DL
NRBC BLD AUTO-RTO: 0 /100 WBCS
PMV BLD AUTO: 11 FL (ref 8.9–12.7)
POTASSIUM SERPL-SCNC: 4.2 MMOL/L (ref 3.5–5.3)
PROLACTIN SERPL-MCNC: 11 NG/ML
PROT SERPL-MCNC: 7.5 G/DL (ref 6.4–8.2)
RBC # BLD AUTO: 4.55 MILLION/UL (ref 3.81–5.12)
SODIUM SERPL-SCNC: 140 MMOL/L (ref 136–145)
TRIGL SERPL-MCNC: 72 MG/DL
TSH SERPL DL<=0.05 MIU/L-ACNC: 1.44 UIU/ML (ref 0.36–3.74)
WBC # BLD AUTO: 4.21 THOUSAND/UL (ref 4.31–10.16)

## 2019-09-12 PROCEDURE — 84443 ASSAY THYROID STIM HORMONE: CPT

## 2019-09-12 PROCEDURE — 36415 COLL VENOUS BLD VENIPUNCTURE: CPT

## 2019-09-12 PROCEDURE — 80053 COMPREHEN METABOLIC PANEL: CPT

## 2019-09-12 PROCEDURE — 84146 ASSAY OF PROLACTIN: CPT

## 2019-09-12 PROCEDURE — 85025 COMPLETE CBC W/AUTO DIFF WBC: CPT

## 2019-09-12 PROCEDURE — 82306 VITAMIN D 25 HYDROXY: CPT

## 2019-09-12 PROCEDURE — 80061 LIPID PANEL: CPT

## 2019-09-16 ENCOUNTER — OFFICE VISIT (OUTPATIENT)
Dept: INTERNAL MEDICINE CLINIC | Facility: CLINIC | Age: 59
End: 2019-09-16

## 2019-09-16 VITALS
SYSTOLIC BLOOD PRESSURE: 114 MMHG | TEMPERATURE: 97.8 F | DIASTOLIC BLOOD PRESSURE: 70 MMHG | HEART RATE: 80 BPM | BODY MASS INDEX: 29.3 KG/M2 | WEIGHT: 155.2 LBS | HEIGHT: 61 IN

## 2019-09-16 DIAGNOSIS — E11.9 DIET-CONTROLLED DIABETES MELLITUS (HCC): ICD-10-CM

## 2019-09-16 DIAGNOSIS — R09.81 NASAL CONGESTION: Primary | ICD-10-CM

## 2019-09-16 DIAGNOSIS — G56.01 CARPAL TUNNEL SYNDROME OF RIGHT WRIST: ICD-10-CM

## 2019-09-16 DIAGNOSIS — G47.33 OSA (OBSTRUCTIVE SLEEP APNEA): ICD-10-CM

## 2019-09-16 DIAGNOSIS — L30.9 DERMATITIS: ICD-10-CM

## 2019-09-16 PROBLEM — B35.3 TINEA PEDIS OF LEFT FOOT: Status: ACTIVE | Noted: 2019-09-16

## 2019-09-16 PROCEDURE — 99213 OFFICE O/P EST LOW 20 MIN: CPT | Performed by: INTERNAL MEDICINE

## 2019-09-16 RX ORDER — FLUTICASONE PROPIONATE 50 MCG
1 SPRAY, SUSPENSION (ML) NASAL DAILY
Qty: 15.8 ML | Refills: 0 | Status: SHIPPED | OUTPATIENT
Start: 2019-09-16 | End: 2020-03-16 | Stop reason: ALTCHOICE

## 2019-09-16 RX ORDER — CETIRIZINE HYDROCHLORIDE 10 MG/1
10 TABLET ORAL DAILY
Qty: 30 TABLET | Refills: 0 | Status: SHIPPED | OUTPATIENT
Start: 2019-09-16 | End: 2020-03-16 | Stop reason: ALTCHOICE

## 2019-09-16 RX ORDER — HYDROXYZINE HYDROCHLORIDE 25 MG/1
25 TABLET, FILM COATED ORAL EVERY 6 HOURS PRN
COMMUNITY

## 2019-09-16 RX ORDER — TRIAMCINOLONE ACETONIDE 5 MG/G
CREAM TOPICAL 3 TIMES DAILY
Qty: 30 G | Refills: 0 | Status: SHIPPED | OUTPATIENT
Start: 2019-09-16 | End: 2020-09-24

## 2019-09-16 NOTE — PROGRESS NOTES
INTERNAL MEDICINE FOLLOW-UP OFFICE VISIT  [facility]  [address] [place] [address]  [facility]    NAME: Malgorzata Alvarez  AGE: 60 y o  SEX: female    DATE OF ENCOUNTER: 9/16/2019    Assessment and Plan     1  Nasal congestion  -symptoms possibly secondary to allergic rhinitis with postnasal drip  Possibly exacerbated by dry mouth caused by consistent usage of nightly CPAP  -will trial daily Zyrtec with Flonase  Consider follow-up with sleep medicine regarding CPAP if symptoms remain persistent  - cetirizine (ZyrTEC) 10 mg tablet; Take 1 tablet (10 mg total) by mouth daily  Dispense: 30 tablet; Refill: 0  - fluticasone (FLONASE) 50 mcg/act nasal spray; 1 spray into each nostril daily  Dispense: 15 8 mL; Refill: 0    2  ALEC (obstructive sleep apnea)  -continue nightly CPAP    3  Carpal tunnel syndrome of right wrist  -no evidence of muscle weakness or seen are atrophy on today's exam   Advised patient to consistently wear wrist splint at home  Can consider orthopedic referral for injection if symptoms remain persistent    4  Diet-controlled diabetes mellitus ([facility] [address] )  -continued counseling on healthy dietary choices and exercise regimen  -foot exam completed today in the office  -last A1c 6 0 on 06/03/2019    5  Dermatitis  -symptoms did not previously respond to trial of ketoconazole cream   Consider localized dermatitis  Will trial medium potency triamcinolone cream for 2 weeks    Consider dermatology referral if symptoms do not resolve  - triamcinolone (KENALOG) 0 5 % cream; Apply topically 3 (three) times a day  Dispense: 30 g; Refill: 0    No orders of the defined types were placed in this encounter       - Counseling Documentation: patient was counseled regarding: diagnostic results, instructions for management and patient and family education    Chief Complaint     Chief Complaint   Patient presents with    Follow-up     routine       History of Present Illness Patient is a 79-year-old female with a past medical history of gastritis, depression, ALEC on nightly CPAP, diet-controlled type 2 diabetes, right sided carpal tunnel syndrome who presents to the clinic for a three-month follow-up appointment  Patient has multiple concerns today as below:    Right sided carpal tunnel syndrome  Patient complains of intermittent burning pain extending from her right wrist to her right elbow  Patient actively uses her hands throughout the day and does lots of cleaning at home  Denies any focal muscle weakness  Intermittently wears a glove with 2 metal rods" at nighttime which is not provided adequate relief  Has never seen Orthopedic surgery or received injections in the past    Left foot itching  Patient complains of persistent itching on the plantar aspect of her left foot which has been present for several months  Patient was previously prescribed ketoconazole cream in June of this year for toenail fungus which apparently did not adequately treat her problem  She has an area of dry, peeling skin beneath her 4th and 5th toes    Nasal congestion  Patient sources persistent nasal congestion with thick nasal secretions  She endorses sinus problems" with runny nose  She feels that she is constantly clearing her throat throughout the day  Denies any odynophagia  No exacerbating or alleviating factors  The following portions of the patient's history were reviewed and updated as appropriate: allergies, current medications, past family history, past medical history, past social history, past surgical history and problem list     Review of Systems     Review of Systems   Constitutional: Negative for chills and fever  HENT: Positive for postnasal drip and rhinorrhea  Respiratory: Negative for cough, chest tightness and shortness of breath  Cardiovascular: Negative for chest pain and palpitations     Gastrointestinal: Negative for abdominal distention and abdominal pain    Genitourinary: Negative for dysuria and frequency  Musculoskeletal: Negative for arthralgias and back pain  Neurological: Negative for dizziness and headaches  Psychiatric/Behavioral: Negative for agitation and behavioral problems  Active Problem List     Patient Active Problem List   Diagnosis    Adenomatous colon polyp    Abdominal pain, epigastric    Class 1 obesity due to excess calories in adult    ALEC (obstructive sleep apnea)    Osteoarthritis of knee    Seborrheic keratosis    History of Helicobacter pylori infection    Gastritis    Depression    Headache    Obesity (BMI 30-39  9)    Change in bowel habit    Diet-controlled diabetes mellitus (HCC)    Nasal congestion    Carpal tunnel syndrome of right wrist    Tinea pedis of left foot       Objective     /70   Pulse 80   Temp 97 8 °F (36 6 °C)   Ht 5' 1" (1 549 m)   Wt 70 4 kg (155 lb 3 3 oz)   LMP  (LMP Unknown)   BMI 29 33 kg/m²     Physical Exam   Constitutional: She appears well-developed and well-nourished  No distress  HENT:   Head: Normocephalic and atraumatic  Right Ear: External ear normal    Left Ear: External ear normal    Nose: Nose normal    Mouth/Throat: No oropharyngeal exudate  Mild pharyngeal erythema  No exudate noted   Eyes: Right eye exhibits no discharge  Left eye exhibits no discharge  No scleral icterus  Cardiovascular: Normal rate and regular rhythm  Pulses are no weak pulses  No murmur heard  Pulses:       Dorsalis pedis pulses are 2+ on the right side, and 2+ on the left side  Posterior tibial pulses are 2+ on the right side, and 2+ on the left side  Pulmonary/Chest: Effort normal and breath sounds normal  She has no wheezes  She has no rales  Abdominal: Soft  Bowel sounds are normal  She exhibits no distension  Musculoskeletal: She exhibits no edema or tenderness     Feet:   Right Foot:   Skin Integrity: Negative for ulcer, skin breakdown, erythema, warmth, callus or dry skin  Left Foot:   Skin Integrity: Positive for dry skin  Negative for ulcer, skin breakdown, erythema, warmth or callus  Neurological: No sensory deficit  Positive right-sided Tinel sign  5/5 strength in bilateral upper extremities   Skin: Skin is warm and dry  3 cm area of dry, peeling skin on the plantar aspect of the left foot beneath the 4th and 5th toes   Psychiatric: She has a normal mood and affect  Her behavior is normal        Pertinent Laboratory/Diagnostic Studies:  Mammo Diagnostic Bilateral W 3d & Cad    Result Date: 3/18/2019  Impression:   stable mammogram   Given the lack of any new or concerning mammographic finding and no provided direction for the areas of clinical concern, no further workup was performed at this time  ASSESSMENT/BI-RADS CATEGORY:  Overall: 2 - Benign RECOMMENDATION:      - Routine screening mammogram in 1 year for both breasts  - Clinical management for both breasts  Workstation ID: [id number]      Images and diagnostics reviewed     Current Medications     Current Outpatient Medications:     acetaminophen (TYLENOL) 500 mg tablet, Take 1 tablet (500 mg total) by mouth daily as needed for headaches, Disp: 30 tablet, Rfl: 3    clonazePAM (KlonoPIN) 1 mg tablet, Take 1 mg by mouth daily at bedtime as needed for anxiety    , Disp: , Rfl:     dicyclomine (BENTYL) 10 mg capsule, Take 1 capsule (10 mg total) by mouth 4 (four) times a day as needed (abdominal pain), Disp: 30 capsule, Rfl: 2    hydrOXYzine HCL (ATARAX) 25 mg tablet, Take 25 mg by mouth every 6 (six) hours as needed for itching, Disp: , Rfl:     risperiDONE (RisperDAL) 2 mg tablet, Take 2 mg by mouth 2 (two) times a day , Disp: , Rfl:     venlafaxine (EFFEXOR) 100 MG tablet, Take 150 mg by mouth, Disp: , Rfl:     ACCU-CHEK FASTCLIX LANCETS MISC, Check sugars every few days (Patient not taking: Reported on 6/3/2019), Disp: 102 each, Rfl: 3    ACCU-CHEK GUIDE test strip, TEST every morning (Patient not taking: Reported on 6/3/2019), Disp: 50 each, Rfl: 4    calcium carbonate (TUMS ULTRA) 1000 MG chewable tablet, Chew 1 tablet (1,000 mg total) every 6 (six) hours as needed for indigestion or heartburn (Patient not taking: Reported on 9/16/2019), Disp: 120 tablet, Rfl: 1    cyclobenzaprine (FLEXERIL) 5 mg tablet, Take 1 tablet by mouth 3 (three) times a day as needed for muscle spasms (Patient not taking: Reported on 9/16/2019), Disp: 10 tablet, Rfl: 0    glucose blood (ACCU-CHEK GUIDE) test strip, Check sugars every few days (Patient not taking: Reported on 6/3/2019), Disp: 100 each, Rfl: 3    ibuprofen (MOTRIN) 400 mg tablet, Take 400 mg by mouth 3 (three) times a day as needed for mild pain, Disp: , Rfl:     ketoconazole (NIZORAL) 2 % cream, Apply topically daily To left toe in dry area (Patient not taking: Reported on 9/16/2019), Disp: 15 g, Rfl: 0    omeprazole (PriLOSEC) 20 mg delayed release capsule, Take 1 capsule (20 mg total) by mouth daily (Patient not taking: Reported on 9/16/2019), Disp: 90 capsule, Rfl: 3    PARoxetine (PAXIL) 40 MG tablet, Take 40 mg by mouth every morning , Disp: , Rfl:     polyethylene glycol (GAVILYTE-G) 4000 mL solution, Take by mouth once, Disp: , Rfl:     Riboflavin 100 MG TABS, Take 1 tablet (100 mg total) by mouth daily (Patient not taking: Reported on 9/16/2019), Disp: 90 tablet, Rfl: 1    Saline 0 65 % SOLN, 2 actuation into each nostril daily (Patient not taking: Reported on 9/16/2019), Disp: 1 Bottle, Rfl: 1    Health Maintenance     Health Maintenance   Topic Date Due    Hepatitis C Screening  1960    BMI: Followup Plan  08/27/1977    HEPATITIS B VACCINES (1 of 3 - Risk 3-dose series) 08/27/1978    INFLUENZA VACCINE  07/01/2019    Pneumococcal Vaccine: Pediatrics (0 to 5 Years) and At-Risk Patients (6 to 59 Years) (2 of 3 - PCV13) 09/11/2019    Diabetic Foot Exam  09/11/2019    HEMOGLOBIN A1C  12/03/2019    DM Eye Exam 01/30/2020    MAMMOGRAM  03/18/2020    BMI: Adult  06/03/2020    URINE MICROALBUMIN  08/08/2020    CRC Screening: Colonoscopy  08/29/2022    Pneumococcal Vaccine: 65+ Years (1 of 2 - PCV13) 08/27/2024    DTaP,Tdap,and Td Vaccines (2 - Td) 02/12/2025     Immunization History   Administered Date(s) Administered    Influenza Quadrivalent Preservative Free 3 years and older IM 11/14/2017    Influenza TIV (IM) 08/12/2014    Influenza, recombinant, quadrivalent,injectable, preservative free 09/11/2018    Pneumococcal Polysaccharide PPV23 09/11/2018    Tdap 02/12/2015       [facility]  Internal Medicine PGY-2  [address] [facility] , [address] [place] [address]  Office: [phone]  Fax: [phone]           Patient's shoes and socks removed  Right Foot/Ankle   Right Foot Inspection  Skin Exam: skin normal and skin intact no dry skin, no warmth, no callus, no erythema, no maceration, no abnormal color, no pre-ulcer, no ulcer and no callus                          Toe Exam: ROM and strength within normal limits  Sensory       Monofilament testing: intact  Vascular    The right DP pulse is 2+  The right PT pulse is 2+  Left Foot/Ankle  Left Foot Inspection  Skin Exam: skin intact and dry skinno warmth, no erythema, no maceration, no pre-ulcer, no ulcer and no callus                         Toe Exam: ROM and strength within normal limits                   Sensory       Monofilament: intact  Vascular    The left DP pulse is 2+  The left PT pulse is 2+  Assign Risk Category:  No deformity present; No loss of protective sensation;  No weak pulses       Risk: 0

## 2020-02-05 ENCOUNTER — TELEPHONE (OUTPATIENT)
Dept: INTERNAL MEDICINE CLINIC | Facility: CLINIC | Age: 60
End: 2020-02-05

## 2020-02-05 DIAGNOSIS — G47.30 INSOMNIA WITH SLEEP APNEA: Primary | ICD-10-CM

## 2020-02-05 DIAGNOSIS — G47.00 INSOMNIA WITH SLEEP APNEA: Primary | ICD-10-CM

## 2020-02-05 NOTE — TELEPHONE ENCOUNTER
Leona Howard from Sleep Medicine callled  Patient is scheduled to see Sleep Medicine on 2/12/20 for a Follow Up appointment  Can you please have the Doc of the Day put in a new order?  The previous one expires on 2/7/20    Dx- Sleep Apnea [id number] [id number]

## 2020-03-16 ENCOUNTER — OFFICE VISIT (OUTPATIENT)
Dept: INTERNAL MEDICINE CLINIC | Facility: CLINIC | Age: 60
End: 2020-03-16

## 2020-03-16 ENCOUNTER — APPOINTMENT (OUTPATIENT)
Dept: LAB | Facility: CLINIC | Age: 60
End: 2020-03-16
Payer: COMMERCIAL

## 2020-03-16 VITALS
WEIGHT: 164.02 LBS | SYSTOLIC BLOOD PRESSURE: 122 MMHG | HEART RATE: 74 BPM | TEMPERATURE: 97.9 F | HEIGHT: 61 IN | OXYGEN SATURATION: 96 % | BODY MASS INDEX: 30.97 KG/M2 | DIASTOLIC BLOOD PRESSURE: 74 MMHG

## 2020-03-16 DIAGNOSIS — Z79.899 ENCOUNTER FOR LONG-TERM (CURRENT) USE OF OTHER MEDICATIONS: ICD-10-CM

## 2020-03-16 DIAGNOSIS — E88.89: ICD-10-CM

## 2020-03-16 DIAGNOSIS — Z11.4 SCREENING FOR HIV (HUMAN IMMUNODEFICIENCY VIRUS): ICD-10-CM

## 2020-03-16 DIAGNOSIS — F33.2 SEVERE EPISODE OF RECURRENT MAJOR DEPRESSIVE DISORDER, WITHOUT PSYCHOTIC FEATURES (HCC): Primary | ICD-10-CM

## 2020-03-16 DIAGNOSIS — E11.9 DIET-CONTROLLED DIABETES MELLITUS (HCC): Primary | ICD-10-CM

## 2020-03-16 DIAGNOSIS — Z12.31 VISIT FOR SCREENING MAMMOGRAM: ICD-10-CM

## 2020-03-16 DIAGNOSIS — K58.0 IRRITABLE BOWEL SYNDROME WITH DIARRHEA: ICD-10-CM

## 2020-03-16 DIAGNOSIS — Z12.4 SCREENING FOR CERVICAL CANCER: ICD-10-CM

## 2020-03-16 DIAGNOSIS — Z11.59 NEED FOR HEPATITIS C SCREENING TEST: ICD-10-CM

## 2020-03-16 LAB
ALBUMIN SERPL BCP-MCNC: 3.7 G/DL (ref 3.5–5)
ALP SERPL-CCNC: 67 U/L (ref 46–116)
ALT SERPL W P-5'-P-CCNC: 21 U/L (ref 12–78)
ANION GAP SERPL CALCULATED.3IONS-SCNC: 3 MMOL/L (ref 4–13)
AST SERPL W P-5'-P-CCNC: 14 U/L (ref 5–45)
BASOPHILS # BLD AUTO: 0.05 THOUSANDS/ΜL (ref 0–0.1)
BASOPHILS NFR BLD AUTO: 1 % (ref 0–1)
BILIRUB SERPL-MCNC: 0.34 MG/DL (ref 0.2–1)
BUN SERPL-MCNC: 11 MG/DL (ref 5–25)
CALCIUM SERPL-MCNC: 8.9 MG/DL (ref 8.3–10.1)
CHLORIDE SERPL-SCNC: 110 MMOL/L (ref 100–108)
CHOLEST SERPL-MCNC: 168 MG/DL (ref 50–200)
CO2 SERPL-SCNC: 28 MMOL/L (ref 21–32)
CREAT SERPL-MCNC: 0.96 MG/DL (ref 0.6–1.3)
CREAT UR-MCNC: 171 MG/DL
EOSINOPHIL # BLD AUTO: 0.08 THOUSAND/ΜL (ref 0–0.61)
EOSINOPHIL NFR BLD AUTO: 2 % (ref 0–6)
ERYTHROCYTE [DISTWIDTH] IN BLOOD BY AUTOMATED COUNT: 13.5 % (ref 11.6–15.1)
GFR SERPL CREATININE-BSD FRML MDRD: 64 ML/MIN/1.73SQ M
GLUCOSE P FAST SERPL-MCNC: 116 MG/DL (ref 65–99)
HCT VFR BLD AUTO: 39.6 % (ref 34.8–46.1)
HCV AB SER QL: NORMAL
HDLC SERPL-MCNC: 33 MG/DL
HGB BLD-MCNC: 12.2 G/DL (ref 11.5–15.4)
IMM GRANULOCYTES # BLD AUTO: 0.01 THOUSAND/UL (ref 0–0.2)
IMM GRANULOCYTES NFR BLD AUTO: 0 % (ref 0–2)
LDLC SERPL CALC-MCNC: 115 MG/DL (ref 0–100)
LEFT EYE DIABETIC RETINOPATHY: NORMAL
LEFT EYE IMAGE QUALITY: NORMAL
LEFT EYE MACULAR EDEMA: NORMAL
LEFT EYE OTHER RETINOPATHY: NORMAL
LYMPHOCYTES # BLD AUTO: 1.52 THOUSANDS/ΜL (ref 0.6–4.47)
LYMPHOCYTES NFR BLD AUTO: 35 % (ref 14–44)
MCH RBC QN AUTO: 28.5 PG (ref 26.8–34.3)
MCHC RBC AUTO-ENTMCNC: 30.8 G/DL (ref 31.4–37.4)
MCV RBC AUTO: 93 FL (ref 82–98)
MICROALBUMIN UR-MCNC: 9.7 MG/L (ref 0–20)
MICROALBUMIN/CREAT 24H UR: 6 MG/G CREATININE (ref 0–30)
MONOCYTES # BLD AUTO: 0.5 THOUSAND/ΜL (ref 0.17–1.22)
MONOCYTES NFR BLD AUTO: 12 % (ref 4–12)
NEUTROPHILS # BLD AUTO: 2.14 THOUSANDS/ΜL (ref 1.85–7.62)
NEUTS SEG NFR BLD AUTO: 50 % (ref 43–75)
NONHDLC SERPL-MCNC: 135 MG/DL
NRBC BLD AUTO-RTO: 0 /100 WBCS
PLATELET # BLD AUTO: 250 THOUSANDS/UL (ref 149–390)
PMV BLD AUTO: 10.2 FL (ref 8.9–12.7)
POTASSIUM SERPL-SCNC: 3.8 MMOL/L (ref 3.5–5.3)
PROT SERPL-MCNC: 7.3 G/DL (ref 6.4–8.2)
RBC # BLD AUTO: 4.28 MILLION/UL (ref 3.81–5.12)
RIGHT EYE DIABETIC RETINOPATHY: NORMAL
RIGHT EYE IMAGE QUALITY: NORMAL
RIGHT EYE MACULAR EDEMA: NORMAL
RIGHT EYE OTHER RETINOPATHY: NORMAL
SEVERITY (EYE EXAM): NORMAL
SL AMB POCT HEMOGLOBIN AIC: 6.2 (ref ?–6.5)
SODIUM SERPL-SCNC: 141 MMOL/L (ref 136–145)
TRIGL SERPL-MCNC: 100 MG/DL
TSH SERPL DL<=0.05 MIU/L-ACNC: 1.85 UIU/ML (ref 0.36–3.74)
WBC # BLD AUTO: 4.3 THOUSAND/UL (ref 4.31–10.16)

## 2020-03-16 PROCEDURE — 2022F DILAT RTA XM EVC RTNOPTHY: CPT | Performed by: INTERNAL MEDICINE

## 2020-03-16 PROCEDURE — T1015 CLINIC SERVICE: HCPCS | Performed by: INTERNAL MEDICINE

## 2020-03-16 PROCEDURE — 83036 HEMOGLOBIN GLYCOSYLATED A1C: CPT | Performed by: INTERNAL MEDICINE

## 2020-03-16 PROCEDURE — 3008F BODY MASS INDEX DOCD: CPT | Performed by: INTERNAL MEDICINE

## 2020-03-16 PROCEDURE — 87389 HIV-1 AG W/HIV-1&-2 AB AG IA: CPT

## 2020-03-16 PROCEDURE — 36415 COLL VENOUS BLD VENIPUNCTURE: CPT

## 2020-03-16 PROCEDURE — 84443 ASSAY THYROID STIM HORMONE: CPT

## 2020-03-16 PROCEDURE — 3061F NEG MICROALBUMINURIA REV: CPT | Performed by: PODIATRIST

## 2020-03-16 PROCEDURE — 80061 LIPID PANEL: CPT

## 2020-03-16 PROCEDURE — 82570 ASSAY OF URINE CREATININE: CPT | Performed by: STUDENT IN AN ORGANIZED HEALTH CARE EDUCATION/TRAINING PROGRAM

## 2020-03-16 PROCEDURE — 2025F 7 FLD RTA PHOTO W/O RTNOPTHY: CPT | Performed by: PODIATRIST

## 2020-03-16 PROCEDURE — 2025F 7 FLD RTA PHOTO W/O RTNOPTHY: CPT | Performed by: INTERNAL MEDICINE

## 2020-03-16 PROCEDURE — 99213 OFFICE O/P EST LOW 20 MIN: CPT | Performed by: INTERNAL MEDICINE

## 2020-03-16 PROCEDURE — 1036F TOBACCO NON-USER: CPT | Performed by: INTERNAL MEDICINE

## 2020-03-16 PROCEDURE — 3044F HG A1C LEVEL LT 7.0%: CPT | Performed by: INTERNAL MEDICINE

## 2020-03-16 PROCEDURE — 86803 HEPATITIS C AB TEST: CPT

## 2020-03-16 PROCEDURE — 3044F HG A1C LEVEL LT 7.0%: CPT | Performed by: PODIATRIST

## 2020-03-16 PROCEDURE — 80053 COMPREHEN METABOLIC PANEL: CPT

## 2020-03-16 PROCEDURE — 85025 COMPLETE CBC W/AUTO DIFF WBC: CPT

## 2020-03-16 PROCEDURE — 3061F NEG MICROALBUMINURIA REV: CPT | Performed by: INTERNAL MEDICINE

## 2020-03-16 PROCEDURE — 82043 UR ALBUMIN QUANTITATIVE: CPT | Performed by: STUDENT IN AN ORGANIZED HEALTH CARE EDUCATION/TRAINING PROGRAM

## 2020-03-16 RX ORDER — MAG HYDROX/ALUMINUM HYD/SIMETH 400-400-40
SUSPENSION, ORAL (FINAL DOSE FORM) ORAL
COMMUNITY

## 2020-03-16 RX ORDER — SIMETHICONE 20 MG/.3ML
40 EMULSION ORAL 4 TIMES DAILY PRN
Qty: 30 ML | Refills: 0 | Status: SHIPPED | OUTPATIENT
Start: 2020-03-16 | End: 2020-09-24

## 2020-03-16 RX ORDER — ARIPIPRAZOLE 5 MG/1
5 TABLET ORAL DAILY
COMMUNITY

## 2020-03-16 RX ORDER — MECOBALAMIN 5000 MCG
TABLET,DISINTEGRATING ORAL
COMMUNITY

## 2020-03-16 RX ORDER — DULOXETIN HYDROCHLORIDE 30 MG/1
30 CAPSULE, DELAYED RELEASE ORAL DAILY
COMMUNITY

## 2020-03-16 RX ORDER — DULOXETIN HYDROCHLORIDE 30 MG/1
30 CAPSULE, DELAYED RELEASE ORAL DAILY
COMMUNITY
End: 2020-03-19 | Stop reason: SDUPTHER

## 2020-03-16 NOTE — PATIENT INSTRUCTIONS
Mammogram due this year  Pap smear due 2021 with ob/gyn  Colonoscopy in 2022 (serrated adenoma found in 2017)

## 2020-03-16 NOTE — PROGRESS NOTES
ASSESSMENT/PLAN:  Diagnoses and all orders for this visit:    Diet-controlled diabetes mellitus ([facility] [address] )  -     POCT hemoglobin A1c  -     Microalbumin / creatinine urine ratio  -     IRIS Diabetic eye exam  -     HbA1c 3/16/2020 - 6 2  -     Patient is currently diet controlled; she was encouraged to lose weight and continue a diabetic diet and exercise at least 3x a week for 30 min a day  Visit for screening mammogram  -     Mammo screening bilateral w cad; Future  -     Last mammogram wnl on 3/2019; patient due for mammo this year  Screening for cervical cancer  -     Ambulatory referral to Obstetrics / Gynecology; Future  -     Last pap smear 2016 was wnl -> due 2021  Need for hepatitis C screening test  -     Hepatitis C antibody; Future  -     Patient born between [birth year] and 1966 -> HCV screening warranted    Screening for HIV (human immunodeficiency virus)  -     Rapid HIV 1/2 AB-AG Combo; Future    Irritable bowel syndrome with diarrhea  -     simethicone (MYLICON) 40 KO/2 0 mL drops; Take 0 6 mL (40 mg total) by mouth 4 (four) times a day as needed for flatulence  -     Ambulatory referral to Gastroenterology; Future  -     Patient is complaining of lower abdominal pain that is relieved by dicyclomine, however the pain is not completely relieved by it  She also states that the pain improves once she passes gas  There is no association with food  -     Patient wishes to see Dr Espinoza of gastroenterology for her history of polyps as well the abdominal pain  Rash on the left foot         - Present for the past 8 months  Patient has a single erythematous papule on the plantar surface of the left foot under the 2nd and 3rd digit  Patient would like the lesion to completely resolve            - Possibly an insect bite         - Triamcinolone cream does relieve the itch, however due to its persistence and patient preference, we'll consider a dermatology referral if the itch worsens of does not improve in 3 months  Other orders  -     Cholecalciferol (VITAMIN D3) 125 MCG (5000 UT) CAPS; Take by mouth  -     Methylcobalamin (B-12) 5000 MCG TBDP; Take by mouth  -     ARIPiprazole (ABILIFY) 5 mg tablet; Take 5 mg by mouth daily  -     DULoxetine (CYMBALTA) 30 mg delayed release capsule; Take 30 mg by mouth daily  -     DULoxetine (CYMBALTA) 30 mg delayed release capsule; Take 30 mg by mouth daily      Health Maintenance:  Advised diet and exercise  Advised to refrain from tobacco, alcohol, illicit drug use  Advised medical compliance  Patient states that she has received the flu vaccine this year, however she does not remember the date  Last pap with co-testing for HPV 2016 -> due 2021  HCV and HIV testing ordered  Colonoscopy due 2022    Schedule a follow-up appointment in 3 months  CHIEF COMPLAINT: Itch on the left foot    HISTORY OF PRESENT ILLNESS:    Patient is a 60year old female coming in for a routine check up  Patient states that she has been having an itch on her left foot for the past 8 months  She was previously tried on ketoconazole which did not help  She is currently using triamcinolone cream which relieves the itch, however the patient wants something to completely cure it  She does not believe that it was caused by an insect bite  It is on the plantar surface of the left foot  She is also complaining of lower abdominal pain that is sharp and mild in nature  It is not associated with food or bowel movements  She states that she has diarrhea approximately 6 times a day some days, and other days she has normal bowel movements  She denies any exacerbating factors  Relieving factors include passing gas  She denies any nausea or vomiting  She also denies any fevers and chills         The following portions of the patient's history were reviewed and updated as appropriate: allergies, current medications, past family history, past medical history, past social history, past surgical history and problem list     Review of Systems   Constitutional: Negative  HENT: Negative  Eyes: Negative  Respiratory: Negative  Cardiovascular: Negative  Gastrointestinal: Positive for abdominal pain and diarrhea  Negative for blood in stool, constipation, nausea and vomiting  Endocrine: Negative  Genitourinary: Negative  Musculoskeletal: Negative  Skin: Negative  Allergic/Immunologic: Negative  Neurological: Negative  Hematological: Negative  Psychiatric/Behavioral: Negative  OBJECTIVE:  Vitals:    03/16/20 0856   BP: 122/74   BP Location: Left arm   Patient Position: Sitting   Cuff Size: Adult   Pulse: 74   Temp: 97 9 °F (36 6 °C)   TempSrc: Oral   SpO2: 96%   Weight: 74 4 kg (164 lb 0 4 oz)   Height: 5' 1" (1 549 m)     Physical Exam   Constitutional: She is oriented to person, place, and time  She appears well-developed and well-nourished  No distress  HENT:   Head: Normocephalic and atraumatic  Eyes: Pupils are equal, round, and reactive to light  Conjunctivae and EOM are normal  Right eye exhibits no discharge  Left eye exhibits no discharge  No scleral icterus  Neck: Normal range of motion  Neck supple  Cardiovascular: Normal rate and regular rhythm  Exam reveals no gallop and no friction rub  No murmur heard  Pulmonary/Chest: Effort normal and breath sounds normal  No stridor  No respiratory distress  She has no wheezes  She has no rales  Abdominal: Soft  Bowel sounds are normal  She exhibits no distension and no mass  There is no tenderness  There is no rebound and no guarding  No hernia  Musculoskeletal: Normal range of motion  Neurological: She is alert and oriented to person, place, and time  Skin: Skin is warm and dry  She is not diaphoretic  On the plantar surface of the left foot there is a single erythematous papule  Psychiatric: She has a normal mood and affect   Her behavior is normal          Current Outpatient Medications:    acetaminophen (TYLENOL) 500 mg tablet, Take 1 tablet (500 mg total) by mouth daily as needed for headaches, Disp: 30 tablet, Rfl: 3    ARIPiprazole (ABILIFY) 5 mg tablet, Take 5 mg by mouth daily, Disp: , Rfl:     Cholecalciferol (VITAMIN D3) 125 MCG (5000 UT) CAPS, Take by mouth, Disp: , Rfl:     clonazePAM (KlonoPIN) 1 mg tablet, Take 1 mg by mouth daily at bedtime as needed for anxiety    , Disp: , Rfl:     dicyclomine (BENTYL) 10 mg capsule, Take 1 capsule (10 mg total) by mouth 4 (four) times a day as needed (abdominal pain), Disp: 30 capsule, Rfl: 2    DULoxetine (CYMBALTA) 30 mg delayed release capsule, Take 30 mg by mouth daily, Disp: , Rfl:     DULoxetine (CYMBALTA) 30 mg delayed release capsule, Take 30 mg by mouth daily, Disp: , Rfl:     Methylcobalamin (B-12) 5000 MCG TBDP, Take by mouth, Disp: , Rfl:     omeprazole (PriLOSEC) 20 mg delayed release capsule, Take 1 capsule (20 mg total) by mouth daily, Disp: 90 capsule, Rfl: 3    triamcinolone (KENALOG) 0 5 % cream, Apply topically 3 (three) times a day, Disp: 30 g, Rfl: 0    ACCU-CHEK FASTCLIX LANCETS MISC, Check sugars every few days (Patient not taking: Reported on 6/3/2019), Disp: 102 each, Rfl: 3    ACCU-CHEK GUIDE test strip, TEST every morning (Patient not taking: Reported on 6/3/2019), Disp: 50 each, Rfl: 4    glucose blood (ACCU-CHEK GUIDE) test strip, Check sugars every few days (Patient not taking: Reported on 6/3/2019), Disp: 100 each, Rfl: 3    hydrOXYzine HCL (ATARAX) 25 mg tablet, Take 25 mg by mouth every 6 (six) hours as needed for itching, Disp: , Rfl:     simethicone (MYLICON) 40 TS/0 6 mL drops, Take 0 6 mL (40 mg total) by mouth 4 (four) times a day as needed for flatulence, Disp: 30 mL, Rfl: 0    Past Medical History:   Diagnosis Date    Abnormal mammogram     RESOLVED: [id number]    Anxiety     Anxiety     Arthritis     Depression     Depression     Diverticulosis     GERD (gastroesophageal reflux disease)     Helicobacter pylori infection     Hyperlipidemia     Seborrheic keratosis     LAST ASSESSED: [id number]    Sleep apnea     Sleep apnea      Past Surgical History:   Procedure Laterality Date    ABDOMINOPLASTY      abdomin    NE COLONOSCOPY FLX DX W/COLLJ SPEC WHEN PFRMD N/A 8/11/2016    Procedure: COLONOSCOPY;  Surgeon: Augusto Healy MD;  Location:  GI LAB; Service: Gastroenterology    NE COLONOSCOPY FLX DX W/COLLJ [facility] WHEN PFRMD N/A 8/29/2017    Procedure: EGD AND COLONOSCOPY;  Surgeon: Augusto Healy MD;  Location: [facility] GI [facility];   Service: Gastroenterology    TUBAL LIGATION      TUBAL LIGATION       Social History     Socioeconomic History    Marital status: Single     Spouse name: Not on file    Number of children: Not on file    Years of education: Not on file    Highest education level: Not on file   Occupational History    Not on file   Social Needs    Financial resource strain: Not on file    Food insecurity:     Worry: Not on file     Inability: Not on file    Transportation needs:     Medical: Not on file     Non-medical: Not on file   Tobacco Use    Smoking status: Never Smoker    Smokeless tobacco: Never Used   Substance and Sexual Activity    Alcohol use: No    Drug use: No    Sexual activity: Yes     Partners: Male     Birth control/protection: None, Post-menopausal   Lifestyle    Physical activity:     Days per week: Not on file     Minutes per session: Not on file    Stress: Not on file   Relationships    Social connections:     Talks on phone: Not on file     Gets together: Not on file     Attends Taoism service: Not on file     Active member of club or organization: Not on file     Attends meetings of clubs or organizations: Not on file     Relationship status: Not on file    Intimate partner violence:     Fear of current or ex partner: Not on file     Emotionally abused: Not on file     Physically abused: Not on file     Forced sexual activity: Not on file   Other Topics Concern    Not on file   Social History Narrative    Not on file     Family History   Problem Relation Age of Onset    Diabetes Mother     Hypertension Mother     Heart disease Mother     Stomach cancer Paternal Grandfather        ==  MD Terrence Virk Internal Medicine PGY-1    [facility]  [address] , [address]  Office: [phone]  Fax: [phone]

## 2020-03-17 LAB — HIV 1+2 AB+HIV1 P24 AG SERPL QL IA: NORMAL

## 2020-03-19 ENCOUNTER — ANNUAL EXAM (OUTPATIENT)
Dept: OBGYN CLINIC | Facility: CLINIC | Age: 60
End: 2020-03-19

## 2020-03-19 VITALS
WEIGHT: 164.6 LBS | HEIGHT: 61 IN | SYSTOLIC BLOOD PRESSURE: 115 MMHG | HEART RATE: 82 BPM | DIASTOLIC BLOOD PRESSURE: 79 MMHG | BODY MASS INDEX: 31.08 KG/M2

## 2020-03-19 DIAGNOSIS — Z01.419 WOMEN'S ANNUAL ROUTINE GYNECOLOGICAL EXAMINATION: Primary | ICD-10-CM

## 2020-03-19 PROCEDURE — 3044F HG A1C LEVEL LT 7.0%: CPT | Performed by: NURSE PRACTITIONER

## 2020-03-19 PROCEDURE — 99396 PREV VISIT EST AGE 40-64: CPT | Performed by: NURSE PRACTITIONER

## 2020-03-19 PROCEDURE — T1015 CLINIC SERVICE: HCPCS | Performed by: NURSE PRACTITIONER

## 2020-03-19 NOTE — PROGRESS NOTES
Patient is primarily Bulgarian-speaking A  Ellis Daily bedside to assist translation     Subjective      Eileen Lara is a 60 y o  female who presents for annual exam  Last pap smear 16 resulted NILM/ HR HPV negative  Next pap smear due 2021  Last mammogram 3/18/19 resulted BiRads 2  Has mammogram scheduled 20  CRC screening - colonoscopy 17  The patient has no complaints today  The patient is sexually active  The patient is not taking hormone replacement therapy  Patient denies post-menopausal vaginal bleeding    The patient wears seatbelts: yes  The patient participates in regular exercise: no  The patient reports that there is not domestic violence in her life  Menstrual History:  OB History        6    Para   5    Term   5            AB   1    Living           SAB        TAB        Ectopic        Multiple        Live Births                    Menarche age: 14  No LMP recorded (lmp unknown)  Patient is postmenopausal        The following portions of the patient's history were reviewed and updated as appropriate: allergies, current medications, past family history, past medical history, past social history, past surgical history and problem list     Review of Systems  Pertinent items are noted in HPI       Objective      LMP  (LMP Unknown)     General:   alert and oriented, in no acute distress   Heart: regular rate and rhythm, S1, S2 normal, no murmur, click, rub or gallop   Lungs: clear to auscultation bilaterally   Abdomen: soft, non-tender, without masses or organomegaly, nondistended and normal bowel sounds   Vulva: normal, Bartholin's, Urethra, [place]'s normal   Vagina: normal mucosa, normal discharge, no palpable nodules   Cervix: no cervical motion tenderness and no lesions   Uterus: normal size, non-tender, normal shape and consistency   Adnexa: normal adnexa and no mass, fullness, tenderness   Breast: non-tender, no palpable masses, no nipple discharge and no skin changes bilaterally      Assessment/Plan     Diagnoses and all orders for this visit:    Women's annual routine gynecological examination      Pap smear due 1/2021  Encouraged healthy diet, exercise and lifestyle  Encouraged follow up with PCP for chronic conditions  Encouraged to keep appointment for mammogram   Encouraged and reviewed social distancing, good hand hygiene, minimize contact with groups, call PCP with signs or symptoms  VBI-  BMI Counseling: Body mass index is 31 1 kg/m²  The BMI is above normal  Nutrition recommendations include reducing portion sizes, decreasing overall calorie intake and 3-5 servings of fruits/vegetables daily  Exercise recommendations include moderate aerobic physical activity for 150 minutes/week, exercising 3-5 times per week and strength training exercises  Depression Screening Follow-up Plan: Patient's depression screening was positive with a PHQ-2 score of 2  Their PHQ-9 score was   Continue regular follow-up with their psychologist/therapist/psychiatrist who is managing their mental health condition(s)

## 2020-03-19 NOTE — PATIENT INSTRUCTIONS
Wellness Visit for Adults   WHAT YOU NEED TO KNOW:   What is a wellness visit? A wellness visit is when you see your healthcare provider to get screened for health problems  You can also get advice on how to stay healthy  Write down your questions so you remember to ask them  Ask your healthcare provider how often you should have a wellness visit  What happens at a wellness visit? Your healthcare provider will ask about your health, and your family history of health problems  This includes high blood pressure, heart disease, and cancer  He or she will ask if you have symptoms that concern you, if you smoke, and about your mood  You may also be asked about your intake of medicines, supplements, food, and alcohol  Any of the following may be done:  · Your weight  will be checked  Your height may also be checked so your body mass index (BMI) can be calculated  Your BMI shows if you are at a healthy weight  · Your blood pressure  and heart rate will be checked  Your temperature may also be checked  · Blood and urine tests  may be done  Blood tests may be done to check your cholesterol levels  Abnormal cholesterol levels increase your risk for heart disease and stroke  You may also need a blood or urine test to check for diabetes if you are at increased risk  Urine tests may be done to look for signs of an infection or kidney disease  · A physical exam  includes checking your heartbeat and lungs with a stethoscope  Your healthcare provider may also check your skin to look for sun damage  · Screening tests  may be recommended  A screening test is done to check for diseases that may not cause symptoms  The screening tests you may need depend on your age, gender, family history, and lifestyle habits  For example, colorectal screening may be recommended if you are 48years old or older  What screening tests do I need if I am a woman? · A Pap smear  is used to screen for cervical cancer   Pap smears are usually done every 3 to 5 years depending on your age  You may need them more often if you have had abnormal Pap smear test results in the past  Ask your healthcare provider how often you should have a Pap smear  · A mammogram  is an x-ray of your breasts to screen for breast cancer  Experts recommend mammograms every 2 years starting at age 48 years  You may need a mammogram at age 52 years or younger if you have an increased risk for breast cancer  Talk to your healthcare provider about when you should start having mammograms and how often you need them  What vaccines might I need? · Get an influenza vaccine  every year  The influenza vaccine protects you from the flu  Several types of viruses cause the flu  The viruses change over time, so new vaccines are made each year  · Get a tetanus-diphtheria (Td) booster vaccine  every 10 years  This vaccine protects you against tetanus and diphtheria  Tetanus is a severe infection that may cause painful muscle spasms and lockjaw  Diphtheria is a severe bacterial infection that causes a thick covering in the back of your mouth and throat  · Get a human papillomavirus (HPV) vaccine  if you are female and aged 23 to 32 or male 23 to 24 and never received it  This vaccine protects you from HPV infection  HPV is the most common infection spread by sexual contact  HPV may also cause vaginal, penile, and anal cancers  · Get a pneumococcal vaccine  if you are aged 72 years or older  The pneumococcal vaccine is an injection given to protect you from pneumococcal disease  Pneumococcal disease is an infection caused by pneumococcal bacteria  The infection may cause pneumonia, meningitis, or an ear infection  · Get a shingles vaccine  if you are aged 61 or older, even if you have had shingles before  The shingles vaccine is an injection to protect you from the varicella-zoster virus  This is the same virus that causes chickenpox   Shingles is a painful rash that develops in people who had chickenpox or have been exposed to the virus  How can I eat healthy? My Plate is a model for planning healthy meals  It shows the types and amounts of foods that should go on your plate  Fruits and vegetables make up about half of your plate, and grains and protein make up the other half  A serving of dairy is included on the side of your plate  The amount of calories and serving sizes you need depends on your age, gender, weight, and height  Examples of healthy foods are listed below:  · Eat a variety of vegetables  such as dark green, red, and orange vegetables  You can also include canned vegetables low in sodium (salt) and frozen vegetables without added butter or sauces  · Eat a variety of fresh fruits , canned fruit in 100% juice, frozen fruit, and dried fruit  · Include whole grains  At least half of the grains you eat should be whole grains  Examples include whole-wheat bread, wheat pasta, brown rice, and whole-grain cereals such as oatmeal     · Eat a variety of protein foods such as seafood (fish and shellfish), lean meat, and poultry without skin (turkey and chicken)  Examples of lean meats include pork leg, shoulder, or tenderloin, and beef round, sirloin, tenderloin, and extra lean ground beef  Other protein foods include eggs and egg substitutes, beans, peas, soy products, nuts, and seeds  · Choose low-fat dairy products such as skim or 1% milk or low-fat yogurt, cheese, and cottage cheese  · Limit unhealthy fats  such as butter, hard margarine, and shortening  How much exercise do I need? Exercise at least 30 minutes per day on most days of the week  Some examples of exercise include walking, biking, dancing, and swimming  You can also fit in more physical activity by taking the stairs instead of the elevator or parking farther away from stores  Include muscle strengthening activities 2 days each week  Regular exercise provides many health benefits  It helps you manage your weight, and decreases your risk for type 2 diabetes, heart disease, stroke, and high blood pressure  Exercise can also help improve your mood  Ask your healthcare provider about the best exercise plan for you  What are some general health and safety guidelines I should follow? · Do not smoke  Nicotine and other chemicals in cigarettes and cigars can cause lung damage  Ask your healthcare provider for information if you currently smoke and need help to quit  E-cigarettes or smokeless tobacco still contain nicotine  Talk to your healthcare provider before you use these products  · Limit alcohol  A drink of alcohol is 12 ounces of beer, 5 ounces of wine, or 1½ ounces of liquor  · Lose weight, if needed  Being overweight increases your risk of certain health conditions  These include heart disease, high blood pressure, type 2 diabetes, and certain types of cancer  · Protect your skin  Do not sunbathe or use tanning beds  Use sunscreen with a SPF 15 or higher  Apply sunscreen at least 15 minutes before you go outside  Reapply sunscreen every 2 hours  Wear protective clothing, hats, and sunglasses when you are outside  · Drive safely  Always wear your seatbelt  Make sure everyone in your car wears a seatbelt  A seatbelt can save your life if you are in an accident  Do not use your cell phone when you are driving  This could distract you and cause an accident  Pull over if you need to make a call or send a text message  · Practice safe sex  Use latex condoms if are sexually active and have more than one partner  Your healthcare provider may recommend screening tests for sexually transmitted infections (STIs)  · Wear helmets, lifejackets, and protective gear  Always wear a helmet when you ride a bike or motorcycle, go skiing, or play sports that could cause a head injury  Wear protective equipment when you play sports   Wear a lifejacket when you are on a boat or doing water sports  CARE AGREEMENT:   You have the right to help plan your care  Learn about your health condition and how it may be treated  Discuss treatment options with your caregivers to decide what care you want to receive  You always have the right to refuse treatment  The above information is an  only  It is not intended as medical advice for individual conditions or treatments  Talk to your doctor, nurse or pharmacist before following any medical regimen to see if it is safe and effective for you  © 2017 [employer]  Information is for End User's use only and may not be sold, redistributed or otherwise used for commercial purposes  All illustrations and images included in CareNotes® are the copyrighted property of [employer]  or [employer]  Mammogram   WHAT YOU NEED TO KNOW:   What do I need to know about a mammogram?  A mammogram is an x-ray of your breasts to screen for breast cancer  Experts recommend mammograms every 2 years starting at age 48 years  You may need a mammogram at age 52 years or younger if you have an increased risk for breast cancer  Talk to your healthcare provider about when you should start having mammograms and how often you need them  How do I prepare for a mammogram?   · Do not use deodorant, powder, lotion, or perfume  These products may cause particles to appear on your mammogram      · Wear a 2-piece outfit  · If your breasts are tender before your monthly period, do not have a mammogram during this time  Schedule your mammogram to be done 1 week after your period ends  · If you are breastfeeding, express as much milk as possible before the mammogram     · Bring a list of the dates and places of your past mammograms and other breast tests or treatments    What will happen during a mammogram?  A top view and a side view x-ray are usually done for each breast  Tell healthcare providers if you have breast implants or breast problems before you have your mammogram  You may need extra x-rays of each breast   · You will be given a hospital gown  Take off your clothes from the waist up  Wear the hospital gown so that it opens in the front  · You will sit or stand next to a small x-ray machine  The healthcare provider will help you place one of your breasts on the x-ray plate  Your arm and breast will be moved until your breast is in the correct position  · Your breast will be gently pressed between 2 plastic plates for a few seconds while the x-ray is taken  This may be uncomfortable  · You will be asked to hold your breath while the x-ray is taken  Another x-ray will be taken of the same breast after the position of the x-ray machine has been changed  · Your other breast will be x-rayed the same way  What will happen after my mammogram?  Your breasts may feel tender for a short while after the mammogram  You may resume your regular activities  Ask your healthcare provider when you should receive the results of your mammogram   What are the risks of a mammogram?  You will be exposed to a small amount of radiation  Some breast cancers may not show up on mammograms  When should I contact my healthcare provider? · You cannot make your appointment on time  · You do not receive your results when expected  · You have questions or concerns about the mammogram   CARE AGREEMENT:   You have the right to help plan your care  Learn about your health condition and how it may be treated  Discuss treatment options with your caregivers to decide what care you want to receive  You always have the right to refuse treatment  The above information is an  only  It is not intended as medical advice for individual conditions or treatments  Talk to your doctor, nurse or pharmacist before following any medical regimen to see if it is safe and effective for you    © 2017 [employer] Information is for End User's use only and may not be sold, redistributed or otherwise used for commercial purposes  All illustrations and images included in CareNotes® are the copyrighted property of [employer]  or [employer]  Breast Self Exam for Women   WHAT YOU NEED TO KNOW:   What is a breast self-exam (BSE)? A BSE is a way to check your breasts for lumps and other changes  Regular BSEs can help you know how your breasts normally look and feel  Most breast lumps or changes are not cancer, but you should always have them checked by a healthcare provider  Your healthcare provider can also watch you do a BSE and can tell you if you are doing your BSE correctly  Why should I do a BSE? Breast cancer is the most common type of cancer in women  Even if you have mammograms, you may still want to do a BSE regularly  If you know how your breasts normally feel and look, it may help you know when to contact your healthcare provider  Mammograms can miss some cancers  You may find a lump during a BSE that did not show up on your mammogram   When should I do a BSE? Tez your calendar to help you remember to do BSE on a regular schedule  One easy way to remember to do a BSE is to do the exam on the same day of each month  If you have periods, you may want to do your BSE 1 week after your period ends  This is the time when your breasts may be the least swollen, lumpy, or tender  You can do regular BSEs even if you are breastfeeding or have breast implants  How should I do a BSE? · Look at your breasts in a mirror  Look at the size and shape of each breast and nipple  Check for swelling, lumps, dimpling, scaly skin, or other skin changes  Look for nipple changes, such as a nipple that is painful or beginning to pull inward  Gently squeeze both nipples and check to see if fluid (that is not breast milk) comes out of them  If you find any of these or other breast changes, contact your healthcare provider   Check your breasts while you sit or  the following 3 positions:    [facility] your arms down at your sides  ¨ Raise your hands and join them behind your head  ¨ Put firm pressure with your hands on your hips  Bend slightly forward while you look at your breasts in the mirror  · Lie down and feel your breasts  When you lie down, your breast tissue spreads out evenly over your chest  This makes it easier for you to feel for lumps and anything that may not be normal for your breasts  Do a BSE on one breast at a time  ¨ Place a small pillow or towel under your left shoulder  Put your left arm behind your head  ¨ Use the 3 middle fingers of your right hand  Use your fingertip pads, on the top of your fingers  Your fingertip pad is the most sensitive part of your finger  ¨ Use small circles to feel your breast tissue  Use your fingertip pads to make dime-sized, overlapping circles on your breast and armpits  Use light, medium, and firm pressure  First, press lightly  Second, press with medium pressure to feel a little deeper into the breast  Last, use firm pressure to feel deep within your breast     ¨ Examine your entire breast area  Examine the breast area from above the breast to below the breast where you feel only ribs  Make small circles with your fingertips, starting in the middle of your armpit  Make circles going up and down the breast area  Continue toward your breast and all the way across it  Examine the area from your armpit all the way over to the middle of your chest (breastbone)  Stop at the middle of your chest     ¨ Move the pillow or towel to your right shoulder, and put your right arm behind your head  Use the 3 fingertip pads of your left hand, and repeat the above steps to do a BSE on your right breast        What else can I do to check for breast problems or cancer? Some experts suggest that women 36years of age or older should have a mammogram every year   Other experts suggest that women between the ages of 48and 76years old should have a mammogram every 2 years  Talk to your healthcare provider about when you should have a mammogram   When should I contact my healthcare provider? · You find any lumps or changes in your breasts  · You have breast pain or fluid coming from your nipples  · You have questions or concerns or concerns about your condition or care  CARE AGREEMENT:   You have the right to help plan your care  Learn about your health condition and how it may be treated  Discuss treatment options with your caregivers to decide what care you want to receive  You always have the right to refuse treatment  The above information is an  only  It is not intended as medical advice for individual conditions or treatments  Talk to your doctor, nurse or pharmacist before following any medical regimen to see if it is safe and effective for you  © 2017 [employer] Information is for End User's use only and may not be sold, redistributed or otherwise used for commercial purposes  All illustrations and images included in CareNotes® are the copyrighted property of [employer]  or [employer]

## 2020-04-08 ENCOUNTER — TELEPHONE (OUTPATIENT)
Dept: INTERNAL MEDICINE CLINIC | Facility: CLINIC | Age: 60
End: 2020-04-08

## 2020-04-16 DIAGNOSIS — E11.9 DIET-CONTROLLED DIABETES MELLITUS (HCC): Primary | ICD-10-CM

## 2020-04-28 ENCOUNTER — TELEMEDICINE (OUTPATIENT)
Dept: GASTROENTEROLOGY | Facility: CLINIC | Age: 60
End: 2020-04-28
Payer: COMMERCIAL

## 2020-04-28 ENCOUNTER — TELEPHONE (OUTPATIENT)
Dept: GASTROENTEROLOGY | Facility: CLINIC | Age: 60
End: 2020-04-28

## 2020-04-28 VITALS — BODY MASS INDEX: 30.96 KG/M2 | WEIGHT: 164 LBS | HEIGHT: 61 IN

## 2020-04-28 DIAGNOSIS — Z11.59 NEED FOR HEPATITIS C SCREENING TEST: ICD-10-CM

## 2020-04-28 DIAGNOSIS — E66.9 OBESITY (BMI 30-39.9): Chronic | ICD-10-CM

## 2020-04-28 DIAGNOSIS — K57.30 DIVERTICULOSIS LARGE INTESTINE W/O PERFORATION OR ABSCESS W/O BLEEDING: ICD-10-CM

## 2020-04-28 DIAGNOSIS — K64.9 HEMORRHOIDS, UNSPECIFIED HEMORRHOID TYPE: ICD-10-CM

## 2020-04-28 DIAGNOSIS — Z86.19 HISTORY OF HELICOBACTER PYLORI INFECTION: ICD-10-CM

## 2020-04-28 DIAGNOSIS — R10.84 GENERALIZED ABDOMINAL CRAMPING: ICD-10-CM

## 2020-04-28 DIAGNOSIS — D12.6 ADENOMATOUS POLYP OF COLON, UNSPECIFIED PART OF COLON: Primary | ICD-10-CM

## 2020-04-28 DIAGNOSIS — K58.9 IRRITABLE BOWEL SYNDROME, UNSPECIFIED TYPE: ICD-10-CM

## 2020-04-28 PROCEDURE — 99213 OFFICE O/P EST LOW 20 MIN: CPT | Performed by: INTERNAL MEDICINE

## 2020-04-28 RX ORDER — DICYCLOMINE HYDROCHLORIDE 10 MG/1
10 CAPSULE ORAL 4 TIMES DAILY PRN
Qty: 360 CAPSULE | Refills: 3 | Status: SHIPPED | OUTPATIENT
Start: 2020-04-28 | End: 2020-09-24

## 2020-05-15 ENCOUNTER — OFFICE VISIT (OUTPATIENT)
Dept: INTERNAL MEDICINE CLINIC | Facility: CLINIC | Age: 60
End: 2020-05-15

## 2020-05-15 VITALS
HEART RATE: 80 BPM | BODY MASS INDEX: 31.63 KG/M2 | WEIGHT: 167.55 LBS | DIASTOLIC BLOOD PRESSURE: 86 MMHG | SYSTOLIC BLOOD PRESSURE: 130 MMHG | HEIGHT: 61 IN | TEMPERATURE: 98.3 F

## 2020-05-15 DIAGNOSIS — B35.3 TINEA PEDIS OF LEFT FOOT: Primary | ICD-10-CM

## 2020-05-15 PROCEDURE — T1015 CLINIC SERVICE: HCPCS | Performed by: HOSPITALIST

## 2020-05-15 PROCEDURE — 3008F BODY MASS INDEX DOCD: CPT | Performed by: HOSPITALIST

## 2020-05-15 PROCEDURE — 1036F TOBACCO NON-USER: CPT | Performed by: HOSPITALIST

## 2020-05-15 PROCEDURE — 99213 OFFICE O/P EST LOW 20 MIN: CPT | Performed by: HOSPITALIST

## 2020-05-15 PROCEDURE — 2022F DILAT RTA XM EVC RTNOPTHY: CPT | Performed by: HOSPITALIST

## 2020-05-15 PROCEDURE — 3044F HG A1C LEVEL LT 7.0%: CPT | Performed by: HOSPITALIST

## 2020-05-20 ENCOUNTER — TELEPHONE (OUTPATIENT)
Dept: INTERNAL MEDICINE CLINIC | Facility: CLINIC | Age: 60
End: 2020-05-20

## 2020-05-26 ENCOUNTER — OFFICE VISIT (OUTPATIENT)
Dept: INTERNAL MEDICINE CLINIC | Facility: CLINIC | Age: 60
End: 2020-05-26

## 2020-05-26 ENCOUNTER — CONSULT (OUTPATIENT)
Dept: MULTI SPECIALTY CLINIC | Facility: CLINIC | Age: 60
End: 2020-05-26

## 2020-05-26 VITALS
BODY MASS INDEX: 31.55 KG/M2 | WEIGHT: 167.11 LBS | TEMPERATURE: 98.1 F | DIASTOLIC BLOOD PRESSURE: 82 MMHG | HEIGHT: 61 IN | SYSTOLIC BLOOD PRESSURE: 122 MMHG

## 2020-05-26 DIAGNOSIS — S90.822A BLISTER OF LEFT FOOT, INITIAL ENCOUNTER: Primary | ICD-10-CM

## 2020-05-26 DIAGNOSIS — S90.822A BLISTER (NONTHERMAL), LEFT FOOT, INITIAL ENCOUNTER: Primary | ICD-10-CM

## 2020-05-26 PROBLEM — K57.30 DIVERTICULOSIS OF COLON: Chronic | Status: ACTIVE | Noted: 2020-05-26

## 2020-05-26 PROBLEM — L08.9: Status: ACTIVE | Noted: 2020-05-26

## 2020-05-26 PROBLEM — S90.829A: Status: ACTIVE | Noted: 2020-05-26

## 2020-05-26 PROCEDURE — 99213 OFFICE O/P EST LOW 20 MIN: CPT | Performed by: INTERNAL MEDICINE

## 2020-05-26 PROCEDURE — T1015 CLINIC SERVICE: HCPCS | Performed by: INTERNAL MEDICINE

## 2020-05-26 PROCEDURE — 99212 OFFICE O/P EST SF 10 MIN: CPT | Performed by: PODIATRIST

## 2020-05-26 PROCEDURE — 2022F DILAT RTA XM EVC RTNOPTHY: CPT | Performed by: INTERNAL MEDICINE

## 2020-05-26 PROCEDURE — 1036F TOBACCO NON-USER: CPT | Performed by: PODIATRIST

## 2020-05-26 PROCEDURE — 3044F HG A1C LEVEL LT 7.0%: CPT | Performed by: PODIATRIST

## 2020-05-26 PROCEDURE — 3044F HG A1C LEVEL LT 7.0%: CPT | Performed by: INTERNAL MEDICINE

## 2020-05-26 PROCEDURE — 3008F BODY MASS INDEX DOCD: CPT | Performed by: INTERNAL MEDICINE

## 2020-05-26 PROCEDURE — 3008F BODY MASS INDEX DOCD: CPT | Performed by: PODIATRIST

## 2020-05-26 PROCEDURE — 1036F TOBACCO NON-USER: CPT | Performed by: INTERNAL MEDICINE

## 2020-05-26 PROCEDURE — 2022F DILAT RTA XM EVC RTNOPTHY: CPT | Performed by: PODIATRIST

## 2020-05-26 RX ORDER — CEPHALEXIN 500 MG/1
500 CAPSULE ORAL EVERY 6 HOURS SCHEDULED
Qty: 28 CAPSULE | Refills: 0 | Status: SHIPPED | OUTPATIENT
Start: 2020-05-26 | End: 2020-06-02

## 2020-05-26 RX ORDER — KETOCONAZOLE 20 MG/G
CREAM TOPICAL DAILY
Qty: 15 G | Refills: 0 | Status: SHIPPED | OUTPATIENT
Start: 2020-05-26 | End: 2020-09-24

## 2020-06-02 ENCOUNTER — TELEPHONE (OUTPATIENT)
Dept: INTERNAL MEDICINE CLINIC | Facility: CLINIC | Age: 60
End: 2020-06-02

## 2020-06-02 ENCOUNTER — OFFICE VISIT (OUTPATIENT)
Dept: MULTI SPECIALTY CLINIC | Facility: CLINIC | Age: 60
End: 2020-06-02

## 2020-06-02 VITALS
DIASTOLIC BLOOD PRESSURE: 70 MMHG | WEIGHT: 193.56 LBS | SYSTOLIC BLOOD PRESSURE: 130 MMHG | TEMPERATURE: 97 F | BODY MASS INDEX: 36.57 KG/M2

## 2020-06-02 DIAGNOSIS — S90.822A BLISTER (NONTHERMAL), LEFT FOOT, INITIAL ENCOUNTER: ICD-10-CM

## 2020-06-02 PROCEDURE — 2022F DILAT RTA XM EVC RTNOPTHY: CPT | Performed by: PODIATRIST

## 2020-06-02 PROCEDURE — 99212 OFFICE O/P EST SF 10 MIN: CPT | Performed by: PODIATRIST

## 2020-06-02 PROCEDURE — 1036F TOBACCO NON-USER: CPT | Performed by: PODIATRIST

## 2020-06-02 PROCEDURE — T1015 CLINIC SERVICE: HCPCS | Performed by: PODIATRIST

## 2020-06-02 PROCEDURE — 3044F HG A1C LEVEL LT 7.0%: CPT | Performed by: PODIATRIST

## 2020-06-03 DIAGNOSIS — K21.9 GASTROESOPHAGEAL REFLUX DISEASE, ESOPHAGITIS PRESENCE NOT SPECIFIED: ICD-10-CM

## 2020-06-03 RX ORDER — OMEPRAZOLE 20 MG/1
20 CAPSULE, DELAYED RELEASE ORAL DAILY
Qty: 90 CAPSULE | Refills: 3 | Status: SHIPPED | OUTPATIENT
Start: 2020-06-03 | End: 2021-05-31

## 2020-09-24 ENCOUNTER — OFFICE VISIT (OUTPATIENT)
Dept: INTERNAL MEDICINE CLINIC | Facility: CLINIC | Age: 60
End: 2020-09-24

## 2020-09-24 VITALS
HEART RATE: 76 BPM | TEMPERATURE: 97.6 F | DIASTOLIC BLOOD PRESSURE: 88 MMHG | WEIGHT: 157.41 LBS | BODY MASS INDEX: 29.74 KG/M2 | SYSTOLIC BLOOD PRESSURE: 128 MMHG | OXYGEN SATURATION: 96 %

## 2020-09-24 DIAGNOSIS — E11.9 DIET-CONTROLLED DIABETES MELLITUS (HCC): Primary | ICD-10-CM

## 2020-09-24 DIAGNOSIS — D17.9 LIPOMA, UNSPECIFIED SITE: ICD-10-CM

## 2020-09-24 DIAGNOSIS — Z23 NEED FOR INFLUENZA VACCINATION: ICD-10-CM

## 2020-09-24 DIAGNOSIS — G47.33 OSA (OBSTRUCTIVE SLEEP APNEA): ICD-10-CM

## 2020-09-24 PROCEDURE — 1036F TOBACCO NON-USER: CPT | Performed by: INTERNAL MEDICINE

## 2020-09-24 PROCEDURE — 90682 RIV4 VACC RECOMBINANT DNA IM: CPT | Performed by: INTERNAL MEDICINE

## 2020-09-24 PROCEDURE — 90471 IMMUNIZATION ADMIN: CPT | Performed by: INTERNAL MEDICINE

## 2020-09-24 PROCEDURE — 99213 OFFICE O/P EST LOW 20 MIN: CPT | Performed by: INTERNAL MEDICINE

## 2020-09-24 PROCEDURE — 83036 HEMOGLOBIN GLYCOSYLATED A1C: CPT | Performed by: INTERNAL MEDICINE

## 2020-09-24 NOTE — ASSESSMENT & PLAN NOTE
Lab Results   Component Value Date    HGBA1C 6 2 03/16/2020   ·  Patient's diabetes well controlled with diet and exercise at this time  · Patient continues to have well controlled A1c of 6 2%  · Patient is not on metformin currently

## 2020-09-24 NOTE — ASSESSMENT & PLAN NOTE
· Patient with small lipoma noted on lateral aspect of shoulder new line can observe at this time; if this does increase in size patient stated she would like it removed and general surgery consult can be placed

## 2020-09-24 NOTE — PROGRESS NOTES
Patient's shoes and socks removed  Right Foot/Ankle   Right Foot Inspection      Sensory       Monofilament testing: intact      Left Foot/Ankle  Left Foot Inspection                                           Sensory       Monofilament: intact

## 2020-09-24 NOTE — PROGRESS NOTES
ASSESSMENT/PLAN:  Problem List Items Addressed This Visit        Endocrine    Diet-controlled diabetes mellitus ([facility] [address] ) - Primary       Lab Results   Component Value Date    HGBA1C 6 2 03/16/2020 ·    Patient's diabetes well controlled with diet and exercise at this time  · Patient continues to have well controlled A1c of 6 2%  · Patient is not on metformin currently         Relevant Orders    POCT hemoglobin A1c       Respiratory    ALEC (obstructive sleep apnea)       Other    Lipoma     · Patient with small lipoma noted on lateral aspect of shoulder new line can observe at this time; if this does increase in size patient stated she would like it removed and general surgery consult can be placed  Other Visit Diagnoses     Need for influenza vaccination        Relevant Orders    influenza vaccine, quadrivalent, recombinant, PF, 0 5 mL, for patients 18 yr+ (FLUBLOK) (Completed)          Health Maintenance:  Colonoscopy next in 2022  Yepoppy flu shot administered today 09/24/2020    CHIEF COMPLAINT:     General Follow-up    HISTORY OF PRESENT ILLNESS:  54-year-old female with past medical history significant for diabetes mellitus type 2 well controlled with diet  Since patient's last visit she states she has been doing well  Patient's only complaint on exam was a bump that she noted on the lateral aspect of her left shoulder which she noticed about 2 weeks ago  Patient denies any trauma to this arm or any puncture wounds  Patient also denies any erythema, warmth, or pus  Patient denies any numbness, tingling, or weakness in her left upper extremity as well  Patient does believe she had a similar bump on her right leg in the past, but was uncertain what it was  She states she did have this excised and the bump has not come back  Review of Systems   All other systems reviewed and are negative        OBJECTIVE:  Vitals:    09/24/20 1603   BP: 128/88   BP Location: Left arm   Patient Position: Sitting   Cuff Size: Standard   Pulse: 76   Temp: 97 6 °F (36 4 °C)   TempSrc: Temporal   SpO2: 96%   Weight: 71 4 kg (157 lb 6 5 oz)     Physical Exam  Constitutional:       General: She is not in acute distress  Appearance: She is well-developed  HENT:      Head: Normocephalic and atraumatic  Eyes:      General: No scleral icterus  Conjunctiva/sclera: Conjunctivae normal    Cardiovascular:      Rate and Rhythm: Normal rate and regular rhythm  Heart sounds: Normal heart sounds  No murmur  No friction rub  No gallop  Pulmonary:      Effort: Pulmonary effort is normal  No respiratory distress  Breath sounds: Normal breath sounds  No wheezing or rales  Abdominal:      General: Bowel sounds are normal  There is no distension  Palpations: Abdomen is soft  Tenderness: There is no abdominal tenderness  Musculoskeletal: Normal range of motion  Comments: Small soft mobile mass noted on lateral aspect of left proximal arm  No erythema, warmth or tenderness     Skin:     General: Skin is warm  Findings: No rash  Neurological:      Mental Status: She is alert and oriented to person, place, and time  Current Outpatient Medications:     acetaminophen (TYLENOL) 500 mg tablet, Take 1 tablet (500 mg total) by mouth daily as needed for headaches, Disp: 30 tablet, Rfl: 3    Cholecalciferol (VITAMIN D3) 125 MCG (5000 UT) CAPS, Take by mouth, Disp: , Rfl:     clonazePAM (KlonoPIN) 1 mg tablet, Take 1 mg by mouth daily at bedtime as needed for anxiety    , Disp: , Rfl:     DULoxetine (CYMBALTA) 30 mg delayed release capsule, Take 30 mg by mouth daily, Disp: , Rfl:     Methylcobalamin (B-12) 5000 MCG TBDP, Take by mouth, Disp: , Rfl:     omeprazole (PriLOSEC) 20 mg delayed release capsule, Take 1 capsule (20 mg total) by mouth daily, Disp: 90 capsule, Rfl: 3    ARIPiprazole (ABILIFY) 5 mg tablet, Take 5 mg by mouth daily, Disp: , Rfl:     hydrOXYzine HCL (ATARAX) 25 mg tablet, Take 25 mg by mouth every 6 (six) hours as needed for itching, Disp: , Rfl:     Past Medical History:   Diagnosis Date    Abnormal mammogram     RESOLVED: [id number]    Anxiety     Anxiety     Arthritis     Depression     Depression     Diverticulosis     GERD (gastroesophageal reflux disease)     Helicobacter pylori infection     Hyperlipidemia     Seborrheic keratosis     LAST ASSESSED: [id number]    Sleep apnea     Sleep apnea     Tinea pedis of left foot 9/16/2019     Past Surgical History:   Procedure Laterality Date    ABDOMINOPLASTY      abdomin    COLONOSCOPY      VT COLONOSCOPY FLX DX W/COLLJ SPEC WHEN PFRMD N/A 8/11/2016    Procedure: COLONOSCOPY;  Surgeon: Bradford Montiel MD;  Location:  GI LAB; Service: Gastroenterology    VT COLONOSCOPY FLX DX W/COLLJ [facility] WHEN PFRMD N/A 8/29/2017    Procedure: EGD AND COLONOSCOPY;  Surgeon: Bradford Montiel MD;  Location: [facility] GI LAB; Service: Gastroenterology    TUBAL LIGATION      TUBAL LIGATION      UPPER GASTROINTESTINAL ENDOSCOPY       Social History     Socioeconomic History    Marital status: Single     Spouse name: Not on file    Number of children: Not on file    Years of education: Not on file    Highest education level: Not on file   Occupational History    Not on file   Social Needs    Financial resource strain: Not hard at all    Food insecurity     Worry: Never true     Inability: Sometimes true    Transportation needs     Medical: No     Non-medical: No   Tobacco Use    Smoking status: Never Smoker    Smokeless tobacco: Never Used   Substance and Sexual Activity    Alcohol use: No     Frequency: Never     Binge frequency: Never    Drug use: No    Sexual activity: Yes     Partners: Male     Birth control/protection: Post-menopausal   Lifestyle    Physical activity     Days per week: 0 days     Minutes per session: 0 min    Stress:  To some extent   Relationships    Social connections     Talks on phone: More than three times a week     Gets together: More than three times a week     Attends [employer] service: More than 4 times per year     Active member of club or organization: No     Attends meetings of clubs or organizations: Never     Relationship status:     Intimate partner violence     Fear of current or ex partner: No     Emotionally abused: No     Physically abused: No     Forced sexual activity: No   Other Topics Concern    Not on file   Social History Narrative    Not on file     Family History   Problem Relation Age of Onset    Diabetes Mother     Hypertension Mother     Heart disease Mother     Diabetes Paternal Grandfather     Alzheimer's disease Father     No Known Problems Sister     No Known Problems Brother     Depression Daughter     ADD / ADHD Son     Depression Son     Diabetes Maternal Grandmother     Stomach cancer Maternal Grandfather     No Known Problems Paternal Grandmother     No Known Problems Sister     Heart disease Sister     Intellectual disability Brother     Schizophrenia Brother     Other Son          as an infant     Other Daughter          at 2-4 mths old   Eugene Polio Other Son          as an infant      Patient's shoes and socks removed  Right Foot/Ankle   Right Foot Inspection        Sensory         Monofilament testing: intact        Left Foot/Ankle  Left Foot Inspection                                             Sensory         Monofilament: intact      Dalia Jurado DO  [facility] Internal Medicine PGY-3  [facility]  [address] [facility]  [address]  [place], [address] [facility]

## 2020-09-25 LAB — SL AMB POCT HEMOGLOBIN AIC: 6.2 (ref ?–6.5)

## 2020-09-25 PROCEDURE — 3044F HG A1C LEVEL LT 7.0%: CPT | Performed by: INTERNAL MEDICINE

## 2020-11-09 ENCOUNTER — APPOINTMENT (EMERGENCY)
Dept: RADIOLOGY | Facility: HOSPITAL | Age: 60
End: 2020-11-09
Payer: COMMERCIAL

## 2020-11-09 ENCOUNTER — HOSPITAL ENCOUNTER (OUTPATIENT)
Facility: HOSPITAL | Age: 60
Setting detail: OBSERVATION
Discharge: HOME | End: 2020-11-11
Attending: EMERGENCY MEDICINE | Admitting: INTERNAL MEDICINE
Payer: COMMERCIAL

## 2020-11-09 DIAGNOSIS — K57.92 ACUTE DIVERTICULITIS: ICD-10-CM

## 2020-11-09 DIAGNOSIS — R10.9 INTRACTABLE ABDOMINAL PAIN: Primary | ICD-10-CM

## 2020-11-09 LAB
ALBUMIN SERPL BCP-MCNC: 3.8 G/DL (ref 3.5–5)
ALP SERPL-CCNC: 69 U/L (ref 46–116)
ALT SERPL W P-5'-P-CCNC: 22 U/L (ref 12–78)
ANION GAP SERPL CALCULATED.3IONS-SCNC: 1 MMOL/L (ref 4–13)
AST SERPL W P-5'-P-CCNC: 15 U/L (ref 5–45)
BACTERIA UR QL AUTO: NORMAL /HPF
BASOPHILS # BLD AUTO: 0.06 THOUSANDS/ΜL (ref 0–0.1)
BASOPHILS NFR BLD AUTO: 1 % (ref 0–1)
BILIRUB SERPL-MCNC: 0.44 MG/DL (ref 0.2–1)
BILIRUB UR QL STRIP: NEGATIVE
BUN SERPL-MCNC: 12 MG/DL (ref 5–25)
CALCIUM SERPL-MCNC: 9.5 MG/DL (ref 8.3–10.1)
CHLORIDE SERPL-SCNC: 110 MMOL/L (ref 100–108)
CLARITY UR: CLEAR
CO2 SERPL-SCNC: 31 MMOL/L (ref 21–32)
COLOR UR: YELLOW
COLOR, POC: NORMAL
CREAT SERPL-MCNC: 0.9 MG/DL (ref 0.6–1.3)
EOSINOPHIL # BLD AUTO: 0.1 THOUSAND/ΜL (ref 0–0.61)
EOSINOPHIL NFR BLD AUTO: 1 % (ref 0–6)
ERYTHROCYTE [DISTWIDTH] IN BLOOD BY AUTOMATED COUNT: 13.4 % (ref 11.6–15.1)
EXT PREG TEST URINE: NEGATIVE
EXT. CONTROL ED NAV: NORMAL
GFR SERPL CREATININE-BSD FRML MDRD: 69 ML/MIN/1.73SQ M
GLUCOSE SERPL-MCNC: 98 MG/DL (ref 65–140)
GLUCOSE UR STRIP-MCNC: NEGATIVE MG/DL
HCT VFR BLD AUTO: 41.7 % (ref 34.8–46.1)
HGB BLD-MCNC: 13.1 G/DL (ref 11.5–15.4)
HGB UR QL STRIP.AUTO: ABNORMAL
HYALINE CASTS #/AREA URNS LPF: NORMAL /LPF
IMM GRANULOCYTES # BLD AUTO: 0.03 THOUSAND/UL (ref 0–0.2)
IMM GRANULOCYTES NFR BLD AUTO: 0 % (ref 0–2)
KETONES UR STRIP-MCNC: NEGATIVE MG/DL
LEUKOCYTE ESTERASE UR QL STRIP: NEGATIVE
LIPASE SERPL-CCNC: 140 U/L (ref 73–393)
LYMPHOCYTES # BLD AUTO: 2 THOUSANDS/ΜL (ref 0.6–4.47)
LYMPHOCYTES NFR BLD AUTO: 23 % (ref 14–44)
MCH RBC QN AUTO: 28.2 PG (ref 26.8–34.3)
MCHC RBC AUTO-ENTMCNC: 31.4 G/DL (ref 31.4–37.4)
MCV RBC AUTO: 90 FL (ref 82–98)
MONOCYTES # BLD AUTO: 0.79 THOUSAND/ΜL (ref 0.17–1.22)
MONOCYTES NFR BLD AUTO: 9 % (ref 4–12)
NEUTROPHILS # BLD AUTO: 5.72 THOUSANDS/ΜL (ref 1.85–7.62)
NEUTS SEG NFR BLD AUTO: 66 % (ref 43–75)
NITRITE UR QL STRIP: NEGATIVE
NON-SQ EPI CELLS URNS QL MICRO: NORMAL /HPF
NRBC BLD AUTO-RTO: 0 /100 WBCS
PH UR STRIP.AUTO: 6 [PH] (ref 4.5–8)
PLATELET # BLD AUTO: 300 THOUSANDS/UL (ref 149–390)
PMV BLD AUTO: 9.7 FL (ref 8.9–12.7)
POTASSIUM SERPL-SCNC: 4.4 MMOL/L (ref 3.5–5.3)
PROT SERPL-MCNC: 7.9 G/DL (ref 6.4–8.2)
PROT UR STRIP-MCNC: NEGATIVE MG/DL
RBC # BLD AUTO: 4.64 MILLION/UL (ref 3.81–5.12)
RBC #/AREA URNS AUTO: NORMAL /HPF
SODIUM SERPL-SCNC: 142 MMOL/L (ref 136–145)
SP GR UR STRIP.AUTO: >=1.03 (ref 1–1.03)
UROBILINOGEN UR QL STRIP.AUTO: 0.2 E.U./DL
WBC # BLD AUTO: 8.7 THOUSAND/UL (ref 4.31–10.16)
WBC #/AREA URNS AUTO: NORMAL /HPF

## 2020-11-09 PROCEDURE — 74177 CT ABD & PELVIS W/CONTRAST: CPT

## 2020-11-09 PROCEDURE — 81001 URINALYSIS AUTO W/SCOPE: CPT

## 2020-11-09 PROCEDURE — 36415 COLL VENOUS BLD VENIPUNCTURE: CPT | Performed by: EMERGENCY MEDICINE

## 2020-11-09 PROCEDURE — 80053 COMPREHEN METABOLIC PANEL: CPT | Performed by: EMERGENCY MEDICINE

## 2020-11-09 PROCEDURE — 96374 THER/PROPH/DIAG INJ IV PUSH: CPT

## 2020-11-09 PROCEDURE — 81025 URINE PREGNANCY TEST: CPT | Performed by: EMERGENCY MEDICINE

## 2020-11-09 PROCEDURE — 96361 HYDRATE IV INFUSION ADD-ON: CPT

## 2020-11-09 PROCEDURE — 85025 COMPLETE CBC W/AUTO DIFF WBC: CPT | Performed by: EMERGENCY MEDICINE

## 2020-11-09 PROCEDURE — 99285 EMERGENCY DEPT VISIT HI MDM: CPT | Performed by: EMERGENCY MEDICINE

## 2020-11-09 PROCEDURE — G1004 CDSM NDSC: HCPCS

## 2020-11-09 PROCEDURE — 83690 ASSAY OF LIPASE: CPT | Performed by: EMERGENCY MEDICINE

## 2020-11-09 PROCEDURE — 99285 EMERGENCY DEPT VISIT HI MDM: CPT

## 2020-11-09 RX ORDER — SODIUM CHLORIDE, SODIUM GLUCONATE, SODIUM ACETATE, POTASSIUM CHLORIDE, MAGNESIUM CHLORIDE, SODIUM PHOSPHATE, DIBASIC, AND POTASSIUM PHOSPHATE .53; .5; .37; .037; .03; .012; .00082 G/100ML; G/100ML; G/100ML; G/100ML; G/100ML; G/100ML; G/100ML
75 INJECTION, SOLUTION INTRAVENOUS CONTINUOUS
Status: DISCONTINUED | OUTPATIENT
Start: 2020-11-09 | End: 2020-11-11 | Stop reason: HOSPADM

## 2020-11-09 RX ORDER — FENTANYL CITRATE 50 UG/ML
50 INJECTION, SOLUTION INTRAMUSCULAR; INTRAVENOUS ONCE
Status: COMPLETED | OUTPATIENT
Start: 2020-11-09 | End: 2020-11-09

## 2020-11-09 RX ORDER — DULOXETIN HYDROCHLORIDE 30 MG/1
30 CAPSULE, DELAYED RELEASE ORAL DAILY
Status: DISCONTINUED | OUTPATIENT
Start: 2020-11-10 | End: 2020-11-11 | Stop reason: HOSPADM

## 2020-11-09 RX ORDER — ONDANSETRON 2 MG/ML
4 INJECTION INTRAMUSCULAR; INTRAVENOUS EVERY 6 HOURS PRN
Status: DISCONTINUED | OUTPATIENT
Start: 2020-11-09 | End: 2020-11-11 | Stop reason: HOSPADM

## 2020-11-09 RX ORDER — ONDANSETRON 2 MG/ML
4 INJECTION INTRAMUSCULAR; INTRAVENOUS ONCE
Status: COMPLETED | OUTPATIENT
Start: 2020-11-09 | End: 2020-11-09

## 2020-11-09 RX ORDER — CLONAZEPAM 0.5 MG/1
1 TABLET ORAL
Status: DISCONTINUED | OUTPATIENT
Start: 2020-11-09 | End: 2020-11-11 | Stop reason: HOSPADM

## 2020-11-09 RX ORDER — ARIPIPRAZOLE 5 MG/1
5 TABLET ORAL DAILY
Status: DISCONTINUED | OUTPATIENT
Start: 2020-11-10 | End: 2020-11-11 | Stop reason: HOSPADM

## 2020-11-09 RX ADMIN — SODIUM CHLORIDE, SODIUM GLUCONATE, SODIUM ACETATE, POTASSIUM CHLORIDE, MAGNESIUM CHLORIDE, SODIUM PHOSPHATE, DIBASIC, AND POTASSIUM PHOSPHATE 75 ML/HR: .53; .5; .37; .037; .03; .012; .00082 INJECTION, SOLUTION INTRAVENOUS at 23:54

## 2020-11-09 RX ADMIN — SODIUM CHLORIDE 1000 ML: 0.9 INJECTION, SOLUTION INTRAVENOUS at 19:28

## 2020-11-09 RX ADMIN — ONDANSETRON 4 MG: 2 INJECTION INTRAMUSCULAR; INTRAVENOUS at 22:44

## 2020-11-09 RX ADMIN — FENTANYL CITRATE 50 MCG: 50 INJECTION INTRAMUSCULAR; INTRAVENOUS at 19:30

## 2020-11-09 RX ADMIN — IOHEXOL 100 ML: 350 INJECTION, SOLUTION INTRAVENOUS at 20:23

## 2020-11-09 RX ADMIN — MORPHINE SULFATE 2 MG: 2 INJECTION, SOLUTION INTRAMUSCULAR; INTRAVENOUS at 22:44

## 2020-11-10 PROBLEM — K57.92 ACUTE DIVERTICULITIS: Status: ACTIVE | Noted: 2020-11-10

## 2020-11-10 LAB
ANION GAP SERPL CALCULATED.3IONS-SCNC: 2 MMOL/L (ref 4–13)
BUN SERPL-MCNC: 10 MG/DL (ref 5–25)
CALCIUM SERPL-MCNC: 8.5 MG/DL (ref 8.3–10.1)
CHLORIDE SERPL-SCNC: 108 MMOL/L (ref 100–108)
CO2 SERPL-SCNC: 28 MMOL/L (ref 21–32)
CREAT SERPL-MCNC: 0.92 MG/DL (ref 0.6–1.3)
ERYTHROCYTE [DISTWIDTH] IN BLOOD BY AUTOMATED COUNT: 13.5 % (ref 11.6–15.1)
GFR SERPL CREATININE-BSD FRML MDRD: 67 ML/MIN/1.73SQ M
GLUCOSE SERPL-MCNC: 126 MG/DL (ref 65–140)
HCT VFR BLD AUTO: 36.9 % (ref 34.8–46.1)
HGB BLD-MCNC: 11.3 G/DL (ref 11.5–15.4)
MCH RBC QN AUTO: 28.5 PG (ref 26.8–34.3)
MCHC RBC AUTO-ENTMCNC: 30.6 G/DL (ref 31.4–37.4)
MCV RBC AUTO: 93 FL (ref 82–98)
PMV BLD AUTO: 10.1 FL (ref 8.9–12.7)
POTASSIUM SERPL-SCNC: 4.7 MMOL/L (ref 3.5–5.3)
RBC # BLD AUTO: 3.97 MILLION/UL (ref 3.81–5.12)
SODIUM SERPL-SCNC: 138 MMOL/L (ref 136–145)
WBC # BLD AUTO: 7.42 THOUSAND/UL (ref 4.31–10.16)

## 2020-11-10 PROCEDURE — 80048 BASIC METABOLIC PNL TOTAL CA: CPT | Performed by: STUDENT IN AN ORGANIZED HEALTH CARE EDUCATION/TRAINING PROGRAM

## 2020-11-10 PROCEDURE — 85027 COMPLETE CBC AUTOMATED: CPT | Performed by: STUDENT IN AN ORGANIZED HEALTH CARE EDUCATION/TRAINING PROGRAM

## 2020-11-10 PROCEDURE — 36415 COLL VENOUS BLD VENIPUNCTURE: CPT | Performed by: STUDENT IN AN ORGANIZED HEALTH CARE EDUCATION/TRAINING PROGRAM

## 2020-11-10 RX ORDER — LEVOFLOXACIN 750 MG/1
750 TABLET ORAL EVERY 24 HOURS
Status: DISCONTINUED | OUTPATIENT
Start: 2020-11-10 | End: 2020-11-10

## 2020-11-10 RX ORDER — ACETAMINOPHEN 325 MG/1
650 TABLET ORAL EVERY 4 HOURS PRN
Status: DISCONTINUED | OUTPATIENT
Start: 2020-11-10 | End: 2020-11-11 | Stop reason: HOSPADM

## 2020-11-10 RX ORDER — CEFDINIR 300 MG/1
300 CAPSULE ORAL EVERY 12 HOURS SCHEDULED
Status: DISCONTINUED | OUTPATIENT
Start: 2020-11-10 | End: 2020-11-10

## 2020-11-10 RX ORDER — HYDROMORPHONE HCL/PF 1 MG/ML
0.5 SYRINGE (ML) INJECTION
Status: DISCONTINUED | OUTPATIENT
Start: 2020-11-10 | End: 2020-11-11 | Stop reason: HOSPADM

## 2020-11-10 RX ORDER — CIPROFLOXACIN 500 MG/1
500 TABLET, FILM COATED ORAL EVERY 12 HOURS SCHEDULED
Status: DISCONTINUED | OUTPATIENT
Start: 2020-11-10 | End: 2020-11-10

## 2020-11-10 RX ORDER — METRONIDAZOLE 500 MG/1
500 TABLET ORAL EVERY 8 HOURS SCHEDULED
Status: DISCONTINUED | OUTPATIENT
Start: 2020-11-10 | End: 2020-11-10

## 2020-11-10 RX ORDER — OXYCODONE HYDROCHLORIDE 5 MG/1
5 TABLET ORAL EVERY 4 HOURS PRN
Status: DISCONTINUED | OUTPATIENT
Start: 2020-11-10 | End: 2020-11-11 | Stop reason: HOSPADM

## 2020-11-10 RX ORDER — HYDROMORPHONE HCL/PF 1 MG/ML
1 SYRINGE (ML) INJECTION ONCE
Status: COMPLETED | OUTPATIENT
Start: 2020-11-10 | End: 2020-11-10

## 2020-11-10 RX ORDER — OXYCODONE HYDROCHLORIDE 10 MG/1
10 TABLET ORAL EVERY 4 HOURS PRN
Status: DISCONTINUED | OUTPATIENT
Start: 2020-11-10 | End: 2020-11-11 | Stop reason: HOSPADM

## 2020-11-10 RX ADMIN — OXYCODONE HYDROCHLORIDE 5 MG: 5 TABLET ORAL at 16:35

## 2020-11-10 RX ADMIN — METRONIDAZOLE 500 MG: 500 TABLET ORAL at 01:31

## 2020-11-10 RX ADMIN — PIPERACILLIN AND TAZOBACTAM 3.38 G: 36; 4.5 INJECTION, POWDER, FOR SOLUTION INTRAVENOUS at 16:23

## 2020-11-10 RX ADMIN — ENOXAPARIN SODIUM 40 MG: 40 INJECTION SUBCUTANEOUS at 09:15

## 2020-11-10 RX ADMIN — PIPERACILLIN AND TAZOBACTAM 3.38 G: 36; 4.5 INJECTION, POWDER, FOR SOLUTION INTRAVENOUS at 22:11

## 2020-11-10 RX ADMIN — ARIPIPRAZOLE 5 MG: 5 TABLET ORAL at 10:05

## 2020-11-10 RX ADMIN — VITAM B12 50 MCG: 100 TAB at 10:06

## 2020-11-10 RX ADMIN — CEFDINIR 300 MG: 300 CAPSULE ORAL at 02:52

## 2020-11-10 RX ADMIN — SODIUM CHLORIDE, SODIUM GLUCONATE, SODIUM ACETATE, POTASSIUM CHLORIDE, MAGNESIUM CHLORIDE, SODIUM PHOSPHATE, DIBASIC, AND POTASSIUM PHOSPHATE 75 ML/HR: .53; .5; .37; .037; .03; .012; .00082 INJECTION, SOLUTION INTRAVENOUS at 16:35

## 2020-11-10 RX ADMIN — HYDROMORPHONE HYDROCHLORIDE 1 MG: 1 INJECTION, SOLUTION INTRAMUSCULAR; INTRAVENOUS; SUBCUTANEOUS at 01:31

## 2020-11-10 RX ADMIN — DULOXETINE HYDROCHLORIDE 30 MG: 30 CAPSULE, DELAYED RELEASE ORAL at 10:06

## 2020-11-10 RX ADMIN — PIPERACILLIN AND TAZOBACTAM 3.38 G: 36; 4.5 INJECTION, POWDER, FOR SOLUTION INTRAVENOUS at 10:07

## 2020-11-10 RX ADMIN — OXYCODONE HYDROCHLORIDE 10 MG: 10 TABLET ORAL at 09:28

## 2020-11-11 VITALS
HEIGHT: 61 IN | RESPIRATION RATE: 18 BRPM | HEART RATE: 82 BPM | OXYGEN SATURATION: 94 % | WEIGHT: 156.97 LBS | TEMPERATURE: 98.7 F | DIASTOLIC BLOOD PRESSURE: 74 MMHG | SYSTOLIC BLOOD PRESSURE: 134 MMHG | BODY MASS INDEX: 29.64 KG/M2

## 2020-11-11 LAB
ANION GAP SERPL CALCULATED.3IONS-SCNC: 3 MMOL/L (ref 4–13)
BASOPHILS # BLD AUTO: 0.02 THOUSANDS/ΜL (ref 0–0.1)
BASOPHILS NFR BLD AUTO: 0 % (ref 0–1)
BUN SERPL-MCNC: 10 MG/DL (ref 5–25)
CALCIUM SERPL-MCNC: 8.6 MG/DL (ref 8.3–10.1)
CHLORIDE SERPL-SCNC: 104 MMOL/L (ref 100–108)
CO2 SERPL-SCNC: 30 MMOL/L (ref 21–32)
CREAT SERPL-MCNC: 0.87 MG/DL (ref 0.6–1.3)
EOSINOPHIL # BLD AUTO: 0.04 THOUSAND/ΜL (ref 0–0.61)
EOSINOPHIL NFR BLD AUTO: 1 % (ref 0–6)
ERYTHROCYTE [DISTWIDTH] IN BLOOD BY AUTOMATED COUNT: 13.2 % (ref 11.6–15.1)
GFR SERPL CREATININE-BSD FRML MDRD: 72 ML/MIN/1.73SQ M
GLUCOSE P FAST SERPL-MCNC: 91 MG/DL (ref 65–99)
GLUCOSE SERPL-MCNC: 91 MG/DL (ref 65–140)
HCT VFR BLD AUTO: 34.4 % (ref 34.8–46.1)
HGB BLD-MCNC: 10.6 G/DL (ref 11.5–15.4)
IMM GRANULOCYTES # BLD AUTO: 0.03 THOUSAND/UL (ref 0–0.2)
IMM GRANULOCYTES NFR BLD AUTO: 0 % (ref 0–2)
LYMPHOCYTES # BLD AUTO: 1.63 THOUSANDS/ΜL (ref 0.6–4.47)
LYMPHOCYTES NFR BLD AUTO: 22 % (ref 14–44)
MCH RBC QN AUTO: 28 PG (ref 26.8–34.3)
MCHC RBC AUTO-ENTMCNC: 30.8 G/DL (ref 31.4–37.4)
MCV RBC AUTO: 91 FL (ref 82–98)
MONOCYTES # BLD AUTO: 0.86 THOUSAND/ΜL (ref 0.17–1.22)
MONOCYTES NFR BLD AUTO: 11 % (ref 4–12)
NEUTROPHILS # BLD AUTO: 4.98 THOUSANDS/ΜL (ref 1.85–7.62)
NEUTS SEG NFR BLD AUTO: 66 % (ref 43–75)
NRBC BLD AUTO-RTO: 0 /100 WBCS
PLATELET # BLD AUTO: 235 THOUSANDS/UL (ref 149–390)
PMV BLD AUTO: 10.1 FL (ref 8.9–12.7)
POTASSIUM SERPL-SCNC: 3.7 MMOL/L (ref 3.5–5.3)
RBC # BLD AUTO: 3.79 MILLION/UL (ref 3.81–5.12)
SODIUM SERPL-SCNC: 137 MMOL/L (ref 136–145)
WBC # BLD AUTO: 7.56 THOUSAND/UL (ref 4.31–10.16)

## 2020-11-11 PROCEDURE — 85025 COMPLETE CBC W/AUTO DIFF WBC: CPT | Performed by: INTERNAL MEDICINE

## 2020-11-11 PROCEDURE — 80048 BASIC METABOLIC PNL TOTAL CA: CPT | Performed by: INTERNAL MEDICINE

## 2020-11-11 RX ORDER — AMOXICILLIN AND CLAVULANATE POTASSIUM 875; 125 MG/1; MG/1
1 TABLET, FILM COATED ORAL EVERY 12 HOURS SCHEDULED
Qty: 12 TABLET | Refills: 0 | Status: SHIPPED | OUTPATIENT
Start: 2020-11-11 | End: 2020-11-17

## 2020-11-11 RX ADMIN — PIPERACILLIN AND TAZOBACTAM 3.38 G: 36; 4.5 INJECTION, POWDER, FOR SOLUTION INTRAVENOUS at 15:20

## 2020-11-11 RX ADMIN — SODIUM CHLORIDE, SODIUM GLUCONATE, SODIUM ACETATE, POTASSIUM CHLORIDE, MAGNESIUM CHLORIDE, SODIUM PHOSPHATE, DIBASIC, AND POTASSIUM PHOSPHATE 75 ML/HR: .53; .5; .37; .037; .03; .012; .00082 INJECTION, SOLUTION INTRAVENOUS at 07:39

## 2020-11-11 RX ADMIN — VITAM B12 50 MCG: 100 TAB at 08:39

## 2020-11-11 RX ADMIN — PIPERACILLIN AND TAZOBACTAM 3.38 G: 36; 4.5 INJECTION, POWDER, FOR SOLUTION INTRAVENOUS at 03:51

## 2020-11-11 RX ADMIN — PIPERACILLIN AND TAZOBACTAM 3.38 G: 36; 4.5 INJECTION, POWDER, FOR SOLUTION INTRAVENOUS at 08:44

## 2020-11-11 RX ADMIN — ENOXAPARIN SODIUM 40 MG: 40 INJECTION SUBCUTANEOUS at 08:38

## 2020-11-11 RX ADMIN — ARIPIPRAZOLE 5 MG: 5 TABLET ORAL at 08:39

## 2020-11-11 RX ADMIN — DULOXETINE HYDROCHLORIDE 30 MG: 30 CAPSULE, DELAYED RELEASE ORAL at 08:39

## 2020-12-14 ENCOUNTER — TELEPHONE (OUTPATIENT)
Dept: INTERNAL MEDICINE CLINIC | Facility: CLINIC | Age: 60
End: 2020-12-14

## 2021-01-26 ENCOUNTER — TELEPHONE (OUTPATIENT)
Dept: INTERNAL MEDICINE CLINIC | Facility: CLINIC | Age: 61
End: 2021-01-26

## 2021-01-26 NOTE — TELEPHONE ENCOUNTER
[facility]- [address] [facility]     Name of [address] [place] [address]     Form to be filled out by- Fabiana Kidd    Form to be Faxed [phone]    Patient made aware of 10 business day policy

## 2021-01-27 NOTE — TELEPHONE ENCOUNTER
I called and spoke to [facility] at [address] [facility] to discuss what is needed for this form as it is only staing office notes and sleep study needed  She stated patient has RX on file for a new CPAP machine but needs baseline sleep study and recent office notes  I advised her patient had sleep study ordered in Feb 2020 and did not have done  I advised he needs to contact sleep medicine and schedule appt   She will advise CPAP team to reach out to patient

## 2021-03-20 ENCOUNTER — APPOINTMENT (EMERGENCY)
Dept: RADIOLOGY | Facility: HOSPITAL | Age: 61
End: 2021-03-20
Payer: COMMERCIAL

## 2021-03-20 ENCOUNTER — HOSPITAL ENCOUNTER (OUTPATIENT)
Facility: HOSPITAL | Age: 61
Setting detail: OBSERVATION
Discharge: HOME | End: 2021-03-21
Attending: EMERGENCY MEDICINE | Admitting: INTERNAL MEDICINE
Payer: COMMERCIAL

## 2021-03-20 DIAGNOSIS — R26.2 AMBULATORY DYSFUNCTION: ICD-10-CM

## 2021-03-20 DIAGNOSIS — M17.10 OSTEOARTHRITIS OF KNEE: ICD-10-CM

## 2021-03-20 DIAGNOSIS — M25.562 ACUTE PAIN OF LEFT KNEE: ICD-10-CM

## 2021-03-20 DIAGNOSIS — M25.562 LEFT KNEE PAIN: Primary | ICD-10-CM

## 2021-03-20 PROBLEM — F41.9 ANXIETY: Status: ACTIVE | Noted: 2021-03-20

## 2021-03-20 PROBLEM — K21.9 GERD (GASTROESOPHAGEAL REFLUX DISEASE): Status: ACTIVE | Noted: 2021-03-20

## 2021-03-20 LAB
ALBUMIN SERPL BCP-MCNC: 3.9 G/DL (ref 3.5–5)
ALP SERPL-CCNC: 64 U/L (ref 46–116)
ALT SERPL W P-5'-P-CCNC: 18 U/L (ref 12–78)
ANION GAP SERPL CALCULATED.3IONS-SCNC: 3 MMOL/L (ref 4–13)
AST SERPL W P-5'-P-CCNC: 10 U/L (ref 5–45)
BASOPHILS # BLD AUTO: 0.05 THOUSANDS/ΜL (ref 0–0.1)
BASOPHILS NFR BLD AUTO: 1 % (ref 0–1)
BILIRUB SERPL-MCNC: 0.31 MG/DL (ref 0.2–1)
BUN SERPL-MCNC: 11 MG/DL (ref 5–25)
CALCIUM SERPL-MCNC: 9.3 MG/DL (ref 8.3–10.1)
CHLORIDE SERPL-SCNC: 111 MMOL/L (ref 100–108)
CO2 SERPL-SCNC: 29 MMOL/L (ref 21–32)
CREAT SERPL-MCNC: 0.98 MG/DL (ref 0.6–1.3)
CRP SERPL QL: 6.5 MG/L
EOSINOPHIL # BLD AUTO: 0.12 THOUSAND/ΜL (ref 0–0.61)
EOSINOPHIL NFR BLD AUTO: 2 % (ref 0–6)
ERYTHROCYTE [DISTWIDTH] IN BLOOD BY AUTOMATED COUNT: 12.9 % (ref 11.6–15.1)
ERYTHROCYTE [SEDIMENTATION RATE] IN BLOOD: 38 MM/HOUR (ref 0–29)
GFR SERPL CREATININE-BSD FRML MDRD: 62 ML/MIN/1.73SQ M
GLUCOSE SERPL-MCNC: 95 MG/DL (ref 65–140)
HCT VFR BLD AUTO: 40.5 % (ref 34.8–46.1)
HGB BLD-MCNC: 12.7 G/DL (ref 11.5–15.4)
IMM GRANULOCYTES # BLD AUTO: 0.02 THOUSAND/UL (ref 0–0.2)
IMM GRANULOCYTES NFR BLD AUTO: 0 % (ref 0–2)
LYMPHOCYTES # BLD AUTO: 1.5 THOUSANDS/ΜL (ref 0.6–4.47)
LYMPHOCYTES NFR BLD AUTO: 29 % (ref 14–44)
MCH RBC QN AUTO: 28.7 PG (ref 26.8–34.3)
MCHC RBC AUTO-ENTMCNC: 31.4 G/DL (ref 31.4–37.4)
MCV RBC AUTO: 91 FL (ref 82–98)
MONOCYTES # BLD AUTO: 0.49 THOUSAND/ΜL (ref 0.17–1.22)
MONOCYTES NFR BLD AUTO: 9 % (ref 4–12)
NEUTROPHILS # BLD AUTO: 3.07 THOUSANDS/ΜL (ref 1.85–7.62)
NEUTS SEG NFR BLD AUTO: 59 % (ref 43–75)
NRBC BLD AUTO-RTO: 0 /100 WBCS
PLATELET # BLD AUTO: 329 THOUSANDS/UL (ref 149–390)
PMV BLD AUTO: 9.4 FL (ref 8.9–12.7)
POTASSIUM SERPL-SCNC: 4.1 MMOL/L (ref 3.5–5.3)
PROT SERPL-MCNC: 7.8 G/DL (ref 6.4–8.2)
RBC # BLD AUTO: 4.43 MILLION/UL (ref 3.81–5.12)
SODIUM SERPL-SCNC: 143 MMOL/L (ref 136–145)
WBC # BLD AUTO: 5.25 THOUSAND/UL (ref 4.31–10.16)

## 2021-03-20 PROCEDURE — 99285 EMERGENCY DEPT VISIT HI MDM: CPT

## 2021-03-20 PROCEDURE — 85652 RBC SED RATE AUTOMATED: CPT | Performed by: EMERGENCY MEDICINE

## 2021-03-20 PROCEDURE — 85025 COMPLETE CBC W/AUTO DIFF WBC: CPT | Performed by: EMERGENCY MEDICINE

## 2021-03-20 PROCEDURE — 99285 EMERGENCY DEPT VISIT HI MDM: CPT | Performed by: EMERGENCY MEDICINE

## 2021-03-20 PROCEDURE — NC001 PR NO CHARGE: Performed by: INTERNAL MEDICINE

## 2021-03-20 PROCEDURE — 73564 X-RAY EXAM KNEE 4 OR MORE: CPT

## 2021-03-20 PROCEDURE — 86140 C-REACTIVE PROTEIN: CPT | Performed by: EMERGENCY MEDICINE

## 2021-03-20 PROCEDURE — 96374 THER/PROPH/DIAG INJ IV PUSH: CPT

## 2021-03-20 PROCEDURE — 80053 COMPREHEN METABOLIC PANEL: CPT | Performed by: EMERGENCY MEDICINE

## 2021-03-20 PROCEDURE — 36415 COLL VENOUS BLD VENIPUNCTURE: CPT | Performed by: EMERGENCY MEDICINE

## 2021-03-20 RX ORDER — ACETAMINOPHEN 500 MG
500 TABLET ORAL DAILY PRN
Status: DISCONTINUED | OUTPATIENT
Start: 2021-03-20 | End: 2021-03-20

## 2021-03-20 RX ORDER — ARIPIPRAZOLE 5 MG/1
5 TABLET ORAL DAILY
Status: CANCELLED | OUTPATIENT
Start: 2021-03-20

## 2021-03-20 RX ORDER — HYDROXYZINE HYDROCHLORIDE 25 MG/1
25 TABLET, FILM COATED ORAL EVERY 6 HOURS PRN
Status: DISCONTINUED | OUTPATIENT
Start: 2021-03-20 | End: 2021-03-21 | Stop reason: HOSPADM

## 2021-03-20 RX ORDER — ACETAMINOPHEN 325 MG/1
650 TABLET ORAL EVERY 6 HOURS PRN
Status: DISCONTINUED | OUTPATIENT
Start: 2021-03-20 | End: 2021-03-21 | Stop reason: HOSPADM

## 2021-03-20 RX ORDER — HEPARIN SODIUM 5000 [USP'U]/ML
5000 INJECTION, SOLUTION INTRAVENOUS; SUBCUTANEOUS EVERY 8 HOURS SCHEDULED
Status: DISCONTINUED | OUTPATIENT
Start: 2021-03-20 | End: 2021-03-21 | Stop reason: HOSPADM

## 2021-03-20 RX ORDER — KETOROLAC TROMETHAMINE 30 MG/ML
15 INJECTION, SOLUTION INTRAMUSCULAR; INTRAVENOUS ONCE
Status: COMPLETED | OUTPATIENT
Start: 2021-03-20 | End: 2021-03-20

## 2021-03-20 RX ORDER — DULOXETIN HYDROCHLORIDE 30 MG/1
30 CAPSULE, DELAYED RELEASE ORAL DAILY
Status: DISCONTINUED | OUTPATIENT
Start: 2021-03-20 | End: 2021-03-21 | Stop reason: HOSPADM

## 2021-03-20 RX ORDER — IBUPROFEN 600 MG/1
600 TABLET ORAL EVERY 6 HOURS PRN
Status: DISCONTINUED | OUTPATIENT
Start: 2021-03-20 | End: 2021-03-21 | Stop reason: HOSPADM

## 2021-03-20 RX ORDER — CLONAZEPAM 1 MG/1
1 TABLET ORAL
Status: DISCONTINUED | OUTPATIENT
Start: 2021-03-20 | End: 2021-03-21 | Stop reason: HOSPADM

## 2021-03-20 RX ORDER — LIDOCAINE HYDROCHLORIDE 10 MG/ML
30 INJECTION, SOLUTION EPIDURAL; INFILTRATION; INTRACAUDAL; PERINEURAL ONCE
Status: DISCONTINUED | OUTPATIENT
Start: 2021-03-20 | End: 2021-03-21 | Stop reason: HOSPADM

## 2021-03-20 RX ORDER — ACETAMINOPHEN 325 MG/1
975 TABLET ORAL ONCE
Status: COMPLETED | OUTPATIENT
Start: 2021-03-20 | End: 2021-03-20

## 2021-03-20 RX ORDER — PANTOPRAZOLE SODIUM 40 MG/1
40 TABLET, DELAYED RELEASE ORAL
Status: DISCONTINUED | OUTPATIENT
Start: 2021-03-21 | End: 2021-03-21 | Stop reason: HOSPADM

## 2021-03-20 RX ADMIN — HEPARIN SODIUM 5000 UNITS: 5000 INJECTION INTRAVENOUS; SUBCUTANEOUS at 21:34

## 2021-03-20 RX ADMIN — KETOROLAC TROMETHAMINE 15 MG: 30 INJECTION, SOLUTION INTRAMUSCULAR at 10:13

## 2021-03-20 RX ADMIN — ACETAMINOPHEN 975 MG: 325 TABLET, FILM COATED ORAL at 10:31

## 2021-03-20 NOTE — ED NOTES
Ortho unable to aspirate synovial fluid at bedside at this time  Admitting physician at bedside, aware       Francesco Ferreira RN  03/20/21 0497

## 2021-03-20 NOTE — ASSESSMENT & PLAN NOTE
Janeth Shelton is a 27-year-old female with past history of osteoarthritis presenting with left knee pain x1 day  No prior history left knee pain  No preceeding trauma  Patient describes pain as sharp  Patient reports difficulty with ambulation  No prior history of gout  Lab significant for elevated ESR and CRP  Left knee x-ray showed chondrocalcinosis but no other acute abnormality  Vital signs stable  Physical exam demonstrated no swelling, erythema, warmth with intact passive ROM and limited active ROM secondary to severe pain  Patient received Toradol 15 mg x 1 and acetaminophen 975 mg x 1 in the ED with significant improvement in pain to 0/10  Orthopedic surgery was consulted in the ED who was unable to aspirate any fluid  Differential diagnosis includes osteoarthritis versus infection versus meniscal injury  Plan:  · Pain control  · Acetaminophen 650 mg q 6 hours p r n  · Ibuprofen 600 mg q 6 hours p r n    · Ambulate patient and weight-bearing as tolerated  · Orthopedic surgery following, appreciate recommendations  · No further intervention at this time  · Will re-evaluate in the morning  · PT/OT

## 2021-03-20 NOTE — ASSESSMENT & PLAN NOTE
Patient states she has history of arthritis  However denies any left knee pain prior to yesterday  She does not take any pain medications over-the-counter  No significant degenerative changes noted on the left knee x-ray  Mildly elevated ESR and CRP      Plan:  · PT/OT  · Pain control

## 2021-03-20 NOTE — ASSESSMENT & PLAN NOTE
Patient states she takes hydroxyzine and clonazepam at home  Plan:  · Hydroxyzine 25 mg q 6 p r n  anxiety  · Clonazepam 1 mg HS p r n   Anxiety
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
## 2021-03-20 NOTE — ASSESSMENT & PLAN NOTE
Patient states she takes omeprazole at home for reflux  Currently denies any abdominal pain, nausea, vomiting      Plan:  · Pantoprazole 40 mg daily  · Continue to monitor

## 2021-03-20 NOTE — ED NOTES
Dr Angie Dai at bedside to see patient - [facility]  from triage remains on line for translation        Netta Avilez RN  03/20/21 9158
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
## 2021-03-20 NOTE — ED NOTES
Dr Marco Springer and Dr Serena Vaca to bedside - pt reports some pain relief from medications but still adamant that she cannot bear weight or move the LLE against resistance  Per providers ortho to consult       Carmela Felix RN  03/20/21 2326
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
## 2021-03-20 NOTE — ASSESSMENT & PLAN NOTE
Patient presents with ambulatory dysfunction secondary to left knee pain  No signs of infection noted per exam   Left knee x-ray unremarkable  Physical exam shows passive ROM intact although patient has significant pain      Plan:  · Pain control  · PT/OT  · Continue to ambulate and bear weight as tolerated

## 2021-03-20 NOTE — PLAN OF CARE
Problem: PAIN - ADULT  Goal: Verbalizes/displays adequate comfort level or baseline comfort level  Description: Interventions:  - Encourage patient to monitor pain and request assistance  - Assess pain using appropriate pain scale  - Administer analgesics based on type and severity of pain and evaluate response  - Implement non-pharmacological measures as appropriate and evaluate response  - Consider cultural and social influences on pain and pain management  - Notify physician/advanced practitioner if interventions unsuccessful or patient reports new pain  Outcome: Progressing     Problem: INFECTION - ADULT  Goal: Absence or prevention of progression during hospitalization  Description: INTERVENTIONS:  - Assess and monitor for signs and symptoms of infection  - Monitor lab/diagnostic results  - Monitor all insertion sites, i e  indwelling lines, tubes, and drains  - Monitor endotracheal if appropriate and nasal secretions for changes in amount and color  - [place] appropriate cooling/warming therapies per order  - Administer medications as ordered  - Instruct and encourage patient and family to use good hand hygiene technique  - Identify and instruct in appropriate isolation precautions for identified infection/condition  Outcome: Progressing  Goal: Absence of fever/infection during neutropenic period  Description: INTERVENTIONS:  - Monitor WBC    Outcome: Progressing     Problem: SAFETY ADULT  Goal: Patient will remain free of falls  Description: INTERVENTIONS:  - Assess patient frequently for physical needs  -  Identify cognitive and physical deficits and behaviors that affect risk of falls    -  [place] fall precautions as indicated by assessment   - Educate patient/family on patient safety including physical limitations  - Instruct patient to call for assistance with activity based on assessment  - Modify environment to reduce risk of injury  - Consider OT/PT consult to assist with strengthening/mobility  Outcome: Progressing  Goal: Maintain or return to baseline ADL function  Description: INTERVENTIONS:  -  Assess patient's ability to carry out ADLs; assess patient's baseline for ADL function and identify physical deficits which impact ability to perform ADLs (bathing, care of mouth/teeth, toileting, grooming, dressing, etc )  - Assess/evaluate cause of self-care deficits   - Assess range of motion  - Assess patient's mobility; develop plan if impaired  - Assess patient's need for assistive devices and provide as appropriate  - Encourage maximum independence but intervene and supervise when necessary  - Involve family in performance of ADLs  - Assess for home care needs following discharge   - Consider OT consult to assist with ADL evaluation and planning for discharge  - Provide patient education as appropriate  Outcome: Progressing  Goal: Maintain or return mobility status to optimal level  Description: INTERVENTIONS:  - Assess patient's baseline mobility status (ambulation, transfers, stairs, etc )    - Identify cognitive and physical deficits and behaviors that affect mobility  - Identify mobility aids required to assist with transfers and/or ambulation (gait belt, sit-to-stand, lift, walker, cane, etc )  - [place] fall precautions as indicated by assessment  - Record patient progress and toleration of activity level on Mobility SBAR; progress patient to next Phase/Stage  - Instruct patient to call for assistance with activity based on assessment  - Consider rehabilitation consult to assist with strengthening/weightbearing, etc   Outcome: Progressing     Problem: DISCHARGE PLANNING  Goal: Discharge to home or other facility with appropriate resources  Description: INTERVENTIONS:  - Identify barriers to discharge w/patient and caregiver  - Arrange for needed discharge resources and transportation as appropriate  - Identify discharge learning needs (meds, wound care, etc )  - Arrange for interpretive services to assist at discharge as needed  - Refer to Case Management Department for coordinating discharge planning if the patient needs post-hospital services based on physician/advanced practitioner order or complex needs related to functional status, cognitive ability, or social support system  Outcome: Progressing     Problem: Knowledge Deficit  Goal: Patient/family/caregiver demonstrates understanding of disease process, treatment plan, medications, and discharge instructions  Description: Complete learning assessment and assess knowledge base    Interventions:  - Provide teaching at level of understanding  - Provide teaching via preferred learning methods  Outcome: Progressing     Problem: PAIN - ADULT  Goal: Verbalizes/displays adequate comfort level or baseline comfort level  Description: Interventions:  - Encourage patient to monitor pain and request assistance  - Assess pain using appropriate pain scale  - Administer analgesics based on type and severity of pain and evaluate response  - Implement non-pharmacological measures as appropriate and evaluate response  - Consider cultural and social influences on pain and pain management  - Notify physician/advanced practitioner if interventions unsuccessful or patient reports new pain  Outcome: Progressing     Problem: INFECTION - ADULT  Goal: Absence or prevention of progression during hospitalization  Description: INTERVENTIONS:  - Assess and monitor for signs and symptoms of infection  - Monitor lab/diagnostic results  - Monitor all insertion sites, i e  indwelling lines, tubes, and drains  - Monitor endotracheal if appropriate and nasal secretions for changes in amount and color  - [place] appropriate cooling/warming therapies per order  - Administer medications as ordered  - Instruct and encourage patient and family to use good hand hygiene technique  - Identify and instruct in appropriate isolation precautions for identified infection/condition  Outcome: Progressing  Goal: Absence of fever/infection during neutropenic period  Description: INTERVENTIONS:  - Monitor WBC    Outcome: Progressing     Problem: SAFETY ADULT  Goal: Patient will remain free of falls  Description: INTERVENTIONS:  - Assess patient frequently for physical needs  -  Identify cognitive and physical deficits and behaviors that affect risk of falls    -  [place] fall precautions as indicated by assessment   - Educate patient/family on patient safety including physical limitations  - Instruct patient to call for assistance with activity based on assessment  - Modify environment to reduce risk of injury  - Consider OT/PT consult to assist with strengthening/mobility  Outcome: Progressing  Goal: Maintain or return to baseline ADL function  Description: INTERVENTIONS:  -  Assess patient's ability to carry out ADLs; assess patient's baseline for ADL function and identify physical deficits which impact ability to perform ADLs (bathing, care of mouth/teeth, toileting, grooming, dressing, etc )  - Assess/evaluate cause of self-care deficits   - Assess range of motion  - Assess patient's mobility; develop plan if impaired  - Assess patient's need for assistive devices and provide as appropriate  - Encourage maximum independence but intervene and supervise when necessary  - Involve family in performance of ADLs  - Assess for home care needs following discharge   - Consider OT consult to assist with ADL evaluation and planning for discharge  - Provide patient education as appropriate  Outcome: Progressing  Goal: Maintain or return mobility status to optimal level  Description: INTERVENTIONS:  - Assess patient's baseline mobility status (ambulation, transfers, stairs, etc )    - Identify cognitive and physical deficits and behaviors that affect mobility  - Identify mobility aids required to assist with transfers and/or ambulation (gait belt, sit-to-stand, lift, walker, cane, etc )  - [place] fall precautions as indicated by assessment  - Record patient progress and toleration of activity level on Mobility SBAR; progress patient to next Phase/Stage  - Instruct patient to call for assistance with activity based on assessment  - Consider rehabilitation consult to assist with strengthening/weightbearing, etc   Outcome: Progressing     Problem: DISCHARGE PLANNING  Goal: Discharge to home or other facility with appropriate resources  Description: INTERVENTIONS:  - Identify barriers to discharge w/patient and caregiver  - Arrange for needed discharge resources and transportation as appropriate  - Identify discharge learning needs (meds, wound care, etc )  - Arrange for interpretive services to assist at discharge as needed  - Refer to Case Management Department for coordinating discharge planning if the patient needs post-hospital services based on physician/advanced practitioner order or complex needs related to functional status, cognitive ability, or social support system  Outcome: Progressing     Problem: Knowledge Deficit  Goal: Patient/family/caregiver demonstrates understanding of disease process, treatment plan, medications, and discharge instructions  Description: Complete learning assessment and assess knowledge base    Interventions:  - Provide teaching at level of understanding  - Provide teaching via preferred learning methods  Outcome: Progressing

## 2021-03-20 NOTE — PROGRESS NOTES
INTERNAL MEDICINE RESIDENCY SENIOR ADMISSION NOTE     Name: Bob Garza   Age & Sex: 63 y o  female   MRN: [id number]  Unit/Bed#: [facility] 315-01   Encounter: [id number]  Primary Care Provider: Ramirez Coa MD    Admit to team: SOD Team A    Patient seen and examined  Reviewed H&P per Dr Deng Mcgraw   Agree with the assessment and plan with any exception/addition as noted below: Will admit patient for PT/OT evaluation and further workup left knee pain as patient is unable to ambulate  Pending results of aspiration, if able to obtain  Pain control     Ortho consulted, appreciate recommendations  Likely D/c in AM    Principal Problem:    Acute pain of left knee  Active Problems:    Osteoarthritis of knee    Depression    Anxiety    GERD (gastroesophageal reflux disease)      Code Status: Level 1 - Full Code  Admission Status: OBSERVATION  Disposition: Patient requires Med/Surg  Expected Length of Stay: 1 night or more
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
## 2021-03-21 VITALS
DIASTOLIC BLOOD PRESSURE: 68 MMHG | OXYGEN SATURATION: 98 % | RESPIRATION RATE: 18 BRPM | TEMPERATURE: 98.4 F | SYSTOLIC BLOOD PRESSURE: 134 MMHG | HEIGHT: 61 IN | BODY MASS INDEX: 29.27 KG/M2 | WEIGHT: 155 LBS | HEART RATE: 87 BPM

## 2021-03-21 LAB
ANION GAP SERPL CALCULATED.3IONS-SCNC: 3 MMOL/L (ref 4–13)
BASOPHILS # BLD AUTO: 0.04 THOUSANDS/ΜL (ref 0–0.1)
BASOPHILS NFR BLD AUTO: 1 % (ref 0–1)
BUN SERPL-MCNC: 13 MG/DL (ref 5–25)
CALCIUM SERPL-MCNC: 8.9 MG/DL (ref 8.3–10.1)
CHLORIDE SERPL-SCNC: 111 MMOL/L (ref 100–108)
CO2 SERPL-SCNC: 28 MMOL/L (ref 21–32)
CREAT SERPL-MCNC: 0.99 MG/DL (ref 0.6–1.3)
CRP SERPL QL: 4.7 MG/L
EOSINOPHIL # BLD AUTO: 0.13 THOUSAND/ΜL (ref 0–0.61)
EOSINOPHIL NFR BLD AUTO: 3 % (ref 0–6)
ERYTHROCYTE [DISTWIDTH] IN BLOOD BY AUTOMATED COUNT: 13 % (ref 11.6–15.1)
GFR SERPL CREATININE-BSD FRML MDRD: 62 ML/MIN/1.73SQ M
GLUCOSE P FAST SERPL-MCNC: 104 MG/DL (ref 65–99)
GLUCOSE SERPL-MCNC: 104 MG/DL (ref 65–140)
HCT VFR BLD AUTO: 39.9 % (ref 34.8–46.1)
HGB BLD-MCNC: 12.6 G/DL (ref 11.5–15.4)
IMM GRANULOCYTES # BLD AUTO: 0.01 THOUSAND/UL (ref 0–0.2)
IMM GRANULOCYTES NFR BLD AUTO: 0 % (ref 0–2)
LYMPHOCYTES # BLD AUTO: 1.85 THOUSANDS/ΜL (ref 0.6–4.47)
LYMPHOCYTES NFR BLD AUTO: 38 % (ref 14–44)
MCH RBC QN AUTO: 28.6 PG (ref 26.8–34.3)
MCHC RBC AUTO-ENTMCNC: 31.6 G/DL (ref 31.4–37.4)
MCV RBC AUTO: 91 FL (ref 82–98)
MONOCYTES # BLD AUTO: 0.5 THOUSAND/ΜL (ref 0.17–1.22)
MONOCYTES NFR BLD AUTO: 10 % (ref 4–12)
NEUTROPHILS # BLD AUTO: 2.37 THOUSANDS/ΜL (ref 1.85–7.62)
NEUTS SEG NFR BLD AUTO: 48 % (ref 43–75)
NRBC BLD AUTO-RTO: 0 /100 WBCS
PLATELET # BLD AUTO: 309 THOUSANDS/UL (ref 149–390)
PMV BLD AUTO: 9.4 FL (ref 8.9–12.7)
POTASSIUM SERPL-SCNC: 4.3 MMOL/L (ref 3.5–5.3)
RBC # BLD AUTO: 4.41 MILLION/UL (ref 3.81–5.12)
SODIUM SERPL-SCNC: 142 MMOL/L (ref 136–145)
WBC # BLD AUTO: 4.9 THOUSAND/UL (ref 4.31–10.16)

## 2021-03-21 PROCEDURE — 99244 OFF/OP CNSLTJ NEW/EST MOD 40: CPT | Performed by: ORTHOPAEDIC SURGERY

## 2021-03-21 PROCEDURE — 86140 C-REACTIVE PROTEIN: CPT | Performed by: INTERNAL MEDICINE

## 2021-03-21 PROCEDURE — 99219 PR INITIAL OBSERVATION CARE/DAY 50 MINUTES: CPT | Performed by: INTERNAL MEDICINE

## 2021-03-21 PROCEDURE — NC001 PR NO CHARGE: Performed by: ORTHOPAEDIC SURGERY

## 2021-03-21 PROCEDURE — NC001 PR NO CHARGE: Performed by: INTERNAL MEDICINE

## 2021-03-21 PROCEDURE — 80048 BASIC METABOLIC PNL TOTAL CA: CPT | Performed by: INTERNAL MEDICINE

## 2021-03-21 PROCEDURE — 97163 PT EVAL HIGH COMPLEX 45 MIN: CPT

## 2021-03-21 PROCEDURE — 85025 COMPLETE CBC W/AUTO DIFF WBC: CPT | Performed by: INTERNAL MEDICINE

## 2021-03-21 PROCEDURE — 20610 DRAIN/INJ JOINT/BURSA W/O US: CPT | Performed by: PHYSICIAN ASSISTANT

## 2021-03-21 PROCEDURE — 77002 NEEDLE LOCALIZATION BY XRAY: CPT

## 2021-03-21 PROCEDURE — 97166 OT EVAL MOD COMPLEX 45 MIN: CPT

## 2021-03-21 RX ORDER — IBUPROFEN 600 MG/1
600 TABLET ORAL EVERY 6 HOURS PRN
Qty: 30 TABLET | Refills: 0 | Status: SHIPPED | OUTPATIENT
Start: 2021-03-21 | End: 2022-03-04

## 2021-03-21 RX ADMIN — HEPARIN SODIUM 5000 UNITS: 5000 INJECTION INTRAVENOUS; SUBCUTANEOUS at 05:17

## 2021-03-21 RX ADMIN — IBUPROFEN 600 MG: 600 TABLET, FILM COATED ORAL at 03:55

## 2021-03-21 RX ADMIN — HEPARIN SODIUM 5000 UNITS: 5000 INJECTION INTRAVENOUS; SUBCUTANEOUS at 13:31

## 2021-03-21 RX ADMIN — PANTOPRAZOLE SODIUM 40 MG: 40 TABLET, DELAYED RELEASE ORAL at 05:17

## 2021-03-21 RX ADMIN — DULOXETINE 30 MG: 30 CAPSULE, DELAYED RELEASE ORAL at 08:38

## 2021-03-21 RX ADMIN — CYANOCOBALAMIN TAB 500 MCG 1000 MCG: 500 TAB at 08:38

## 2021-03-21 NOTE — DISCHARGE INSTRUCTIONS
Discharge Instructions - Orthopedics  [unfilled]    Weight Bearing Status:                                           As tolerated    Pain:  Continue Tylenol and Motrin as required    Appt Instructions:    Follow up with family doctor and orthopedics as needed

## 2021-03-21 NOTE — OCCUPATIONAL THERAPY NOTE
Occupational Therapy Evaluation     Patient Name: Chava Santana  PXZIZ'A Date: 3/21/2021  Problem List  Principal Problem:    Acute pain of left knee  Active Problems:    Osteoarthritis of knee    Depression    Anxiety    GERD (gastroesophageal reflux disease)    Ambulatory dysfunction    Past Medical History  Past Medical History:   Diagnosis Date    Abnormal mammogram     RESOLVED: [id number]    Anxiety     Anxiety     Arthritis     Depression     Depression     Diverticulosis     GERD (gastroesophageal reflux disease)     Helicobacter pylori infection     Hyperlipidemia     Seborrheic keratosis     LAST ASSESSED: [id number]    Sleep apnea     Sleep apnea     Tinea pedis of left foot 9/16/2019     Past Surgical History  Past Surgical History:   Procedure Laterality Date    ABDOMINOPLASTY      abdomin    COLONOSCOPY      DE COLONOSCOPY FLX DX W/COLLJ SPEC WHEN PFRMD N/A 8/11/2016    Procedure: COLONOSCOPY;  Surgeon: Priscilla Rios MD;  Location:  GI LAB; Service: Gastroenterology    DE COLONOSCOPY FLX DX W/COLLJ [facility] WHEN PFRMD N/A 8/29/2017    Procedure: EGD AND COLONOSCOPY;  Surgeon: Priscilla Rios MD;  Location: [facility] GI LAB; Service: Gastroenterology    TUBAL LIGATION      TUBAL LIGATION      UPPER GASTROINTESTINAL ENDOSCOPY               03/21/21 1211   OT Last Visit   OT Visit Date 03/21/21   Note Type   Note type Evaluation   Restrictions/Precautions   Weight Bearing Precautions Per Order Yes   LLE Weight Bearing Per Order WBAT   Other Precautions Fall Risk   Pain Assessment   Pain Assessment Tool 0-10   Pain Score No Pain   Home Living   Type of [address] [place] [address] Two level;Stairs to enter with rails; Able to live on main level with bedroom/bathroom   Additional Comments Pt reports living in a 2 story home with 5 CAMILLE and 20 steps to reach bedroom  Pt reports she can stay on the first floor if needed      Prior Function   Level of [place] Independent with ADLs and functional mobility   Lives With Alone   Receives Help From Family   ADL Assistance Independent   IADLs Independent   Lifestyle   Autonomy Pt reports being I with ADLS, IADLS and mobility without device PTA  Reciprocal Relationships Pt lives alone but reports having local friends/ family who are able to stay with her and assist upon d/c    Intrinsic Gratification Active PTA   ADL   Where Assessed Edge of bed   Eating Deficit Setup   Grooming Assistance 5  Supervision/Setup   UB Bathing Assistance 5  Supervision/Setup   LB Bathing Assistance 5  Supervision/Setup   UB Dressing Assistance 5  Supervision/Setup   LB Dressing Assistance 5  Supervision/Setup   Toileting Assistance  5  Supervision/Setup   Bed Mobility   Supine to Sit 6  Modified independent   Transfers   Sit to Stand 5  Supervision   Stand to Sit 5  Supervision   Functional Mobility   Functional Mobility 5  Supervision   Additional Comments Pt demonstrated household mobility with RW and no rest breaks  Additional items Rolling walker   Balance   Static Sitting Good   Dynamic Sitting Fair +   Static Standing Fair   Dynamic Standing Fair   Ambulatory Fair -   Activity Tolerance   Activity Tolerance Patient tolerated treatment well   Medical Staff Made Aware PT [place]   Nurse Made Aware RN confirmed okay to see pt and updated after   RUE Assessment   RUE Assessment WFL   LUE Assessment   LUE Assessment WFL   Cognition   Overall Cognitive Status WFL   Arousal/Participation Alert; Cooperative   Attention Within functional limits   Orientation Level Oriented X4   Memory Within functional limits   Following Commands Follows all commands and directions without difficulty   Comments Pt is very pleasant and cooperative  Pt is mainly [place] speaking but is able to communicate through simple english and  belem  Assessment   Assessment Pt is a 63 y o  female admitted to B on 3/20/2021 w/ acute pain of left knee   Comorbidities include a h/o Abnormal mammogram, Anxiety, Anxiety, Arthritis, Depression, Diverticulosis, GERD (gastroesophageal reflux disease), Helicobacter pylori infection, Hyperlipidemia, Seborrheic keratosis, Sleep apnea, Sleep apnea, and Tinea pedis of left foot (9/16/2019)  Pt with active OT orders and activity as tolerated orders  Pt is WBAT on L LE  Pt resides in a 2 story home with 5 CAMILLE and 20 steps to reach bedroom  Pt lives alone but reports having local friends/family who would be able to stay with her and assist as needed upon d/c Pt was I w/  ADLS and IADLS, & required no use of DME PTA  Currently pt is supervision for functional transfers and functional mobility, and supervision for ADLS overall  Based on the aforementioned OT evaluation, functional performance deficits, and assessments, pt has been identified as a moderate complexity evaluation  From OT standpoint, anticipate d/c home with increased family support      Goals   Patient Goals To return home    Recommendation   OT Discharge Recommendation Return to previous environment with social support  (with increased social support)   OT - OK to Discharge Yes  (When medically appropriate)   AM-PAC Daily Activity Inpatient   Lower Body Dressing 4   Bathing 4   Toileting 4   Upper Body Dressing 4   Grooming 4   Eating 4   Daily Activity Raw Score 24   Daily Activity Standardized Score (Calc for Raw Score >=11) 57 54   AM-PAC Applied Cognition Inpatient   Following a Speech/Presentation 4   Understanding Ordinary Conversation 4   Taking Medications 4   Remembering Where Things Are Placed or Put Away 4   Remembering List of 4-5 Errands 4   Taking Care of Complicated Tasks 4   Applied Cognition Raw Score 24   Applied Cognition Standardized Score 62 21   Modified Wesley Scale   Modified Wesley Scale 3       Glendy Vivas, BINH, OTR/L

## 2021-03-21 NOTE — PLAN OF CARE
Problem: PAIN - ADULT  Goal: Verbalizes/displays adequate comfort level or baseline comfort level  Description: Interventions:  - Encourage patient to monitor pain and request assistance  - Assess pain using appropriate pain scale  - Administer analgesics based on type and severity of pain and evaluate response  - Implement non-pharmacological measures as appropriate and evaluate response  - Consider cultural and social influences on pain and pain management  - Notify physician/advanced practitioner if interventions unsuccessful or patient reports new pain  Outcome: Progressing     Problem: INFECTION - ADULT  Goal: Absence or prevention of progression during hospitalization  Description: INTERVENTIONS:  - Assess and monitor for signs and symptoms of infection  - Monitor lab/diagnostic results  - Monitor all insertion sites, i e  indwelling lines, tubes, and drains  - Monitor endotracheal if appropriate and nasal secretions for changes in amount and color  - [place] appropriate cooling/warming therapies per order  - Administer medications as ordered  - Instruct and encourage patient and family to use good hand hygiene technique  - Identify and instruct in appropriate isolation precautions for identified infection/condition  Outcome: Progressing  Goal: Absence of fever/infection during neutropenic period  Description: INTERVENTIONS:  - Monitor WBC    Outcome: Progressing     Problem: SAFETY ADULT  Goal: Patient will remain free of falls  Description: INTERVENTIONS:  - Assess patient frequently for physical needs  -  Identify cognitive and physical deficits and behaviors that affect risk of falls    -  [place] fall precautions as indicated by assessment   - Educate patient/family on patient safety including physical limitations  - Instruct patient to call for assistance with activity based on assessment  - Modify environment to reduce risk of injury  - Consider OT/PT consult to assist with strengthening/mobility  Outcome: Progressing  Goal: Maintain or return to baseline ADL function  Description: INTERVENTIONS:  -  Assess patient's ability to carry out ADLs; assess patient's baseline for ADL function and identify physical deficits which impact ability to perform ADLs (bathing, care of mouth/teeth, toileting, grooming, dressing, etc )  - Assess/evaluate cause of self-care deficits   - Assess range of motion  - Assess patient's mobility; develop plan if impaired  - Assess patient's need for assistive devices and provide as appropriate  - Encourage maximum independence but intervene and supervise when necessary  - Involve family in performance of ADLs  - Assess for home care needs following discharge   - Consider OT consult to assist with ADL evaluation and planning for discharge  - Provide patient education as appropriate  Outcome: Progressing  Goal: Maintain or return mobility status to optimal level  Description: INTERVENTIONS:  - Assess patient's baseline mobility status (ambulation, transfers, stairs, etc )    - Identify cognitive and physical deficits and behaviors that affect mobility  - Identify mobility aids required to assist with transfers and/or ambulation (gait belt, sit-to-stand, lift, walker, cane, etc )  - [place] fall precautions as indicated by assessment  - Record patient progress and toleration of activity level on Mobility SBAR; progress patient to next Phase/Stage  - Instruct patient to call for assistance with activity based on assessment  - Consider rehabilitation consult to assist with strengthening/weightbearing, etc   Outcome: Progressing     Problem: DISCHARGE PLANNING  Goal: Discharge to home or other facility with appropriate resources  Description: INTERVENTIONS:  - Identify barriers to discharge w/patient and caregiver  - Arrange for needed discharge resources and transportation as appropriate  - Identify discharge learning needs (meds, wound care, etc )  - Arrange for interpretive services to assist at discharge as needed  - Refer to Case Management Department for coordinating discharge planning if the patient needs post-hospital services based on physician/advanced practitioner order or complex needs related to functional status, cognitive ability, or social support system  Outcome: Progressing     Problem: Knowledge Deficit  Goal: Patient/family/caregiver demonstrates understanding of disease process, treatment plan, medications, and discharge instructions  Description: Complete learning assessment and assess knowledge base  Interventions:  - Provide teaching at level of understanding  - Provide teaching via preferred learning methods  Outcome: Progressing     Problem: Prexisting or High Potential for Compromised Skin Integrity  Goal: Skin integrity is maintained or improved  Description: INTERVENTIONS:  - Identify patients at risk for skin breakdown  - Assess and monitor skin integrity  - Assess and monitor nutrition and hydration status  - Monitor labs   - Assess for incontinence   - Turn and reposition patient  - Assist with mobility/ambulation  - Relieve pressure over bony prominences  - Avoid friction and shearing  - Provide appropriate hygiene as needed including keeping skin clean and dry  - Evaluate need for skin moisturizer/barrier cream  - Collaborate with interdisciplinary team   - Patient/family teaching  - Consider wound care consult   Outcome: Progressing     Problem: Potential for Falls  Goal: Patient will remain free of falls  Description: INTERVENTIONS:  - Assess patient frequently for physical needs  -  Identify cognitive and physical deficits and behaviors that affect risk of falls    -  [place] fall precautions as indicated by assessment   - Educate patient/family on patient safety including physical limitations  - Instruct patient to call for assistance with activity based on assessment  - Modify environment to reduce risk of injury  - Consider OT/PT consult to assist with strengthening/mobility  Outcome: Progressing

## 2021-03-21 NOTE — PHYSICAL THERAPY NOTE
Physical Therapy Evaluation    Patient's Name: Sandy Courser    Admitting Diagnosis  Knee pain [M25 569]  Left knee pain [M25 562]  Ambulatory dysfunction [R26 2]    Problem List  Patient Active Problem List   Diagnosis    Adenomatous colon polyp    Abdominal pain, epigastric    Class 1 obesity due to excess calories in adult    ALEC (obstructive sleep apnea)    Osteoarthritis of knee    Seborrheic keratosis    History of Helicobacter pylori infection    Gastritis    Depression    Headache    Obesity (BMI 30-39  9)    Change in bowel habit    Diet-controlled diabetes mellitus ([address] )    Nasal congestion    Carpal tunnel syndrome of right wrist    Tinea pedis of left foot    Dermatitis    Diverticulosis of colon    Blister of foot with infection, initial encounter    Lipoma    Acute diverticulitis    Acute pain of left knee    Anxiety    GERD (gastroesophageal reflux disease)    Ambulatory dysfunction       Past Medical History  Past Medical History:   Diagnosis Date    Abnormal mammogram     RESOLVED: [id number]    Anxiety     Anxiety     Arthritis     Depression     Depression     Diverticulosis     GERD (gastroesophageal reflux disease)     Helicobacter pylori infection     Hyperlipidemia     Seborrheic keratosis     LAST ASSESSED: [id number]    Sleep apnea     Sleep apnea     Tinea pedis of left foot 9/16/2019       Past Surgical History  Past Surgical History:   Procedure Laterality Date    ABDOMINOPLASTY      abdomin    COLONOSCOPY      [place] COLONOSCOPY FLX DX W/COLLJ SPEC WHEN PFRMD N/A 8/11/2016    Procedure: COLONOSCOPY;  Surgeon: Willem Harmon MD;  Location:  GI LAB; Service: Gastroenterology    [place] COLONOSCOPY FLX DX W/COLLJ [facility] WHEN PFRMD N/A 8/29/2017    Procedure: EGD AND COLONOSCOPY;  Surgeon: Willem Harmon MD;  Location: [facility] GI LAB;   Service: Gastroenterology    TUBAL LIGATION      TUBAL LIGATION      UPPER GASTROINTESTINAL ENDOSCOPY          03/21/21 1210   PT Last Visit   PT Visit Date 03/21/21   Pain Assessment   Pain Assessment Tool Pain Assessment not indicated - pt denies pain   Home Living   Type of [address] Two level;Stairs to enter with rails   Additional Comments Pt reports living in a [facility] with 5 CAMILLE and 20 steps to 2nd floor  Prior Function   Level of [place] Independent with ADLs and functional mobility   Lives With Alone   Receives Help From Family   ADL Assistance Independent   IADLs Independent   Comments Pt lives alone but reports she has a local friend that can assist if needed  Restrictions/Precautions   Weight Bearing Precautions Per Order Yes   LLE Weight Bearing Per Order WBAT   Other Precautions Fall Risk;Pain;Bed Alarm  (Northern Irish speaking)   General   Family/Caregiver Present No   Cognition   Overall Cognitive Status WFL   Attention Within functional limits   Orientation Level Oriented X4   Memory Within functional limits   Following Commands Follows all commands and directions without difficulty   Comments Pt primarily [place] speaking, but able to communicate using translate belem on iphone  RLE Assessment   RLE Assessment WFL   LLE Assessment   LLE Assessment WFL   Bed Mobility   Supine to Sit 6  Modified independent   Additional Comments Pt ended session sitting EOB eating lunch with bed alarm on and RN  updated  Transfers   Sit to Stand 5  Supervision   Additional items Assist x 1; Increased time required;Verbal cues   Stand to Sit 5  Supervision   Additional items Assist x 1; Increased time required;Verbal cues   Additional Comments Transfers with RW  VCs for proper hand placement  Ambulation/Elevation   Gait pattern Ant[place]; Excessively slow; Step to   Gait Assistance 5  Supervision  (CGA)   Additional items Assist x 1   Assistive Device Rolling walker   Distance 80ft + stair simulation using step stool x10 with HHA   Stair Management Assistance 5  Supervision  (CGA)   Additional items Assist x 1;Verbal cues   Stair Management Technique Step to pattern   Number of Stairs 10   Balance   Static Sitting Good   Dynamic Sitting Fair +   Static Standing Fair   Dynamic Standing Fair -   Ambulatory Fair -   Activity Tolerance   Activity Tolerance Patient tolerated treatment well   Medical Staff Made Aware OT [address] ok to see per RN   Assessment   Prognosis Good   Problem List Pain   Assessment Pt is a 63 y o  female seen for PT evaluation s/p admit to [facility] on 3/20/2021  Pt was admitted with a primary dx of: acute pain of L knee  "Left knee x-ray showed chondrocalcinosis but no other acute abnormality "  PT now consulted for assessment of mobility and d/c needs  Pt with Activity as tolerated, Up and OOB as tolerated , PT evaluation orders  Pt with WBAT orders for LLE  Pts current comorbidities effecting treatment include: OA of knee, depression, anxiety, GERD, ambulatory dysfunction  Pts current clinical presentation is Unstable/ Unpredictable (high complexity) due to Ongoing medical management for primary dx, Increased reliance on more restrictive AD compared to baseline, Decreased activity tolerance compared to baseline, Fall risk, Increased assistance needed from caregiver at current time  Pt primarily [address] speaking, but able to use translate belem on iphone to communicate   Prior to admission, pt was independent with ADLs and ambulating w/o AD  Pt lives alone in a [facility] with 5 CAMILLE and 20 steps to bedroom  Upon evaluation, pt currently is mod I for bed mobility; supervision for transfers; supervisoin for ambulation 80 ft w/ RW; CGA with HHA for stair simulation x10  Pt presents at PT eval functioning near baseline mobility level, limited mostly by pain  No further acute PT needs, D/C PT   Pt would benefit from continued ambulation with staff while here in the hospital  At conclusion of PT session pt returned BTB, bed alarm engaged and RN notified of session findings/recommendations with phone and call bell within reach  Pt denies any further questions at this time  The patient's AM-PAC Basic Mobility Inpatient Short Form Raw Score is 23, Standardized Score is 50  88  A standardized score greater than 42 9 suggests the patient may benefit from discharge to home  Please also refer to the recommendation of the Physical Therapist for safe discharge planning  Recommend home with increased social support and OPPT upon hospital D/C  Goals   Patient Goals to get better   Plan   PT Frequency One time visit  (D/C PT)   Recommendation   PT Discharge Recommendation Return to previous environment with social support;Home with skilled therapy  (OPPT)   Equipment Recommended [place] walker   Change/add to [place]?  No   PT - OK to Discharge Yes   AM-PAC Basic Mobility Inpatient   Turning in Bed Without Bedrails 4   Lying on Back to Sitting on Edge of Flat Bed 4   Moving Bed to Chair 3   Standing Up From Chair 4   Walk in Room 4   Climb 3-5 Stairs 4   Basic Mobility Inpatient Raw Score 23   Basic Mobility Standardized Score 50 88       Marisela Trinidad, PT, DPT
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
## 2021-03-21 NOTE — SOCIAL WORK
Care Coordination with Dr Chuy Mendes, per physician pt is medically clear for d/c home today, and she needs a rolling walker  CM made several attempts to contact pt via phone  CM processed walker via [place] and bought rw to pts room, and pt had discharge  CM called patient and spoke with her granddaughter, who stated they don't need the rolling walker  ADRIENNE canceled rolling walker order in [place] and replaced rolling walker to supply cabinet

## 2021-03-21 NOTE — PROGRESS NOTES
Progress Note - Orthopedics   Baldemar Reynold 63 y o  female MRN: [id number]  Unit/Bed#: [facility] 315-01      Subjective:    63 y  o female with left knee pain  No acute events, pain is controlled and relieved with NSAIDs   Denies fevers chills, CP, SOB    Labs:  0   Lab Value Date/Time    HCT 39 9 03/21/2021 0406    HCT 40 5 03/20/2021 1032    HCT 34 4 (L) 11/11/2020 0516    HCT 38 2 10/09/2015 0839    HCT 36 9 04/13/2015 1418    HCT 39 6 11/13/2014 0835    HGB 12 6 03/21/2021 0406    HGB 12 7 03/20/2021 1032    HGB 10 6 (L) 11/11/2020 0516    HGB 12 4 10/09/2015 0839    HGB 12 0 04/13/2015 1418    HGB 12 8 11/13/2014 0835    INR 0 99 04/13/2015 1418    WBC 4 90 03/21/2021 0406    WBC 5 25 03/20/2021 1032    WBC 7 56 11/11/2020 0516    WBC 4 87 10/09/2015 0839    WBC 5 41 04/13/2015 1418    WBC 4 93 11/13/2014 0835    ESR 38 (H) 03/20/2021 1033    CRP 4 7 (H) 03/21/2021 0406       Meds:    Current Facility-Administered Medications:     acetaminophen (TYLENOL) tablet 650 mg, 650 mg, Oral, Q6H PRN, Fermin Mar, DO    clonazePAM (KlonoPIN) tablet 1 mg, 1 mg, Oral, HS PRN, Martinez Bowels, DO    cyanocobalamin (VITAMIN B-12) tablet 1,000 mcg, 1,000 mcg, Oral, Daily, Martinez Bowels, DO    DULoxetine (CYMBALTA) delayed release capsule 30 mg, 30 mg, Oral, Daily, Martinez Bowels, DO    heparin (porcine) subcutaneous injection 5,000 Units, 5,000 Units, Subcutaneous, Q8H [facility], 5,000 Units at 03/21/21 0517 **AND** [CANCELED] Platelet count, , , Once, Martinez Bowels, DO    hydrOXYzine HCL (ATARAX) tablet 25 mg, 25 mg, Oral, Q6H PRN, Martinez Bowels, DO    ibuprofen (MOTRIN) tablet 600 mg, 600 mg, Oral, Q6H PRN, Fermin Mar, DO, 600 mg at 03/21/21 0355    lidocaine (PF) (XYLOCAINE-MPF) 1 % injection 30 mL, 30 mL, Infiltration, Once, Kayla Olson MD    pantoprazole (PROTONIX) EC tablet 40 mg, 40 mg, Oral, Early Morning, Martinez Bowels, DO, 40 mg at 03/21/21 0517    Blood Culture:   No results found for: BLOODCX    Wound Culture:   No results found for: WOUNDCULT    Ins and Outs:  I/O last 24 hours: In: -   Out: 500 [Urine:500]          Physical:  Vitals:    03/20/21 2300   BP: 122/56   Pulse: 66   Resp: 18   Temp: 98 2 °F (36 8 °C)   SpO2: 98%     Musculoskeletal: left Lower Extremity  · Skin intact, no erythema, no effusion, no warmth to touch  · Nontender to palpation   · Passive range of motion intact to knee flexion and extension without mechanical block  · Unable to tolerate active range of motion of the knee secondary to pain  · SILT   · Motor intact +fhl/ehl, +ankle dorsi/plantar flexion   · 2+ DP pulse    Assessment:    63 y  o female with left knee pain that is atraumatic and workup has revealed no acute injuries  Plan is for corticosteroid injection this morning for pain relief  No further orthopedic intervention at this time  Recommend medical management and primary care physician follow-up for ongoing left knee pain      Plan:  · WBAT LLE   · CSI left knee today   · Pain control per primary   · Follow up PCP for continued knee pain once discharged   · Dispo: Ortho signing off    Daylene Brittle, MD

## 2021-03-21 NOTE — PROCEDURES
Procedure- Orthopedics   Suri Oliver 63 y o  female MRN: [id number]  Unit/Bed#: [facility] 315-01    Procedure: left knee and injection    After sterile preparation of the skin overlying the knee local anesthetic was provided with ethyl chloride spray  A 23 gauge needle was then  inserted via a superior lateral portal   A mixture of 2cc 1% lidocaine, 2cc 0 25% Marcaine, 2cc Betamethasone was injected into the knee joint  Sterile dressing was then applied  Pt tolerated the procedure well and was neurovascularly intact both pre and post procedure      Raquel Madrid PA-C

## 2021-03-22 ENCOUNTER — TRANSITIONAL CARE MANAGEMENT (OUTPATIENT)
Dept: INTERNAL MEDICINE CLINIC | Facility: CLINIC | Age: 61
End: 2021-03-22

## 2021-03-25 ENCOUNTER — OFFICE VISIT (OUTPATIENT)
Dept: INTERNAL MEDICINE CLINIC | Facility: CLINIC | Age: 61
End: 2021-03-25

## 2021-03-25 VITALS
SYSTOLIC BLOOD PRESSURE: 121 MMHG | DIASTOLIC BLOOD PRESSURE: 84 MMHG | WEIGHT: 155 LBS | TEMPERATURE: 97.9 F | HEART RATE: 81 BPM | BODY MASS INDEX: 29.29 KG/M2

## 2021-03-25 DIAGNOSIS — M17.12 PRIMARY OSTEOARTHRITIS OF LEFT KNEE: Primary | ICD-10-CM

## 2021-03-25 PROCEDURE — 99496 TRANSJ CARE MGMT HIGH F2F 7D: CPT | Performed by: INTERNAL MEDICINE

## 2021-03-25 NOTE — PROGRESS NOTES
[address]  INTERNAL MEDICINE  [address], [facility]      NAME: Yung Ferreira  AGE: 63 y o  SEX: female    DATE OF ENCOUNTER: 3/25/2021    ASSESSMENT AND PLAN     1  Primary osteoarthritis of left knee  Patient was admitted for ambulatory dysfunction   On tylenol and voltaren gel  Advised patient to limit ibuprofen to only when pain is very severe   Fill follow-up with ortho  Doing well without use of assistive device, using a knee brace     No orders of the defined types were placed in this encounter  CHIEF COMPLAINT     No chief complaint on file  HISTORY OF PRESENT ILLNESS     43-year-old female with past history of arthritis, anxiety, depression, GERD who presented to [facility] ED with 1 day history of left knee pain  She was admitted for ambulatory dysfunction  PT/OT evaluated her and suggested home PT  She got an intra-articular steroid injection in her left knee  Today she reports feeling well and her pain is better after the intra-articular injection  She is not using the walker that was prescribed in the hospital  She says the pain is a 5/10 currently  She is using tylenol, voltaren gel and as needed ibuprofen for pain  The following portions of the patient's history were reviewed and updated as appropriate: allergies, current medications, past family history, past medical history, past social history, past surgical history and problem list     REVIEW OF SYSTEMS     Review of Systems   Musculoskeletal: Positive for arthritis  All other systems reviewed and are negative          All other ROS negative except per HPI    ACTIVE PROBLEM LIST     Patient Active Problem List   Diagnosis    Adenomatous colon polyp    Abdominal pain, epigastric    Class 1 obesity due to excess calories in adult    ALEC (obstructive sleep apnea)    Osteoarthritis of knee    Seborrheic keratosis    History of Helicobacter pylori infection    Gastritis    Depression    Headache    Obesity (BMI 30-39  9)    Change in bowel habit    Diet-controlled diabetes mellitus (HCC)    Nasal congestion    Carpal tunnel syndrome of right wrist    Tinea pedis of left foot    Dermatitis    Diverticulosis of colon    Blister of foot with infection, initial encounter    Lipoma    Acute diverticulitis    Acute pain of left knee    Anxiety    GERD (gastroesophageal reflux disease)    Ambulatory dysfunction       Past Medical History:   Diagnosis Date    Abnormal mammogram     RESOLVED: [id number]    Anxiety     Anxiety     Arthritis     Depression     Depression     Diverticulosis     GERD (gastroesophageal reflux disease)     Helicobacter pylori infection     Hyperlipidemia     Seborrheic keratosis     LAST ASSESSED: [id number]    Sleep apnea     Sleep apnea     Tinea pedis of left foot 9/16/2019       CURRENT MEDICATIONS       Current Outpatient Medications:     acetaminophen (TYLENOL) 500 mg tablet, Take 1 tablet (500 mg total) by mouth daily as needed for headaches, Disp: 30 tablet, Rfl: 3    ARIPiprazole (ABILIFY) 5 mg tablet, Take 5 mg by mouth daily, Disp: , Rfl:     Cholecalciferol (VITAMIN D3) 125 MCG (5000 UT) CAPS, Take by mouth, Disp: , Rfl:     clonazePAM (KlonoPIN) 1 mg tablet, Take 1 mg by mouth daily at bedtime as needed for anxiety    , Disp: , Rfl:     Diclofenac Sodium (VOLTAREN) 1 %, Apply 2 g topically 4 (four) times a day, Disp: 100 g, Rfl: 0    DULoxetine (CYMBALTA) 30 mg delayed release capsule, Take 30 mg by mouth daily, Disp: , Rfl:     hydrOXYzine HCL (ATARAX) 25 mg tablet, Take 25 mg by mouth every 6 (six) hours as needed for itching, Disp: , Rfl:     ibuprofen (MOTRIN) 600 mg tablet, Take 1 tablet (600 mg total) by mouth every 6 (six) hours as needed for moderate pain, Disp: 30 tablet, Rfl: 0    Methylcobalamin (B-12) 5000 MCG TBDP, Take by mouth, Disp: , Rfl:     omeprazole (PriLOSEC) 20 mg delayed release capsule, Take 1 capsule (20 mg total) by mouth daily, Disp: 90 capsule, Rfl: 3    No Known Allergies    Social History   Past Surgical History:   Procedure Laterality Date    ABDOMINOPLASTY      abdomin    COLONOSCOPY      ME COLONOSCOPY FLX DX W/COLLJ SPEC WHEN PFRMD N/A 2016    Procedure: COLONOSCOPY;  Surgeon: Marnie Calzada MD;  Location:  GI LAB; Service: Gastroenterology    ME COLONOSCOPY FLX DX W/COLLJ [facility] WHEN PFRMD N/A 2017    Procedure: EGD AND COLONOSCOPY;  Surgeon: Marnie Calzada MD;  Location: [facility] GI LAB; Service: Gastroenterology   [address] [place] [address] TUBAL LIGATION      TUBAL LIGATION      UPPER GASTROINTESTINAL ENDOSCOPY       Family History   Problem Relation Age of Onset    Diabetes Mother     Hypertension Mother     Heart disease Mother     Diabetes Paternal Grandfather     Alzheimer's disease Father     No Known Problems Sister     No Known Problems Brother     Depression Daughter     ADD / ADHD Son     Depression Son     Diabetes Maternal Grandmother     Stomach cancer Maternal Grandfather     No Known Problems Paternal Grandmother     No Known Problems Sister     Heart disease Sister     Intellectual disability Brother     Schizophrenia Brother     Other Son          as an infant    [address] [place] [address] Other Daughter          at 2-4 mths old   [address] [place] [address] Other Son          as an infant        OBJECTIVE     LMP  (LMP Unknown)     Physical Exam:   GENERAL: NAD  HEENT:  NC/AT, PERRL, EOMI, MMM, no scleral icterus  CARDIAC:  RRR, +S1/S2, no S3/S4 heard, no m/g/r  PULMONARY:  CTA B/L, no wheezing/rales/rhonci, non-labored breathing  ABDOMEN:  Soft, NT/ND, +BS, no rebound/guarding/rigidity  Extremities:  2+ Pulses in DP/PT  No edema, cyanosis, or clubbing, left knee brace present   NEUROLOGIC:  Alert/oriented x3   No motor or sensory deficits  SKIN:  No rashes or erythema    Pertinent Laboratory/Diagnostic Studies:     Xr Knee 4+ Vw Left Injury    Result Date: 3/20/2021  Impression: No acute osseous abnormality  Chondrocalcinosis   Workstation performed: [id number]       Images and diagnostics reviewed     Yomi [address] Maintenance   Topic Date Due    COVID-19 Vaccine (1) Never done    Depression Remission PHQ  03/11/2020    MAMMOGRAM  03/18/2020    Diabetic Foot Exam  09/16/2020    Cervical Cancer Screening  01/14/2021    DM Eye Exam  03/16/2021    URINE MICROALBUMIN  03/16/2021    BMI: Followup Plan  03/19/2021    Annual Physical  03/19/2021    HEMOGLOBIN A1C  03/25/2021    BMI: Adult  03/20/2022    Colonoscopy Surveillance  08/29/2022    DTaP,Tdap,and Td Vaccines (2 - Td) 02/12/2025    Colorectal Cancer Screening  08/29/2027    HIV Screening  Completed    Hepatitis C Screening  Completed    Pneumococcal Vaccine: Pediatrics (0 to 5 Years) and At-Risk Patients (6 to 59 Years)  Completed    Influenza Vaccine  Completed    HIB Vaccine  Aged Out    Hepatitis B Vaccine  Aged Out    IPV Vaccine  Aged Out    Hepatitis A Vaccine  Aged Out    Meningococcal ACWY Vaccine  Aged Out    HPV Vaccine  Aged Out     Immunization History   Administered Date(s) Administered    Influenza Quadrivalent Preservative Free 3 years and older IM 11/14/2017    Influenza, recombinant, quadrivalent,injectable, preservative free 09/11/2018, 09/24/2020    Influenza, seasonal, injectable 08/12/2014    Pneumococcal Polysaccharide PPV23 09/11/2018    Tdap 02/12/2015         Danny Daigle MD  PGY-3  Internal Medicine

## 2021-03-29 ENCOUNTER — IMMUNIZATIONS (OUTPATIENT)
Dept: FAMILY MEDICINE CLINIC | Facility: HOSPITAL | Age: 61
End: 2021-03-29

## 2021-03-29 DIAGNOSIS — Z23 ENCOUNTER FOR IMMUNIZATION: Primary | ICD-10-CM

## 2021-03-29 PROCEDURE — 0001A SARS-COV-2 / COVID-19 MRNA VACCINE (PFIZER-BIONTECH) 30 MCG: CPT

## 2021-03-29 PROCEDURE — 91300 SARS-COV-2 / COVID-19 MRNA VACCINE (PFIZER-BIONTECH) 30 MCG: CPT

## 2021-04-05 NOTE — PROGRESS NOTES
1  Arthritis of left knee     2  Chondrocalcinosis due to dicalcium phosphate crystals, of the knee, left     3  Acute pain of left knee  Ambulatory referral to Sports Medicine     No orders of the defined types were placed in this encounter  Imaging Studies (I personally reviewed images in PACS and report):    XR left knee 03/20/21:  Evidence of chondrocacinosis of left medial meniscus  Minimal degenerative changes  ASSESSMENT/PLAN:  Lengthy discussion with Adelaida Lester today via Telugu interpretor  We reviewed clinical and radiographic findings today  I suspect her recent pseudogout attack in combination of mild knee OA is what was responsible for her symptoms  Today she is remission s/p intraarticular CS injection while inpatient  She will follow-up in a few months  If symptoms return may consider repeat injection at that time  Home exercises prescribed  Return in about 3 months (around 7/6/2021) for Left knee CPPD (pseudogout) recheck  Repeat XR Next Visit: No     _____________________________________________________________  CHIEF COMPLAINT:  New patient left knee pain    HPI:  Adryan Mendoza is a 63 y o  female with a history of GERD, depression, anxiety who presents as a new patient for acute atraumatic left knee pain  The patient was hospitalized on 03/20/2021 for gait dysfunction secondary to acute onset severe knee pain  Was diagnosed with chondrocalcinosis and osteoarthritis of the joint  Today presents for initial PCSM evaluation of this issue  Visit 4/6/2021 :  Today, presents with acute atraumatic left knee pain  She denies any inciting injury  Localizes the pain to the medial aspect of the knee  The pain started two weeks ago  Since the injection of steroid she has had significant improvement and only feels it when she walks  Denies any tick bites, rashes, red hot swollen joints, discharge  The knee does "crack" but denies any locking or instability         Review of Systems Constitutional: Negative for chills, fever and unexpected weight change  HENT: Negative for hearing loss, nosebleeds and sore throat  Eyes: Negative for pain, redness and visual disturbance  Respiratory: Negative for cough, shortness of breath and wheezing  Cardiovascular: Negative for chest pain, palpitations and leg swelling  Gastrointestinal: Negative for abdominal pain, nausea and vomiting  Endocrine: Negative for polydipsia and polyuria  Genitourinary: Negative for dysuria and hematuria  Skin: Negative for rash and wound  Neurological: Negative for dizziness, numbness and headaches  Psychiatric/Behavioral: Negative for decreased concentration, dysphoric mood and suicidal ideas  The patient is not nervous/anxious  Following history reviewed and update:    Past Medical History:   Diagnosis Date    Abnormal mammogram     RESOLVED: [id number]    Anxiety     Anxiety     Arthritis     Depression     Depression     Diverticulosis     GERD (gastroesophageal reflux disease)     Helicobacter pylori infection     Hyperlipidemia     Seborrheic keratosis     LAST ASSESSED: [id number]    Sleep apnea     Sleep apnea     Tinea pedis of left foot 9/16/2019     Past Surgical History:   Procedure Laterality Date    ABDOMINOPLASTY      abdomin    COLONOSCOPY      CA COLONOSCOPY FLX DX W/COLLJ SPEC WHEN PFRMD N/A 8/11/2016    Procedure: COLONOSCOPY;  Surgeon: Griselda Nieves MD;  Location:  GI LAB; Service: Gastroenterology    CA COLONOSCOPY FLX DX W/COLLJ [address] WHEN PFRMD N/A 8/29/2017    Procedure: EGD AND COLONOSCOPY;  Surgeon: Griselda Nieves MD;  Location: [facility] GI [facility];   Service: Gastroenterology    TUBAL LIGATION      TUBAL LIGATION      UPPER GASTROINTESTINAL ENDOSCOPY       Social History   Social History     Substance and Sexual Activity   Alcohol Use No    Frequency: Never    Binge frequency: Never     Social History     Substance and Sexual Activity   Drug Use No Social History     Tobacco Use   Smoking Status Never Smoker   Smokeless Tobacco Never Used     Family History   Problem Relation Age of Onset    Diabetes Mother     Hypertension Mother     Heart disease Mother     Diabetes Paternal Grandfather     Alzheimer's disease Father     No Known Problems Sister     No Known Problems Brother     Depression Daughter     ADD / ADHD Son     Depression Son     Diabetes Maternal Grandmother     Stomach cancer Maternal Grandfather     No Known Problems Paternal Grandmother     No Known Problems Sister     Heart disease Sister     Intellectual disability Brother     Schizophrenia Brother     Other Son          as an infant    Bell Other Daughter          at 2-4 mths old   Bell Other Son          as an infant      No Known Allergies       Physical Exam  /85 (BP Location: Left arm, Patient Position: Sitting, Cuff Size: Standard)   Pulse 71   Ht 5' 2" (1 575 m)   Wt 70 kg (154 lb 6 4 oz)   LMP  (LMP Unknown)   BMI 28 24 kg/m²     Constitutional:  see vital signs  Gen: well-developed, normocephalic/atraumatic, well-groomed  Eyes: No inflammation or discharge of conjunctiva or lids; sclera clear   Pharynx: no inflammation, lesion, or mass of lips  Neck: supple, no masses, non-distended  MSK: no inflammation, lesion, mass, or clubbing of nails and digits except for other than mentioned below  SKIN: no visible rashes or skin lesions  Pulmonary/Chest: Effort normal  No respiratory distress     NEURO: cranial nerves grossly intact  PSYCH:  Alert and oriented to person, place, and time; recent and remote memory intact; mood normal, no depression, anxiety, or agitation, judgment and insight good and intact     Ortho Exam  LEFT KNEE:  Erythema: no  Swelling: no  Increased Warmth: no  Tenderness: very mild TTP over lateral joint line  Flexion: intact  Extension: intact  Patellar Displacement: negative   Patellar Tilt: negative   Patellar Apprehension: negative  Patellar Grind Swanson's: negative  Lachman's: negative  Drawer: negative  Varus laxity: negative  Valgus laxity: negative  Luisana: negative       Portions of the record may have been created with voice recognition software  Occasional wrong word or "sound alike" substitutions may have occurred due to the inherent limitations of voice recognition software  Please review the chart carefully and recognize, using context, where substitutions/typographical errors may have occurred

## 2021-04-06 ENCOUNTER — OFFICE VISIT (OUTPATIENT)
Dept: OBGYN CLINIC | Facility: OTHER | Age: 61
End: 2021-04-06
Payer: COMMERCIAL

## 2021-04-06 VITALS
WEIGHT: 154.4 LBS | HEART RATE: 71 BPM | HEIGHT: 62 IN | DIASTOLIC BLOOD PRESSURE: 85 MMHG | SYSTOLIC BLOOD PRESSURE: 147 MMHG | BODY MASS INDEX: 28.41 KG/M2

## 2021-04-06 DIAGNOSIS — M17.12 ARTHRITIS OF LEFT KNEE: Primary | ICD-10-CM

## 2021-04-06 DIAGNOSIS — M11.262 CHONDROCALCINOSIS DUE TO DICALCIUM PHOSPHATE CRYSTALS, OF THE KNEE, LEFT: ICD-10-CM

## 2021-04-06 DIAGNOSIS — M25.562 ACUTE PAIN OF LEFT KNEE: ICD-10-CM

## 2021-04-06 PROCEDURE — 1111F DSCHRG MED/CURRENT MED MERGE: CPT | Performed by: ORTHOPAEDIC SURGERY

## 2021-04-06 PROCEDURE — 3008F BODY MASS INDEX DOCD: CPT | Performed by: ORTHOPAEDIC SURGERY

## 2021-04-06 PROCEDURE — 1036F TOBACCO NON-USER: CPT | Performed by: ORTHOPAEDIC SURGERY

## 2021-04-06 PROCEDURE — 99213 OFFICE O/P EST LOW 20 MIN: CPT | Performed by: ORTHOPAEDIC SURGERY

## 2021-04-06 NOTE — PATIENT INSTRUCTIONS
Ejercicios para la rodilla   CUIDADO AMBULATORIO:   Lo que necesita saber acerca de los ejercicios para la rodilla: Los ejercicios para la rodilla ayudan a fortalecer los músculos alrededor de cain rodilla  Unos músculos jose antonio pueden ayudar a reducir el dolor y disminuir el riesgo de sufrir mango lesión en el futuro  Los ejercicios para la rodilla también pueden ayudarlo a recuperarse después de [place] Dory Mallorie  · Empiece despacio  Estos son Hilary Organ básicos  Pregúntele a cain médico si usted necesita acudir con un fisioterapeuta para que le indique ejercicios más avanzado  A medida que se sienta más montez, es posible que pueda realizar más series de cada ejercicio o añadir pesas  · Deténgase si siente dolor  Es normal que sienta cierta molestia al principio  Practicar los ejercicios con regularidad ayudará a disminuir cain incomodidad con el paso del Syd  · Realice los [address] keli  Asha esto para [address] se mantengan jose antonio  · Entre en calor antes de hacer los ejercicios para la rodilla  Use mango bicicleta estacionaria o camine cecilia 5 a 10 minutos para que alice músculos entren en calor  Cómo realizar estiramientos de la rodilla de forma carpenter: Siempre ejecute los ejercicios de calentamiento antes de hacer cualquier ejercicio de acondicionamiento  Asha estos ejercicios de estiramiento mango vez más después de hacer los ejercicios de fortalecimiento  Realice estos ejercicios de estiramiento entre 4 o 5 días a la semana o según se lo indicaron  · [employer], calentamiento para la pantorrilla: De frente a mango pared, coloque ambas judy abiertas contra la pared, o agárrese del espaldar de mango silla para mantener el equilibrio  Mantenga las rodillas ligeramente dobladas  Dé un gran paso para atrás con mango pierna  Deje cain otra pierna directamente debajo de gianfranco Luue y presione con alice caderas hacia adelante   Sostenga el estiramiento por 30 segundos  Cambie de pierna  Repita romi ejercicio 2 o 3 veces con cada pierna  · Estiramiento de los cuádriceps de pie: [employer], coloque mango mano contra mango pared, o agárrese del espaldar de mango silla para mantener el equilibrio  Cargue el peso de cain cuerpo en mango pierna, flexione la rodilla de la otra pierna y tómese del tobillo  Acerque el tobillo a alice nalgas  Sostenga el estiramiento de 30 a 60 segundos  Cambie de pierna  Repita romi ejercicio 2 o 3 veces con cada pierna  · Sentado, estiramiento del tendón de la corva, parte posterior del muslo: Siéntese en mango superficie plana en el piso con las dos piernas enfrente de usted  No flexione alice dedos de los pies ni los ponga en puntas  Coloque las baron de alice judy en el piso y deslice alice judy hacia adelante hasta que usted sienta mango resistencia o un estiramiento leve  Debe mantener la espalda derecha  Sostenga el estiramiento por 30 segundos  Repita 2 o 3 veces  Cómo realizar ejercicios de fortalecimiento de la rodilla de manera carpenter: Realice estos ejercicios 4 o 5 días a la semana o según le indicaron  · Medias sentadillas de pie: Párese con los pies a la distancia de alice hombros  Lleve cain espalda contra mango pared o agárrese del espaldar de mango silla para mantener el equilibrio, si es necesario  [address] y baje unas 10 pulgadas lentamente, rafael si fuera a sentarse en mango silla  El [place] de cain cuerpo debería encontrarse mayormente sobre alice talones  Sostenga las sentadillas por 5 segundos, luego regresa a la posición inicial  Realice 3 series de 10 sentadillas para fortalecer los glúteos y los muslos  · De pie flexión de los músculos isquiotibiales: De frente a mango pared, coloque ambas judy abiertas contra la pared, o agárrese del espaldar de mango silla para mantener el equilibrio  Cargue el peso de cain cuerpo en mango pierna, levante otra pierna y lleve cain talón tan cerca de los glúteos rafael pueda   Sostenga por 5 segundos y baje cain pierna  Realice 2 series de 10 flexiones con cada pierna  Marlene ejercicio fortalece el músculo de la parte posterior de cain muslo  · De pie levantamiento de las pantorillas o gémelos: De frente a mango pared, coloque ambas judy abiertas contra la pared, o agárrese del espaldar de mango silla para mantener el equilibrio  Póngase de pie derecho, y no se alecia hacia adelante  Coloque todo cain peso sobre mango clinton pierna levantando el otro pie del suelo  Levante el talón del pie que está en el piso tan alto rafael pueda y Kjeremybenhavn K  Realice 2 series de 10 levantamientos de pantorilla con cada pierna para fortalecer los músculos de la pantorilla  · Enderezar la pierna y levantarla: Acuéstese boca abajo con las piernas estiradas  Cruce alice brazos enfrente de usted y descanse la frente sobre alice brazos cruzados  Apriete el músculo de cain pierna y levántela tanto rafael pueda  Sostenga por 5 segundos, y luego baje cain pierna  Realice 2 series de 10 levantamientos con cada pierna para fortalecer alice glúteos  · Sentado, levantamiento de pierna: Siéntese en mango silla  Despacio enderece y levante mango pierna  Contraiga el músculo de cain muslo y sostenga por 5 segundos  Relaje y regrese cain pie al piso  Realice 2 series de 10 levantamientos con cada pierna  [place] le ayuda a fortalecer el músculo anterior de cain muslo  Comuníquese con cain médico si:  · Usted tiene un nuevo dolor o el dolor VERONIQUE  · Usted tiene preguntas o inquietudes acerca de cain condición o cuidado  © Copyright [address] Information is for End User's use only and may not be sold, redistributed or otherwise used for commercial purposes  All illustrations and images included in CareNotes® are the copyrighted property of [employer]  or [address] [place] [address] es sólo para uso en educación  Cain intención no es darle un consejo médico sobre enfermedades o tratamientos   Colsulte con cain Kristyn Christiana o farmacéutico antes de seguir cualquier régimen médico para saber si es seguro y efectivo para usted  [place]   LO QUE NECESITA SABER:   La seudogota es un tipo de artritis  También se denomina enfermedad por depósitos de dihidrato de pirofosfato cálcico  La seudogota provoca la formación de walter de calcio en el cartílago y la acumulación de líquido denominado fluido sinovial que se encuentra alrededor de las articulaciones  Los walter provocan daños al cartílago y pueden causar inflamación que produce el dolor en las articulaciones  Gurdon se conoce rafael un episodio  La seudogota por lo general afecta las articulaciones grandes, rafael la rodilla  INSTRUCCIONES SOBRE EL [facility]EL HOSPITALARIA:   Regrese a la rocio de emergencias si:   · Usted tiene dolor articular intenso intolerable  · Usted tiene fiebre o enrojecimiento que se propaga más allá del área de la articulación  Pregúntele a cain Terrilyn Greenfield vitaminas y minerales son adecuados para usted  · Usted tiene nuevos síntomas, rafael salpullido, después de [employer]  · Usted tiene preguntas o inquietudes acerca de cain condición o cuidado  Medicamentos:   · AINEs (Analgésicos antiinflamatorios no esteroides) rafael el ibuprofeno, ayudan a disminuir la inflamación, el dolor y la fiebre  Romi medicamento esta disponible con o sin mango receta médica  Los AINEs pueden causar sangrado estomacal o problemas renales en ciertas personas  Si usted cruz un medicamento anticoagulante, siempre pregúntele a cain médico si los WILI son seguros para usted  Siempre mary la etiqueta de romi medicamento y [place] instrucciones  · Esteroides  reducen la inflamación y pueden ayudarlo con la rigidez y el dolor en alice articulaciones cecilia los ataques de [place]  · El medicamento para la gota  disminuye el dolor e inflamación en las articulaciones  También se puede administrar para prevenir nuevos ataques de gota       · [place] alice medicamentos rafael se le haya indicado  Consulte con cain médico si usted tristin que cain medicamento no le está ayudando o si presenta efectos secundarios  Infórmele si es alérgico a cualquier medicamento  Mantenga mango lista actualizada de los [place], las vitaminas y los productos herbales que cruz  Incluya los siguientes datos de los medicamentos: cantidad, frecuencia y motivo de administración  Traiga con usted la lista o los envases de la píldoras a alice citas de seguimiento  Lleve la lista de los medicamentos con usted en bravo de mango emergencia  Programe mango linda con cain médico o reumatólogo rafael se le indique:  Anote alice preguntas para que se acuerde de hacerlas cecilia alice visitas  Cómo sobrellevar la seudogota:   · Descanse cain articulación adolorida para que pueda recuperarse  Cain médico podría recomendarle que use muletas o un caminador si la articulación afectada está en mango pierna  · Aplique hielo a cain articulación  El hielo disminuye el dolor y la inflamación  Use un paquete de hielo o ponga hielo molido dentro de [employer]  Cubra la compresa de hielo o bolsa con mango toalla y póngasela en la articulación adolorida de 15 a 20 minutos [address] indicaciones  · Eleve cain articulación  [place] le ayudará a disminuir la inflamación y el dolor  Eleve cain articulación por encima del nivel del corazón con la frecuencia que pueda  [place] cain articulación adolorida sobre almohadas para mantenerla cómodamente por encima del nivel del corazón  · Asista a terapia física u ocupacional rafael se le indique  Un fisioterapeuta le puede enseñar ejercicios para mejorar la flexibilidad y el rango de [place]  También le pueden mostrar ejercicios que no implican soporte de peso y que son adecuados para las articulaciones rafael la natación  El ejercicio puede ayudarle con la flexibilidad de las articulaciones y a reducir el dolor   Un terapeuta ocupacional puede ayudarle para que aprenda a hacer alice actividades cotidianas cuando alice articulaciones estén rígidas o adoloridas  · [address] se le haya indicado  Los líquidos rafael el agua ayuda a prevenir más acumulación de calcio en las articulaciones  Pregunte cuánto líquido debe keyshawn cada día y cuáles líquidos son los más adecuados para usted  © Copyright [employer] [facility] Platial Information is for End User's use only and may not be sold, redistributed or otherwise used for commercial purposes  All illustrations and images included in CareNotes® are the copyrighted property of [employer]  or [address] [place] [address] es sólo para uso en educación  Cain intención no es darle un consejo médico sobre enfermedades o tratamientos  Colsulte con cain Kaur Zuleta farmacéutico antes de seguir cualquier régimen médico para saber si es seguro y efectivo para usted

## 2021-04-19 ENCOUNTER — IMMUNIZATIONS (OUTPATIENT)
Dept: FAMILY MEDICINE CLINIC | Facility: HOSPITAL | Age: 61
End: 2021-04-19

## 2021-04-19 DIAGNOSIS — Z23 ENCOUNTER FOR IMMUNIZATION: Primary | ICD-10-CM

## 2021-04-19 PROCEDURE — 0002A SARS-COV-2 / COVID-19 MRNA VACCINE (PFIZER-BIONTECH) 30 MCG: CPT

## 2021-04-19 PROCEDURE — 91300 SARS-COV-2 / COVID-19 MRNA VACCINE (PFIZER-BIONTECH) 30 MCG: CPT

## 2021-04-30 ENCOUNTER — HOSPITAL ENCOUNTER (EMERGENCY)
Facility: HOSPITAL | Age: 61
Discharge: HOME | End: 2021-04-30
Attending: EMERGENCY MEDICINE | Admitting: EMERGENCY MEDICINE
Payer: COMMERCIAL

## 2021-04-30 VITALS
BODY MASS INDEX: 28.35 KG/M2 | DIASTOLIC BLOOD PRESSURE: 79 MMHG | RESPIRATION RATE: 18 BRPM | TEMPERATURE: 97.9 F | OXYGEN SATURATION: 100 % | HEART RATE: 68 BPM | SYSTOLIC BLOOD PRESSURE: 123 MMHG | WEIGHT: 155 LBS

## 2021-04-30 DIAGNOSIS — Z20.822 ENCOUNTER FOR LABORATORY TESTING FOR COVID-19 VIRUS: Primary | ICD-10-CM

## 2021-04-30 LAB — SARS-COV-2 RNA RESP QL NAA+PROBE: NEGATIVE

## 2021-04-30 PROCEDURE — 99282 EMERGENCY DEPT VISIT SF MDM: CPT | Performed by: EMERGENCY MEDICINE

## 2021-04-30 PROCEDURE — 99283 EMERGENCY DEPT VISIT LOW MDM: CPT

## 2021-04-30 PROCEDURE — U0005 INFEC AGEN DETEC AMPLI PROBE: HCPCS | Performed by: EMERGENCY MEDICINE

## 2021-04-30 PROCEDURE — U0003 INFECTIOUS AGENT DETECTION BY NUCLEIC ACID (DNA OR RNA); SEVERE ACUTE RESPIRATORY SYNDROME CORONAVIRUS 2 (SARS-COV-2) (CORONAVIRUS DISEASE [COVID-19]), AMPLIFIED PROBE TECHNIQUE, MAKING USE OF HIGH THROUGHPUT TECHNOLOGIES AS DESCRIBED BY CMS-2020-01-R: HCPCS | Performed by: EMERGENCY MEDICINE

## 2021-04-30 NOTE — DISCHARGE INSTRUCTIONS
Someone will call you with the results of your COVID test      Please continue to self-isolate until you receive the results  ------    Alguien lo llamará con los resultados de cain prueba de COVID  Continúe aislándose hasta que Celio Núñez

## 2021-04-30 NOTE — ED ATTENDING ATTESTATION
4/30/2021  Fay Box DO, saw and evaluated the patient  I have discussed the patient with the resident/non-physician practitioner and agree with the resident's/non-physician practitioner's findings, Plan of Care, and MDM as documented in the resident's/non-physician practitioner's note, except where noted  All available labs and Radiology studies were reviewed  I was present for key portions of any procedure(s) performed by the resident/non-physician practitioner and I was immediately available to provide assistance  At this point I agree with the current assessment done in the Emergency Department  I have conducted an independent evaluation of this patient a history and physical is as follows:    Patient presents for testing for COVID after being exposed to someone who was positive  She has no COVID related complaints except mild headache which was treated with Tylenol and ibuprofen  She did receive both doses of the Pfizer vaccine though not more than 2 weeks ago  No recent travel  ROS: No associated fever, LH/dizziness, headache, CP, SOB, n/v/d, myalgias/arthralgias  Denies complaint  PE: NAD, appears comfortable, alert; PERRL, EOMI; MMM, no posterior oropharyngeal exudate, edema or erythema; HRR, no murmur; lungs CTA w/o w/r/r, POx 100% on RA (nl); (-) LE edema, FROM extremities x4; skin p/w/d  DDx:  Possible COVID exposure without clinical evidence of COVID infection  A/P: Will send outpatient COVID test and recommend follow-up with PCP      ED Course         Critical Care Time  Procedures

## 2021-04-30 NOTE — ED PROVIDER NOTES
History  Chief Complaint   Patient presents with    Headache     Pt reports at neighbors found out they tested positive for Covid now presents with headache since yesterday  Pt denies CP/SOB/nausea/vomitting/diarrhea  Recieved second covid vaccine on 4/19     Sherial Members is a 59-year-old woman with no significant medical history presenting for COVID-19 testing  She believes that her neighbor exposed to COVID-19  He has no symptoms at this time  She has no symptoms at this time  She reports no cough, rhinorrhea, congestion, fevers, chills, chest pain  She reports mild headache at this time, is on the worst headache of her life and was not sudden in onset  She took Tylenol and ibuprofen and that has improved her headache  That is not her primary complaint today  She received 2 doses of the Pfizer vaccine, the 2nd dose was less than 14 days ago  Prior to Admission Medications   Prescriptions Last Dose Informant Patient Reported? Taking? ARIPiprazole (ABILIFY) 5 mg tablet  Self Yes No   Sig: Take 5 mg by mouth daily   Cholecalciferol (VITAMIN D3) 125 MCG (5000 UT) CAPS  Self Yes No   Sig: Take by mouth   DULoxetine (CYMBALTA) 30 mg delayed release capsule  Self Yes No   Sig: Take 30 mg by mouth daily   Diclofenac Sodium (VOLTAREN) 1 %   No No   Sig: Apply 2 g topically 4 (four) times a day   Methylcobalamin (B-12) 5000 MCG TBDP  Self Yes No   Sig: Take by mouth   acetaminophen (TYLENOL) 500 mg tablet  Self No No   Sig: Take 1 tablet (500 mg total) by mouth daily as needed for headaches   clonazePAM (KlonoPIN) 1 mg tablet  Self Yes No   Sig: Take 1 mg by mouth daily at bedtime as needed for anxiety       hydrOXYzine HCL (ATARAX) 25 mg tablet  Self Yes No   Sig: Take 25 mg by mouth every 6 (six) hours as needed for itching   ibuprofen (MOTRIN) 600 mg tablet   No No   Sig: Take 1 tablet (600 mg total) by mouth every 6 (six) hours as needed for moderate pain   Patient not taking: Reported on 2021   omeprazole (PriLOSEC) 20 mg delayed release capsule   No No   Sig: Take 1 capsule (20 mg total) by mouth daily      Facility-Administered Medications: None       Past Medical History:   Diagnosis Date    Abnormal mammogram     RESOLVED: [id number]    Anxiety     Anxiety     Arthritis     Depression     Depression     Diverticulosis     GERD (gastroesophageal reflux disease)     Helicobacter pylori infection     Hyperlipidemia     Seborrheic keratosis     LAST ASSESSED: [id number]    Sleep apnea     Sleep apnea     Tinea pedis of left foot 2019       Past Surgical History:   Procedure Laterality Date    ABDOMINOPLASTY      abdomin    COLONOSCOPY      TN COLONOSCOPY FLX DX W/COLLJ SPEC WHEN PFRMD N/A 2016    Procedure: COLONOSCOPY;  Surgeon: Frankie Wu MD;  Location:  GI LAB; Service: Gastroenterology    TN COLONOSCOPY FLX DX W/COLLJ [facility] WHEN PFRMD N/A 2017    Procedure: EGD AND COLONOSCOPY;  Surgeon: Frankie Wu MD;  Location: [facility] GI LAB; Service: Gastroenterology    TUBAL LIGATION      TUBAL LIGATION      UPPER GASTROINTESTINAL ENDOSCOPY         Family History   Problem Relation Age of Onset    Diabetes Mother     Hypertension Mother     Heart disease Mother     Diabetes Paternal Grandfather     Alzheimer's disease Father     No Known Problems Sister     No Known Problems Brother     Depression Daughter     ADD / ADHD Son     Depression Son     Diabetes Maternal Grandmother     Stomach cancer Maternal Grandfather     No Known Problems Paternal Grandmother     No Known Problems Sister     Heart disease Sister     Intellectual disability Brother     Schizophrenia Brother     Other Son          as an infant    Leta Nine Other Daughter          at 2-4 mths old   Leta Nine Other Son          as an infant      I have reviewed and agree with the history as documented      E-Cigarette/Vaping    E-Cigarette Use Never User      E-Cigarette/Vaping Substances    Nicotine No     THC No     CBD No     Flavoring No     Other No     Unknown No      Social History     Tobacco Use    Smoking status: Never Smoker    Smokeless tobacco: Never Used   Substance Use Topics    Alcohol use: No     Frequency: Never     Binge frequency: Never    Drug use: No        Review of Systems   Constitutional: Negative for chills, fatigue and fever  HENT: Negative for congestion, rhinorrhea, sinus pain and sore throat  Respiratory: Negative for shortness of breath  Cardiovascular: Negative for chest pain  Gastrointestinal: Negative for abdominal pain, nausea and vomiting  Neurological: Positive for headaches  All other systems reviewed and are negative  Physical Exam  ED Triage Vitals [04/30/21 1312]   Temperature Pulse Respirations Blood Pressure SpO2   97 9 °F (36 6 °C) 68 18 123/79 100 %      Temp Source Heart Rate Source Patient Position - Orthostatic VS BP Location FiO2 (%)   Oral Monitor Sitting Left arm --      Pain Score       3             Orthostatic Vital Signs  Vitals:    04/30/21 1312   BP: 123/79   Pulse: 68   Patient Position - Orthostatic VS: Sitting       Physical Exam  Vitals signs reviewed  Constitutional:       General: She is not in acute distress  Appearance: She is not ill-appearing or toxic-appearing  HENT:      Head: Normocephalic and atraumatic  Right Ear: External ear normal       Left Ear: External ear normal       Nose: Nose normal       Mouth/Throat:      Mouth: Mucous membranes are moist    Eyes:      Extraocular Movements: Extraocular movements intact  Conjunctiva/sclera: Conjunctivae normal       Pupils: Pupils are equal, round, and reactive to light  Cardiovascular:      Rate and Rhythm: Normal rate and regular rhythm  Pulses: Normal pulses  Heart sounds: Normal heart sounds  No murmur  Pulmonary:      Effort: Pulmonary effort is normal  No respiratory distress        Breath sounds: Normal breath sounds  Abdominal:      General: There is no distension  Musculoskeletal:         General: No deformity  Skin:     General: Skin is warm and dry  Capillary Refill: Capillary refill takes less than 2 seconds  Coloration: Skin is not jaundiced or pale  Neurological:      Mental Status: She is alert  Comments: CN2-12 intact  +5 strength and normal sensation in upper and lower extremities  Normal gait  ED Medications  Medications - No data to display    Diagnostic Studies  Results Reviewed     Procedure Component Value Units Date/Time    Novel Coronavirus (Covid-19),PCR [facility] - 24 Hour Routine [[id number]] Collected: 04/30/21 1427    Lab Status: In process Specimen: Nares from Nasopharyngeal Swab Updated: 04/30/21 1432                 No orders to display         Procedures  Procedures      ED Course                             SBIRT 22yo+      Most Recent Value   SBIRT (25 yo +)   In order to provide better care to our patients, we are screening all of our patients for alcohol and drug use  Would it be okay to ask you these screening questions? Unable to answer at this time Filed at: 04/30/2021 1354                MDM  Number of Diagnoses or Management Options  Encounter for laboratory testing for COVID-19 virus:   Diagnosis management comments: 76-year-old woman presenting for COVID-19 testing  No concern for other acute illness at this time  Have a very low suspicion that she has an active infection  Will offer send out testing, with call back results  Recommended continue isolating until she received the results  Patient discharged        Disposition  Final diagnoses:   Encounter for laboratory testing for COVID-19 virus     Time reflects when diagnosis was documented in both MDM as applicable and the Disposition within this note     Time User Action Codes Description Comment    4/30/2021  2:23 PM Beka Berger Add [[id number] [id number]] Encounter for laboratory testing for COVID-19 virus       ED Disposition     ED Disposition Condition Date/Time Comment    Discharge Stable Fri Apr 30, 2021  2:18 PM Grace Clay discharge to home/self care  Follow-up Information    None         Discharge Medication List as of 4/30/2021  2:18 PM      CONTINUE these medications which have NOT CHANGED    Details   acetaminophen (TYLENOL) 500 mg tablet Take 1 tablet (500 mg total) by mouth daily as needed for headaches, Starting Wed 1/30/2019, Normal      ARIPiprazole (ABILIFY) 5 mg tablet Take 5 mg by mouth daily, Historical Med      Cholecalciferol (VITAMIN D3) 125 MCG (5000 UT) CAPS Take by mouth, Historical Med      clonazePAM (KlonoPIN) 1 mg tablet Take 1 mg by mouth daily at bedtime as needed for anxiety  , Historical Med      Diclofenac Sodium (VOLTAREN) 1 % Apply 2 g topically 4 (four) times a day, Starting Sun 3/21/2021, Normal      DULoxetine (CYMBALTA) 30 mg delayed release capsule Take 30 mg by mouth daily, Historical Med      hydrOXYzine HCL (ATARAX) 25 mg tablet Take 25 mg by mouth every 6 (six) hours as needed for itching, Historical Med      ibuprofen (MOTRIN) 600 mg tablet Take 1 tablet (600 mg total) by mouth every 6 (six) hours as needed for moderate pain, Starting Sun 3/21/2021, Normal      Methylcobalamin (B-12) 5000 MCG TBDP Take by mouth, Historical Med      omeprazole (PriLOSEC) 20 mg delayed release capsule Take 1 capsule (20 mg total) by mouth daily, Starting Wed 6/3/2020, Normal           No discharge procedures on file  PDMP Review     None           ED Provider  Attending physically available and evaluated Grace Clay  I managed the patient along with the ED Attending      Electronically Signed by         Herber Mcclendon MD  04/30/21 0998

## 2021-05-22 DIAGNOSIS — K21.9 GASTROESOPHAGEAL REFLUX DISEASE: ICD-10-CM

## 2021-05-31 RX ORDER — OMEPRAZOLE 20 MG/1
CAPSULE, DELAYED RELEASE ORAL
Qty: 90 CAPSULE | Refills: 3 | Status: SHIPPED | OUTPATIENT
Start: 2021-05-31 | End: 2022-06-16

## 2021-07-06 ENCOUNTER — OFFICE VISIT (OUTPATIENT)
Dept: OBGYN CLINIC | Facility: OTHER | Age: 61
End: 2021-07-06
Payer: COMMERCIAL

## 2021-07-06 VITALS
WEIGHT: 154.98 LBS | SYSTOLIC BLOOD PRESSURE: 114 MMHG | HEART RATE: 73 BPM | HEIGHT: 62 IN | BODY MASS INDEX: 28.52 KG/M2 | DIASTOLIC BLOOD PRESSURE: 74 MMHG

## 2021-07-06 DIAGNOSIS — M11.262 CHONDROCALCINOSIS DUE TO DICALCIUM PHOSPHATE CRYSTALS, OF THE KNEE, LEFT: Primary | ICD-10-CM

## 2021-07-06 DIAGNOSIS — M65.311 TRIGGER THUMB OF RIGHT HAND: ICD-10-CM

## 2021-07-06 PROCEDURE — 1036F TOBACCO NON-USER: CPT | Performed by: ORTHOPAEDIC SURGERY

## 2021-07-06 PROCEDURE — 99214 OFFICE O/P EST MOD 30 MIN: CPT | Performed by: ORTHOPAEDIC SURGERY

## 2021-07-06 NOTE — PROGRESS NOTES
Assessment:       1  Chondrocalcinosis due to dicalcium phosphate crystals, of the knee, left    2  Trigger thumb of right hand          Plan:          I explained my current clinical findings to Shamir Carreno  With regards to her left knee, she is doing well with no concerning symptoms or significant abnormality on the clinical exam   Her main concern today was her right thumb discomfort  This is clinically consistent with a right trigger thumb  I did offer her a cortisone injection of the right thumb flexor tendon sheath at the A1 pulley  However, the patient would like to come back with her granddaughter in this regard and we will schedule her accordingly  Subjective:     Patient ID: Magdiel Mayers is a 63 y o  female  Chief Complaint:  Left knee follow-up and right thumb  Difficulty movement    HPI  Shamir Carreno  Is a 76-year-old lady who presents today for a follow-up of her left knee pain  She was previously seen in this regard on 4/6/2021 and noted to have   Mild left knee degenerative arthritis and meniscal chondrocalcinosis  Prior to her last office visit she had received left knee intra-articular cortisone injection for suspected pseudogout and her symptoms had subsequently improved  Today, the patient reports  That her left knee pain has resolved and she currently does not have any significant concerns in this regard  She does occasionally feel some clicking in the left knee but this is not painful and she denies any other mechanical symptoms or instability of the left knee  Today, the patient does report that she has had difficulty bending her right thumb with some pain  The symptoms have been present for several weeks  The patient is right-hand-dominant  She denies any injury of her right hand or thumb  Denies any tingling numbness or any weakness of the right hand at this time       Social History     Occupational History    Not on file   Tobacco Use    Smoking status: Never Smoker    Smokeless tobacco: Never Used   Vaping Use    Vaping Use: Never used   Substance and Sexual Activity    Alcohol use: No    Drug use: No    Sexual activity: Yes     Partners: Male     Birth control/protection: Post-menopausal      Review of Systems   Constitutional: Negative  HENT: Negative  Eyes: Negative  Respiratory: Negative  Cardiovascular: Negative  Gastrointestinal: Negative  Endocrine: Negative  Genitourinary: Negative  Skin: Negative  Allergic/Immunologic: Negative  Neurological: Negative  Hematological: Negative  Psychiatric/Behavioral: Negative  Objective:     Ortho ExamPhysical Exam  Vitals and nursing note reviewed  Constitutional:       Appearance: She is well-developed  HENT:      Head: Normocephalic and atraumatic  Eyes:      Conjunctiva/sclera: Conjunctivae normal       Pupils: Pupils are equal, round, and reactive to light  Cardiovascular:      Rate and Rhythm: Normal rate and regular rhythm  Pulmonary:      Effort: Pulmonary effort is normal  No respiratory distress  Skin:     General: Skin is warm and dry  Findings: No erythema  Neurological:      Mental Status: She is alert and oriented to person, place, and time  Cranial Nerves: No cranial nerve deficit  Psychiatric:         Behavior: Behavior normal          Thought Content: Thought content normal          Judgment: Judgment normal           left knee exam:   no effusion  No deformity  No overlying erythema or palpable warmth  There is full range of left knee motion without discomfort  No palpable areas of knee tenderness  Negative valgus and varus stress test   Negative medial and lateral Shravan's  Negative Lachman  Grade 5/5 strength of flexion extension  Right hand exam:   no noted deformity  The patient is unable to actively flex her right thumb interphalangeal joint    There is a tender palpable nodule over the volar aspect of the 1st metacarpophalangeal joint in the region of the A1 pulley  Passively, I was able to flex the IP joint as well as passively extended with a palpable triggering  Clinically intact distal neurovascular status  I have personally reviewed pertinent films in PACS

## 2021-07-07 ENCOUNTER — OFFICE VISIT (OUTPATIENT)
Dept: OBGYN CLINIC | Facility: OTHER | Age: 61
End: 2021-07-07
Payer: COMMERCIAL

## 2021-07-07 ENCOUNTER — APPOINTMENT (OUTPATIENT)
Dept: RADIOLOGY | Facility: OTHER | Age: 61
End: 2021-07-07
Payer: COMMERCIAL

## 2021-07-07 VITALS
BODY MASS INDEX: 28.52 KG/M2 | HEIGHT: 62 IN | SYSTOLIC BLOOD PRESSURE: 121 MMHG | WEIGHT: 154.98 LBS | DIASTOLIC BLOOD PRESSURE: 81 MMHG | HEART RATE: 73 BPM

## 2021-07-07 DIAGNOSIS — M65.311 TRIGGER THUMB OF RIGHT HAND: Primary | ICD-10-CM

## 2021-07-07 DIAGNOSIS — M65.311 TRIGGER THUMB OF RIGHT HAND: ICD-10-CM

## 2021-07-07 PROCEDURE — 3008F BODY MASS INDEX DOCD: CPT | Performed by: ORTHOPAEDIC SURGERY

## 2021-07-07 PROCEDURE — 73140 X-RAY EXAM OF FINGER(S): CPT

## 2021-07-07 PROCEDURE — 1036F TOBACCO NON-USER: CPT | Performed by: ORTHOPAEDIC SURGERY

## 2021-07-07 PROCEDURE — 99213 OFFICE O/P EST LOW 20 MIN: CPT | Performed by: ORTHOPAEDIC SURGERY

## 2021-07-07 PROCEDURE — 20550 NJX 1 TENDON SHEATH/LIGAMENT: CPT | Performed by: ORTHOPAEDIC SURGERY

## 2021-07-07 RX ORDER — BETAMETHASONE SODIUM PHOSPHATE AND BETAMETHASONE ACETATE 3; 3 MG/ML; MG/ML
3 INJECTION, SUSPENSION INTRA-ARTICULAR; INTRALESIONAL; INTRAMUSCULAR; SOFT TISSUE
Status: COMPLETED | OUTPATIENT
Start: 2021-07-07 | End: 2021-07-07

## 2021-07-07 RX ORDER — LIDOCAINE HYDROCHLORIDE 10 MG/ML
0.5 INJECTION, SOLUTION INFILTRATION; PERINEURAL
Status: COMPLETED | OUTPATIENT
Start: 2021-07-07 | End: 2021-07-07

## 2021-07-07 RX ADMIN — BETAMETHASONE SODIUM PHOSPHATE AND BETAMETHASONE ACETATE 3 MG: 3; 3 INJECTION, SUSPENSION INTRA-ARTICULAR; INTRALESIONAL; INTRAMUSCULAR; SOFT TISSUE at 09:30

## 2021-07-07 RX ADMIN — LIDOCAINE HYDROCHLORIDE 0.5 ML: 10 INJECTION, SOLUTION INFILTRATION; PERINEURAL at 09:30

## 2021-07-07 NOTE — LETTER
July 7, 2021     Patient: Stacey Hidalgo   YOB: 1960   Date of Visit: 7/7/2021       To Whom it May Concern:    Stacey Hidalgo is under my professional care  She was seen in my office on 7/7/2021  She   Is suggested not to lift, pull, push anything over 5 lb from the right hand for the next 2 weeks  If you have any questions or concerns, please don't hesitate to call           Sincerely,          Deacon Gracia MD        CC: Stacey Hidalgo

## 2021-07-07 NOTE — PROGRESS NOTES
Hand/upper extremity injection: R thumb A1  Universal Protocol:  Consent: Verbal consent obtained  Risks and benefits: risks, benefits and alternatives were discussed  Consent given by: patient  Time out: Immediately prior to procedure a "time out" was called to verify the correct patient, procedure, equipment, support staff and site/side marked as required  Patient understanding: patient states understanding of the procedure being performed  Patient consent: the patient's understanding of the procedure matches consent given  Site marked: the operative site was marked  Radiology Images displayed and confirmed  If images not available, report reviewed: imaging studies available  Required items: required blood products, implants, devices, and special equipment available  Patient identity confirmed: verbally with patient    Supporting Documentation  Indications: pain and therapeutic   Procedure Details  Condition:trigger finger Location: thumb - R thumb A1   Preparation: Patient was prepped and draped in the usual sterile fashion  Needle size: 25 G  Ultrasound guidance: no  Approach: volar  Medications administered: 0 5 mL lidocaine 1 %; 3 mg betamethasone acetate-betamethasone sodium phosphate 6 (3-3) mg/mL    Patient tolerance: patient tolerated the procedure well with no immediate complications  Dressing:  Sterile dressing applied     Patient is instructed to wear a thumb spica brace for the next week  Explained that improvement of symptoms may take 2-3 weeks or sometimes longer  Follow-up in 8 weeks for reassessment  If symptoms do not improve or partially improve we may consider a repeat right trigger thumb cortisone injection  Patient was explained that if symptoms do not improve despite 2 cortisone injections then she would likely be a candidate for surgical release of her trigger thumb

## 2021-07-28 ENCOUNTER — OFFICE VISIT (OUTPATIENT)
Dept: INTERNAL MEDICINE CLINIC | Facility: CLINIC | Age: 61
End: 2021-07-28

## 2021-07-28 VITALS
DIASTOLIC BLOOD PRESSURE: 84 MMHG | OXYGEN SATURATION: 97 % | TEMPERATURE: 97.2 F | BODY MASS INDEX: 29.3 KG/M2 | WEIGHT: 159.2 LBS | SYSTOLIC BLOOD PRESSURE: 133 MMHG | HEIGHT: 62 IN | HEART RATE: 71 BPM

## 2021-07-28 DIAGNOSIS — Z12.39 BREAST CANCER SCREENING: ICD-10-CM

## 2021-07-28 DIAGNOSIS — R10.9 ABDOMINAL PAIN: ICD-10-CM

## 2021-07-28 DIAGNOSIS — E11.9 DIET-CONTROLLED DIABETES MELLITUS (HCC): Primary | ICD-10-CM

## 2021-07-28 DIAGNOSIS — Z12.4 SCREENING FOR CERVICAL CANCER: ICD-10-CM

## 2021-07-28 DIAGNOSIS — Z12.31 ENCOUNTER FOR SCREENING MAMMOGRAM FOR BREAST CANCER: ICD-10-CM

## 2021-07-28 LAB — SL AMB POCT HEMOGLOBIN AIC: 6.1 (ref ?–6.5)

## 2021-07-28 PROCEDURE — 3008F BODY MASS INDEX DOCD: CPT | Performed by: ORTHOPAEDIC SURGERY

## 2021-07-28 PROCEDURE — 3044F HG A1C LEVEL LT 7.0%: CPT | Performed by: ORTHOPAEDIC SURGERY

## 2021-07-28 PROCEDURE — 99214 OFFICE O/P EST MOD 30 MIN: CPT | Performed by: INTERNAL MEDICINE

## 2021-07-28 PROCEDURE — 83036 HEMOGLOBIN GLYCOSYLATED A1C: CPT | Performed by: INTERNAL MEDICINE

## 2021-07-28 PROCEDURE — T1015 CLINIC SERVICE: HCPCS | Performed by: INTERNAL MEDICINE

## 2021-07-28 RX ORDER — DICYCLOMINE HCL 20 MG
20 TABLET ORAL EVERY 6 HOURS
Qty: 120 TABLET | Refills: 1 | Status: SHIPPED | OUTPATIENT
Start: 2021-07-28

## 2021-07-28 NOTE — PROGRESS NOTES
[facility]  INTERNAL MEDICINE OFFICE VISIT     PATIENT INFORMATION     Isdiro Garces   63 y o  female   MRN: [id number]    ASSESSMENT/PLAN     Diagnoses and all orders for this visit:    Prediabetes  -     POCT hemoglobin A1c -> 6 1 today (6 2 last year)  - Will continue lifestyle modifications including proper diet and exercise    Screening for cervical cancer  -     Ambulatory referral to Obstetrics / Gynecology; Future    Abdominal pain        -  patient has been experiencing episodes of left lower quadrant abdominal pain as well as clear mucus-like bowel movements only with exertion        -  her last colonoscopy was back in 2017 which showed 1 polyp, diverticulosis and external hemorrhoids        -  will start the patient on Bentyl 20 mg q 6 as needed and refer to Gastroenterology for further assessment of symptoms and possible repeat colonoscopy  -     dicyclomine (BENTYL) 20 mg tablet; Take 1 tablet (20 mg total) by mouth every 6 (six) hours  -     Ambulatory referral to Gastroenterology; Future    Breast cancer screening  -     Mammo screening bilateral w 3d & cad; Future    Schedule a follow-up appointment in 6 months      HEALTH MAINTENANCE     Immunization History   Administered Date(s) Administered    Influenza Quadrivalent Preservative Free 3 years and older IM 11/14/2017    Influenza, recombinant, quadrivalent,injectable, preservative free 09/11/2018, 09/24/2020    Influenza, seasonal, injectable 08/12/2014    Pneumococcal Polysaccharide PPV23 09/11/2018    SARS-CoV-2 / COVID-19 mRNA IM (Pfizer-BioNTech) 03/29/2021, 04/19/2021    Tdap 02/12/2015     CHIEF COMPLAINT     Chief Complaint   Patient presents with    Follow-up      HISTORY OF PRESENT ILLNESS     The patient is a 57-year-old female with a past medical history of prediabetes, arthritis, anxiety, GERD, ALEC, chondrocalcinosis of the left knee and right thumb trigger finger who presented to the clinic today for a follow-up visit  She is currently receiving injections in her thumb with Ortho  Overall today she is feeling well  Her primary complaint is that she experiences left lower quadrant crampy abdominal pain with exertion  During these episodes she will have a clear mucousy bowel movement as well  These episodes only occur after she has walked about 1/4 mile  And occur up to 3 times per day  She has no abdominal pain or other symptoms outside of these episodes  She has not noticed any blood in any of her bowel movements  Her bowel movements outside of these episodes are solid and brown as well  Today during the encounter she appeared well and there was no tenderness to palpation of her left lower quadrant  She denies any recent fevers, nausea, vomiting or other abdominal complaints  She denies any urinary complaints as well  She also denies any recent chest pain or shortness of breath  Her last colonoscopy was in 2017 and at that time she was found to have 1 polyp which was removed as well as diverticulosis and external hemorrhoids  It was recommended that she have a follow-up colonoscopy in 5 years  She is also due for a mammogram and Pap smear which were ordered at today's visit  Her A1c was 6 1 today down from 6 2 last year  Her sugars are well controlled with diet and exercise  Otherwise she had no other acute complaints  REVIEW OF SYSTEMS     Review of Systems   Constitutional: Negative for activity change, appetite change, chills, diaphoresis, fatigue and fever  HENT: Negative for congestion, ear discharge, ear pain, hearing loss, sinus pressure, sinus pain and sore throat  Eyes: Negative for pain, discharge, redness and itching  Respiratory: Negative for cough, chest tightness, shortness of breath and wheezing  Cardiovascular: Negative for chest pain, palpitations and leg swelling     Gastrointestinal: Negative for abdominal distention, abdominal pain, blood in stool, constipation, diarrhea, nausea and vomiting  Genitourinary: Negative for difficulty urinating, dysuria, flank pain and frequency  Musculoskeletal: Negative for arthralgias, back pain, gait problem and joint swelling  Skin: Negative for color change, pallor and rash  Neurological: Negative for dizziness, weakness, light-headedness, numbness and headaches  Psychiatric/Behavioral: Negative for agitation, behavioral problems, confusion and decreased concentration  OBJECTIVE     Vitals:    07/28/21 0757   BP: 133/84   BP Location: Left arm   Patient Position: Sitting   Cuff Size: Standard   Pulse: 71   Temp: (!) 97 2 °F (36 2 °C)   TempSrc: Temporal   SpO2: 97%   Weight: 72 2 kg (159 lb 3 2 oz)   Height: 5' 1 5" (1 562 m)     Physical Exam  Constitutional:       Appearance: She is well-developed  HENT:      Head: Normocephalic and atraumatic  Nose: Nose normal    Eyes:      Conjunctiva/sclera: Conjunctivae normal       Pupils: Pupils are equal, round, and reactive to light  Neck:      Vascular: No JVD  Cardiovascular:      Rate and Rhythm: Normal rate and regular rhythm  Heart sounds: Normal heart sounds  No murmur heard  No friction rub  No gallop  Pulmonary:      Effort: Pulmonary effort is normal  No respiratory distress  Breath sounds: Normal breath sounds  No stridor  No wheezing or rales  Chest:      Chest wall: No tenderness  Abdominal:      General: Bowel sounds are normal  There is no distension  Palpations: Abdomen is soft  There is no mass  Tenderness: There is no abdominal tenderness  There is no guarding or rebound  Hernia: No hernia is present  Musculoskeletal:         General: No tenderness or deformity  Right lower leg: No edema  Left lower leg: No edema  Skin:     General: Skin is warm and dry  Neurological:      Mental Status: She is alert and oriented to person, place, and time     Psychiatric:         Mood and Affect: Mood normal          Behavior: Behavior normal          Thought Content: Thought content normal          Judgment: Judgment normal        CURRENT MEDICATIONS     Current Outpatient Medications:     acetaminophen (TYLENOL) 500 mg tablet, Take 1 tablet (500 mg total) by mouth daily as needed for headaches, Disp: 30 tablet, Rfl: 3    ARIPiprazole (ABILIFY) 5 mg tablet, Take 5 mg by mouth daily, Disp: , Rfl:     Cholecalciferol (VITAMIN D3) 125 MCG (5000 UT) CAPS, Take by mouth, Disp: , Rfl:     clonazePAM (KlonoPIN) 1 mg tablet, Take 1 mg by mouth daily at bedtime as needed for anxiety    , Disp: , Rfl:     Diclofenac Sodium (VOLTAREN) 1 %, Apply 2 g topically 4 (four) times a day, Disp: 100 g, Rfl: 0    DULoxetine (CYMBALTA) 30 mg delayed release capsule, Take 30 mg by mouth daily, Disp: , Rfl:     hydrOXYzine HCL (ATARAX) 25 mg tablet, Take 25 mg by mouth every 6 (six) hours as needed for itching, Disp: , Rfl:     Methylcobalamin (B-12) 5000 MCG TBDP, Take by mouth, Disp: , Rfl:     omeprazole (PriLOSEC) 20 mg delayed release capsule, take 1 capsule by mouth once daily, Disp: 90 capsule, Rfl: 3    dicyclomine (BENTYL) 20 mg tablet, Take 1 tablet (20 mg total) by mouth every 6 (six) hours, Disp: 120 tablet, Rfl: 1    ibuprofen (MOTRIN) 600 mg tablet, Take 1 tablet (600 mg total) by mouth every 6 (six) hours as needed for moderate pain (Patient not taking: Reported on 4/6/2021), Disp: 30 tablet, Rfl: 0    PAST MEDICAL & SURGICAL HISTORY     Past Medical History:   Diagnosis Date    Abnormal mammogram     RESOLVED: [id number]    Anxiety     Anxiety     Arthritis     Depression     Depression     Diverticulosis     GERD (gastroesophageal reflux disease)     Helicobacter pylori infection     Hyperlipidemia     Seborrheic keratosis     LAST ASSESSED: [id number]    Sleep apnea     Sleep apnea     Tinea pedis of left foot 9/16/2019     Past Surgical History:   Procedure Laterality Date    ABDOMINOPLASTY abdomin    COLONOSCOPY      VT COLONOSCOPY FLX DX W/COLLJ SPEC WHEN PFRMD N/A 8/11/2016    Procedure: COLONOSCOPY;  Surgeon: Betty Calabrese MD;  Location:  GI LAB; Service: Gastroenterology    VT COLONOSCOPY FLX DX W/COLLJ [facility] WHEN PFRMD N/A 8/29/2017    Procedure: EGD AND COLONOSCOPY;  Surgeon: Betty Calabrese MD;  Location: [facility] GI LAB; Service: Gastroenterology   Learta January TUBAL LIGATION      TUBAL LIGATION      UPPER GASTROINTESTINAL ENDOSCOPY       SOCIAL & FAMILY HISTORY     Social History     Socioeconomic History    Marital status: Single     Spouse name: Not on file    Number of children: Not on file    Years of education: Not on file    Highest education level: Not on file   Occupational History    Not on file   Tobacco Use    Smoking status: Never Smoker    Smokeless tobacco: Never Used   Vaping Use    Vaping Use: Never used   Substance and Sexual Activity    Alcohol use: No    Drug use: No    Sexual activity: Yes     Partners: Male     Birth control/protection: Post-menopausal   Other Topics Concern    Not on file   Social History Narrative    Not on file     Social Determinants of Health     Financial Resource Strain: Low Risk     Difficulty of Paying Living Expenses: Not hard at all   Food Insecurity: Food Insecurity Present    Worried About [facility] in the Last Year: Never true    Apoorva of Food in the Last Year: Sometimes true   Transportation Needs: No Transportation Needs    Lack of Transportation (Medical): No    Lack of Transportation (Non-Medical): No   Physical Activity: Inactive    Days of Exercise per Week: 0 days    Minutes of Exercise per Session: 0 min   Stress: Stress Concern Present    Feeling of Stress : To some extent   Social Connections:  Moderately Isolated    Frequency of Communication with Friends and Family: More than three times a week    Frequency of Social Gatherings with Friends and Family: More than three times a week    Attends Mandaeism Services: More than 4 times per year    Active Member of Clubs or Organizations: No    Attends Club or Organization Meetings: Never    Marital Status:    Intimate Partner Violence: Not At Risk    Fear of Current or Ex-Partner: No    Emotionally Abused: No    Physically Abused: No    Sexually Abused: No     Social History     Substance and Sexual Activity   Alcohol Use No       Social History     Substance and Sexual Activity   Drug Use No     Social History     Tobacco Use   Smoking Status Never Smoker   Smokeless Tobacco Never Used     Family History   Problem Relation Age of Onset    Diabetes Mother     Hypertension Mother     Heart disease Mother     Diabetes Paternal Grandfather     Alzheimer's disease Father     No Known Problems Sister     No Known Problems Brother     Depression Daughter     ADD / ADHD Son     Depression Son     Diabetes Maternal Grandmother     Stomach cancer Maternal Grandfather     No Known Problems Paternal Grandmother     No Known Problems Sister     Heart disease Sister     Intellectual disability Brother     Schizophrenia Brother     Other Son          as an infant    [facility] Other Daughter          at 2-4 mths old   [facility] Other Son          as an infant      ==  Chana Carlos MD   Resident, PGY-2  [address] [facility] [address]  [address] , [address], [address] [facility]  Office: [phone]  Fax: [phone]

## 2021-07-28 NOTE — PATIENT INSTRUCTIONS
Please take Bentyl 20 mg as needed for abdominal pain  Please follow-up with gastroenterology for abdominal pain  Please obtain mammography  Please obtain Pap smear with Ob/gyn

## 2021-08-16 ENCOUNTER — ANNUAL EXAM (OUTPATIENT)
Dept: OBGYN CLINIC | Facility: CLINIC | Age: 61
End: 2021-08-16

## 2021-08-16 VITALS
SYSTOLIC BLOOD PRESSURE: 141 MMHG | WEIGHT: 157 LBS | HEIGHT: 62 IN | HEART RATE: 78 BPM | BODY MASS INDEX: 28.89 KG/M2 | DIASTOLIC BLOOD PRESSURE: 66 MMHG

## 2021-08-16 DIAGNOSIS — Z72.51 HIGH RISK SEXUAL BEHAVIOR, UNSPECIFIED TYPE: Primary | ICD-10-CM

## 2021-08-16 DIAGNOSIS — Z12.4 ENCOUNTER FOR PAPANICOLAOU SMEAR OF CERVIX: ICD-10-CM

## 2021-08-16 DIAGNOSIS — R35.0 URINE FREQUENCY: ICD-10-CM

## 2021-08-16 DIAGNOSIS — Z12.4 SCREENING FOR CERVICAL CANCER: ICD-10-CM

## 2021-08-16 PROCEDURE — G0476 HPV COMBO ASSAY CA SCREEN: HCPCS | Performed by: OBSTETRICS & GYNECOLOGY

## 2021-08-16 PROCEDURE — 99213 OFFICE O/P EST LOW 20 MIN: CPT | Performed by: OBSTETRICS & GYNECOLOGY

## 2021-08-16 PROCEDURE — G0145 SCR C/V CYTO,THINLAYER,RESCR: HCPCS | Performed by: OBSTETRICS & GYNECOLOGY

## 2021-08-16 PROCEDURE — 87086 URINE CULTURE/COLONY COUNT: CPT | Performed by: OBSTETRICS & GYNECOLOGY

## 2021-08-16 PROCEDURE — 87591 N.GONORRHOEAE DNA AMP PROB: CPT | Performed by: OBSTETRICS & GYNECOLOGY

## 2021-08-16 PROCEDURE — 87491 CHLMYD TRACH DNA AMP PROBE: CPT | Performed by: OBSTETRICS & GYNECOLOGY

## 2021-08-16 NOTE — PROGRESS NOTES
Annual Well Woman visit  Subjective      Wade Buerger is a 63 y o  female who presents for annual well woman exam  Patient is post-menopausal and denies vaginal bleeding or discharge  She reports that over the last 5-6 months she feels that she is having more frequent urination  She denies incontinent episodes  She denies dyuria  She reports that she also has to get up at night to go to the bathroom 3 times a night as well  GYN:  · No vaginal discharge, labial erythema or lesions, dyspareunia  · Contraception: menopause, abstience  · Patient is not sexually active  · No gynecologic surgeries  OB:  · [id number] female  · Pregnancies were complicated by death of  shortly after birth in three pregnancies    :  · Per HPI    Breast:  · No breast mass, skin changes, dimpling, reddening, nipple retraction  · No breast discharge  General:  · Diet: Encouraged balanced diet   · Exercise: Encouraged 150mins of aerobic exercise per week  · Work: on disability  · ETOH use: no  · Tobacco use: no  · Recreational drug use: no    Screening:  · Cervical cancer: last pap smear in   Results were normal; due today  · Breast cancer: ordered  · Colon cancer: last colonoscopy in ; she has an appointment with GI scheduled for Wednesday  Review of Systems  Pertinent items are noted in HPI        Objective      /66   Pulse 78   Ht 5' 1 5" (1 562 m)   Wt 71 2 kg (157 lb)   LMP  (LMP Unknown)   BMI 29 18 kg/m²     General:   alert, appears stated age and cooperative   Heart: regular rate and rhythm, S1, S2 normal, no murmur, click, rub or gallop   Lungs: clear to auscultation bilaterally   Abdomen: soft, non-tender, without masses or organomegaly   Vulva: normal   Vagina: normal mucosa, normal discharge; loss of rugae, moderate rectocele   Cervix: no cervical motion tenderness and no lesions   Uterus: Small, mobile, non-tender   Adnexa: no mass, fullness, tenderness     Breast: normal appearing breasts bilaterally, no masses or lesions identified, no discharge noted       Assessment: 59y/o who presents for well woman exam  Patient complaining of urinary urgency with no incontinence episodes  Discuss behavioral changes including decreasing fluid intake prior to bedtime, drink water throughout the day rather than large volume in one sitting and avoiding caffeine/tea       Plan     - F/u pap and GC/CT testing  - Mammogram ordered, patient encouraged to schedule as soon as possible  - UA and culture sent to r/o UTI as cause for urinary urgency  - Recent Hgb A1C was WNL  - RTC for annual appointment or sooner if symptoms worsen        Ronnie Sanchez MD, PGY-4  8/16/2021  9:38 AM

## 2021-08-17 LAB
C TRACH DNA SPEC QL NAA+PROBE: NEGATIVE
N GONORRHOEA DNA SPEC QL NAA+PROBE: NEGATIVE

## 2021-08-18 ENCOUNTER — OFFICE VISIT (OUTPATIENT)
Dept: GASTROENTEROLOGY | Facility: CLINIC | Age: 61
End: 2021-08-18
Payer: COMMERCIAL

## 2021-08-18 VITALS
DIASTOLIC BLOOD PRESSURE: 85 MMHG | HEIGHT: 62 IN | TEMPERATURE: 97 F | BODY MASS INDEX: 28.82 KG/M2 | HEART RATE: 82 BPM | WEIGHT: 156.6 LBS | SYSTOLIC BLOOD PRESSURE: 129 MMHG

## 2021-08-18 DIAGNOSIS — R10.9 ABDOMINAL PAIN: ICD-10-CM

## 2021-08-18 DIAGNOSIS — R14.0 BLOATING: ICD-10-CM

## 2021-08-18 DIAGNOSIS — R19.4 ENCOUNTER FOR DIAGNOSTIC COLONOSCOPY DUE TO CHANGE IN BOWEL HABITS: Primary | ICD-10-CM

## 2021-08-18 LAB
BACTERIA UR CULT: NORMAL
HPV HR 12 DNA CVX QL NAA+PROBE: POSITIVE
HPV16 DNA CVX QL NAA+PROBE: NEGATIVE
HPV18 DNA CVX QL NAA+PROBE: NEGATIVE

## 2021-08-18 PROCEDURE — 99213 OFFICE O/P EST LOW 20 MIN: CPT | Performed by: INTERNAL MEDICINE

## 2021-08-18 RX ORDER — POLYETHYLENE GLYCOL 3350 17 G/17G
238 POWDER, FOR SOLUTION ORAL ONCE
Qty: 238 G | Refills: 0 | Status: SHIPPED | OUTPATIENT
Start: 2021-08-18 | End: 2022-03-04

## 2021-08-18 RX ORDER — HYOSCYAMINE SULFATE 0.125 MG
0.12 TABLET ORAL EVERY 4 HOURS PRN
Qty: 30 TABLET | Refills: 0 | Status: SHIPPED | OUTPATIENT
Start: 2021-08-18

## 2021-08-18 RX ORDER — MULTIVIT WITH MINERALS/LUTEIN
1000 TABLET ORAL DAILY
COMMUNITY

## 2021-08-18 NOTE — PROGRESS NOTES
[facility] - Outpatient Consultation  Isidro Garces 63 y o  female MRN: [id number]  Encounter: [id number]          ASSESSMENT AND PLAN:      1  Rupal umbilical abdominal pain    Patient has been having abdominal pain for the past 6 months  It is  associated with fecal urgency and she has improvement in her pain after having a bowel movement  She has associated bloating and postprandial fullness  Denies any acid reflux, dysphagia or odynophagia  Her symptoms are likely related to irritable bowel syndrome  However, given rupal- umbilical location of pain with plan endoscopy and due to recent episode of diverticulitis plan for colonoscopy  - We will schedule for an upper endoscopy with biopsies to evaluate for peptic ulcer disease, Helicobacter pylori infection, reflux esophagitis or peptic stricture  -  Given history of diverticulitis of the descending colon in 11/2020 we will plan for colonoscopy  -  Patient had developed dizziness after using Bentyl can do a trial of hyoscyamine 0 125 MG tablet every 4 hours as needed for cramping      2  Acute diverticulitis- now resolved   -  Patient admitted with descending colon  Diverticulitis in November 2020  She was treated with oral Augmentin  -  Will plan for for colonoscopy  Her last colonoscopy was in 12/2018 and repeat recommended in 5 years  -  Dulcolax and MiraLax prep    3  Bloating  Could be related to her IBS    She denies any acid reflux, dysphagia or odynophagia    - Will plan for EGD with biopsies to rule out H pylori given longstanding symptoms  -  Continue omeprazole 20 mg daily  -  Patient given instructions to follow low FODMAP diet    Patient seen discussed with Dr Heidy Stroud in clinic in 3 months    Keena Rodas MD   Gastroenterology Fellow     ______________________________________________________________________    HPI:      Ms Rahel Mccormick is a  69-year-old female who presents to clinic for evaluation of abdominal pain  Patient is Emirati speaking and history was taken with the help of phone     She has been having periumbilical abdominal pain for the past 6 months  It happens when she is walking or standing for long hours for doing household chores  Doing these episodes of abdominal pain she has urgency and usually  would have a bowel movement  Her pain does improve after she has a bowel movement  She denies any constipation or diarrhea  However she has noticed  Increase in frequency of her bowel movements  She denies any nausea or vomiting  She denies any hematochezia,  Hematemesis or melena  Denies any weight loss  Does not use any NSAIDs or alcohol  She did have acute diverticulitis involving the descending colon in November 2020 requiring hospitalization  REVIEW OF SYSTEMS:    CONSTITUTIONAL: Denies any fever, chills, rigors, and weight loss  HEENT: No earache or tinnitus  Denies hearing loss or visual disturbances  CARDIOVASCULAR: No chest pain or palpitations  RESPIRATORY: Denies any cough, hemoptysis, shortness of breath or dyspnea on exertion  GASTROINTESTINAL: As noted in the History of Present Illness  GENITOURINARY: No problems with urination  Denies any hematuria or dysuria  NEUROLOGIC: No dizziness or vertigo, denies headaches  MUSCULOSKELETAL: Denies any muscle or joint pain  SKIN: Denies skin rashes or itching  ENDOCRINE: Denies excessive thirst  Denies intolerance to heat or cold  PSYCHOSOCIAL: Denies depression or anxiety  Denies any recent memory loss         Historical Information   Past Medical History:   Diagnosis Date    Abnormal mammogram     RESOLVED: [id number]    Anxiety     Anxiety     Arthritis     Depression     Depression     Diverticulosis     GERD (gastroesophageal reflux disease)     Helicobacter pylori infection     Hyperlipidemia     Seborrheic keratosis     LAST ASSESSED: [id number]    Sleep apnea     Sleep apnea     Tinea pedis of left foot 2019     Past Surgical History:   Procedure Laterality Date    ABDOMINOPLASTY      abdomin    COLONOSCOPY  2017    OH COLONOSCOPY FLX DX W/COLLJ SPEC WHEN PFRMD N/A 2016    Procedure: COLONOSCOPY;  Surgeon: Fabio Benavidez MD;  Location:  GI LAB; Service: Gastroenterology    OH COLONOSCOPY FLX DX W/COLLJ [facility] WHEN PFRMD N/A 2017    Procedure: EGD AND COLONOSCOPY;  Surgeon: Fabio Benavidez MD;  Location: [facility] GI [facility];   Service: Gastroenterology    TUBAL LIGATION      TUBAL LIGATION      UPPER GASTROINTESTINAL ENDOSCOPY       Social History   Social History     Substance and Sexual Activity   Alcohol Use No     Social History     Substance and Sexual Activity   Drug Use No     Social History     Tobacco Use   Smoking Status Never Smoker   Smokeless Tobacco Never Used     Family History   Problem Relation Age of Onset    Diabetes Mother     Hypertension Mother     Heart disease Mother     Diabetes Paternal Grandfather     Alzheimer's disease Father     No Known Problems Sister     No Known Problems Brother     Depression Daughter     ADD / ADHD Son     Depression Son     Diabetes Maternal Grandmother     Stomach cancer Maternal Grandfather     No Known Problems Paternal Grandmother     No Known Problems Sister     Heart disease Sister     Intellectual disability Brother     Schizophrenia Brother     Other Son          as an infant    Joseluis Jurado Other Daughter          at 2-4 mths old   Joseluis Jurado Other Son          as an infant        Meds/Allergies       Current Outpatient Medications:     acetaminophen (TYLENOL) 500 mg tablet    ARIPiprazole (ABILIFY) 5 mg tablet    Ascorbic Acid (vitamin C) 1000 MG tablet    Calcium Polycarbophil (FIBERCON PO)    Cholecalciferol (VITAMIN D3) 125 MCG (5000 UT) CAPS    clonazePAM (KlonoPIN) 1 mg tablet    dicyclomine (BENTYL) 20 mg tablet    DULoxetine (CYMBALTA) 30 mg delayed release capsule   Methylcobalamin (B-12) 5000 MCG TBDP    omeprazole (PriLOSEC) 20 mg delayed release capsule    Diclofenac Sodium (VOLTAREN) 1 %    hydrOXYzine HCL (ATARAX) 25 mg tablet    ibuprofen (MOTRIN) 600 mg tablet    No Known Allergies        Objective     Blood pressure 129/85, pulse 82, temperature (!) 97 °F (36 1 °C), temperature source Tympanic, height 5' 1 5" (1 562 m), weight 71 kg (156 lb 9 6 oz), not currently breastfeeding  Body mass index is 29 11 kg/m²  PHYSICAL EXAM:      General Appearance:   Alert, cooperative, no distress   HEENT:   Normocephalic, atraumatic, anicteric      Neck:  Supple, symmetrical, trachea midline   Lungs:   Clear to auscultation bilaterally; no rales, rhonchi or wheezing; respirations unlabored    Heart[de-identified]   Regular rate and rhythm; no murmur, rub, or gallop  Abdomen:   Soft, non-tender, non-distended; normal bowel sounds; no masses, no organomegaly    Genitalia:   Deferred    Rectal:   Deferred    Extremities:  No cyanosis, clubbing or edema    Pulses:  2+ and symmetric    Skin:  No jaundice, rashes, or lesions    Lymph nodes:  No palpable cervical lymphadenopathy        Lab Results:   No visits with results within 1 Day(s) from this visit  Latest known visit with results is:   Annual Exam on 08/16/2021   Component Date Value    N gonorrhoeae, DNA Probe 08/16/2021 Negative     Chlamydia trachomatis, D* 08/16/2021 Negative     Urine Culture 08/16/2021 Culture too young- will reincubate     HPV Other HR 08/16/2021 Positive*    HPV16 08/16/2021 Negative     HPV18 08/16/2021 Negative          Radiology Results:   No results found

## 2021-08-18 NOTE — PATIENT INSTRUCTIONS
IBGard is available over the counter   You can take it before you feel you are going to have symptoms     Please do a trial of the FODMAP diet     Schedule an endoscopy and colonoscopy for better evaluation of symptoms     Colon/egd on 9/30/21 with Dr Mylene Verdin at [facility]  Miralax/dulcolax prep instructions given by Bird Morgan in the office

## 2021-08-19 LAB
LAB AP GYN PRIMARY INTERPRETATION: NORMAL
Lab: NORMAL

## 2021-09-06 NOTE — PROGRESS NOTES
1  Trigger thumb of right hand       No orders of the defined types were placed in this encounter  Imaging Studies (I personally reviewed images in PACS and report):     x-ray of right thumb 07/07/2021: Mild degenerative changes of the 1st carpometacarpal joint complex  No acute osseous abnormality    ASSESSMENT/PLAN:    Explained my current clinical findings to DARIUSZ  She has had improvement of her right thumb pain and triggering  However, she still has some mild triggering of the   Right thumb  I offered her a repeat  right thumb flexor tendon sheath cortisone injection for her trigger thumb  However, the patient would like to reschedule this in the next 1-2 weeks  She would prefer to come with an attendant for this injection  _____________________________________________________________  CHIEF COMPLAINT: follow-up right thumb trigger finger  HPI:  Eleuterio Saini is a 57 y o  female  who presents for follow-up for trigger finger of right thumb  She was last seen and examined on 07/07/2021  She under went a cortisone steroid injection to A1  She is right-hand dominant  Visit 9/6/2021 :  Today,  The patient reports that her right thumb triggering and pain has significantly improved  However, she still reports some intermittent triggering of the right thumb  Denies any tingling or numbness of the finger distally  Denies any new injuries in this regard  Review of Systems   Constitutional: Negative  HENT: Negative  Eyes: Negative  Respiratory: Negative  Cardiovascular: Negative  Gastrointestinal: Negative  Endocrine: Negative  Genitourinary: Negative  Skin: Negative  Allergic/Immunologic: Negative  Neurological: Negative  Hematological: Negative  Psychiatric/Behavioral: Negative  All other systems reviewed and are negative        Following history reviewed and update:    Past Medical History:   Diagnosis Date    Abnormal mammogram RESOLVED: [id number]    Anxiety     Anxiety     Arthritis     Depression     Depression     Diverticulosis     GERD (gastroesophageal reflux disease)     Helicobacter pylori infection     Hyperlipidemia     Seborrheic keratosis     LAST ASSESSED: [id number]    Sleep apnea     Sleep apnea     Tinea pedis of left foot 2019     Past Surgical History:   Procedure Laterality Date    ABDOMINOPLASTY      abdomin    COLONOSCOPY  2017    [place] COLONOSCOPY FLX DX W/COLLJ SPEC WHEN PFRMD N/A 2016    Procedure: COLONOSCOPY;  Surgeon: Pancho Horn MD;  Location:  GI LAB; Service: Gastroenterology    IA COLONOSCOPY FLX DX W/COLLJ [facility] WHEN PFRMD N/A 2017    Procedure: EGD AND COLONOSCOPY;  Surgeon: Pancho Horn MD;  Location: [facility] GI LAB; Service: Gastroenterology    TUBAL LIGATION      TUBAL LIGATION      UPPER GASTROINTESTINAL ENDOSCOPY       Social History   Social History     Substance and Sexual Activity   Alcohol Use No     Social History     Substance and Sexual Activity   Drug Use No     Social History     Tobacco Use   Smoking Status Never Smoker   Smokeless Tobacco Never Used     Family History   Problem Relation Age of Onset    Diabetes Mother     Hypertension Mother     Heart disease Mother     Diabetes Paternal Grandfather     Alzheimer's disease Father     No Known Problems Sister     No Known Problems Brother     Depression Daughter     ADD / ADHD Son     Depression Son     Diabetes Maternal Grandmother     Stomach cancer Maternal Grandfather     No Known Problems Paternal Grandmother     No Known Problems Sister     Heart disease Sister     Intellectual disability Brother     Schizophrenia Brother     Other Son          as an infant    Roselia Sarabia Other Daughter          at 2-4 mths old   Roselia Sarabia Other Son          as an infant      No Known Allergies       Physical Exam  Vitals and nursing note reviewed     Constitutional:       Appearance: She is well-developed  HENT:      Head: Normocephalic and atraumatic  Eyes:      Conjunctiva/sclera: Conjunctivae normal       Pupils: Pupils are equal, round, and reactive to light  Cardiovascular:      Rate and Rhythm: Normal rate and regular rhythm  Pulmonary:      Effort: Pulmonary effort is normal  No respiratory distress  Skin:     General: Skin is warm and dry  Findings: No erythema  Neurological:      Mental Status: She is alert and oriented to person, place, and time  Cranial Nerves: No cranial nerve deficit  Psychiatric:         Behavior: Behavior normal          Thought Content: Thought content normal          Judgment: Judgment normal         right hand exam:   no deformity noted  There is a small  Nodular palpable swelling in the  right thumb at the level of metacarpophalangeal joint in the region of A1 pulley  There is associated mild triggering noted with interphalangeal joint flexion extension  The patient is able to fully extend and flex the MCP as well as the IP joint of the left thumb  Clinically intact distal neurovascular status  Portions of the record may have been created with voice recognition software  Occasional wrong word or "sound alike" substitutions may have occurred due to the inherent limitations of voice recognition software  Please review the chart carefully and recognize, using context, where substitutions/typographical errors may have occurred

## 2021-09-07 ENCOUNTER — OFFICE VISIT (OUTPATIENT)
Dept: OBGYN CLINIC | Facility: OTHER | Age: 61
End: 2021-09-07
Payer: COMMERCIAL

## 2021-09-07 VITALS
BODY MASS INDEX: 28.8 KG/M2 | WEIGHT: 156.53 LBS | HEIGHT: 62 IN | SYSTOLIC BLOOD PRESSURE: 125 MMHG | DIASTOLIC BLOOD PRESSURE: 83 MMHG | HEART RATE: 93 BPM

## 2021-09-07 DIAGNOSIS — M65.311 TRIGGER THUMB OF RIGHT HAND: Primary | ICD-10-CM

## 2021-09-07 PROCEDURE — 99213 OFFICE O/P EST LOW 20 MIN: CPT | Performed by: ORTHOPAEDIC SURGERY

## 2021-09-19 NOTE — PROGRESS NOTES
1  Trigger thumb of right hand  Hand/upper extremity injection: R thumb A1   2  Finger stiffness, right       Orders Placed This Encounter   Procedures    Hand/upper extremity injection        Imaging Studies (I personally reviewed images in PACS and report):     x-ray of right thumb 07/07/2021:  Mild degenerative changes of the carpometacarpal joint complex  No acute osseous abnormality  ASSESSMENT/PLAN:    Explained my current clinical findings to Diane Vargas today  She was agreeable to and received a right thumb trigger finger cortisone injection  I have advised her to wear a thumb spica brace over the next week  With regards to her right middle and ring fingers, she does have a small tiny palpable nodular swelling in the region of A1 pulleys of both these digits  However, there is no significant triggering noted at this time  I have advised her to do some home-based exercises  I will be happy to see her back in the future for these fingers in case there is any persistent or worsening discomfort  I will see her back in about 6 weeks time for her right trigger thumb  If symptoms do persist despite 2 cortisone injections we will consider a referral to Hand Orthopedic surgery for trigger finger release  Return in about 2 months (around 11/21/2021)  _____________________________________________________________  CHIEF COMPLAINT:  Follow up right thumb trigger finger      HPI:  Eleuterio Saini is a 57 y o  female  who presents for for follow-up for trigger finger of the right thumb  She was last seen and examined on 09/07/2021  She is right-hand dominant and had previously underwent a cortisone steroid injection to the A1 on 07/07/2021  During last visit she is still having some triggering is his head for the right thumb, she  He was offered a cortisone steroid injection but she preferred to reschedule and come with an attendant        Visit 9/19/2021 :  Today,   The patient reports that she has continued triggering of the right thumb but has not noticed any worsening since the last office visit  She also notices some mild stiffness of her right middle and ring finger which is primarily in the morning but does not report any significant triggering of these fingers at the today's visit  Review of Systems   Constitutional: Negative for chills and fever  HENT: Negative for ear pain and sore throat  Eyes: Negative for pain and visual disturbance  Respiratory: Negative for cough and shortness of breath  Cardiovascular: Negative for chest pain and palpitations  Gastrointestinal: Negative for abdominal pain and vomiting  Genitourinary: Negative for dysuria and hematuria  Musculoskeletal: Negative for arthralgias and back pain  Skin: Negative for color change and rash  Neurological: Negative for seizures and syncope  All other systems reviewed and are negative  Following history reviewed and update:    Past Medical History:   Diagnosis Date    Abnormal mammogram     RESOLVED: [id number]    Anxiety     Anxiety     Arthritis     Depression     Depression     Diverticulosis     GERD (gastroesophageal reflux disease)     Helicobacter pylori infection     Hyperlipidemia     Seborrheic keratosis     LAST ASSESSED: [id number]    Sleep apnea     Sleep apnea     Tinea pedis of left foot 9/16/2019     Past Surgical History:   Procedure Laterality Date    ABDOMINOPLASTY      abdomin    COLONOSCOPY  2017    [place] COLONOSCOPY FLX DX W/COLLJ SPEC WHEN PFRMD N/A 8/11/2016    Procedure: COLONOSCOPY;  Surgeon: Susie Long MD;  Location:  GI LAB; Service: Gastroenterology    SC COLONOSCOPY FLX DX W/COLLJ [facility] WHEN PFRMD N/A 8/29/2017    Procedure: EGD AND COLONOSCOPY;  Surgeon: Susie Long MD;  Location: [facility] GI LAB;   Service: Gastroenterology    TUBAL LIGATION      TUBAL LIGATION      UPPER GASTROINTESTINAL ENDOSCOPY  2017     Social History   Social History     Substance and Sexual Activity   Alcohol Use No     Social History     Substance and Sexual Activity   Drug Use No     Social History     Tobacco Use   Smoking Status Never Smoker   Smokeless Tobacco Never Used     Family History   Problem Relation Age of Onset    Diabetes Mother     Hypertension Mother     Heart disease Mother     Diabetes Paternal Grandfather     Alzheimer's disease Father     No Known Problems Sister     No Known Problems Brother     Depression Daughter     ADD / ADHD Son     Depression Son     Diabetes Maternal Grandmother     Stomach cancer Maternal Grandfather     No Known Problems Paternal Grandmother     No Known Problems Sister     Heart disease Sister     Intellectual disability Brother     Schizophrenia Brother     Other Son          as an infant    Bell Other Daughter          at 2-4 mths old   Bell Other Son          as an infant      No Known Allergies       Physical Exam  LMP  (LMP Unknown)     Constitutional:  see vital signs  Gen: well-developed, normocephalic/atraumatic, well-groomed  Eyes: No inflammation or discharge of conjunctiva or lids; sclera clear   Pharynx: no inflammation, lesion, or mass of lips  Neck: supple, no masses, non-distended  MSK: no inflammation, lesion, mass, or clubbing of nails and digits except for other than mentioned below  SKIN: no visible rashes or skin lesions  Pulmonary/Chest: Effort normal  No respiratory distress  NEURO: cranial nerves grossly intact  PSYCH:  Alert and oriented to person, place, and time; recent and remote memory intact; mood normal, no depression, anxiety, or agitation, judgment and insight good and intact     Ortho Exam       RIGHT HAND EXAM      Thumb -  There is a small palpable nodule at the level of metacarpophalangeal joint and A1 pulley on the volar aspect  There is a palpable triggering noted with thumb IP joint flexion  This is clinically consistent with a trigger thumb    Clinically intact distal neurovascular status  Long/middle finger -  Small palpable nodule at the level of metacarpophalangeal joint and the A1 pulley on the volar aspect  There is no significant triggering noted with middle finger flexion  Clinically intact distal neurovascular status  Ring finger -  Small palpable nodule at the level of metacarpophalangeal joint and the A1 pulley on the volar aspect  There is no significant triggering noted with the ring finger flexion  Clinically intact distal neurovascular status  Hand/upper extremity injection: R thumb A1  Universal Protocol:  Procedure performed by: (Dr Av Houston)  Consent: Verbal consent obtained  Risks and benefits: risks, benefits and alternatives were discussed  Consent given by: patient  Patient understanding: patient states understanding of the procedure being performed  Patient consent: the patient's understanding of the procedure matches consent given  Site marked: the operative site was marked  Radiology Images displayed and confirmed  If images not available, report reviewed: imaging studies available  Required items: required blood products, implants, devices, and special equipment available  Patient identity confirmed: verbally with patient    Supporting Documentation  Indications: therapeutic and pain   Procedure Details  Condition:trigger finger Location: thumb - R thumb A1   Needle size: 25 G  Ultrasound guidance: no  Approach: volar  Medications administered: 0 5 mL lidocaine 1 %; 3 mg betamethasone acetate-betamethasone sodium phosphate 6 (3-3) mg/mL    Patient tolerance: patient tolerated the procedure well with no immediate complications  Dressing:  Sterile dressing applied             Portions of the record may have been created with voice recognition software  Occasional wrong word or "sound alike" substitutions may have occurred due to the inherent limitations of voice recognition software   Please review the chart carefully and recognize, using context, where substitutions/typographical errors may have occurred

## 2021-09-21 ENCOUNTER — OFFICE VISIT (OUTPATIENT)
Dept: OBGYN CLINIC | Facility: OTHER | Age: 61
End: 2021-09-21
Payer: COMMERCIAL

## 2021-09-21 VITALS
SYSTOLIC BLOOD PRESSURE: 113 MMHG | HEIGHT: 62 IN | BODY MASS INDEX: 28.8 KG/M2 | WEIGHT: 156.53 LBS | HEART RATE: 93 BPM | DIASTOLIC BLOOD PRESSURE: 74 MMHG

## 2021-09-21 DIAGNOSIS — M65.311 TRIGGER THUMB OF RIGHT HAND: Primary | ICD-10-CM

## 2021-09-21 DIAGNOSIS — M25.641 FINGER STIFFNESS, RIGHT: ICD-10-CM

## 2021-09-21 PROCEDURE — 99213 OFFICE O/P EST LOW 20 MIN: CPT | Performed by: ORTHOPAEDIC SURGERY

## 2021-09-21 PROCEDURE — 3078F DIAST BP <80 MM HG: CPT | Performed by: ORTHOPAEDIC SURGERY

## 2021-09-21 PROCEDURE — 20550 NJX 1 TENDON SHEATH/LIGAMENT: CPT | Performed by: ORTHOPAEDIC SURGERY

## 2021-09-21 PROCEDURE — 3074F SYST BP LT 130 MM HG: CPT | Performed by: ORTHOPAEDIC SURGERY

## 2021-09-21 RX ORDER — BETAMETHASONE SODIUM PHOSPHATE AND BETAMETHASONE ACETATE 3; 3 MG/ML; MG/ML
3 INJECTION, SUSPENSION INTRA-ARTICULAR; INTRALESIONAL; INTRAMUSCULAR; SOFT TISSUE
Status: COMPLETED | OUTPATIENT
Start: 2021-09-21 | End: 2021-09-21

## 2021-09-21 RX ORDER — LIDOCAINE HYDROCHLORIDE 10 MG/ML
0.5 INJECTION, SOLUTION INFILTRATION; PERINEURAL
Status: COMPLETED | OUTPATIENT
Start: 2021-09-21 | End: 2021-09-21

## 2021-09-21 RX ADMIN — LIDOCAINE HYDROCHLORIDE 0.5 ML: 10 INJECTION, SOLUTION INFILTRATION; PERINEURAL at 10:05

## 2021-09-21 RX ADMIN — BETAMETHASONE SODIUM PHOSPHATE AND BETAMETHASONE ACETATE 3 MG: 3; 3 INJECTION, SUSPENSION INTRA-ARTICULAR; INTRALESIONAL; INTRAMUSCULAR; SOFT TISSUE at 10:05

## 2021-09-28 ENCOUNTER — NURSE TRIAGE (OUTPATIENT)
Dept: OTHER | Facility: OTHER | Age: 61
End: 2021-09-28

## 2021-09-28 NOTE — TELEPHONE ENCOUNTER
Regarding: Colonoscopy prep  ----- Message from Janay Rodriguez sent at 9/28/2021  1:00 PM EDT -----  "What can I eat/ cannot eat for my colonoscopy?"

## 2021-09-28 NOTE — TELEPHONE ENCOUNTER
Colonoscopy scheduled with Dr Loyd Mckeon on 9/30/2021  I attempted to call the patient with a [employer] twice  Both times I waited about 9 minutes and no  was available

## 2021-09-28 NOTE — TELEPHONE ENCOUNTER
Clinical pool: Please call this patient about her colonoscopy prep  I was not able to call her due to inability to reach a

## 2021-09-29 ENCOUNTER — TELEPHONE (OUTPATIENT)
Dept: GASTROENTEROLOGY | Facility: HOSPITAL | Age: 61
End: 2021-09-29

## 2021-09-30 ENCOUNTER — ANESTHESIA (OUTPATIENT)
Dept: GASTROENTEROLOGY | Facility: HOSPITAL | Age: 61
End: 2021-09-30

## 2021-09-30 ENCOUNTER — HOSPITAL ENCOUNTER (OUTPATIENT)
Dept: GASTROENTEROLOGY | Facility: HOSPITAL | Age: 61
Setting detail: OUTPATIENT SURGERY
Discharge: HOME | End: 2021-09-30
Attending: INTERNAL MEDICINE | Admitting: INTERNAL MEDICINE
Payer: COMMERCIAL

## 2021-09-30 ENCOUNTER — ANESTHESIA EVENT (OUTPATIENT)
Dept: GASTROENTEROLOGY | Facility: HOSPITAL | Age: 61
End: 2021-09-30

## 2021-09-30 VITALS
DIASTOLIC BLOOD PRESSURE: 98 MMHG | HEART RATE: 69 BPM | OXYGEN SATURATION: 97 % | RESPIRATION RATE: 20 BRPM | SYSTOLIC BLOOD PRESSURE: 170 MMHG | TEMPERATURE: 97.7 F

## 2021-09-30 DIAGNOSIS — R10.9 ABDOMINAL PAIN: ICD-10-CM

## 2021-09-30 PROCEDURE — 45380 COLONOSCOPY AND BIOPSY: CPT | Performed by: INTERNAL MEDICINE

## 2021-09-30 PROCEDURE — 88305 TISSUE EXAM BY PATHOLOGIST: CPT | Performed by: PATHOLOGY

## 2021-09-30 PROCEDURE — 43239 EGD BIOPSY SINGLE/MULTIPLE: CPT | Performed by: INTERNAL MEDICINE

## 2021-09-30 RX ORDER — LIDOCAINE HYDROCHLORIDE 10 MG/ML
INJECTION, SOLUTION EPIDURAL; INFILTRATION; INTRACAUDAL; PERINEURAL AS NEEDED
Status: DISCONTINUED | OUTPATIENT
Start: 2021-09-30 | End: 2021-09-30

## 2021-09-30 RX ORDER — PROPOFOL 10 MG/ML
INJECTION, EMULSION INTRAVENOUS AS NEEDED
Status: DISCONTINUED | OUTPATIENT
Start: 2021-09-30 | End: 2021-09-30

## 2021-09-30 RX ORDER — SODIUM CHLORIDE 9 MG/ML
INJECTION, SOLUTION INTRAVENOUS CONTINUOUS PRN
Status: DISCONTINUED | OUTPATIENT
Start: 2021-09-30 | End: 2021-09-30

## 2021-09-30 RX ADMIN — LIDOCAINE HYDROCHLORIDE 100 MG: 10 INJECTION, SOLUTION EPIDURAL; INFILTRATION; INTRACAUDAL; PERINEURAL at 09:41

## 2021-09-30 RX ADMIN — PROPOFOL 20 MG: 10 INJECTION, EMULSION INTRAVENOUS at 10:04

## 2021-09-30 RX ADMIN — PROPOFOL 40 MG: 10 INJECTION, EMULSION INTRAVENOUS at 10:00

## 2021-09-30 RX ADMIN — PROPOFOL 40 MG: 10 INJECTION, EMULSION INTRAVENOUS at 09:50

## 2021-09-30 RX ADMIN — SODIUM CHLORIDE: 0.9 INJECTION, SOLUTION INTRAVENOUS at 09:32

## 2021-09-30 RX ADMIN — PROPOFOL 40 MG: 10 INJECTION, EMULSION INTRAVENOUS at 09:55

## 2021-09-30 RX ADMIN — PROPOFOL 150 MG: 10 INJECTION, EMULSION INTRAVENOUS at 09:41

## 2021-09-30 RX ADMIN — PROPOFOL 40 MG: 10 INJECTION, EMULSION INTRAVENOUS at 09:46

## 2021-09-30 NOTE — ANESTHESIA PREPROCEDURE EVALUATION
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, of the knee, left   (+) Osteoarthritis of knee      NEURO/PSYCH   (+) Anxiety   (+) Depression   (+) Headache   (+) History of Helicobacter pylori infection      PULMONARY   (+) ALEC (obstructive sleep apnea)        Physical Exam    Airway    Mallampati score: II  TM Distance: >3 FB  Neck ROM: full     Dental   No notable dental hx     Cardiovascular  Cardiovascular exam normal    Pulmonary  Pulmonary exam normal     Other Findings        Anesthesia Plan  ASA Score- 2     Anesthesia Type- IV sedation with anesthesia with ASA Monitors  Additional Monitors:   Airway Plan:           Plan Factors-Exercise tolerance (METS): >4 METS  Chart reviewed  EKG reviewed  Existing labs reviewed  Patient summary reviewed  Patient is not a current smoker  Induction- intravenous  Postoperative Plan-     Informed Consent- Anesthetic plan and risks discussed with patient  I personally reviewed this patient with the CRNA  Discussed and agreed on the Anesthesia Plan with the CRNA  Sharon Bishop

## 2021-09-30 NOTE — ANESTHESIA POSTPROCEDURE EVALUATION
Post-Op Assessment Note    CV Status:  Stable  Pain Score: 0    Pain management: adequate     Mental Status:  Awake and alert   PONV Controlled:  None   Airway Patency:  Patent      Post Op Vitals Reviewed: Yes      Staff: CRNA         No complications documented      BP   147/88   Temp  97 7   Pulse  81   Resp   10   SpO2   96

## 2021-10-01 ENCOUNTER — TELEPHONE (OUTPATIENT)
Dept: GASTROENTEROLOGY | Facility: CLINIC | Age: 61
End: 2021-10-01

## 2021-12-10 ENCOUNTER — OFFICE VISIT (OUTPATIENT)
Dept: OBGYN CLINIC | Facility: OTHER | Age: 61
End: 2021-12-10
Payer: COMMERCIAL

## 2021-12-10 VITALS
BODY MASS INDEX: 29.08 KG/M2 | WEIGHT: 158 LBS | SYSTOLIC BLOOD PRESSURE: 143 MMHG | DIASTOLIC BLOOD PRESSURE: 81 MMHG | HEIGHT: 62 IN | HEART RATE: 73 BPM

## 2021-12-10 DIAGNOSIS — M65.311 TRIGGER THUMB OF RIGHT HAND: Primary | ICD-10-CM

## 2021-12-10 PROCEDURE — 99213 OFFICE O/P EST LOW 20 MIN: CPT | Performed by: ORTHOPAEDIC SURGERY

## 2021-12-10 PROCEDURE — 3077F SYST BP >= 140 MM HG: CPT | Performed by: ORTHOPAEDIC SURGERY

## 2021-12-10 PROCEDURE — 3079F DIAST BP 80-89 MM HG: CPT | Performed by: ORTHOPAEDIC SURGERY

## 2021-12-14 ENCOUNTER — HOSPITAL ENCOUNTER (OUTPATIENT)
Dept: RADIOLOGY | Age: 61
Discharge: HOME | End: 2021-12-14
Payer: COMMERCIAL

## 2021-12-14 VITALS — WEIGHT: 158 LBS | HEIGHT: 62 IN | BODY MASS INDEX: 29.08 KG/M2

## 2021-12-14 DIAGNOSIS — Z12.39 BREAST CANCER SCREENING: ICD-10-CM

## 2021-12-14 DIAGNOSIS — Z12.31 ENCOUNTER FOR SCREENING MAMMOGRAM FOR MALIGNANT NEOPLASM OF BREAST: ICD-10-CM

## 2021-12-14 PROCEDURE — 77067 SCR MAMMO BI INCL CAD: CPT

## 2021-12-14 PROCEDURE — 77063 BREAST TOMOSYNTHESIS BI: CPT

## 2021-12-26 ENCOUNTER — HOSPITAL ENCOUNTER (EMERGENCY)
Facility: HOSPITAL | Age: 61
Discharge: HOME | End: 2021-12-26
Attending: EMERGENCY MEDICINE | Admitting: EMERGENCY MEDICINE
Payer: COMMERCIAL

## 2021-12-26 VITALS
OXYGEN SATURATION: 98 % | RESPIRATION RATE: 18 BRPM | HEART RATE: 98 BPM | DIASTOLIC BLOOD PRESSURE: 81 MMHG | SYSTOLIC BLOOD PRESSURE: 139 MMHG | TEMPERATURE: 98.3 F

## 2021-12-26 DIAGNOSIS — Z20.822 CONTACT WITH AND (SUSPECTED) EXPOSURE TO COVID-19: Primary | ICD-10-CM

## 2021-12-26 PROCEDURE — U0003 INFECTIOUS AGENT DETECTION BY NUCLEIC ACID (DNA OR RNA); SEVERE ACUTE RESPIRATORY SYNDROME CORONAVIRUS 2 (SARS-COV-2) (CORONAVIRUS DISEASE [COVID-19]), AMPLIFIED PROBE TECHNIQUE, MAKING USE OF HIGH THROUGHPUT TECHNOLOGIES AS DESCRIBED BY CMS-2020-01-R: HCPCS | Performed by: EMERGENCY MEDICINE

## 2021-12-26 PROCEDURE — 99282 EMERGENCY DEPT VISIT SF MDM: CPT

## 2021-12-26 PROCEDURE — 99281 EMR DPT VST MAYX REQ PHY/QHP: CPT | Performed by: EMERGENCY MEDICINE

## 2021-12-26 PROCEDURE — U0005 INFEC AGEN DETEC AMPLI PROBE: HCPCS | Performed by: EMERGENCY MEDICINE

## 2021-12-26 NOTE — ED PROVIDER NOTES
HPI: Patient is a 57 y o  female who presents with  no symptoms, requires screening  The patient has had contact with people with similar symptoms  The patient was exposed to [place] during a work party 4 days ago, and there been for confirmed cases of COVID from attendees of the party  The patient has not taken any medication  No Known Allergies    Past Medical History:   Diagnosis Date    Abnormal mammogram     RESOLVED: [id number]    Anxiety     Anxiety     Arthritis     Depression     Depression     Diverticulosis     GERD (gastroesophageal reflux disease)     Helicobacter pylori infection     Hyperlipidemia     Seborrheic keratosis     LAST ASSESSED: [id number]    Sleep apnea     Sleep apnea     Tinea pedis of left foot 9/16/2019      Past Surgical History:   Procedure Laterality Date    ABDOMINOPLASTY      abdomin    COLONOSCOPY  2017    [place] COLONOSCOPY FLX DX W/COLLJ SPEC WHEN PFRMD N/A 8/11/2016    Procedure: COLONOSCOPY;  Surgeon: Noa Howell MD;  Location:  GI LAB; Service: Gastroenterology    [place] COLONOSCOPY FLX DX W/COLLJ [facility] WHEN PFRMD N/A 8/29/2017    Procedure: EGD AND COLONOSCOPY;  Surgeon: Noa Howell MD;  Location: [facility] GI LAB; Service: Gastroenterology    TUBAL LIGATION      TUBAL LIGATION      UPPER GASTROINTESTINAL ENDOSCOPY  2017     Social History     Tobacco Use    Smoking status: Never Smoker    Smokeless tobacco: Never Used   Vaping Use    Vaping Use: Never used   Substance Use Topics    Alcohol use: No    Drug use: No       Nursing notes reviewed  Physical Exam:  ED Triage Vitals [12/26/21 1700]   Temperature Pulse Respirations Blood Pressure SpO2   98 3 °F (36 8 °C) 98 18 139/81 98 %      Temp Source Heart Rate Source Patient Position - Orthostatic VS BP Location FiO2 (%)   Oral Monitor Sitting Left arm --      Pain Score       --           ROS: Positive for No sx, the remainder of a 10 organ system ROS was otherwise unremarkable    General: awake, alert, no acute distress    Head: normocephalic, atraumatic    Eyes: no scleral icterus  Ears: external ears normal, hearing grossly intact  Nose: external exam grossly normal, negative nasal discharge  Neck: symmetric, No JVD noted, trachea midline  Pulmonary: no respiratory distress, no tachypnea noted  Cardiovascular: appears well perfused  Abdomen: no distention noted  Musculoskeletal: no deformities noted, tone normal  Neuro: grossly non-focal  Psych: mood and affect appropriate    The patient is stable and has a history and physical exam consistent with a viral illness  COVID19 testing has been performed  I considered the patient's other medical conditions as applicable/noted above in my medical decision making  The patient is stable upon discharge  The plan is for supportive care at home  The patient (and any family present) verbalized understanding of the discharge instructions and warnings that would necessitate return to the Emergency Department  All questions were answered prior to discharge  Medications - No data to display  Final diagnoses:   Contact with and (suspected) exposure to covid-19     Time reflects when diagnosis was documented in both MDM as applicable and the Disposition within this note     Time User Action Codes Description Comment    12/26/2021  5:16 PM Isabel Chua Add [Z20 [id number]] Contact with and (suspected) exposure to covid-19       ED Disposition     ED Disposition Condition Date/Time Comment    Discharge Stable Sun Dec 26, 2021  5:16 PM José Luis Michele discharge to home/self care              Follow-up Information     Follow up With Specialties Details Why Contact Info Additional [address] [facility] Internal Medicine Call  As needed Sal[address] [phone] [facility] 10 [phone]  [facility] [address], [address], [address]   [address] [place] [address] [facility] Emergency Department Emergency Medicine Go to  If symptoms worsen [address]  [address] [facility] [address] Emergency Department, [address], [facility], [place], [address]        Discharge Medication List as of 12/26/2021  5:16 PM      CONTINUE these medications which have NOT CHANGED    Details   acetaminophen (TYLENOL) 500 mg tablet Take 1 tablet (500 mg total) by mouth daily as needed for headaches, Starting Wed 1/30/2019, Normal      ARIPiprazole (ABILIFY) 5 mg tablet Take 5 mg by mouth daily, Historical Med      Ascorbic Acid (vitamin C) 1000 MG tablet Take 1,000 mg by mouth daily, Historical Med      bisacodyl (DULCOLAX) 5 mg EC tablet Take 2 tablets (10 mg total) by mouth once for 1 dose Take once at 4pm evening before the procedure, Starting Wed 8/18/2021, Normal      Calcium Polycarbophil (FIBERCON PO) Take by mouth, Historical Med      Cholecalciferol (VITAMIN D3) 125 MCG (5000 UT) CAPS Take by mouth, Historical Med      clonazePAM (KlonoPIN) 1 mg tablet Take 1 mg by mouth daily at bedtime as needed for anxiety    , Historical Med      Diclofenac Sodium (VOLTAREN) 1 % Apply 2 g topically 4 (four) times a day, Starting Sun 3/21/2021, Normal      dicyclomine (BENTYL) 20 mg tablet Take 1 tablet (20 mg total) by mouth every 6 (six) hours, Starting Wed 7/28/2021, Normal      DULoxetine (CYMBALTA) 30 mg delayed release capsule Take 30 mg by mouth daily , Historical Med      hydrOXYzine HCL (ATARAX) 25 mg tablet Take 25 mg by mouth every 6 (six) hours as needed for itching  , Historical Med      hyoscyamine (ANASPAZ,LEVSIN) 0 125 MG tablet Take 1 tablet (0 125 mg total) by mouth every 4 (four) hours as needed for cramping, Starting Wed 8/18/2021, Normal      ibuprofen (MOTRIN) 600 mg tablet Take 1 tablet (600 mg total) by mouth every 6 (six) hours as needed for moderate pain, Starting Sun 3/21/2021, Normal      Methylcobalamin (B-12) 5000 MCG TBDP Take by mouth, Historical Med      omeprazole (PriLOSEC) 20 mg delayed release capsule take 1 capsule by mouth once daily, Normal      polyethylene glycol (MiraLax) 17 g packet Take 238 g by mouth once for 1 dose Please mix in 64 oz of water /Gatorade  At 4:00 p m  evening of procedure Drink 32 oz at the rate of 8 oz every 15 minutes  Drink the remaining 32 oz at the rate of 8 oz every 15 minutes at 4:00 a m morning of joyce luna , Starting Wed 8/18/2021, Normal           No discharge procedures on file      Electronically Signed by       Tyler Alexis MD  12/26/21 [id number]

## 2021-12-26 NOTE — ED ATTENDING ATTESTATION
12/26/2021  IAmauri MD, saw and evaluated the patient  I have discussed the patient with the resident/non-physician practitioner and agree with the resident's/non-physician practitioner's findings, Plan of Care, and MDM as documented in the resident's/non-physician practitioner's note, except where noted  All available labs and Radiology studies were reviewed  I was present for key portions of any procedure(s) performed by the resident/non-physician practitioner and I was immediately available to provide assistance  At this point I agree with the current assessment done in the Emergency Department  I have conducted an independent evaluation of this patient a history and physical is as follows:  Patient has been vaccinated and received booster, patient is here with COVID exposure 4 days ago at work    Patient does not have any symptoms and has benign physical exam   Agree with documentation  ED Course         Critical Care Time  Procedures

## 2021-12-26 NOTE — Clinical Note
Pamela Martinez was seen and treated in our emergency department on 12/26/2021  Other - See Comments    N/A    Diagnosis: Contact with Shai Patricia  is off the rest of the shift today, may return to work on return date  She may return on this date: 01/05/2022    Asymptomatic COVID exposure  COVID test results are pending  If positive, please quarantine for 10 days  If you have any questions or concerns, please don't hesitate to call        Misael Oconnor MD    ______________________________           _______________          _______________  JOVANNY BARNES VENITA [facility] Representative                              Date                                Time

## 2021-12-27 LAB — SARS-COV-2 RNA RESP QL NAA+PROBE: NEGATIVE

## 2022-01-03 ENCOUNTER — OFFICE VISIT (OUTPATIENT)
Dept: INTERNAL MEDICINE CLINIC | Facility: CLINIC | Age: 62
End: 2022-01-03

## 2022-01-03 VITALS
SYSTOLIC BLOOD PRESSURE: 129 MMHG | TEMPERATURE: 98 F | DIASTOLIC BLOOD PRESSURE: 81 MMHG | HEIGHT: 61 IN | HEART RATE: 80 BPM | BODY MASS INDEX: 30.25 KG/M2 | WEIGHT: 160.2 LBS

## 2022-01-03 DIAGNOSIS — R22.32 ARM MASS, LEFT: Primary | ICD-10-CM

## 2022-01-03 DIAGNOSIS — E78.5 HYPERLIPIDEMIA, UNSPECIFIED HYPERLIPIDEMIA TYPE: ICD-10-CM

## 2022-01-03 PROCEDURE — 99213 OFFICE O/P EST LOW 20 MIN: CPT | Performed by: INTERNAL MEDICINE

## 2022-01-03 RX ORDER — ATORVASTATIN CALCIUM 10 MG/1
10 TABLET, FILM COATED ORAL DAILY
Qty: 30 TABLET | Refills: 2 | Status: SHIPPED | OUTPATIENT
Start: 2022-01-03 | End: 2022-03-31

## 2022-01-03 NOTE — PROGRESS NOTES
[facility]  INTERNAL MEDICINE OFFICE VISIT     PATIENT INFORMATION     Pamela Martinez   57 y o  female   MRN: [id number]    ASSESSMENT/PLAN     Diagnoses and all orders for this visit:    Arm mass, left        - the patient complained of a small mobile mass on her left upper extremity under the skin, likely a lipoma  She states that the mass has been present for around 1 year  It is nonpainful but does bother the patient and she would like to have it removed  Will order an ultrasound to identify the mass and can consider surgery consult following results  -     US MSK limited; Future    Hyperlipidemia, unspecified hyperlipidemia type        - patient's last lipid panel was done on 03/16/2020 and showed an LDL of 115 and a cholesterol of 168  She has an ASCVD risk of 9 3% currently  Given all this the patient was agreeable to starting statin therapy  -     atorvastatin (LIPITOR) 10 mg tablet; Take 1 tablet (10 mg total) by mouth daily    Dry Mouth         - the patient complained of intermittent dry mouth  She states that she drinks around 5 bottles of water daily and does not experience dry eyes  - patient was instructed to use Biotene mouthwash as needed for dryness    BMI Counseling: Body mass index is 30 27 kg/m²  The BMI is above normal  Nutrition recommendations include reducing portion sizes, decreasing overall calorie intake and 3-5 servings of fruits/vegetables daily  Exercise recommendations include moderate aerobic physical activity for 150 minutes/week and exercising 3-5 times per week  Schedule a follow-up appointment in 6 months      HEALTH MAINTENANCE     Immunization History   Administered Date(s) Administered    COVID-19 PFIZER VACCINE 0 3 ML IM 03/29/2021, 04/19/2021, 12/28/2021    Influenza Quadrivalent Preservative Free 3 years and older IM 11/14/2017    Influenza, recombinant, quadrivalent,injectable, preservative free 09/11/2018, 09/24/2020    Influenza, seasonal, injectable 08/12/2014    Pneumococcal Polysaccharide PPV23 09/11/2018    Tdap 02/12/2015     CHIEF COMPLAINT     Chief Complaint   Patient presents with    Annual Exam      HISTORY OF PRESENT ILLNESS     The patient is a 57-year-old female with a past medical history of prediabetes, arthritis, anxiety, GERD, ALEC, chondrocalcinosis and right-sided trigger finger of her thumb who presented to the clinic today for an annual physical   Overall today the patient was feeling well  Her last hemoglobin A1c was 6 1 in July of 2021  She is not due for another hemoglobin A1c check until the end of this month  Her blood pressure was also well controlled at 129/81  Her primary complaint is a small mass on her left arm under the skin  She also complained of occasional dry mouth  She denies any eye dryness and states that she drinks lots of water daily  Otherwise the patient had no other acute complaints  She refused the flu vaccine  REVIEW OF SYSTEMS     Review of Systems   Constitutional: Negative for activity change, appetite change, chills, diaphoresis, fatigue and fever  HENT: Negative for congestion, ear discharge, ear pain, hearing loss, sinus pressure, sinus pain and sore throat  Dry Mouth   Eyes: Negative for pain, discharge, redness and itching  Respiratory: Negative for cough, chest tightness, shortness of breath and wheezing  Cardiovascular: Negative for chest pain, palpitations and leg swelling  Gastrointestinal: Negative for abdominal distention, abdominal pain, blood in stool, constipation, diarrhea, nausea and vomiting  Genitourinary: Negative for difficulty urinating, dysuria, flank pain and frequency  Musculoskeletal: Negative for arthralgias, back pain and gait problem  Skin: Negative for color change, pallor and rash  Neurological: Negative for dizziness, weakness, light-headedness, numbness and headaches     Psychiatric/Behavioral: Negative for agitation, behavioral problems, confusion and decreased concentration  OBJECTIVE     Vitals:    01/03/22 1420   BP: 129/81   BP Location: Right arm   Patient Position: Sitting   Cuff Size: Large   Pulse: 80   Temp: 98 °F (36 7 °C)   TempSrc: Temporal   Weight: 72 7 kg (160 lb 3 2 oz)   Height: 5' 1" (1 549 m)     Physical Exam  Constitutional:       Appearance: She is well-developed  HENT:      Head: Normocephalic and atraumatic  Nose: Nose normal       Mouth/Throat:      Mouth: Mucous membranes are dry  Eyes:      Conjunctiva/sclera: Conjunctivae normal       Pupils: Pupils are equal, round, and reactive to light  Neck:      Vascular: No JVD  Cardiovascular:      Rate and Rhythm: Normal rate and regular rhythm  Heart sounds: Normal heart sounds  No murmur heard  No friction rub  No gallop  Pulmonary:      Effort: Pulmonary effort is normal  No respiratory distress  Breath sounds: Normal breath sounds  No stridor  No wheezing or rales  Chest:      Chest wall: No tenderness  Abdominal:      General: Bowel sounds are normal  There is no distension  Palpations: Abdomen is soft  There is no mass  Tenderness: There is no abdominal tenderness  There is no guarding or rebound  Hernia: No hernia is present  Musculoskeletal:         General: No tenderness or deformity  Right lower leg: No edema  Left lower leg: No edema  Comments: Small mobile 1cm-1cm mass on left upper extremity  Skin:     General: Skin is warm and dry  Neurological:      Mental Status: She is alert and oriented to person, place, and time  Psychiatric:         Mood and Affect: Mood normal          Behavior: Behavior normal          Thought Content:  Thought content normal          Judgment: Judgment normal        CURRENT MEDICATIONS     Current Outpatient Medications:     acetaminophen (TYLENOL) 500 mg tablet, Take 1 tablet (500 mg total) by mouth daily as needed for headaches, Disp: 30 tablet, Rfl: 3    ARIPiprazole (ABILIFY) 5 mg tablet, Take 5 mg by mouth daily, Disp: , Rfl:     Ascorbic Acid (vitamin C) 1000 MG tablet, Take 1,000 mg by mouth daily, Disp: , Rfl:     Calcium Polycarbophil (FIBERCON PO), Take by mouth, Disp: , Rfl:     Cholecalciferol (VITAMIN D3) 125 MCG (5000 UT) CAPS, Take by mouth, Disp: , Rfl:     clonazePAM (KlonoPIN) 1 mg tablet, Take 1 mg by mouth daily at bedtime as needed for anxiety  , Disp: , Rfl:     Diclofenac Sodium (VOLTAREN) 1 %, Apply 2 g topically 4 (four) times a day, Disp: 100 g, Rfl: 0    dicyclomine (BENTYL) 20 mg tablet, Take 1 tablet (20 mg total) by mouth every 6 (six) hours, Disp: 120 tablet, Rfl: 1    DULoxetine (CYMBALTA) 30 mg delayed release capsule, Take 30 mg by mouth daily , Disp: , Rfl:     hydrOXYzine HCL (ATARAX) 25 mg tablet, Take 25 mg by mouth every 6 (six) hours as needed for itching  , Disp: , Rfl:     Methylcobalamin (B-12) 5000 MCG TBDP, Take by mouth, Disp: , Rfl:     omeprazole (PriLOSEC) 20 mg delayed release capsule, take 1 capsule by mouth once daily, Disp: 90 capsule, Rfl: 3    atorvastatin (LIPITOR) 10 mg tablet, Take 1 tablet (10 mg total) by mouth daily, Disp: 30 tablet, Rfl: 2    bisacodyl (DULCOLAX) 5 mg EC tablet, Take 2 tablets (10 mg total) by mouth once for 1 dose Take once at 4pm evening before the procedure (Patient not taking: Reported on 1/3/2022 ), Disp: 2 tablet, Rfl: 0    hyoscyamine (ANASPAZ,LEVSIN) 0 125 MG tablet, Take 1 tablet (0 125 mg total) by mouth every 4 (four) hours as needed for cramping, Disp: 30 tablet, Rfl: 0    ibuprofen (MOTRIN) 600 mg tablet, Take 1 tablet (600 mg total) by mouth every 6 (six) hours as needed for moderate pain (Patient not taking: Reported on 1/3/2022 ), Disp: 30 tablet, Rfl: 0    polyethylene glycol (MiraLax) 17 g packet, Take 238 g by mouth once for 1 dose Please mix in 64 oz of water /Gatorade    At 4:00 p m  evening of procedure Drink 32 oz at the rate of 8 oz every 15 minutes  Drink the remaining 32 oz at the rate of 8 oz every 15 minutes at 4:00 a m morning of procedure  (Patient not taking: Reported on 1/3/2022 ), Disp: 238 g, Rfl: 0    PAST MEDICAL & SURGICAL HISTORY     Past Medical History:   Diagnosis Date    Abnormal mammogram     RESOLVED: [id number]    Anxiety     Anxiety     Arthritis     Depression     Depression     Diverticulosis     GERD (gastroesophageal reflux disease)     Helicobacter pylori infection     Hyperlipidemia     Seborrheic keratosis     LAST ASSESSED: [id number]    Sleep apnea     Sleep apnea     Tinea pedis of left foot 9/16/2019     Past Surgical History:   Procedure Laterality Date    ABDOMINOPLASTY      abdomin    COLONOSCOPY  2017    CA COLONOSCOPY FLX DX W/COLLJ SPEC WHEN PFRMD N/A 8/11/2016    Procedure: COLONOSCOPY;  Surgeon: Umair Daniels MD;  Location:  GI LAB; Service: Gastroenterology    CA COLONOSCOPY FLX DX W/COLLJ [facility] WHEN PFRMD N/A 8/29/2017    Procedure: EGD AND COLONOSCOPY;  Surgeon: Umair Daniels MD;  Location: [facility] GI LAB;   Service: Gastroenterology    TUBAL LIGATION      TUBAL LIGATION      UPPER GASTROINTESTINAL ENDOSCOPY  2017     SOCIAL & FAMILY HISTORY     Social History     Socioeconomic History    Marital status: Single     Spouse name: Not on file    Number of children: Not on file    Years of education: Not on file    Highest education level: Not on file   Occupational History    Not on file   Tobacco Use    Smoking status: Never Smoker    Smokeless tobacco: Never Used   Vaping Use    Vaping Use: Never used   Substance and Sexual Activity    Alcohol use: No    Drug use: No    Sexual activity: Yes     Partners: Male     Birth control/protection: Post-menopausal   Other Topics Concern    Not on file   Social History Narrative    Not on file     Social Determinants of Health     Financial Resource Strain: Low Risk     Difficulty of Paying Living Expenses: Not hard at all   Food Insecurity: No Food Insecurity    Worried About [address] in the Last Year: Never true    Ran Out of Food in the Last Year: Never true   Transportation Needs: No Transportation Needs    Lack of Transportation (Medical): No    Lack of Transportation (Non-Medical): No   Physical Activity: Inactive    Days of Exercise per Week: 0 days    Minutes of Exercise per Session: 0 min   Stress: Stress Concern Present    Feeling of Stress : To some extent   Social Connections:  Moderately Isolated    Frequency of Communication with Friends and Family: More than three times a week    Frequency of Social Gatherings with Friends and Family: More than three times a week    Attends Anabaptism Services: More than 4 times per year    Active Member of [employer] or Organizations: No    Attends Club or Organization Meetings: Never    Marital Status:    Intimate Partner Violence: Not At Risk    Fear of Current or Ex-Partner: No    Emotionally Abused: No    Physically Abused: No    Sexually Abused: No   Housing Stability: Not on file     Social History     Substance and Sexual Activity   Alcohol Use No     Social History     Substance and Sexual Activity   Drug Use No     Social History     Tobacco Use   Smoking Status Never Smoker   Smokeless Tobacco Never Used     Family History   Problem Relation Age of Onset    Diabetes Mother     Hypertension Mother     Heart disease Mother     Diabetes Paternal Grandfather     Alzheimer's disease Father     No Known Problems Sister     No Known Problems Brother     Depression Daughter     ADD / ADHD Son     Depression Son     Diabetes Maternal Grandmother     Stomach cancer Maternal Grandfather     No Known Problems Paternal Grandmother     No Known Problems Sister     Heart disease Sister     Intellectual disability Brother     Schizophrenia Brother     Other Son          as an infant    [facility] Other Daughter          at 2-2 mths old   Jose Garcia Other Son          as an infant      ==  Tiera Lama MD  IM Resident, PGY-2  [address] [facility] [address]   [facility] , [address] [address] [address] [address] [facility]  Office: [phone]  Fax: [phone]

## 2022-01-03 NOTE — PATIENT INSTRUCTIONS
Please obtain ultrasound imaging of your left arm  Please start taking atorvastatin 10 mg daily  Please use Biotene dry mouth liquid as needed  Please continue with lifestyle modifications including proper diet and exercise

## 2022-02-02 ENCOUNTER — OFFICE VISIT (OUTPATIENT)
Dept: OBGYN CLINIC | Facility: HOSPITAL | Age: 62
End: 2022-02-02
Payer: COMMERCIAL

## 2022-02-02 VITALS
BODY MASS INDEX: 30.21 KG/M2 | HEIGHT: 61 IN | WEIGHT: 160 LBS | SYSTOLIC BLOOD PRESSURE: 118 MMHG | HEART RATE: 80 BPM | DIASTOLIC BLOOD PRESSURE: 77 MMHG

## 2022-02-02 DIAGNOSIS — M65.331 TRIGGER MIDDLE FINGER OF RIGHT HAND: ICD-10-CM

## 2022-02-02 DIAGNOSIS — M65.341 TRIGGER RING FINGER OF RIGHT HAND: ICD-10-CM

## 2022-02-02 DIAGNOSIS — M65.311 TRIGGER THUMB OF RIGHT HAND: Primary | ICD-10-CM

## 2022-02-02 PROCEDURE — 3074F SYST BP LT 130 MM HG: CPT | Performed by: ORTHOPAEDIC SURGERY

## 2022-02-02 PROCEDURE — 99214 OFFICE O/P EST MOD 30 MIN: CPT | Performed by: ORTHOPAEDIC SURGERY

## 2022-02-02 PROCEDURE — 3078F DIAST BP <80 MM HG: CPT | Performed by: ORTHOPAEDIC SURGERY

## 2022-02-02 RX ORDER — CEFAZOLIN SODIUM 1 G/50ML
1000 SOLUTION INTRAVENOUS ONCE
Status: CANCELLED | OUTPATIENT
Start: 2022-02-02 | End: 2022-02-02

## 2022-02-02 NOTE — PATIENT INSTRUCTIONS
¿Qué es? La tenosinovitis estenosante, comúnmente conocida rafael dedo (o pulgar) en resorte o en gatillo, afecta las poleas y tendones de la mano que flexionan los dedos  Los tendones trabajan rafael largas cuerdas que [employer] músculos del antebrazo con los huesos de los dedos y del pulgar  En los dedos, las poleas son Carli Gina serie de anillos que adry un túnel a través del cual se desliza el tendón, en forma similar a las guías de mango caña de pescar, a través de las cuales debe pasar la línea (o el tendón)  Estas poleas mantienen el tendón jeanne cerca del [place]  Tanto los tendones rafael el túnel tienen un revestimiento que permite el fácil deslizamiento del tendón a través de las poleas (anil la [place] 1)  El dedo/pulgar en resorte se presenta cuando la polea en la base del dedo se hincha demasiado y constriñe el tendón, que por ello no puede moverse libremente dentro de la polea  A veces en el tendón se forma un nódulo (nudo), o mango hinchazón de cain recubrimiento  Debido al aumento de la resistencia al deslizamiento del tendón a través de la polea, se puede sentir dolor, un chasquido o un atascamiento del dedo o el pulgar (anil la [place] 2)  Cuando el tendón se atasca, se produce mayor inflamación e hinchazón  [place] origina un círculo amy de atascamiento, inflamación e hinchazón  En ocasiones el dedo [place], y es difícil enderezarlo o flexionarlo  ¿Cuál es la causa? Las causas de esta condición no siempre pueden definirse con claridad  Algunos dedos en resorte están relacionados con afecciones tales rafael la artritis reumatoide, la gota o la diabetes  Ocasionalmente un trauma localizado en la villa o la base del dedo puede ser un factor, cheryl en la mayoría de los casos no hay mango causa claramente definida  Señales y síntomas  El dedo/pulgar en resorte puede comenzar con mango molestia en la base del dedo o el pulgar, en el punto de unión con la villa   Esta anastasiia es a menudo muy sensible a la presión localizada, y a menudo puede hallarse en poonam un nódulo  Cuando el dedo comienza a trabarse o quedar en resorte, el paciente puede pensar que el problema está en el nudillo intermedio del dedo o el nudillo distal del pulgar, porque el tendón que se atasca es el que mueve estas articulaciones  Camillia Priyanka meta del tratamiento del dedo/pulgar en resorte consiste en eliminar el atascamiento o traba y permitir un movimiento completo del dedo o el pulgar sin molestias  La hinchazón alrededor del tendón flexor y la vaina del tendón debe reducirse para que el tendón pueda deslizarse suavemente  A veces puede ayudar el uso de mango férula o un medicamento anti-inflamatorio por vía oral  El tratamiento puede incluir también la modificación de las actividades para reducir la hinchazón  A menudo, mango inyección de esteroides en la anastasiia que rodea al tendón y la polea resulta efectiva para geovanni alivio al dedo/pulgar en resorte  Si las formas de [place] no quirúrgicas no [employer], puede  resultar recomendable la [place]  La misma se realiza en consultorios externos, usualmente con mango sencilla anestesia local  La meta de la cirugía es abrir la polea en la base del dedo, para que el tendon pueda deslizarse más libremente  El movimiento activo del dedo por lo general comienza inmediatamente después de la [place]  El uso normal de la mano usualmente puede recobrarse cuando [employer]  Algunos pacientes pueden experimentar sensibilidad, molestias e hinchazón alrededor de la anastasiia [employer]  Ocasionalmente se requiere terapia física en la mano luego de la cirugía para mango mejor recuperación de cain uso  © 2012 American Society for Surgery of the Hand  www handcare  [facility]  Margot Rubi MD  Chief of [address] [place] [address]  [phone]  You have chosen to undergo surgery for your condition  Any surgery is associated with risks of potential complications  Certain medical problems such as smoking, diabetes, thyroid disease, neuropathy, malnutrition or bleeding problems may increase your risk of complications  Complications after surgery are rare but include the following:   Bleeding Infection   Scar Pain   Tenderness Problems with healing   Damage to nerves Damage to blood vessels   Lack of desired results Need for further surgery   Numbness Stiffness   Problems with anesthesia Blood clots   Need for hardware removal (if inserted)    If bony work is done, other risks include:   Delayed bone healing   Lack of bone healing   Bones healing in wrong position    While these risks and complications are rare and infrequent they are still important to know and discuss  If you have any other questions, please let me know  _____________________________________________________________________________________  It can be difficult, but it is possible to get on with your life with only one hand for the first few weeks after your operation  The following are a few suggestions that may be helpful when you're recovering from your hand problem or from your hand surgery  While most of these suggestions are really only applicable if your dominant or your writing hand is affected, some apply to problems involving either hand  Most daily activities can be accomplished with some modifications, rather than the need for store bought devices  Before surgery, if you can:   Ask for help: Have others help you with: childcare, housework, and meals   Practice: Dressing, undressing, using the toilet, brushing your teeth, showering   Prepare: for the first few days after surgery  o Open and re-sealed cans and bottles that you may need   o Open medication containers and leave them easy-to-read open   Make sure you put these medication containers out of reach of children, even if you don't expect children to visit you   o Prepare and think about no-cutmeals-sandwiches, ground meats, etc     It helps to have   In the shower  o Plastic bags and rubber bands to cover bandages-the gunter that newspapers come in are good  Also, small trashcan liners will work  Use 2 of these at a time  The other option is an oversized rubber glove that may be purchased at a food store to aid in dishwashing   o A bottle sponge (soft sponge on a long stick)-for the armpit of yourgood hand  o Shower brush  o At [place] in the shower will help you to wash your hair  o A cotton terrycloth bathrobe to aid you in drying your back     In the bathroom  o Toothpaste, shampoo, etc  in a flip top or pump dispenser  o Flossers (dental floss on aYshaped handle)  o Consider an electric razor     In the bedroom  o Back scratcher  o Large sleeve shirts and tops  o Put away clothing which buttons, fastens or snaps in the back, or which uses drawstrings     In the kitchen  o A rubber jar opener Hank-to help open jars, but also to keep things from sliding around while you are working on them   o Double suction cup pads (the little octopus)-to hold items while you use or wash them  o An electric can opener with a lid magnet strong enough to hold the can in the air-for one-handed use   Consider Trish Sy and wear haircut  Darwyn Binet! [facility] Scar [facility]   You should keep your bandages dry and clean; change your dressings if you see drainage coming through your dressings   Signs of infection include increased pain, swelling, redness, warmth, and excessive or foul-smelling drainage  Please contact our office if you experience signs of infection   You may wash with soap and water after given permission   Sutures will be removed between 7-14 days after surgery if the wound is healed   Patients who smoke, have diabetes, or have nutritional problems may need longer to heal    Steri-strips may be placed across your incision at this time, which will fall off or peel away   To help prevent infection, do not submerse your incision area for 2-3 weeks (i e  no hot tubs, pools, ocean, dish water, fish ponds, fish tanks, bathtubs)   Swelling, bruising, and numbness are common after surgery  To help reduce these symptoms, keep the area elevated  Scar Care: To improve the appearance of your scar, you can massage the healed area (using circular motions with your fingertip) for 5 minutes 3x a day after your steri-strips peel away  You can use regular hand lotion or Scar zone from the store  You may also use Silicone pads after the stitches are removed (available at the store)  Redness and bumpiness of the scar are expected  These generally improve as healing progresses, but redness can be expected for up to 6-8 months  ** If you have any questions or concerns, please call our office  [phone]  _____________________________________________________________________________________  Pain Management  Pain after an injury or surgery is common and should often be expected  There are many ways to manage and reduce this pain  This often does not include medications or may not exclusively include medication  Each patient, surgery and surgeon are unique, and the approach to management of pain is individual   It is important to try to discuss your concerns and expectations regarding pain with your surgical team before and after surgery  They want to help you get better and have a good patient experience  Your patient experience includes understanding and treating your pain  Here's what you may expect before surgery and how to manage your pain and medications after surgery  Again, the approach to pain management after injury or surgery is individualized, and this is general information  Your surgical team will have more specific recommendations for you   Before using any of the methods explored here, please discuss with your medical team if these pain management methods are appropriate for you  We advise good communication with your team to let them help you achieve the best outcome  Surgery Day  As your surgery begins, your surgeon and anesthesia team may give you medications by mouth, IV, and/or injection  Giving you medication as the surgery progresses not only helps you to decrease pain during the surgery, but it also reduces your pain post-surgery  You may also receive medication in the recovery room after surgery, if needed  In some cases, you will receive prescription pain medication and instructions for its use to use at home in the days following your surgery  You may also receive instructions on using over-the-counter pain medications  It is important to follow these directions carefully, as many over-the counter medications contain some of the same ingredients as prescription pain medications and using them together can result in a dangerous accidental overdose  Post-Surgery Pain Management  While always important to follow your specific postoperative instructions, here are some different methods, outside of medication, that your team may recommend to reduce your pain:   Elevate: Elevating the injured area so it is higher than your heart can reduce swelling and pain  Swelling can increase quickly by putting your hand at your side, and this can make your dressing feel tight  Often, the pain associated with swelling is difficult to control, so it is best to avoid this problem   Take care of your dressing: If your dressing/splint feels tight, and elevation for 10 minutes does not improve the tight sensation, contact your surgical team  It may be recommended that you unravel any tape or elastic wrap and loosen the outer bandage  If this does not help, you may be advised to tear, unravel, or cut the inner layers with blunt tipped scissors  Make sure you are cutting on the opposite side of where your incision is located   When done, you will need to try to rebuild your dressing to keep your wound clean and covered  Before doing any of this, check with your medical team  They may want to be aware of the tight dressing and could have different instructions for loosening the dressing   Keep moving: If allowed by your surgeon, try to frequently move the fingers, wrist, elbow, and/or shoulder that are outside of the splint or cast  You can do this gently and slowly  This improves blood flow, which limits swelling and prevents bandages from feeling tight  It may be uncomfortable to move at first, but the discomfort will often improve with time and frequently improves with motion  Your surgeon will be more specific about what to move and what to rest    Ice the area: Icing the painful area will typically reduce swelling and inflammation and reduce pain  However, there may be certain procedures (such as surgery on arteries, skin grafts or flaps) where ice could be harmful, so consult your surgeon before using ice   Heat the area: If you are in the phase of care where you can remove your dressing or splint, you may be able to try heat  Heat increases blood flow to an area and can help with muscle spasms, muscle soreness and joint pain   Avoid smoking: Chemicals present in cigarettes can increase pain  Reducing or quitting smoking can improve your pain   Consume vitamin C: Consuming 500mg of vitamin C daily for 6 weeks may reduce pain after some injuries  However, it is ascorbic acid, which can upset your stomach if you have heartburn or gastritis  Post-Surgery Medication Management  The pain-management methods listed above are often effective when used in combination with taking medications post-surgery  There are many different classes of medication that can help pain  Some can be purchased over the counter, and some require a prescription  All medicines can have some benefits and some adverse reactions/side effects   Your surgical team will balance these issues to provide a plan for you  Some commonly prescribed medications can include:   Tylenol (Acetaminophen)   Aleve (Naprosyn/naproxen)   Motrin/Advil (Ibuprofen)   Celebrex (Celecoxib)   Toradol (Ketorolac)    When taking medication, keep the following in mind:   It may take 30-60 minutes for your body to absorb the medication after you take it by mouth, so be patient   Longer-acting medications used before bedtime may help you sleep better the first few nights after surgery   The first few nights post-surgery will generally be the toughest    Do not exceed the dose recommended by your physician or combine medications without consulting with your physician  If you are unfamiliar with these medications, your surgeon can specify how much medication you should take, for how long, and how often  Opioids  Opioids are a type of pain medication made from the poppy plant that is used to make opium and heroin  They can be effective in treating pain, but opioids should be used at a last resort, in limited amounts, and for a limited number of days  Use of these medications should only be done under the guidance of your doctor  When taking opioids, you are at risk of becoming dependent on the medicine, and they may become less effective over time  Oxycodone and hydrocodone are two of the most commonly used and effective opioid pain pills  These pills are frequently combined with Tylenol (acetaminophen), but it must be done carefully  Be sure to consult your surgeon before doing so  Your surgeon will give you a customized plan for managing your pain based on your type of surgery, number of procedures, duration of surgery, etc  Keep in mind that many opioids are combined with Tylenol (acetaminophen) already in the pill, so take care to follow your prescribed directions and not to take more opioids or acetaminophen than prescribed   Overdoses of each of these medications can be dangerous and life threatening  Learn more about opioids, including the side effects, how to safely use them, and how to properly dispose of any extras  Following the program below will greatly decrease your post-operative pain  1  Aleve (naproxen) 220 mg and Tylenol Arthritis 650 mg on the afternoon/evening of surgery  Do NOT take Aleve if you have a history of gastric ulcers, uncontrolled reflux or have been told previously by a physician that you should not take anti-inflammatory medications such as Advil/Aleve/Motrin  2  Aleve (naproxen) 220 mg in the morning and afternoon, for about 2-3 days after the surgery; even if you have no pain  You can stop two days after surgery if your hand does not hurt  3  Tylenol Arthritis (or any brand of acetaminophen 8-hour), 650 mg every eight hours, with a maximum dose of 3000 mg per day, for about 2-3 days after the surgery, even if you have no pain  Tylenol Arthritis plus Aleve is a case of 1+1=3, not 2  That is, they work together as a team to make each other stronger a  The maximum amount of Tylenol is 3000 mg per day, which is the same as 4 of the 650 mg pills  Remember; don't substitute any other medication for the Tylenol: don't take Motrin, aspirin, or any other over the counter medication  It must be Tylenol (or any brand of acetaminophen) for it to work as a team  Remember as well that Tylenol Arthritis is taken every 8 hours, the Aleve is twice a day  4  Norco (hydrocodone/acetaminophen) 5/325 mg or a similar medication to assist with sleeping at night, and possibly every 6 hours during the day, ONLY IF NEEDED, for the first few days  You will be given a written prescription, but many patients find they do not need to take any or all of the Norco  Do not take the medication just because it was given to you; only take it if you need it  Remember that [place] is an opioid pain medication and can lead to addiction, respiratory sedation, and death   In 2015 over 17,500 Americans  from opioid overdoses and I don't want this to happen to you  Opioid medications can also cause constipation, so please plan for that, as well as possible mental confusion and drowsiness  Do not drive while you are taking this medication  With this protocol, you can expect your post-operative pain to be very manageable  The worst pain only lasts for the first 48 hours and improves significantly after that  By the time you see your surgeon for your post-operative visit you probably will no longer require any pain medication on a daily basis  Important Information about Painkillers  You are being prescribed an opioid pain medication to help with severe pain after surgery  Use the medication sparingly as needed to reduce your pain  The goal is not to be pain-free, but to make the pain more tolerable  If you are able to take non-steroidal anti-inflammatory drugs (NSAIDs), alternate the prescription pain medication with over-the-counter Ibuprofen or Naproxen (if you do not have a history of reflux or stomach ulcers)  This will allow you to take less opioid medication  Also, elevate the hand to reduce swelling and consider applying ice to the affected area for 15 minutes at a time, several times per day  Opioid medication is powerful and has the risk of overdose, abuse, and addiction  Only use the medication as directed by your physician and keep the pills in a safe place  When you no longer need the medication, please dispose of the pills properly as directed below  Allowing someone else to use your opioid prescription is illegal   Also, possible side effects from opioid medications are over-sedation, itching, nausea/vomiting, and constipation  Do not drive a vehicle or operate machinery while taking the pain medications  Drink plenty of fluids and consider a stool softener to prevent constipation    If the pain you are experiencing is not severe, stop taking the opioid pills and only take over-the-counter medications such as Tylenol and Ibuprofen  Disposing of unused pain medications:  (1) Follow pharmacist instructions on the bottle if available, or  (2) Call 0-773.788.9107 for a [facility] authorized collection site in your area, or  (3) If no collection site is available in your area, mix the pills with an undesirable substance such as used coffee grounds, luz litter, or dirt  Place this mixture in a sealed plastic bag  Place the bag in the household garbage  Adapted from the opioid awareness section of the American Society for Surgery of the Hand, thanks to Drs Raguel Bence and Bebe Butler

## 2022-02-02 NOTE — PROGRESS NOTES
ASSESSMENT/PLAN:    Assessment:   Trigger Finger  right  Long, ring finger, thumb    Plan:   Trigger Finger Release  right  Long, ring finger, thumb under sedation    Follow Up: After Surgery    To Do Next Visit:    and Sutures out    General Discussions:       Operative Discussions:     Trigger Finger Release: The anatomy and physiology of trigger finger was discussed with the patient today in the office  Edema and increased contact pressure within the flexor tendons at the A1 pulley can cause pain, crepitation, and limitation of function  Treatment options include resting MP blocking splints to decrease edema, oral anti-inflammatory medications, home or formal therapy exercises, up to 2 steroid injections or surgical release  While majority of patients do respond to conservative treatment, up to 20% may require surgical release  The patient has elected release of the trigger finger  The patient has elected to undergo a release of the A1 pulley (trigger finger)  A small incision will be made over the palmar aspect of the hand, the tendon sheath holding the flexor tendons will be released  In the postoperative period, light activities are allowed immediately, driving is allowed when narcotic medication has stopped, and the incision may get wet after 2 days  Heavy activities (lifting more than approximately 10 pounds) will be allowed after the follow up appointment in 1-2 weeks  While the pain and discomfort within the wrist typically improves rapidly, some residual discomfort may be present for up to 6 weeks  The nodule that is typically palpable in the palmar aspect of the hand will not be removed, as this would necessitate removal of a portion of the flexor tendon, however the catching, clicking, and locking should resolve  Approximate success rate is 98%  The risks and benefits of the procedure were explained to the patient, which include, but are not limited to: Bleeding, infection, recurrence, pain, scar, damage to tendons, damage to nerves, and damage to blood vessels, need for future surgery and complications related to anesthesia  If bony work is done, risks also include malunion and nonunion  These risks, along with alternative conservative treatment options, and postoperative protocols were voiced back and understood by the patient  All questions were answered to the patient's satisfaction  The patient agrees to comply with a standard postoperative protocol, and is willing to proceed  Education was provided via written and auditory forms  There were no barriers to learning  Written handouts regarding wound care, incision and scar care, and general preoperative information, as well as risks and benefits were provided to the patient     _____________________________________________________  CHIEF COMPLAINT:  Chief Complaint   Patient presents with    Right Thumb - Triggering     2nd TF injection 9/21/21 by Dr Correa Bench:  Jonathan Gonzalez is a 57 y o  female who presents with Pain  Moderate  Intermittant  Sharp and Stiffness/LROM to the right long finger and thumb  This started  5 month(s) ago as Insidious  Patient states that her finger is worse in the morning     Radiation: Yes to the  long finger, ring finger and thumb  Previous Treatments: 2 steroid injections into her right thumb without relief  Associated symptoms: No Complaints  Handedness: right  Work status:     PAST MEDICAL HISTORY:  Past Medical History:   Diagnosis Date    Abnormal mammogram     RESOLVED: [id number]    Anxiety     Anxiety     Arthritis     Depression     Depression     Diverticulosis     GERD (gastroesophageal reflux disease)     Helicobacter pylori infection     Hyperlipidemia     Seborrheic keratosis     LAST ASSESSED: [id number]    Sleep apnea     Sleep apnea     Tinea pedis of left foot 9/16/2019       PAST SURGICAL HISTORY:  Past Surgical History:   Procedure Laterality Date    ABDOMINOPLASTY      abdomin    COLONOSCOPY  2017    MO COLONOSCOPY FLX DX W/COLLJ SPEC WHEN PFRMD N/A 2016    Procedure: COLONOSCOPY;  Surgeon: Price Menon MD;  Location:  GI LAB; Service: Gastroenterology    MO COLONOSCOPY FLX DX W/COLLJ [facility] WHEN PFRMD N/A 2017    Procedure: EGD AND COLONOSCOPY;  Surgeon: Price Menon MD;  Location: [facility] GI LAB;   Service: Gastroenterology    TUBAL LIGATION      TUBAL LIGATION      UPPER GASTROINTESTINAL ENDOSCOPY         FAMILY HISTORY:  Family History   Problem Relation Age of Onset    Diabetes Mother     Hypertension Mother     Heart disease Mother     Diabetes Paternal Grandfather     Alzheimer's disease Father     No Known Problems Sister     No Known Problems Brother     Depression Daughter     ADD / ADHD Son     Depression Son     Diabetes Maternal Grandmother     Stomach cancer Maternal Grandfather     No Known Problems Paternal Grandmother     No Known Problems Sister     Heart disease Sister     Intellectual disability Brother     Schizophrenia Brother     Other Son          as an infant    Noreen Thornton Other Daughter          at 2-4 mths old   Noreen Thornton Other Son          as an infant        SOCIAL HISTORY:  Social History     Tobacco Use    Smoking status: Never Smoker    Smokeless tobacco: Never Used   Vaping Use    Vaping Use: Never used   Substance Use Topics    Alcohol use: No    Drug use: No       MEDICATIONS:    Current Outpatient Medications:     acetaminophen (TYLENOL) 500 mg tablet, Take 1 tablet (500 mg total) by mouth daily as needed for headaches, Disp: 30 tablet, Rfl: 3    ARIPiprazole (ABILIFY) 5 mg tablet, Take 5 mg by mouth daily, Disp: , Rfl:     Ascorbic Acid (vitamin C) 1000 MG tablet, Take 1,000 mg by mouth daily, Disp: , Rfl:     atorvastatin (LIPITOR) 10 mg tablet, Take 1 tablet (10 mg total) by mouth daily, Disp: 30 tablet, Rfl: 2    bisacodyl (DULCOLAX) 5 mg EC tablet, Take 2 tablets (10 mg total) by mouth once for 1 dose Take once at 4pm evening before the procedure (Patient not taking: Reported on 1/3/2022 ), Disp: 2 tablet, Rfl: 0    Calcium Polycarbophil (FIBERCON PO), Take by mouth, Disp: , Rfl:     Cholecalciferol (VITAMIN D3) 125 MCG (5000 UT) CAPS, Take by mouth, Disp: , Rfl:     clonazePAM (KlonoPIN) 1 mg tablet, Take 1 mg by mouth daily at bedtime as needed for anxiety  , Disp: , Rfl:     Diclofenac Sodium (VOLTAREN) 1 %, Apply 2 g topically 4 (four) times a day, Disp: 100 g, Rfl: 0    dicyclomine (BENTYL) 20 mg tablet, Take 1 tablet (20 mg total) by mouth every 6 (six) hours, Disp: 120 tablet, Rfl: 1    DULoxetine (CYMBALTA) 30 mg delayed release capsule, Take 30 mg by mouth daily , Disp: , Rfl:     hydrOXYzine HCL (ATARAX) 25 mg tablet, Take 25 mg by mouth every 6 (six) hours as needed for itching  , Disp: , Rfl:     hyoscyamine (ANASPAZ,LEVSIN) 0 125 MG tablet, Take 1 tablet (0 125 mg total) by mouth every 4 (four) hours as needed for cramping, Disp: 30 tablet, Rfl: 0    ibuprofen (MOTRIN) 600 mg tablet, Take 1 tablet (600 mg total) by mouth every 6 (six) hours as needed for moderate pain (Patient not taking: Reported on 1/3/2022 ), Disp: 30 tablet, Rfl: 0    Methylcobalamin (B-12) 5000 MCG TBDP, Take by mouth, Disp: , Rfl:     omeprazole (PriLOSEC) 20 mg delayed release capsule, take 1 capsule by mouth once daily, Disp: 90 capsule, Rfl: 3    polyethylene glycol (MiraLax) 17 g packet, Take 238 g by mouth once for 1 dose Please mix in 64 oz of water /Gatorade  At 4:00 p m  evening of procedure Drink 32 oz at the rate of 8 oz every 15 minutes  Drink the remaining 32 oz at the rate of 8 oz every 15 minutes at 4:00 a m morning of procedure  (Patient not taking: Reported on 1/3/2022 ), Disp: 238 g, Rfl: 0    ALLERGIES:  No Known Allergies    REVIEW OF SYSTEMS:  Pertinent items are noted in HPI      LABS:  HgA1c:   Lab Results   Component Value Date    HGBA1C 6 1 07/28/2021     BMP: Lab Results   Component Value Date    GLUCOSE 97 10/09/2015    CALCIUM 8 9 03/21/2021     10/09/2015    K 4 3 03/21/2021    CO2 28 03/21/2021     (H) 03/21/2021    BUN 13 03/21/2021    CREATININE 0 99 03/21/2021         _____________________________________________________  PHYSICAL EXAMINATION:  Vital signs: /77   Pulse 80   Ht 5' 1" (1 549 m)   Wt 72 6 kg (160 lb)   LMP  (LMP Unknown)   BMI 30 23 kg/m²   General: well developed and well nourished, alert, oriented times 3 and appears comfortable  Psychiatric: Normal  HEENT: Trachea Midline, No torticollis  Cardiovascular: No discernable arrhythmia  Pulmonary: No wheezing or stridor  Abdomen: No rebound or guarding  Extremities: No peripheral edema  Skin: No Erythema  Neurovascular: Sensation Intact to the Median, Ulnar, Radial Nerve, Motor Intact to the Median, Ulnar, Radial Nerve and Pulses Intact    MUSCULOSKELETAL EXAMINATION:  RIGHT SIDE:  Finger:  Triggering  thumb, Crepitation long and ring finger, thumb and Nodules  Long, ring  finger, thumb    _____________________________________________________  STUDIES REVIEWED:  No Studies to review      PROCEDURES PERFORMED:  Procedures  No Procedures performed today   Scribe Attestation    I,:  Nuvia Valdez am acting as a scribe while in the presence of the attending physician :       I,:  Pascual Preciado MD personally performed the services described in this documentation    as scribed in my presence :

## 2022-03-04 RX ORDER — DIPHENOXYLATE HYDROCHLORIDE AND ATROPINE SULFATE 2.5; .025 MG/1; MG/1
1 TABLET ORAL DAILY
COMMUNITY

## 2022-03-04 NOTE — PRE-PROCEDURE INSTRUCTIONS
Pre-Surgery Instructions:   Medication Instructions    acetaminophen (TYLENOL) 500 mg tablet Instructed to take as needed including DOS with sips water    ARIPiprazole (ABILIFY) 5 mg tablet Instructed to take per normal schedule including DOS with sips water    Ascorbic Acid (vitamin C) 1000 MG tablet Instructed to stop as of 3/4    atorvastatin (LIPITOR) 10 mg tablet Instructed to take per normal schedule including DOS with sips water    Calcium Polycarbophil (FIBERCON PO) Instructed to take per normal schedule except DOS    Cholecalciferol (VITAMIN D3) 125 MCG (5000 UT) CAPS   Instructed to stop as of 3/4    clonazePAM (KlonoPIN) 1 mg tablet Instructed to take per normal schedule including DOS with sips water    DULoxetine (CYMBALTA) 30 mg delayed release capsule Instructed to take per normal schedule including DOS with sips water    Methylcobalamin (B-12) 5000 MCG TBDP Instructed to stop as of 3/4    multivitamin (THERAGRAN) TABS Instructed to stop as of 3/4    omeprazole (PriLOSEC) 20 mg delayed release capsule Instructed to take per normal schedule including DOS with sips water     Spoke with pt via  #[id number], COVID screening negative  Advised hospital location will call with arrival time night before surgery and NPO after midnight night before  Advised one vaccinated visitor is allowed to accompany pt to wait during the procedure  Instructed to leave contacts/jewelery/valuables at home and to bring med list with DOS  Instructed to avoid all ASA and OTC Vit/Supp 1 week prior to surgery and to avoid NSAIDs 3 days prior to surgery per anesthesia instructions  Tylenol ok to take prn  Reviewed above medication instructions and showering instructions  Patient verbalized understanding of all of the above

## 2022-03-07 ENCOUNTER — ANESTHESIA EVENT (OUTPATIENT)
Dept: PERIOP | Facility: HOSPITAL | Age: 62
End: 2022-03-07
Payer: COMMERCIAL

## 2022-03-08 ENCOUNTER — HOSPITAL ENCOUNTER (OUTPATIENT)
Facility: HOSPITAL | Age: 62
Setting detail: OUTPATIENT SURGERY
Discharge: HOME | End: 2022-03-08
Attending: ORTHOPAEDIC SURGERY | Admitting: ORTHOPAEDIC SURGERY
Payer: COMMERCIAL

## 2022-03-08 ENCOUNTER — ANESTHESIA (OUTPATIENT)
Dept: PERIOP | Facility: HOSPITAL | Age: 62
End: 2022-03-08
Payer: COMMERCIAL

## 2022-03-08 VITALS
DIASTOLIC BLOOD PRESSURE: 64 MMHG | TEMPERATURE: 96.7 F | HEART RATE: 67 BPM | OXYGEN SATURATION: 97 % | SYSTOLIC BLOOD PRESSURE: 125 MMHG | RESPIRATION RATE: 18 BRPM | HEIGHT: 61 IN | WEIGHT: 160 LBS | BODY MASS INDEX: 30.21 KG/M2

## 2022-03-08 DIAGNOSIS — M65.311 TRIGGER THUMB OF RIGHT HAND: Primary | ICD-10-CM

## 2022-03-08 PROCEDURE — 26055 INCISE FINGER TENDON SHEATH: CPT | Performed by: ORTHOPAEDIC SURGERY

## 2022-03-08 PROCEDURE — NC001 PR NO CHARGE: Performed by: ORTHOPAEDIC SURGERY

## 2022-03-08 RX ORDER — PROPOFOL 10 MG/ML
INJECTION, EMULSION INTRAVENOUS AS NEEDED
Status: DISCONTINUED | OUTPATIENT
Start: 2022-03-08 | End: 2022-03-08

## 2022-03-08 RX ORDER — MAGNESIUM HYDROXIDE 1200 MG/15ML
LIQUID ORAL AS NEEDED
Status: DISCONTINUED | OUTPATIENT
Start: 2022-03-08 | End: 2022-03-08 | Stop reason: HOSPADM

## 2022-03-08 RX ORDER — FENTANYL CITRATE/PF 50 MCG/ML
25 SYRINGE (ML) INJECTION
Status: DISCONTINUED | OUTPATIENT
Start: 2022-03-08 | End: 2022-03-08 | Stop reason: HOSPADM

## 2022-03-08 RX ORDER — FENTANYL CITRATE 50 UG/ML
INJECTION, SOLUTION INTRAMUSCULAR; INTRAVENOUS AS NEEDED
Status: DISCONTINUED | OUTPATIENT
Start: 2022-03-08 | End: 2022-03-08

## 2022-03-08 RX ORDER — SODIUM CHLORIDE, SODIUM LACTATE, POTASSIUM CHLORIDE, CALCIUM CHLORIDE 600; 310; 30; 20 MG/100ML; MG/100ML; MG/100ML; MG/100ML
125 INJECTION, SOLUTION INTRAVENOUS CONTINUOUS
Status: DISCONTINUED | OUTPATIENT
Start: 2022-03-08 | End: 2022-03-08 | Stop reason: HOSPADM

## 2022-03-08 RX ORDER — CEFAZOLIN SODIUM 1 G/50ML
1000 SOLUTION INTRAVENOUS ONCE
Status: COMPLETED | OUTPATIENT
Start: 2022-03-08 | End: 2022-03-08

## 2022-03-08 RX ORDER — HYDROCODONE BITARTRATE AND ACETAMINOPHEN 5; 325 MG/1; MG/1
1 TABLET ORAL EVERY 6 HOURS PRN
Qty: 5 TABLET | Refills: 0 | Status: SHIPPED | OUTPATIENT
Start: 2022-03-08 | End: 2022-03-13

## 2022-03-08 RX ORDER — SENNOSIDES 8.6 MG
650 CAPSULE ORAL EVERY 8 HOURS PRN
Qty: 30 TABLET | Refills: 0 | Status: SHIPPED | OUTPATIENT
Start: 2022-03-08

## 2022-03-08 RX ORDER — MIDAZOLAM HYDROCHLORIDE 2 MG/2ML
INJECTION, SOLUTION INTRAMUSCULAR; INTRAVENOUS AS NEEDED
Status: DISCONTINUED | OUTPATIENT
Start: 2022-03-08 | End: 2022-03-08

## 2022-03-08 RX ORDER — DEXAMETHASONE SODIUM PHOSPHATE 10 MG/ML
INJECTION, SOLUTION INTRAMUSCULAR; INTRAVENOUS AS NEEDED
Status: DISCONTINUED | OUTPATIENT
Start: 2022-03-08 | End: 2022-03-08

## 2022-03-08 RX ORDER — PROPOFOL 10 MG/ML
INJECTION, EMULSION INTRAVENOUS CONTINUOUS PRN
Status: DISCONTINUED | OUTPATIENT
Start: 2022-03-08 | End: 2022-03-08

## 2022-03-08 RX ORDER — TETRACAINE HCL 10 MG/ML
INJECTION SUBARACHNOID AS NEEDED
Status: DISCONTINUED | OUTPATIENT
Start: 2022-03-08 | End: 2022-03-08

## 2022-03-08 RX ORDER — NAPROXEN SODIUM 220 MG
220 TABLET ORAL 2 TIMES DAILY WITH MEALS
Qty: 20 TABLET | Refills: 0 | Status: SHIPPED | OUTPATIENT
Start: 2022-03-08 | End: 2022-05-11 | Stop reason: SDUPTHER

## 2022-03-08 RX ORDER — LIDOCAINE HYDROCHLORIDE AND EPINEPHRINE 10; 10 MG/ML; UG/ML
INJECTION, SOLUTION INFILTRATION; PERINEURAL AS NEEDED
Status: DISCONTINUED | OUTPATIENT
Start: 2022-03-08 | End: 2022-03-08 | Stop reason: HOSPADM

## 2022-03-08 RX ORDER — ONDANSETRON 2 MG/ML
INJECTION INTRAMUSCULAR; INTRAVENOUS AS NEEDED
Status: DISCONTINUED | OUTPATIENT
Start: 2022-03-08 | End: 2022-03-08

## 2022-03-08 RX ORDER — LIDOCAINE HYDROCHLORIDE 10 MG/ML
INJECTION, SOLUTION EPIDURAL; INFILTRATION; INTRACAUDAL; PERINEURAL AS NEEDED
Status: DISCONTINUED | OUTPATIENT
Start: 2022-03-08 | End: 2022-03-08

## 2022-03-08 RX ADMIN — PROPOFOL 100 MCG/KG/MIN: 10 INJECTION, EMULSION INTRAVENOUS at 09:13

## 2022-03-08 RX ADMIN — FENTANYL CITRATE 25 MCG: 50 INJECTION INTRAMUSCULAR; INTRAVENOUS at 09:12

## 2022-03-08 RX ADMIN — DEXAMETHASONE SODIUM PHOSPHATE 10 MG: 10 INJECTION, SOLUTION INTRAMUSCULAR; INTRAVENOUS at 09:15

## 2022-03-08 RX ADMIN — PROPOFOL 50 MG: 10 INJECTION, EMULSION INTRAVENOUS at 09:08

## 2022-03-08 RX ADMIN — MIDAZOLAM 1 MG: 1 INJECTION INTRAMUSCULAR; INTRAVENOUS at 09:14

## 2022-03-08 RX ADMIN — CEFAZOLIN SODIUM 1000 MG: 1 SOLUTION INTRAVENOUS at 09:09

## 2022-03-08 RX ADMIN — SODIUM CHLORIDE, SODIUM LACTATE, POTASSIUM CHLORIDE, AND CALCIUM CHLORIDE 125 ML/HR: .6; .31; .03; .02 INJECTION, SOLUTION INTRAVENOUS at 08:18

## 2022-03-08 RX ADMIN — MIDAZOLAM 1 MG: 1 INJECTION INTRAMUSCULAR; INTRAVENOUS at 09:08

## 2022-03-08 RX ADMIN — LIDOCAINE HYDROCHLORIDE 20 MG: 10 INJECTION, SOLUTION EPIDURAL; INFILTRATION; INTRACAUDAL; PERINEURAL at 09:08

## 2022-03-08 RX ADMIN — ONDANSETRON 4 MG: 2 INJECTION INTRAMUSCULAR; INTRAVENOUS at 09:15

## 2022-03-08 RX ADMIN — PROPOFOL 30 MG: 10 INJECTION, EMULSION INTRAVENOUS at 09:11

## 2022-03-08 NOTE — OP NOTE
OPERATIVE REPORT  PATIENT NAME: Chava Santana  :  1960  MRN: [id number]  Pt Location: [facility]    SURGERY DATE: 22    Surgeon(s) and Role:     * Ladan Pérez MD - Primary     * Washington Guzmán MD - Assisting    Pre-Op Diagnosis:  Trigger thumb of right hand [M65 311]  Trigger middle finger of right hand [M65 331]  Trigger ring finger of right hand [M65 341]    Post-Op Diagnosis:   Trigger thumb of right hand [M65 311]  Trigger middle finger of right hand [M65 331]  Trigger ring finger of right hand [M65 341]    Procedure(s) (LRB):  Right thumb, long, and ring finger trigger finger releases (Right)    Specimen(s):  * No orders in the log *    Estimated Blood Loss:   Minimal      Anesthesia Type:   IV Sedation with Anesthesia    Operative Indications: The patient has a history of trigger fingers to the right thumb, long, and ring finger that were recalcitrant to conservative management  The decision was made to bring the patient to the operating room for trigger finger releases of the right thumb, long, and ring finger  Risks of the procedure were explained which include, but are not limited to bleeding; infection; damage to nerves, arteries,veins, tendons; scar; pain; need for reoperation; failure to give desired result; and risks of anaesthesia  All questions were answered to satisfaction and they were willing to proceed  Operative Findings:  Trigger finger right thumb, long, and ring finger    Complications:   None    Procedure and Technique:  After the patient, site, and procedure were identified, the patient was brought into the operating room in a supine position  Local anaesthesia and sedation were provided  A well padded tourniquet was applied to the extremity, set at 250 mmHg  The  right upper extremity was then prepped and drapped in a normal, sterile, orthopedic fashion      After the patient, site, and procedure were once again identified, attention was turned to the right ring finger  An incision was made over the flexor tendon sheath at the level of the A1 pulley  Dissection was carried out in-line with the flexor tendon sheath and the radial and ulnar digital artery and nerve were protected  The A1 pulley was identified at the base of the incision  Under direct visualization, the A1 pulley was divided along the midline in its entirety with care taken to avoid injury to the underlying tendon  The tendons were examined to ensure that no further catching, popping, clicking or locking occurred with motion of the finger  After the patient, site, and procedure were once again identified, attention was turned to the right long finger  An incision was made over the flexor tendon sheath at the level of the A1 pulley  Dissection was carried out in-line with the flexor tendon sheath and the radial and ulnar digital artery and nerve were protected  The A1 pulley was identified at the base of the incision  Under direct visualization, the A1 pulley was divided along the midline in its entirety with care taken to avoid injury to the underlying tendon  The tendons were examined to ensure that no further catching, popping, clicking or locking occurred with motion of the finger  After the patient, site, and procedure were once again identified, attention was turned to the right thumb  An incision was made over the flexor tendon sheath at the level of the A1 pulley  Dissection was carried out in-line with the flexor tendon sheath and the radial and ulnar digital artery and nerve were protected  The A1 pulley was identified at the base of the incision  Under direct visualization, the A1 pulley was divided along the midline in its entirety with care taken to avoid injury to the underlying tendon  The tendons were examined to ensure that no further catching, popping, clicking or locking occurred with motion of the finger         At the completion of the procedure, hemostasis was obtained with cautery and direct pressure  The wounds were copiously irrigated with sterile solution  The wounds were closed with Prolene  Sterile dressings were applied, including Xeroform, gauze, tweeners, webril, ACE  Please note, all sponge, needle, and instrument counts were correct prior to closure  Loupe magnification was utilized  The patient tolerated the procedure well       I was present for all critical portions of the procedure    Patient Disposition:  PACU  and hemodynamically stable    SIGNATURE: Arminda Curry MD  DATE: 03/08/22  TIME: 9:33 AM

## 2022-03-08 NOTE — ANESTHESIA POSTPROCEDURE EVALUATION
Post-Op Assessment Note    CV Status:  Stable  Pain Score: 0    Pain management: adequate  Multimodal analgesia used between 6 hours prior to anesthesia start to PACU discharge    Mental Status:  Alert and awake   Hydration Status:  Euvolemic   PONV Controlled:  Controlled   Airway Patency:  Patent      Post Op Vitals Reviewed: Yes      Staff: Anesthesiologist, CRNA         No complications documented      /69 (03/08/22 0953)    Temp 98 5 °F (36 9 °C) (03/08/22 0953)    Pulse 72 (03/08/22 0953)   Resp 20 (03/08/22 0953)    SpO2 100 % (03/08/22 0953)

## 2022-03-08 NOTE — ADDENDUM NOTE
Addendum  created 03/08/22 1423 by Heidi Lopez CRNA    Intraprocedure Meds edited, Orders acknowledged in Narrator

## 2022-03-08 NOTE — H&P
H&P Exam - Orthopedics   Ludy Reza 57 y o  female MRN: [id number]  Unit/Bed#: P309 B PRE    Assessment/Plan   Assessment:  Right thumb, long, and ring finger trigger fingers  Plan:  Right thumb, long, and ring finger trigger finger releases    History of Present Illness   HPI:  Ludy Reza is a 57 y o  female who presents with popping and locking of the right thumb, long, and ring fingers  Patient has not responded to non operative modalities  She would like to move forward with surgery today  Kenton Rodgers Historical Information  Review Of Systems:   · Skin: Normal  · Neuro: See HPI  · Musculoskeletal: See HPI  · 14 point review of systems negative except as stated above     Past Medical History:   Past Medical History:   Diagnosis Date    Abnormal mammogram     RESOLVED: [id number]    Anxiety     Anxiety     Arthritis     Depression     Depression     Diabetes mellitus ([facility] [address] )     Pt denies    Diverticulosis     GERD (gastroesophageal reflux disease)     Helicobacter pylori infection     Hyperlipidemia     Seborrheic keratosis     LAST ASSESSED: [id number]    Sleep apnea     Sleep apnea     Tinea pedis of left foot 9/16/2019       Past Surgical History:   Past Surgical History:   Procedure Laterality Date    ABDOMINOPLASTY      abdomin    COLONOSCOPY  2017    ID COLONOSCOPY FLX DX W/COLLJ SPEC WHEN PFRMD N/A 8/11/2016    Procedure: COLONOSCOPY;  Surgeon: Noa Howell MD;  Location:  GI LAB; Service: Gastroenterology    ID COLONOSCOPY FLX DX W/COLLJ [facility] WHEN PFRMD N/A 8/29/2017    Procedure: EGD AND COLONOSCOPY;  Surgeon: Noa Howell MD;  Location: [facility] GI LAB;   Service: Gastroenterology    TUBAL LIGATION      TUBAL LIGATION      UPPER GASTROINTESTINAL ENDOSCOPY  2017       Family History:  Family history reviewed and non-contributory  Family History   Problem Relation Age of Onset    Diabetes Mother     Hypertension Mother     Heart disease Mother     Diabetes Paternal Grandfather     Alzheimer's disease Father     No Known Problems Sister     No Known Problems Brother     Depression Daughter     ADD / ADHD Son     Depression Son     Diabetes Maternal Grandmother     Stomach cancer Maternal Grandfather     No Known Problems Paternal Grandmother     No Known Problems Sister     Heart disease Sister     Intellectual disability Brother     Schizophrenia Brother     Other Son          as an infant    Wash Caller Other Daughter          at 2-4 mths old   Wash Caller Other Son          as an infant        Social History:  Social History     Socioeconomic History    Marital status: Single     Spouse name: None    Number of children: None    Years of education: None    Highest education level: None   Occupational History    None   Tobacco Use    Smoking status: Never Smoker    Smokeless tobacco: Never Used   Vaping Use    Vaping Use: Never used   Substance and Sexual Activity    Alcohol use: No    Drug use: No    Sexual activity: Yes     Partners: Male     Birth control/protection: Post-menopausal   Other Topics Concern    None   Social History Narrative    None     Social Determinants of Health     Financial Resource Strain: Low Risk     Difficulty of Paying Living Expenses: Not hard at all   Food Insecurity: No Food Insecurity    Worried About Running Out of Food in the Last Year: Never true    Apoorva of Food in the Last Year: Never true   Transportation Needs: No Transportation Needs    Lack of Transportation (Medical): No    Lack of Transportation (Non-Medical): No   Physical Activity: Inactive    Days of Exercise per Week: 0 days    Minutes of Exercise per Session: 0 min   Stress: Stress Concern Present    Feeling of Stress : To some extent   Social Connections:  Moderately Isolated    Frequency of Communication with Friends and Family: More than three times a week    Frequency of Social Gatherings with Friends and Family: More than three times a week    Attends Denominational Services: More than 4 times per year    Active Member of Clubs or Organizations: No    Attends Club or Organization Meetings: Never    Marital Status:    Intimate Partner Violence: Not At Risk    Fear of Current or Ex-Partner: No    Emotionally Abused: No    Physically Abused: No    Sexually Abused: No   Housing Stability: Not on file       Allergies:   No Known Allergies        Labs:  0   Lab Value Date/Time    HCT 39 9 03/21/2021 0406    HCT 40 5 03/20/2021 1032    HCT 34 4 (L) 11/11/2020 0516    HCT 38 2 10/09/2015 0839    HCT 36 9 04/13/2015 1418    HCT 39 6 11/13/2014 0835    HGB 12 6 03/21/2021 0406    HGB 12 7 03/20/2021 1032    HGB 10 6 (L) 11/11/2020 0516    HGB 12 4 10/09/2015 0839    HGB 12 0 04/13/2015 1418    HGB 12 8 11/13/2014 0835    INR 0 99 04/13/2015 1418    WBC 4 90 03/21/2021 0406    WBC 5 25 03/20/2021 1032    WBC 7 56 11/11/2020 0516    WBC 4 87 10/09/2015 0839    WBC 5 41 04/13/2015 1418    WBC 4 93 11/13/2014 0835    ESR 38 (H) 03/20/2021 1033    CRP 4 7 (H) 03/21/2021 0406       Meds:    Current Facility-Administered Medications:     ceFAZolin (ANCEF) IVPB (premix in dextrose) 1,000 mg 50 mL, 1,000 mg, Intravenous, Once, Ruddy Rubio MD    lactated ringers infusion, 125 mL/hr, Intravenous, Continuous, Newton Brandon MD, Last Rate: 125 mL/hr at 03/08/22 0818, 125 mL/hr at 03/08/22 0818    Blood Culture:   No results found for: BLOODCX    Wound Culture:   No results found for: WOUNDCULT    Ins and Outs:  No intake/output data recorded              Physical Exam  /79   Pulse 79   Temp (!) 97 1 °F (36 2 °C) (Temporal)   Resp 18   Ht 5' 1" (1 549 m)   Wt 72 6 kg (160 lb)   LMP  (LMP Unknown)   SpO2 98%   BMI 30 23 kg/m²   /79   Pulse 79   Temp (!) 97 1 °F (36 2 °C) (Temporal)   Resp 18   Ht 5' 1" (1 549 m)   Wt 72 6 kg (160 lb)   LMP  (LMP Unknown)   SpO2 98%   BMI 30 23 kg/m²   Gen: Alert and oriented to person, place, time  HEENT: EOMI, eyes clear, moist mucus membranes, hearing intact  Respiratory: Bilateral chest rise   No audible wheezing found  Cardiovascular: Regular Rate and Rhythm  Abdomen: soft nontender/nondistended  Ortho Exam:  Crepitation of tendons A1 pulley  Neuro Exam:  Sensation intact    Lab Results: Reviewed  Imaging: Reviewed

## 2022-03-08 NOTE — ANESTHESIA PREPROCEDURE EVALUATION
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, of the knee, left      NEURO/PSYCH   (+) Anxiety   (+) Depression   (+) Headache      PULMONARY   (+) ALEC (obstructive sleep apnea)      Other   (+) Gastritis   (+) Obesity (BMI 30-39 9)   (+) Trigger thumb of right hand             Anesthesia Plan  ASA Score- 3     Anesthesia Type- IV sedation with anesthesia with ASA Monitors  Additional Monitors:   Airway Plan:     Comment: IV sedation, IV, antiemtics  Plan Factors-    Chart reviewed  EKG reviewed  Existing labs reviewed  Patient summary reviewed  Patient is not a current smoker  Patient did not smoke on day of surgery  Induction- intravenous      Postoperative Plan-     Informed Consent-

## 2022-03-22 ENCOUNTER — OFFICE VISIT (OUTPATIENT)
Dept: OBGYN CLINIC | Facility: HOSPITAL | Age: 62
End: 2022-03-22
Attending: ORTHOPAEDIC SURGERY

## 2022-03-22 VITALS
DIASTOLIC BLOOD PRESSURE: 80 MMHG | SYSTOLIC BLOOD PRESSURE: 131 MMHG | WEIGHT: 161.2 LBS | BODY MASS INDEX: 30.43 KG/M2 | HEIGHT: 61 IN | HEART RATE: 103 BPM

## 2022-03-22 DIAGNOSIS — M25.641 FINGER STIFFNESS, RIGHT: Primary | ICD-10-CM

## 2022-03-22 PROCEDURE — 99024 POSTOP FOLLOW-UP VISIT: CPT | Performed by: PHYSICIAN ASSISTANT

## 2022-03-25 NOTE — PROGRESS NOTES
Assessment:   S/P Right thumb, long, and ring finger trigger finger releases - Right on 3/8/2022    Plan:   Resume activities as tolerated  Recommended formal hand therapy to work on ROM activities  Resume normal hygiene activities    Follow Up:  6  week(s)    To Do Next Visit:    assess post operative recovery       CHIEF COMPLAINT:  Chief Complaint   Patient presents with    Right Hand - Post-op     Thumb, ring, and long TFR- 3/8/22         SUBJECTIVE:  Thad Ceja is a 57 y o  female who presents for follow up after Right thumb, long, and ring finger trigger finger releases - Right on 3/8/2022  Today patient has stiffness and soreness  She denies any signs of an infection  She denies any acute complaints  Visit translated via language line        PHYSICAL EXAMINATION:  Vital signs: /80   Pulse 103   Ht 5' 1" (1 549 m)   Wt 73 1 kg (161 lb 3 2 oz)   LMP  (LMP Unknown)   BMI 30 46 kg/m²   General: well developed and well nourished, alert, oriented times 3 and appears comfortable  Psychiatric: Normal    MUSCULOSKELETAL EXAMINATION:  Incision: Clean, dry, intact  Range of Motion: Limited due to stiffness  Neurovascular status: Neuro intact, good cap refill  Activity Restrictions: No restrictions  Done today: Sutures out and Steri strips applied      STUDIES REVIEWED:  No Studies to review      PROCEDURES PERFORMED:  Procedures  No Procedures performed today

## 2022-03-28 ENCOUNTER — EVALUATION (OUTPATIENT)
Dept: OCCUPATIONAL THERAPY | Age: 62
End: 2022-03-28
Payer: COMMERCIAL

## 2022-03-28 DIAGNOSIS — M79.641 RIGHT HAND PAIN: Primary | ICD-10-CM

## 2022-03-28 DIAGNOSIS — M25.641 FINGER STIFFNESS, RIGHT: ICD-10-CM

## 2022-03-28 PROCEDURE — 97110 THERAPEUTIC EXERCISES: CPT | Performed by: OCCUPATIONAL THERAPIST

## 2022-03-28 PROCEDURE — 97165 OT EVAL LOW COMPLEX 30 MIN: CPT | Performed by: OCCUPATIONAL THERAPIST

## 2022-03-28 NOTE — PROGRESS NOTES
OT Evaluation     Today's date: 3/28/2022  Patient name: Homer Daniel  : 1960  MRN: [id number]  Referring provider: Fauzia Alejandro*  Dx:   Encounter Diagnosis     ICD-10-CM    1  Right hand pain  M79 641    2  Finger stiffness, right  M25 641 Ambulatory Referral to PT/OT Hand Therapy                  Assessment  Assessment details: Interpretor was used during evaluation  Soledad Hines presents with ROM, pain, edema, and strength deficits d/t trigger release sx of R thumb, LF,RF (3/8/22)  She is not currently experiencing any n/t  HEP was provided with tendon glides, intrinsic WF/WE stretches, and (R putty) gripping  Isotoner glove (L) was provided to assist with swelling  Impairments: abnormal or restricted ROM, impaired physical strength and pain with function    Goals  1STG) Pt  Will have decreased edema by 15/25% within 4-6 weeks  2STG) Pt  Will have increased motion by 10-25% within 4-6 weeks  3STG) Pt  Will have increased strength by 15-25% within 4-6 weeks  1LTG) Pt will have increased motion by 25-75% within 6-8 weeks  2LTG) Pt  Will have increased ADL/IADL skills within 6-8 weeks  3LTG) Pt  Will report 0/10 pain for 2 consecutive weeks within 10-12 weeks  Plan  Plan details: Continue per plan of care     Planned modality interventions: thermotherapy: paraffin bath, thermotherapy: hydrocollator packs and TENS  Planned therapy interventions: ADL retraining, manual therapy, orthotic fitting/training, patient education, therapeutic exercise, therapeutic activities and home exercise program  Frequency: 2x week  Treatment plan discussed with: patient        Subjective Evaluation    History of Present Illness  Mechanism of injury: S/P R Thumb, LF and RF trigger release 3/8/22  Pain  Current pain ratin    Hand dominance: right    Patient Goals  Patient goals for therapy: decreased edema, decreased pain and increased strength          Objective     Active Range of Motion Right Wrist   Wrist flexion: 40 degrees   Wrist extension: 45 degrees     Right Thumb   Flexion     MP: 50    DIP: 10  Extension     MP: -10    DIP: 20  Opposition: Able to oppose    Right Digits   Flexion   Index     MCP: 42    PIP: 50    DIP: 40  Middle     MCP: 50    PIP: 60    DIP: 40  Ring     MCP: 40    PIP: 80    DIP: 30  Extension   Index     MCP: -20    PIP: 0    DIP: -10  Middle     MCP: -20    PIP: -20    DIP: 0  Ring     MCP: -20    PIP: -10    DIP: 0    Strength/Myotome Testing     Left Wrist/Hand      (2nd hand position)     Trial 1: 55 2    Right Wrist/Hand      (2nd hand position)     Trial 1: 7    Swelling     Right Wrist/Hand   Middle     Proximal: 7 5 cm  Ring     Proximal: 7 cm  Circumference MCP: 20 cm  Circumference wrist: 20 cm             Precautions: Universal  DOS: 3/8/22  HEP: Tendon Glides, Gripping (R putty), WE/WF PROM       Manuals             STM             MEM             KT             Splint Making             Neuro Re-Ed             Iso  Grasp             In Hand Manip               Ilah Late             Translation             Blocking                                       Ther Ex             PROM             Digit Flexion             Grasping             Pinching             WF/WE                                                    Ther Activity             WB             Wall Walking             Gait Training                                       Modalities             MARI Garcia

## 2022-03-31 ENCOUNTER — OFFICE VISIT (OUTPATIENT)
Dept: OCCUPATIONAL THERAPY | Age: 62
End: 2022-03-31
Payer: COMMERCIAL

## 2022-03-31 DIAGNOSIS — M79.641 RIGHT HAND PAIN: Primary | ICD-10-CM

## 2022-03-31 PROCEDURE — 97110 THERAPEUTIC EXERCISES: CPT | Performed by: OCCUPATIONAL THERAPIST

## 2022-03-31 PROCEDURE — 97112 NEUROMUSCULAR REEDUCATION: CPT | Performed by: OCCUPATIONAL THERAPIST

## 2022-03-31 PROCEDURE — 97140 MANUAL THERAPY 1/> REGIONS: CPT | Performed by: OCCUPATIONAL THERAPIST

## 2022-03-31 NOTE — PROGRESS NOTES
Daily Note     Today's date: 3/31/2022  Patient name: Jonathan Gonzalez  : 1960  MRN: [id number]  Referring provider: Janneth Arias*  Dx:   Encounter Diagnosis     ICD-10-CM    1  Right hand pain  M79 641                   Subjective: "When I wake up it is swollen and I cannot move it "      Objective: See treatment diary below  PIP MF 7 5   PIP LF 6 5   MCP 20       Assessment: Ludy Burris reports pain in volar forearm while flexing digits (9/10)  Extrinsic wrist stretches were demonstrated and added to HEP  She still presents with swelling, but stated that isotoner glove is beneficial        Plan: Continue per plan of care  Precautions: Universal  DOS: 3/8/22  HEP: Tendon Glides, Gripping (R putty), WE/WF PROM, Intrinsic Stretches  Manuals 3/31            STM 10'            MEM             KT             Splint Making             Neuro Re-Ed             Iso  Grasp 1 5' push x 30            In Hand Manip  Shaka Del Toro Hor   X 15            Translation             Blocking                                       Ther Ex             PROM 10'            Digit Flexion             Grasping Lrg Marbles x 15            Pinching             WF/WE             EDC Sm RB x25                                      Ther Activity             WB             Wall Walking             Gait Training                                       Modalities             MHP 5            Parrafin

## 2022-04-04 ENCOUNTER — OFFICE VISIT (OUTPATIENT)
Dept: OCCUPATIONAL THERAPY | Age: 62
End: 2022-04-04
Payer: COMMERCIAL

## 2022-04-04 DIAGNOSIS — M25.641 FINGER STIFFNESS, RIGHT: ICD-10-CM

## 2022-04-04 DIAGNOSIS — M79.641 RIGHT HAND PAIN: Primary | ICD-10-CM

## 2022-04-04 PROCEDURE — 97110 THERAPEUTIC EXERCISES: CPT | Performed by: OCCUPATIONAL THERAPIST

## 2022-04-04 PROCEDURE — 97112 NEUROMUSCULAR REEDUCATION: CPT | Performed by: OCCUPATIONAL THERAPIST

## 2022-04-04 PROCEDURE — 97140 MANUAL THERAPY 1/> REGIONS: CPT | Performed by: OCCUPATIONAL THERAPIST

## 2022-04-04 NOTE — PROGRESS NOTES
Daily Note     Today's date: 2022  Patient name: Mode Cormier  : 1960  MRN: [id number]  Referring provider: Janay Forbes*  Dx:   Encounter Diagnosis     ICD-10-CM    1  Right hand pain  M79 641    2  Finger stiffness, right  M25 641                   Subjective: Pt expressed decreased pain at end of session      Objective: See treatment diary below      Assessment: Improved AROM and pain after intrinsic stretch and focused superficialis gliding, urged to focus on gliding to better manage scar tissue    Plan: Continue per plan of care  Precautions: Universal  DOS: 3/8/22  HEP: Tendon Glides, Gripping (R putty), WE/WF PROM, Intrinsic Stretches  Manuals 3/31 4/4           STM 10' 10           MEM             KT             Splint Making             Neuro Re-Ed             Iso  Grasp 1 5' push x 30 1  5' push x 30           In Hand Manip  Arliss Even Hor   X 15            Translation             Blocking                                       Ther Ex             PROM 10' 10           Digit Flexion             Grasping Lrg Marbles x 15 pencils x 4           Pinching             WF/WE             EDC Sm RB x25 lrg rb x 20                                     Ther Activity             WB             Wall Walking             Gait Training                                       Modalities             MHP 5 5           Parrafin

## 2022-04-06 ENCOUNTER — OFFICE VISIT (OUTPATIENT)
Dept: OCCUPATIONAL THERAPY | Age: 62
End: 2022-04-06
Payer: COMMERCIAL

## 2022-04-06 DIAGNOSIS — M25.641 FINGER STIFFNESS, RIGHT: ICD-10-CM

## 2022-04-06 DIAGNOSIS — M79.641 RIGHT HAND PAIN: Primary | ICD-10-CM

## 2022-04-06 PROCEDURE — 97110 THERAPEUTIC EXERCISES: CPT | Performed by: OCCUPATIONAL THERAPIST

## 2022-04-06 PROCEDURE — 97112 NEUROMUSCULAR REEDUCATION: CPT | Performed by: OCCUPATIONAL THERAPIST

## 2022-04-06 PROCEDURE — 97140 MANUAL THERAPY 1/> REGIONS: CPT | Performed by: OCCUPATIONAL THERAPIST

## 2022-04-06 NOTE — PROGRESS NOTES
Daily Note     Today's date: 2022  Patient name: Danny Ogden  : 1960  MRN: [id number]  Referring provider: Jose Abdullahi*  Dx:   Encounter Diagnosis     ICD-10-CM    1  Right hand pain  M79 641    2  Finger stiffness, right  M25 641                   Subjective: Pt indicated improved overall pain      Objective: See treatment diary below      Assessment: Overall movement is improved - still painfull at the LF p1 volar and decreased FDS gliding, but her movement quality has improved  Plan: Continue per plan of care  Precautions: Universal  DOS: 3/8/22  HEP: Tendon Glides, Gripping (R putty), WE/WF PROM, Intrinsic Stretches  Manuals 3/31 4/4 4/6          STM 10' 10 15          MEM             KT             Splint Making             Neuro Re-Ed             Iso  Grasp 1 5' push x 30 1  5' push x 30           In Hand Manip  David Jean-Baptiste Hor   X 15            Translation   KP          Blocking   30                                    Ther Ex             PROM 10' 10 10          Digit Flexion             Grasping Lrg Marbles x 15 pencils x 4 pencils x 4          Pinching             WF/WE             EDC Sm RB x25 lrg rb x 20                                     Ther Activity             WB             Wall Walking             Gait Training                                       Modalities             MHP 5 5           Parrafin

## 2022-04-11 ENCOUNTER — APPOINTMENT (OUTPATIENT)
Dept: OCCUPATIONAL THERAPY | Age: 62
End: 2022-04-11
Payer: COMMERCIAL

## 2022-04-13 ENCOUNTER — OFFICE VISIT (OUTPATIENT)
Dept: OCCUPATIONAL THERAPY | Age: 62
End: 2022-04-13
Payer: COMMERCIAL

## 2022-04-13 DIAGNOSIS — M79.641 RIGHT HAND PAIN: Primary | ICD-10-CM

## 2022-04-13 PROCEDURE — 97110 THERAPEUTIC EXERCISES: CPT | Performed by: OCCUPATIONAL THERAPIST

## 2022-04-13 PROCEDURE — 97140 MANUAL THERAPY 1/> REGIONS: CPT | Performed by: OCCUPATIONAL THERAPIST

## 2022-04-13 PROCEDURE — 97112 NEUROMUSCULAR REEDUCATION: CPT | Performed by: OCCUPATIONAL THERAPIST

## 2022-04-13 NOTE — PROGRESS NOTES
Daily Note     Today's date: 2022  Patient name: Tina Beatty  : 1960  MRN: [id number]  Referring provider: Phillip Freitas*  Dx:   Encounter Diagnosis     ICD-10-CM    1  Right hand pain  M79 641                   Subjective: "It hurts the most at night"      Objective: See treatment diary below      Assessment: Lynn Kim reported a lot of pain and sensitivity at her R LF incision during the session  Continued tightness in R MCPs, decreased AROM  Plan: Continue per plan of care  Precautions: Universal  DOS: 3/8/22  HEP: Tendon Glides, Gripping (R putty), WE/WF PROM, Intrinsic Stretches  Manuals 3/31 4/4 4/6 4/13         STM 10' 10 15 15         MEM             KT             Splint Making             Neuro Re-Ed             Iso  Grasp 1 5' push x 30 1  5' push x 30           In Hand Manip  Eileen Weeks Hor   X 15            Translation   KP KP         Blocking   30 30                                   Ther Ex             PROM 10' 10 10 10         Digit Flexion             Grasping Lrg Marbles x 15 pencils x 4 pencils x 4 pencils x4         Pinching             WF/WE             EDC Sm RB x25 lrg rb x 20                                     Ther Activity             WB             Wall Walking             Gait Training                                       Modalities             MHP 5 5  5'         Parrafin

## 2022-04-14 ENCOUNTER — OFFICE VISIT (OUTPATIENT)
Dept: OCCUPATIONAL THERAPY | Age: 62
End: 2022-04-14
Payer: COMMERCIAL

## 2022-04-14 DIAGNOSIS — M79.641 RIGHT HAND PAIN: Primary | ICD-10-CM

## 2022-04-14 PROCEDURE — 97110 THERAPEUTIC EXERCISES: CPT | Performed by: OCCUPATIONAL THERAPIST

## 2022-04-14 PROCEDURE — 97140 MANUAL THERAPY 1/> REGIONS: CPT | Performed by: OCCUPATIONAL THERAPIST

## 2022-04-14 NOTE — PROGRESS NOTES
Daily Note     Today's date: 2022  Patient name: Haris Boles  : 1960  MRN: [id number]  Referring provider: Maria Isabel Dawkins*  Dx:   Encounter Diagnosis     ICD-10-CM    1  Right hand pain  M79 641                   Subjective: Pt indicated thumb pain has been bad      Objective: See treatment diary below      Assessment: Improved pain and motion with extrinsic and intrinsic stretch  Plan: Continue per plan of care  Precautions: Universal  DOS: 3/8/22  HEP: Tendon Glides, Gripping (R putty), WE/WF PROM, Intrinsic Stretches  Manuals 3/31 4/4 4/6 4/13 4/14        STM 10' 10 15 15 15        MEM             KT             Splint Making             Neuro Re-Ed             Iso  Grasp 1 5' push x 30 1  5' push x 30           In Hand Manip  Leopold Kanaris Hor   X 15            Translation   KP KP         Blocking   30 30                                   Ther Ex             PROM 10' 10 10 10 10        Digit Flexion             Grasping Lrg Marbles x 15 pencils x 4 pencils x 4 pencils x4         Pinching             WF/WE             EDC Sm RB x25 lrg rb x 20                                     Ther Activity             WB             Wall Walking             Gait Training                                       Modalities             MHP 5 5  5'         Parrafin

## 2022-04-18 ENCOUNTER — OFFICE VISIT (OUTPATIENT)
Dept: OCCUPATIONAL THERAPY | Age: 62
End: 2022-04-18
Payer: COMMERCIAL

## 2022-04-18 DIAGNOSIS — M79.641 RIGHT HAND PAIN: Primary | ICD-10-CM

## 2022-04-18 PROCEDURE — 97110 THERAPEUTIC EXERCISES: CPT | Performed by: OCCUPATIONAL THERAPIST

## 2022-04-18 PROCEDURE — 97140 MANUAL THERAPY 1/> REGIONS: CPT | Performed by: OCCUPATIONAL THERAPIST

## 2022-04-18 NOTE — PROGRESS NOTES
Daily Note     Today's date: 2022  Patient name: Yemi Arellano   : 1960  MRN: [id number]  Referring provider: Donis Albrecht*  Dx:   Encounter Diagnosis     ICD-10-CM    1  Right hand pain  M79 641                   Subjective: Pt  Presented with less pain pain      Objective: See treatment diary below      Assessment: 90% composite fist at end, tolerated fair, AROM advancing  Plan: Continue per plan of care  Precautions: Universal  DOS: 3/8/22  HEP: Tendon Glides, Gripping (R putty), WE/WF PROM, Intrinsic Stretches  Manuals 3/31 4/4 4/6 4/13 4/14 4/18       STM 10' 10 15 15 15 15       MEM             KT             Splint Making             Neuro Re-Ed             Iso  Grasp 1 5' push x 30 1  5' push x 30           In Hand Manip  El Roles Hor   X 15            Translation   KP KP         Blocking   30 30  30                                 Ther Ex             PROM 10' 10 10 10 10 10       Digit Flexion             Grasping Lrg Marbles x 15 pencils x 4 pencils x 4 pencils x4  pencils x 5, isotubes 3x10, AROM       Pinching             WF/WE             EDC Sm RB x25 lrg rb x 20                                     Ther Activity             WB             Wall Walking             Gait Training                                       Modalities             MHP 5 5  5'  5       Parrafin

## 2022-04-20 ENCOUNTER — OFFICE VISIT (OUTPATIENT)
Dept: OCCUPATIONAL THERAPY | Age: 62
End: 2022-04-20
Payer: COMMERCIAL

## 2022-04-20 DIAGNOSIS — M79.641 RIGHT HAND PAIN: Primary | ICD-10-CM

## 2022-04-20 PROCEDURE — 97140 MANUAL THERAPY 1/> REGIONS: CPT | Performed by: OCCUPATIONAL THERAPIST

## 2022-04-20 PROCEDURE — 97110 THERAPEUTIC EXERCISES: CPT | Performed by: OCCUPATIONAL THERAPIST

## 2022-04-20 NOTE — PROGRESS NOTES
Daily Note     Today's date: 2022  Patient name: José Miguel Mcgee   : 1960  MRN: [id number]  Referring provider: Hoa Cabello*  Dx:   Encounter Diagnosis     ICD-10-CM    1  Right hand pain  M79 641                   Subjective: "My whole hand hurts"      Objective: See treatment diary below      Assessment: Haley Carney reported that her whole hand was sore after progressive grasping  85-90% composite fist at end  Digit AROM continues to improve  Plan: Continue per plan of care  Precautions: Universal  DOS: 3/8/22  HEP: Tendon Glides, Gripping (R putty), WE/WF PROM, Intrinsic Stretches  Manuals 3/31 4/4 4/6 4/13 4/14 4/18 4/20      STM 10' 10 15 15 15 15 15      MEM             KT             Splint Making             Neuro Re-Ed             Iso  Grasp 1 5' push x 30 1  5' push x 30           In Hand Manip  Tim Lydia Hor   X 15            Translation   KP KP         Blocking   30 30  30 30                                Ther Ex             PROM 10' 10 10 10 10 10 10      Digit Flexion             Grasping Lrg Marbles x 15 pencils x 4 pencils x 4 pencils x4  pencils x 5, isotubes 3x10, AROM Pencils x5, isotubes 3x10, AROM      Pinching             WF/WE             EDC Sm RB x25 lrg rb x 20                                     Ther Activity             WB             Wall Walking             Gait Training                                       Modalities             MHP 5 5  5'  5 5      Parrafin

## 2022-04-25 ENCOUNTER — OFFICE VISIT (OUTPATIENT)
Dept: OCCUPATIONAL THERAPY | Age: 62
End: 2022-04-25
Payer: COMMERCIAL

## 2022-04-25 DIAGNOSIS — M79.641 RIGHT HAND PAIN: Primary | ICD-10-CM

## 2022-04-25 PROCEDURE — 97110 THERAPEUTIC EXERCISES: CPT | Performed by: OCCUPATIONAL THERAPIST

## 2022-04-25 NOTE — PROGRESS NOTES
Daily Note     Today's date: 2022  Patient name: Rashard Leary  : 1960  MRN: [id number]  Referring provider: Taran Duron*  Dx:   Encounter Diagnosis     ICD-10-CM    1  Right hand pain  M79 641                   Subjective: Pt indicated that her swelling was worse this week      Objective: See treatment diary below      Assessment: Issued size smaller isotoner; much improved AROM at end of session, instructed to wear glove 22-23 hrs/day  Plan: Continue per plan of care  Precautions: Universal  DOS: 3/8/22  HEP: Tendon Glides, Gripping (R putty), WE/WF PROM, Intrinsic Stretches  Manuals 3/31 4/4 4/6 4/13 4/14 4/18 4/20 4/25     STM 10' 10 15 15 15 15 15      MEM             KT             Splint Making             Neuro Re-Ed             Iso  Grasp 1 5' push x 30 1  5' push x 30           In Hand Manip  April Bob Hor   X 15            Translation   KP KP         Blocking   30 30  30 30 30                               Ther Ex             PROM 10' 10 10 10 10 10 10      Digit Flexion             Grasping Lrg Marbles x 15 pencils x 4 pencils x 4 pencils x4  pencils x 5, isotubes 3x10, AROM Pencils x5, isotubes 3x10, AROM pencils x 5, isotubes 3x10, YPW 3x10     Pinching             WF/WE             EDC Sm RB x25 lrg rb x 20      lrg rb x 30                               Ther Activity             WB             Wall Walking        10x tb     Gait Training                                       Modalities             MHP 5 5  5'  5 5 5     Parrafin

## 2022-05-02 ENCOUNTER — OFFICE VISIT (OUTPATIENT)
Dept: OCCUPATIONAL THERAPY | Age: 62
End: 2022-05-02
Payer: COMMERCIAL

## 2022-05-02 DIAGNOSIS — M79.641 RIGHT HAND PAIN: Primary | ICD-10-CM

## 2022-05-02 PROCEDURE — 97140 MANUAL THERAPY 1/> REGIONS: CPT | Performed by: OCCUPATIONAL THERAPIST

## 2022-05-02 PROCEDURE — 97110 THERAPEUTIC EXERCISES: CPT | Performed by: OCCUPATIONAL THERAPIST

## 2022-05-02 NOTE — PROGRESS NOTES
Daily Note     Today's date: 2022  Patient name: Daryle Provencal  : 1960  MRN: [id number]  Referring provider: Horacio Rivera*  Dx:   Encounter Diagnosis     ICD-10-CM    1  Right hand pain  M79 641                   Subjective: Pt had no c/o pain      Objective: See treatment diary below      Assessment: Good tolerance, good AROM initially and then improved at end of session    Plan: Progress note during next visit  Precautions: Universal  DOS: 3/8/22  HEP: Tendon Glides, Gripping (R putty), WE/WF PROM, Intrinsic Stretches  Manuals 3/31 4/4 4/6 4/13 4/14 4/18 4/20 4/25 5/2    STM 10' 10 15 13 15 15 15  15    MEM             KT             Splint Making             Neuro Re-Ed             Iso  Grasp 1 5' push x 30 1  5' push x 30           In Hand Manip  Peggyann Fang Hor   X 15            Translation   KP KP         Blocking   30 30  30 30 30                               Ther Ex             PROM 10' 10 10 10 10 10 10      Digit Flexion             Grasping Lrg Marbles x 15 pencils x 4 pencils x 4 pencils x4  pencils x 5, isotubes 3x10, AROM Pencils x5, isotubes 3x10, AROM pencils x 5, isotubes 3x10, YPW 3x10 RPW 3x10    Pinching             WF/WE             EDC Sm RB x25 lrg rb x 20      lrg rb x 30 lrg rb x 45                              Ther Activity             WB             Wall Walking        10x tb     Gait Training                                       Modalities             MHP 5 5  5'  5 5 5     Parrafin

## 2022-05-04 ENCOUNTER — OFFICE VISIT (OUTPATIENT)
Dept: OCCUPATIONAL THERAPY | Age: 62
End: 2022-05-04
Payer: COMMERCIAL

## 2022-05-04 DIAGNOSIS — M79.641 RIGHT HAND PAIN: Primary | ICD-10-CM

## 2022-05-04 PROCEDURE — 97140 MANUAL THERAPY 1/> REGIONS: CPT | Performed by: OCCUPATIONAL THERAPIST

## 2022-05-04 PROCEDURE — 97110 THERAPEUTIC EXERCISES: CPT | Performed by: OCCUPATIONAL THERAPIST

## 2022-05-04 NOTE — PROGRESS NOTES
Daily Note     Today's date: 2022  Patient name: Yung Ferreira  : 1960  MRN: [id number]  Referring provider: Flor Haji*  Dx:   Encounter Diagnosis     ICD-10-CM    1  Right hand pain  M79 641        Start Time: 1615  Stop Time: 1700  Total time in clinic (min): 45 minutes    Subjective: "Pain sometimes "       Objective: See treatment diary below      Assessment: Trice's PROM has shown gross improvements MCP, IP, and DIP flexion of the left hand  Marya Cruz also presented with grossly less intrinsic tightness of the hand  Therapeutic exercises for strengthening were increased slightly and showed gross improvements in strength and toleration  Plan: Continue per plan of care  Precautions: Universal  DOS: 3/8/22  HEP: Tendon Glides, Gripping (R putty), WE/WF PROM, Intrinsic Stretches  Manuals 3/31 4/4 4/6 4/13 4/14 4/18 4/20 4/25 5/2 5/4   STM 10' 10 13 17 13 17 15  15 25   MEM             KT             Splint Making             Neuro Re-Ed             Iso  Grasp 1 5' push x 30 1  5' push x 30           In Hand Manip  Priya Rodriguez Hor   X 15            Translation   KP KP         Blocking   30 30  30 30 30                               Ther Ex             PROM 10' 10 10 10 10 10 10   10   Digit Flexion             Grasping Lrg Marbles x 15 pencils x 4 pencils x 4 pencils x4  pencils x 5, isotubes 3x10, AROM Pencils x5, isotubes 3x10, AROM pencils x 5, isotubes 3x10, YPW 3x10 RPW 3x10 RPW  3x10    Yelow Flexbar  2x10    Pinching          Y   TT  1x     WF/WE             EDC Sm RB x25 lrg rb x 20      lrg rb x 30 lrg rb x 45 lrg rb x 45                             Ther Activity             WB             Wall Walking        10x tb  10x  tb   Gait Training                                       Modalities             MHP 5 5  5'  5 5 5  5   Parrafin

## 2022-05-09 ENCOUNTER — OFFICE VISIT (OUTPATIENT)
Dept: OCCUPATIONAL THERAPY | Age: 62
End: 2022-05-09
Payer: COMMERCIAL

## 2022-05-09 DIAGNOSIS — M79.641 RIGHT HAND PAIN: Primary | ICD-10-CM

## 2022-05-09 PROCEDURE — 97140 MANUAL THERAPY 1/> REGIONS: CPT

## 2022-05-09 PROCEDURE — 97110 THERAPEUTIC EXERCISES: CPT

## 2022-05-09 NOTE — PROGRESS NOTES
Daily Note     Today's date: 2022  Patient name: Daryle Provencal  : 1960  MRN: [id number]  Referring provider: Horacio Rivera*  Dx:   Encounter Diagnosis     ICD-10-CM    1  Right hand pain  M79 641                 Sidra Lax- [id number]  Subjective: "I had a lot of pain after my session last week "       Objective: See treatment diary below      Assessment: Pt reported inc pain and swelling from last session  Last session, tx was upgraded; Therapist downgraded for today 2* pain  Noted mod edema to MPs of digits  G ROM PIP and DIP; limited MP flexion 2* edema and scar sensitivity  Plan: Continue per plan of care  Precautions: Universal  DOS: 3/8/22  HEP: Tendon Glides, Gripping (R putty), WE/WF PROM, Intrinsic Stretches  Manuals    STM 15' 8 13 17 13 17 15  15 25   MEM 3'            KT applied            Splint Making             Neuro Re-Ed             Iso  Grasp Small 10x 5 sec 1 5' push x 30           In Hand Manip  Peggyann Fang Hor   X 15            Translation   KP KP         Blocking   30 30  30 30 30                               Ther Ex             PROM 10' 10 10 10 10 10 10   10   Digit Flexion MP flexion, lumbrical stretch 10x 10 sec            Grasping Pencils 3x, isotube small; RPW 2x10 flex/ext pencils x 4 pencils x 4 pencils x4  pencils x 5, isotubes 3x10, AROM Pencils x5, isotubes 3x10, AROM pencils x 5, isotubes 3x10, YPW 3x10 RPW 3x10 RPW  3x10    Yelow Flexbar  2x10    Pinching          Y   TT  1x     WF/WE             EDC Sm RB x25 lrg rb x 20      lrg rb x 30 lrg rb x 45 lrg rb x 45                             Ther Activity             WB             Wall Walking        10x tb  10x  tb   Gait Training                                       Modalities             MHP 5 5  5'  5 5 5  5   Parrafin

## 2022-05-11 ENCOUNTER — OFFICE VISIT (OUTPATIENT)
Dept: OBGYN CLINIC | Facility: HOSPITAL | Age: 62
End: 2022-05-11

## 2022-05-11 ENCOUNTER — OFFICE VISIT (OUTPATIENT)
Dept: OCCUPATIONAL THERAPY | Age: 62
End: 2022-05-11
Payer: COMMERCIAL

## 2022-05-11 VITALS
HEIGHT: 61 IN | WEIGHT: 161 LBS | HEART RATE: 97 BPM | OXYGEN SATURATION: 97 % | SYSTOLIC BLOOD PRESSURE: 152 MMHG | DIASTOLIC BLOOD PRESSURE: 90 MMHG | BODY MASS INDEX: 30.4 KG/M2

## 2022-05-11 DIAGNOSIS — M65.341 TRIGGER RING FINGER OF RIGHT HAND: ICD-10-CM

## 2022-05-11 DIAGNOSIS — M65.311 TRIGGER THUMB OF RIGHT HAND: Primary | ICD-10-CM

## 2022-05-11 DIAGNOSIS — M79.641 RIGHT HAND PAIN: Primary | ICD-10-CM

## 2022-05-11 DIAGNOSIS — M65.331 TRIGGER MIDDLE FINGER OF RIGHT HAND: ICD-10-CM

## 2022-05-11 PROCEDURE — 99024 POSTOP FOLLOW-UP VISIT: CPT | Performed by: PHYSICIAN ASSISTANT

## 2022-05-11 PROCEDURE — 97110 THERAPEUTIC EXERCISES: CPT

## 2022-05-11 PROCEDURE — 97140 MANUAL THERAPY 1/> REGIONS: CPT

## 2022-05-11 RX ORDER — NAPROXEN SODIUM 220 MG
220 TABLET ORAL 2 TIMES DAILY WITH MEALS
Qty: 20 TABLET | Refills: 0 | Status: SHIPPED | OUTPATIENT
Start: 2022-05-11

## 2022-05-11 NOTE — LETTER
May 11, 2022     Patient: Sepideh Gaona  YOB: 1960  Date of Visit: 5/11/2022      To Whom it May Concern:    Sepideh Gaona is under my professional care  Satish Luz was seen in my office on 5/11/2022  Satish Luz will remain out of work for 4 weeks until her next visit  If you have any questions or concerns, please don't hesitate to call           Sincerely,          Jarrett Will MD        CC: No Recipients

## 2022-05-11 NOTE — PROGRESS NOTES
Assessment:   S/P Right thumb, long, and ring finger trigger finger releases - Right on 3/8/2022  - patient has continued stiffness and swelling    Plan: Will continue with therapy activities  - work on swelling control and independent tendon gliding  Will order NSAIDs for inflammation control  Will remain out of work until her next visit in 4 weeks    Follow Up:  4  week(s)    To Do Next Visit:  Recheck      CHIEF COMPLAINT:  Chief Complaint   Patient presents with    Right Hand - Post-op         SUBJECTIVE:  Yemi Arellano is a 57 y o  female who presents for follow up after Right thumb, long, and ring finger trigger finger releases - Right on 3/8/2022  Today patient has some persistent pain and stiffness  PHYSICAL EXAMINATION:  Vital signs: LMP  (LMP Unknown)   General: well developed and well nourished, alert, oriented times 3 and appears comfortable  Psychiatric: Normal    MUSCULOSKELETAL EXAMINATION:  Incision: healed  Patient has some swelling at the operative sites and the fingers  Patient has hypersensitivity over the operative site  Range of Motion: As expected  Patient can make a near full fist  Patient can fully extend the fingers     Neurovascular status: Neuro intact, good cap refill  Activity Restrictions: No restrictions      STUDIES REVIEWED:  No Studies to review      PROCEDURES PERFORMED:  Procedures  No Procedures performed today

## 2022-05-12 NOTE — PROGRESS NOTES
Daily Note     Today's date: 2022  Patient name: José Miguel Mcgee  : 1960  MRN: [id number]  Referring provider: Hoa Cabello*  Dx:   Encounter Diagnosis     ICD-10-CM    1  Right hand pain  M79 641                 Jazz Sigala- [id number]  Subjective: "I went to the doctor this morning, she gave me some anti-inflammatories because of my swelling "       Objective: See treatment diary below      Assessment: Pt presented with mod edema to digits and A-1/heads of MPs  KT did not dec any swelling  MD recommended- work on swelling control and independent tendon gliding  Pt edu to use glove  Pt reeducated on TGE and Geormarioan Funk, as well as use of compression glove  Pt able to make full composite fist by end of tx  Plan: Continue per plan of care  Precautions: Universal  DOS: 3/8/22  HEP: Tendon Glides, Gripping (R putty), WE/WF PROM, Intrinsic Stretches  Manuals    STM 15' 8' 15 15 15 15 15  15 25   MEM 3' 2'           KT applied            Splint Making             Neuro Re-Ed             Iso  Grasp Small 10x 5 sec            In Hand Manip  Tim Lydia Hor   X 15            Translation   KP KP         Blocking  Each digit 10x 30 30  30 30 30                               Ther Ex             PROM 10' 10' 10 10 10 10 10   10   Digit Flexion MP flexion, lumbrical stretch 10x 10 sec MP flexion, lumbrical stretch 10x 10 sec           Grasping Pencils 3x, isotube small; RPW 2x10 flex/ext Pencils 3x, isotube small; pencils x 4 pencils x4  pencils x 5, isotubes 3x10, AROM Pencils x5, isotubes 3x10, AROM pencils x 5, isotubes 3x10, YPW 3x10 RPW 3x10 RPW  3x10    Yelow Flexbar  2x10    Pinching          Y   TT  1x     WF/WE             EDC Sm RB x25       lrg rb x 30 lrg rb x 45 lrg rb x 45   TGE   2x5 review                        Ther Activity             WB             Wall Walking        10x tb  10x  tb   Gait Training Modalities             P 5 5 pre tx  5'  5 5 5  5   Parrafin

## 2022-05-16 ENCOUNTER — OFFICE VISIT (OUTPATIENT)
Dept: OCCUPATIONAL THERAPY | Age: 62
End: 2022-05-16
Payer: COMMERCIAL

## 2022-05-16 DIAGNOSIS — M79.641 RIGHT HAND PAIN: Primary | ICD-10-CM

## 2022-05-16 PROCEDURE — 97110 THERAPEUTIC EXERCISES: CPT | Performed by: OCCUPATIONAL THERAPIST

## 2022-05-16 PROCEDURE — 97140 MANUAL THERAPY 1/> REGIONS: CPT | Performed by: OCCUPATIONAL THERAPIST

## 2022-05-16 NOTE — PROGRESS NOTES
Daily Note     Today's date: 2022  Patient name: Anahy Kendall  : 1960  MRN: [id number]  Referring provider: Klarissa Chapin  Dx:   Encounter Diagnosis     ICD-10-CM    1  Right hand pain  M79 641                   Subjective: "Pain around this part of the hand "      Objective: See treatment diary below      Assessment: Renate Schwarz presented with pain, swelling, and sensitivity in MCP joints of the right hand  IASTM and STM was not tolerated  On the A3 pulley there was a small dark colored mass felt on palpation  KT was used to provide compression and support over the pulley under the assumption the pain and swelling are due altered flexor kinematics  Client was advised to where an Isotoner glove to help with swelling management  Therapist will follow up on Wednesday to see how the client feels  Plan: Continue per plan of care  Precautions: Universal  DOS: 3/8/22  HEP: Tendon Glides, Gripping (R putty), WE/WF PROM, Intrinsic Stretches  Manuals    STM 15' 8' 10 15 13 15 15 15  15 25   MEM 3' 2'            KT applied  5           Splint Making              Neuro Re-Ed              Iso  Grasp Small 10x 5 sec             In Hand Manip  Berenice Justice Hor   X 15             Translation    KP KP         Blocking  Each digit 10x  30 30  30 30 30                                 Ther Ex              PROM 10' 10' 10 10 10 10 10 10   10   Digit Flexion MP flexion, lumbrical stretch 10x 10 sec MP flexion, lumbrical stretch 10x 10 sec            Grasping Pencils 3x, isotube small; RPW 2x10 flex/ext Pencils 3x, isotube small;  pencils x 4 pencils x4  pencils x 5, isotubes 3x10, AROM Pencils x5, isotubes 3x10, AROM pencils x 5, isotubes 3x10, YPW 3x10 RPW 3x10 RPW  3x10    Yelow Flexbar  2x10    Pinching           Y   TT  1x     WF/WE              EDC Sm RB x25        lrg rb x 30 lrg rb x 45 lrg rb x 45   TGE   2x5 review Ther Activity              WB              Wall Walking         10x tb  10x  tb   Gait Training                                          Modalities              MHP 5 5 pre tx 5  5'  5 5 5  5   Parrafin

## 2022-05-18 ENCOUNTER — OFFICE VISIT (OUTPATIENT)
Dept: OCCUPATIONAL THERAPY | Age: 62
End: 2022-05-18
Payer: COMMERCIAL

## 2022-05-18 DIAGNOSIS — M79.641 RIGHT HAND PAIN: Primary | ICD-10-CM

## 2022-05-18 PROCEDURE — 97110 THERAPEUTIC EXERCISES: CPT

## 2022-05-18 PROCEDURE — 97112 NEUROMUSCULAR REEDUCATION: CPT

## 2022-05-18 PROCEDURE — 97010 HOT OR COLD PACKS THERAPY: CPT

## 2022-05-18 PROCEDURE — 97140 MANUAL THERAPY 1/> REGIONS: CPT

## 2022-05-18 NOTE — PROGRESS NOTES
Daily Note     Today's date: 2022  Patient name: Ayah Robles  : 1960  MRN: [id number]  Referring provider: Shawn Hernandez*  Dx:   Encounter Diagnosis     ICD-10-CM    1  Right hand pain  M79 641        Start Time: 1600  Stop Time: 1650  Total time in clinic (min): 50 minutes    Subjective: " My hand is very sensitive "      Objective: See treatment diary below    Maritza [phone]    Assessment: DARIUSZ presented with some pain and sensitivity in MCP joints of the right hand  Light IASTM and STM was tolerated today  Poly pellets were used for desensitization from the pain  On the A3 pulley there was a small dark colored mass felt on palpation which had seem to decrease in swelling from previous compression of KT  KT was then applied again  Scar putty was used to provide compression and support over the A1 pulleys under the assumption the pain and swelling are due altered flexor kinematics  Client was advised to where an Isotoner glove to help with swelling management  Plan: Continue per plan of care  Precautions: Universal  DOS: 3/8/22  HEP: Tendon Glides, Gripping (R putty), WE/WF PROM, Intrinsic Stretches  Manuals  5 5   STM 15' 8' 10 15 (gentle IASTM) 15 15 15 15  15 25   MEM 3' 2'            KT applied  5 performed          Splint Making              Neuro Re-Ed              Iso  Grasp Small 10x 5 sec             In Hand Manip  Rolena Clamp Hor   X 15             Translation     KP         Blocking  Each digit 10x   30  30 30 30     Desensitization     Poly pellets 10 marbles                         Ther Ex              PROM 10' 10' 10 10 10 10 10 10   10   Digit Flexion MP flexion, lumbrical stretch 10x 10 sec MP flexion, lumbrical stretch 10x 10 sec            Grasping Pencils 3x, isotube small; RPW 2x10 flex/ext Pencils 3x, isotube small;   pencils x4  pencils x 5, isotubes 3x10, AROM Pencils x5, isotubes 3x10, AROM pencils x 5, isotubes 3x10, YPW 3x10 RPW 3x10 RPW  3x10    Yelow Flexbar  2x10    Pinching           Y   TT  1x     WF/WE              EDC Sm RB x25        lrg rb x 30 lrg rb x 45 lrg rb x 45   TGE   2x5 review  2x10                        Ther Activity              WB              Wall Walking         10x tb  10x  tb   Gait Training                                          Modalities              MHP 5 5 pre tx 5 5 5'  5 5 5  5   Parrafin

## 2022-05-23 ENCOUNTER — OFFICE VISIT (OUTPATIENT)
Dept: OCCUPATIONAL THERAPY | Age: 62
End: 2022-05-23
Payer: COMMERCIAL

## 2022-05-23 DIAGNOSIS — M79.641 RIGHT HAND PAIN: Primary | ICD-10-CM

## 2022-05-23 PROCEDURE — 97110 THERAPEUTIC EXERCISES: CPT | Performed by: OCCUPATIONAL THERAPIST

## 2022-05-23 NOTE — PROGRESS NOTES
Daily Note     Today's date: 2022  Patient name: Khoa Lockhart  : 1960  MRN: [id number]  Referring provider: Karthik Cao*  Dx:   Encounter Diagnosis     ICD-10-CM    1  Right hand pain  M79 641        Start Time: 1600  Stop Time: 1630  Total time in clinic (min): 30 minutes    Subjective:  " Hurts on the back of two fingers "      Objective: See treatment diary below      Assessment: Elle Haddad presented with instrinsic tightness of the right hand  Elle Haddad still inconsistently complain of pain in the back of the ring and picky finger and in the all the finger and palm of the hand  Therapist advised median nerve glides for HEP to see if there is any potential median nerve involvement  Plan: Continue per plan of care  Precautions: Universal  DOS: 3/8/22  HEP: Tendon Glides, Gripping (R putty), WE/WF PROM, Intrinsic Stretches  Manuals    STM 15' 8' 10 15 (gentle IASTM)  15 15 15  15 25   MEM 3' 2'            KT applied  5 performed          Splint Making              Neuro Re-Ed              Iso  Grasp Small 10x 5 sec             In Hand Manip  Kenyatta Lama Hor   X 15             Translation              Blocking  Each digit 10x     30 30 30     Desensitization     Poly pellets 10 marbles                         Ther Ex              PROM 10' 10' 10 10 30 10 10 10   10   Digit Flexion MP flexion, lumbrical stretch 10x 10 sec MP flexion, lumbrical stretch 10x 10 sec            Grasping Pencils 3x, isotube small; RPW 2x10 flex/ext Pencils 3x, isotube small;     pencils x 5, isotubes 3x10, AROM Pencils x5, isotubes 3x10, AROM pencils x 5, isotubes 3x10, YPW 3x10 RPW 3x10 RPW  3x10    Yelow Flexbar  2x10    Pinching           Y   TT  1x     WF/WE              EDC Sm RB x25        lrg rb x 30 lrg rb x 45 lrg rb x 45   TGE   2x5 review  2x10                        Ther Activity              WB              Wall Walking         10x tb  10x  tb   Gait Training                                          Modalities              P 5 5 pre tx 5 5 5'  5 5 5  5   Leathafin

## 2022-05-25 ENCOUNTER — OFFICE VISIT (OUTPATIENT)
Dept: OCCUPATIONAL THERAPY | Age: 62
End: 2022-05-25
Payer: COMMERCIAL

## 2022-05-25 DIAGNOSIS — M79.641 RIGHT HAND PAIN: Primary | ICD-10-CM

## 2022-05-25 PROCEDURE — 97140 MANUAL THERAPY 1/> REGIONS: CPT | Performed by: OCCUPATIONAL THERAPIST

## 2022-05-25 PROCEDURE — 97022 WHIRLPOOL THERAPY: CPT | Performed by: OCCUPATIONAL THERAPIST

## 2022-05-31 ENCOUNTER — OFFICE VISIT (OUTPATIENT)
Dept: OCCUPATIONAL THERAPY | Age: 62
End: 2022-05-31
Payer: COMMERCIAL

## 2022-05-31 DIAGNOSIS — M79.641 RIGHT HAND PAIN: Primary | ICD-10-CM

## 2022-05-31 PROCEDURE — 97140 MANUAL THERAPY 1/> REGIONS: CPT

## 2022-05-31 PROCEDURE — 97110 THERAPEUTIC EXERCISES: CPT

## 2022-06-01 NOTE — PROGRESS NOTES
Daily Note     Today's date: 2022  Patient name: Mode Cormier  : 1960  MRN: [id number]  Referring provider: Janay Forbes*  Dx:   Encounter Diagnosis     ICD-10-CM    1  Right hand pain  M79 641                 Wilberann Pulido [id number]  Subjective: "I don't have pain right now, bu I did yesterday when I do this " (pt demonstrated hook fist)      Objective: See treatment diary below      Assessment: Pt reports dec pain in 3rd finger, but pain of whole hand over the weekend; pt reports more pain later at night after therapy  Fluidotherapy was used to help with desensitization and for AROM  Pt able to make full fist but pt c/o not being able to make tight   Median nerve glides perf for possible nerve entrapment  Tens and heat were applied for relief form pain  Plan: Continue per plan of care  Precautions: Universal  DOS: 3/8/22  HEP: Tendon Glides, Gripping (R putty), WE/WF PROM, Intrinsic Stretches  Manuals  5   STM 15' 8' 10 15 (gentle IASTM)  5' gentle 15 15  15 25   MEM 3' 2'            KT applied  5 performed          Splint Making              Neuro Re-Ed              Iso  Grasp Small 10x 5 sec             In Hand Manip  Arliss Even Hor   X 15             Translation              Blocking  Each digit 10x     30 30 30     Desensitization     Poly pellets 10 marbles   Poly pellets 10 marble        Median nerve glide      15x        Ther Ex              PROM 10' 10' 10 10 30 5' 10 10   10   Digit Flexion MP flexion, lumbrical stretch 10x 10 sec MP flexion, lumbrical stretch 10x 10 sec            Grasping Pencils 3x, isotube small; RPW 2x10 flex/ext Pencils 3x, isotube small;     pencils x 5, isotubes 3x10, AROM Pencils x5, isotubes 3x10, AROM pencils x 5, isotubes 3x10, YPW 3x10 RPW 3x10 RPW  3x10    Yelow Flexbar  2x10    Pinching           Y   TT  1x     WF/WE              EDC Sm RB x25        lrg rb x 30 lrg rb x 45 lrg rb x 45   TGE   2x5 review  2x10                        Ther Activity              WB              Wall Walking         10x tb  10x  tb   Gait Training                                          Modalities              MHP 5 5 pre tx 5 5 5' 10+ Tens post tx 5 5 5  5   Fluido      8'

## 2022-06-02 ENCOUNTER — APPOINTMENT (OUTPATIENT)
Dept: OCCUPATIONAL THERAPY | Age: 62
End: 2022-06-02
Payer: COMMERCIAL

## 2022-06-02 NOTE — PROGRESS NOTES
OT Re-Evaluation     Today's date: 2022  Patient name: Eunice Serrano  : 1960  MRN: [id number]  Referring provider: Kylah Salinas*  Dx:   Encounter Diagnosis     ICD-10-CM    1  Right hand pain  M79 641                   Assessment  Assessment details: Interpretor was used during evaluation  Nalini Damon presents with ROM, pain, edema, and strength deficits d/t trigger release sx of R thumb, LF,RF (3/8/22)  She is not currently experiencing any n/t  HEP was provided with tendon glides, intrinsic WF/WE stretches, and (R putty) gripping  Isotoner glove (L) was provided to assist with swelling  Impairments: abnormal or restricted ROM, impaired physical strength and pain with function    Goals  1STG) Pt  Will have decreased edema by 15/25% within 4-6 weeks  2STG) Pt  Will have increased motion by 10-25% within 4-6 weeks  3STG) Pt  Will have increased strength by 15-25% within 4-6 weeks  1LTG) Pt will have increased motion by 25-75% within 6-8 weeks  2LTG) Pt  Will have increased ADL/IADL skills within 6-8 weeks  3LTG) Pt  Will report 0/10 pain for 2 consecutive weeks within 10-12 weeks  Plan  Plan details: Continue per plan of care     Planned modality interventions: thermotherapy: paraffin bath, thermotherapy: hydrocollator packs and TENS  Planned therapy interventions: ADL retraining, manual therapy, orthotic fitting/training, patient education, therapeutic exercise, therapeutic activities and home exercise program  Frequency: 2x week  Treatment plan discussed with: patient        Subjective Evaluation    History of Present Illness  Mechanism of injury: S/P R Thumb, LF and RF trigger release 3/8/22  Pain  Current pain ratin    Hand dominance: right    Patient Goals  Patient goals for therapy: decreased edema, decreased pain and increased strength          Objective     Active Range of Motion     Right Wrist   Wrist flexion: 40 degrees   Wrist extension: 45 degrees     Right Thumb   Flexion     MP: 50    DIP: 10  Extension     MP: -10    DIP: 20  Opposition: Able to oppose    Right Digits   Flexion   Index     MCP: 42    PIP: 50    DIP: 40  Middle     MCP: 50    PIP: 60    DIP: 40  Ring     MCP: 40    PIP: 80    DIP: 30  Extension   Index     MCP: -20    PIP: 0    DIP: -10  Middle     MCP: -20    PIP: -20    DIP: 0  Ring     MCP: -20    PIP: -10    DIP: 0    Strength/Myotome Testing     Left Wrist/Hand      (2nd hand position)     Trial 1: 55 2    Right Wrist/Hand      (2nd hand position)     Trial 1: 7    Swelling     Right Wrist/Hand   Middle     Proximal: 7 5 cm  Ring     Proximal: 7 cm  Circumference MCP: 20 cm  Circumference wrist: 20 cm             Precautions: Universal  DOS: 3/8/22  HEP: Tendon Glides, Gripping (R putty), WE/WF PROM       Manuals             STM             MEM             KT             Splint Making             Neuro Re-Ed             Iso  Grasp             In Hand Manip               Cristine So             Translation             Blocking                                       Ther Ex             PROM             Digit Flexion             Grasping             Pinching             WF/WE                                                    Ther Activity             WB             Wall Walking             Gait Training                                       Modalities             MARI Garcia

## 2022-06-06 ENCOUNTER — EVALUATION (OUTPATIENT)
Dept: OCCUPATIONAL THERAPY | Age: 62
End: 2022-06-06
Payer: COMMERCIAL

## 2022-06-06 DIAGNOSIS — M79.641 RIGHT HAND PAIN: Primary | ICD-10-CM

## 2022-06-06 PROCEDURE — 97112 NEUROMUSCULAR REEDUCATION: CPT

## 2022-06-06 PROCEDURE — 97140 MANUAL THERAPY 1/> REGIONS: CPT

## 2022-06-06 PROCEDURE — 97168 OT RE-EVAL EST PLAN CARE: CPT

## 2022-06-07 NOTE — PROGRESS NOTES
OT Re-Evaluation     Today's date: 2022  Patient name: Eileen Lara  : 1960  MRN: [id number]  Referring provider: Alise Chicas*  Dx:   Encounter Diagnosis     ICD-10-CM    1  Right hand pain  M79 641                   Assessment  Assessment details: 22- Yinka Matthews ([phone]) used  Pt has been participating in skilled OT tx  Pt has noted with inc ROM of digits and hand, inc in  strength, dec intrinsic tightness and even dec in edema to hand and digits  Pt still demo mild edema to prox phalanx and A1 pulley ofLF and RF; and c/o inc stiffness in the AM and not being able to close her fist  Pt also reports severe pain on palm of her hand from prox phalanx, down palm up to FA  Pt also reporting inc pain to SF  Therapist have focused on pain mgmt and desensitization  Focusing on scar tissue mgmt, with lump noted in RF prox phalanx  Therapist sending pt back to MD for further medical diagnostic and tx for continued pain  Interpretor was used during evaluation  El Liner presents with ROM, pain, edema, and strength deficits d/t trigger release sx of R thumb, LF,RF (3/8/22)  She is not currently experiencing any n/t  HEP was provided with tendon glides, intrinsic WF/WE stretches, and (R putty) gripping  Isotoner glove (L) was provided to assist with swelling  Impairments: abnormal or restricted ROM, impaired physical strength and pain with function    Goals  1STG) Pt  Will have decreased edema by 15/25% within 4-6 weeks  MET  2STG) Pt  Will have increased motion by 10-25% within 4-6 weeks  MET  3STG) Pt  Will have increased strength by 15-25% within 4-6 weeks  MET    1LTG) Pt will have increased motion by 25-75% within 6-8 weeks  2LTG) Pt  Will have increased ADL/IADL skills within 6-8 weeks  3LTG) Pt  Will report 0/10 pain for 2 consecutive weeks within 10-12 weeks  Plan  Plan details: Continue per plan of care     Planned modality interventions: thermotherapy: paraffin bath, thermotherapy: hydrocollator packs and TENS  Planned therapy interventions: ADL retraining, manual therapy, orthotic fitting/training, patient education, therapeutic exercise, therapeutic activities and home exercise program  Frequency: 2x week  Treatment plan discussed with: patient        Subjective Evaluation    History of Present Illness  Mechanism of injury: S/P R Thumb, LF and RF trigger release 3/8/22  Pain  Current pain ratin    Hand dominance: right    Patient Goals  Patient goals for therapy: decreased edema, decreased pain and increased strength          Objective     Active Range of Motion     Right Wrist   Wrist flexion: 45 degrees   Wrist extension: 50 degrees     Right Thumb   Flexion     MP: 50    DIP: 10  Extension     MP: -10    DIP: 20  Opposition: Able to oppose    Right Digits   Flexion   Index     MCP: 60    PIP: 80    DIP: 52  Middle     MCP: 70    PIP: 90    DIP: 70  Ring     MCP: 80    PIP: 95    DIP: 60  Little     MCP: 90    PIP: 90    DIP: 60  Extension   Index     MCP: 0    PIP: 0    DIP: 0  Middle     MCP: 0    PIP: 0    DIP: 0  Ring     MCP: 0    PIP: 0    DIP: 0    Additional Active Range of Motion Details  - Pt has demo G inc in ROM and demo complete composite fist; even though pt c/o severe pain and reports in the AM she cannot close hand, but in the afternoon at therapy she can as time goes on  Strength/Myotome Testing     Left Wrist/Hand      (2nd hand position)     Trial 1: 55 2    Right Wrist/Hand   Wrist extension: 3+  Wrist flexion: 3+  Radial deviation: 3+  Ulnar deviation: 3+     (2nd hand position)     Trial 1: 21 3    Additional Strength Details  - +14# increase of R hand  strength from IE       Swelling     Left Wrist/Hand   Middle     Proximal: 6 5 cm  Ring     Proximal: 6 cm  Circumference MCP: 19 cm    Right Wrist/Hand   Middle     Proximal: 7 cm  Ring     Proximal: 6 4 cm  Circumference MCP: 19 3 cm  Circumference wrist: 20 cm    General Comments:      Wrist/Hand Comments  Pt edema at MP and prox phalanx of RF and LF have decreased from IE; but still   5cm of edema compared to contralateral side  Precautions: Universal  DOS: 3/8/22  HEP: Tendon Glides, Gripping (R putty), WE/WF PROM       Manuals 6/6            STM 15'            MEM             KT             Splint Making             Neuro Re-Ed             Iso  Grasp             In Hand Manip               Ball AAROM             Translation             Blocking                                       Ther Ex             PROM             Digit Flexion             Grasping Towel  20x            Pinching             WF/WE             Intrinsic stretch 5'            TGE 10x                         Ther Activity             WB             Wall Walking             Gait Training                                       Modalities             MHP             Parrafin

## 2022-06-08 ENCOUNTER — OFFICE VISIT (OUTPATIENT)
Dept: OBGYN CLINIC | Facility: HOSPITAL | Age: 62
End: 2022-06-08
Payer: COMMERCIAL

## 2022-06-08 VITALS
WEIGHT: 173 LBS | DIASTOLIC BLOOD PRESSURE: 86 MMHG | HEART RATE: 91 BPM | HEIGHT: 61 IN | BODY MASS INDEX: 32.66 KG/M2 | SYSTOLIC BLOOD PRESSURE: 130 MMHG

## 2022-06-08 DIAGNOSIS — R20.0 NUMBNESS AND TINGLING IN RIGHT HAND: Primary | ICD-10-CM

## 2022-06-08 DIAGNOSIS — R20.2 NUMBNESS AND TINGLING IN RIGHT HAND: Primary | ICD-10-CM

## 2022-06-08 PROCEDURE — 3075F SYST BP GE 130 - 139MM HG: CPT | Performed by: ORTHOPAEDIC SURGERY

## 2022-06-08 PROCEDURE — 99214 OFFICE O/P EST MOD 30 MIN: CPT | Performed by: ORTHOPAEDIC SURGERY

## 2022-06-08 PROCEDURE — 3079F DIAST BP 80-89 MM HG: CPT | Performed by: ORTHOPAEDIC SURGERY

## 2022-06-08 NOTE — PROGRESS NOTES
Assessment:   S/P Right thumb, long, and ring finger trigger finger releases - Right on 3/8/2022  Right hand numbness    Plan:   Hold therapy  US right hand wrist evaluate for CTS, inflammation     Follow Up: After Testing    To Do Next Visit:  Discuss US    CHIEF COMPLAINT:  Chief Complaint   Patient presents with    Right Hand - Follow-up     S/P Right thumb, long, and ring finger trigger finger releases - Right on 3/8/2022         SUBJECTIVE:  Haris Boles is a 57 y o  female who presents for follow up after Right thumb, long, and ring finger trigger finger releases - Right on 3/8/2022    Today patient reports numbness in all digits, diffuse pain and swelling in the hand      PHYSICAL EXAMINATION:  Vital signs: /86   Pulse 91   Ht 5' 1" (1 549 m)   Wt 78 5 kg (173 lb)   LMP  (LMP Unknown)   BMI 32 69 kg/m²   General: well developed and well nourished, alert, oriented times 3 and appears comfortable  Psychiatric: Normal    MUSCULOSKELETAL EXAMINATION:  Incision: healed   Range of Motion: digital Limited due to pain and Limited due to stiffness, intrinsic tightness  AIN 5/5, APB 5/5, intrinsics 5/5, crepitation A1 pulley thumb, long and ring  Neurovascular status: Neuro intact, good cap refill  Activity Restrictions: No restrictions  Negative tinel's and Durkan compression    STUDIES REVIEWED:  No Studies to review      PROCEDURES PERFORMED:  Procedures  No Procedures performed today   Scribe Attestation    I,:   am acting as a scribe while in the presence of the attending physician :       I,:   personally performed the services described in this documentation    as scribed in my presence :

## 2022-06-14 ENCOUNTER — TELEPHONE (OUTPATIENT)
Dept: OBGYN CLINIC | Facility: HOSPITAL | Age: 62
End: 2022-06-14

## 2022-06-14 ENCOUNTER — APPOINTMENT (OUTPATIENT)
Dept: OCCUPATIONAL THERAPY | Age: 62
End: 2022-06-14
Payer: COMMERCIAL

## 2022-06-14 NOTE — TELEPHONE ENCOUNTER
I returned patient's call with an , notified her of below as well as gave her date for [address] [facility] and follow up

## 2022-06-14 NOTE — TELEPHONE ENCOUNTER
Patient is calling in reference to a pain relieving cream that was to be sent to the pharmacy for the patient  Pharmacy is [facility] on [address] [facility] in [place] [phone]    Also, patient tried to schedule her US but because she speaks Malagasy, they told her they would call her back and no one has called her

## 2022-06-14 NOTE — TELEPHONE ENCOUNTER
We did not send a cream to the pharmacy for the patient  She should contact her PCP about this  Can someone also use the translation line to help this patient get her US scheduled for her right hand carpal tunnel?

## 2022-06-16 ENCOUNTER — APPOINTMENT (OUTPATIENT)
Dept: OCCUPATIONAL THERAPY | Age: 62
End: 2022-06-16
Payer: COMMERCIAL

## 2022-06-16 DIAGNOSIS — K21.9 GASTROESOPHAGEAL REFLUX DISEASE: ICD-10-CM

## 2022-06-16 RX ORDER — OMEPRAZOLE 20 MG/1
CAPSULE, DELAYED RELEASE ORAL
Qty: 90 CAPSULE | Refills: 3 | Status: SHIPPED | OUTPATIENT
Start: 2022-06-16

## 2022-06-21 ENCOUNTER — APPOINTMENT (OUTPATIENT)
Dept: OCCUPATIONAL THERAPY | Age: 62
End: 2022-06-21
Payer: COMMERCIAL

## 2022-06-23 ENCOUNTER — APPOINTMENT (OUTPATIENT)
Dept: OCCUPATIONAL THERAPY | Age: 62
End: 2022-06-23
Payer: COMMERCIAL

## 2022-06-27 ENCOUNTER — APPOINTMENT (OUTPATIENT)
Dept: OCCUPATIONAL THERAPY | Age: 62
End: 2022-06-27
Payer: COMMERCIAL

## 2022-06-30 ENCOUNTER — APPOINTMENT (OUTPATIENT)
Dept: OCCUPATIONAL THERAPY | Age: 62
End: 2022-06-30
Payer: COMMERCIAL

## 2022-07-12 ENCOUNTER — HOSPITAL ENCOUNTER (OUTPATIENT)
Dept: RADIOLOGY | Facility: HOSPITAL | Age: 62
Discharge: HOME | End: 2022-07-12
Attending: ORTHOPAEDIC SURGERY
Payer: COMMERCIAL

## 2022-07-12 DIAGNOSIS — R20.2 NUMBNESS AND TINGLING IN RIGHT HAND: ICD-10-CM

## 2022-07-12 DIAGNOSIS — R20.0 NUMBNESS AND TINGLING IN RIGHT HAND: ICD-10-CM

## 2022-07-12 PROCEDURE — 76882 US LMTD JT/FCL EVL NVASC XTR: CPT

## 2022-08-31 ENCOUNTER — OFFICE VISIT (OUTPATIENT)
Dept: OBGYN CLINIC | Facility: HOSPITAL | Age: 62
End: 2022-08-31
Payer: COMMERCIAL

## 2022-08-31 VITALS
WEIGHT: 186 LBS | DIASTOLIC BLOOD PRESSURE: 82 MMHG | OXYGEN SATURATION: 96 % | HEART RATE: 86 BPM | BODY MASS INDEX: 35.12 KG/M2 | SYSTOLIC BLOOD PRESSURE: 130 MMHG | HEIGHT: 61 IN

## 2022-08-31 DIAGNOSIS — M18.11 ARTHRITIS OF CARPOMETACARPAL (CMC) JOINT OF RIGHT THUMB: ICD-10-CM

## 2022-08-31 DIAGNOSIS — G56.01 CARPAL TUNNEL SYNDROME ON RIGHT: Primary | ICD-10-CM

## 2022-08-31 PROCEDURE — 99214 OFFICE O/P EST MOD 30 MIN: CPT | Performed by: ORTHOPAEDIC SURGERY

## 2022-08-31 NOTE — PATIENT INSTRUCTIONS
¿Qué es? El síndrome del túnel ivan es mango condición producida por un aumento de presión sobre el nervio mediano a nivel de la [place]  [place], es un atrapamiento de romi nervio a nivel de la [place]  Los síntomas pueden incluir insensibilidad, hormigueo y dolor en el brazo, la mano y los dedos  Hay un espacio en la osman llamado túnel ivan, donde el nervio mediano y nueve tendons pasan desde el antebrazo a la mano (anil la [place])  El síndrome del túnel ivan se presenta cuando hay un aumento de presión en romi túnel debido a mango hinchazón, y jameel presión se transmite al nervio  Cuando la presión debida a hinchazón aumenta lo suficiente rafael para perturbar la forma en que trabaja el nervio, se experimenta insensibilidad, hormigueo y dolor en la mano y los dedos   (anil la [place] 2)  ¿Cuál es la causa? Usualmente la causa se desconoce  La presión sobre el nervio puede producirse en diferentes formas: hinchazón del recubrimiento de los tendones flexores, llamada tenosinovitis; la dislocación de articulaciones, las fracturas y la artritis pueden estrechar el túnel; y el mantener la osman doblada cecilia [place]  La retención de fluidos cecilia el embarazo puede producir hinchazón en el túnel y síntomas del síndrome del túnel ivan, que por lo general desaparecen luego de geovanni a lui  Las afecciones de la tiroides, la artritis reumatoide y la diabetes pueden también asociarse con el síndrome del túnel ivan  Puede existir también mango combinación de causas  Señales y [place]  Los síntomas del síndrome del túnel ivan incluyen por lo general dolor, insensibilidad, hormigueo, o mango combinación de los yue elementos anteriores  La insensibilidad o el hormigueo se presentan con mayor frecuencia en los dedos pulgar, Cora, medio y anular   Estos síntomas se perciben por lo general cecilia la noche, cheryl pueden notarse [facility] [address] cotidianas, rafael [place] o leer un periódico  Los pacientes pueden notar a veces debilitamiento en la mano, torpeza ocasional y Harinder Amilcar tendencia a dejar caer cosas  En los [place] graves se pierde permanentemente la sensibilidad y los músculos de la base del pulgar se encogen lentamente (atrofia de la eminencia tenar), causando dificultad en el movimiento de pellizco     Diagnóstico  Es importante obtener mandy historia clínica detallada, que incluya las afecciones médicas, la manera en que se araya usado las judy y la posible existencia de lesiones previas  Puede obtenerse mandy radiografía para buscar otras causas de los síntomas, rafael por ejemplo artritis o mandy fractura  En algunos casos pueden hacerse exámenes de laboratorio, si se sospecha la existencia de Harinder Amilcar condición médica asociada con el síndrome del angelito love  Puede efectuarse un studio de conducción nerviosa (nerve conduction study - NCV) y/o un electromiograma (EMG) para confirmar el diagnóstico del síndrome del angelito love, y también para verificar la existencia de otros posibles trastornos nerviosos  Tratamiento  Los síntomas a menudo pueden aliviarse sin recurrir a la cirugía  La identificación y tratamiento de [employer], el cambio de los patrones de uso de las judy, o mantener la osman en cabestrillo en posición recta pueden ayudar a reducir la presión sobre el nervio  El uso de férulas en la osman cecilia la noche puede aliviar los síntomas que interfieren con el sueño  [place]andy inyección de esteroides dentro del túnel ivan puede [employer] síntomas al reducir la hinchazón que rodea al nervio  Cuando los síntomas son graves o no se observa mejoría, puede requerirse Franne Saber a fin de crear mayor espacio para el nervio  La presión sobre el nervio se disminuye haciendo un yolanda en el ligamento que forma el techo (parte superior) del túnel en el lado de la villa de la mano (anil la [place] 3)   Las incisiones efectuadas en esta cirugía pueden variar, cheryl la meta es siempre la misma: agrandar el túnel y disminuir la presión sobre el nervio  Luego de la cirugía, la sensibilidad alrededor de la incisión puede durar varias semanas o meses  La insensibilidad y el hormigueo pueden desaparecer en forma rápida o lenta  Pueden pasar varios meses hasta que la fuerza en la mano y la osman retorne a cain nivel normal  Es posible que los síntomas del túnel carpiano no desaparezcan completamente con la cirugía, especialmente en los casos graves  © 2012 American Society for [employer] of the Hand  www handcare  org   What is it [place] 16 Arthritis? In a normal joint, cartilage covers the end of the bones and serves as a shock absorber to allow smooth, pain-free movement  In osteoarthritis (OA, or degenerative arthritis) the cartilage layer wears out, resulting in direct contact between the bones and producing pain and deformity  One of the most common joints to develop OA in the hand is the base of the thumb  The thumb basal joint, also called the carpometacarpal [place]) joint, is a specialized saddle shaped joint that is formed by a small bone of the wrist (trapezium) and the first bone of the thumb (metacarpal)  The saddle shaped joint allows the thumb to have a wide range of motions, including up, down, across the palm, and the ability to pinch (see Figure 1)  Who gets it? OA at the base of the thumb is more commonly seen in women over the age of 36  The exact cause is unknown, but genetics, previous injuries such as fractures or dislocations, and generalized joint laxity may predispose towards development of this type of arthritis  What are the symptoms and signs? The most common symptom is pain at the base of the thumb  The pain can be aggravated by activities that require pinching, such as opening jars, turning door knobs or keys, and writing  Severity can also progress to pain at rest and pain at night   In more severe cases, progressive destruction and mal-alignment of the joint occurs, and a bump develops at the base of the thumb as the metacarpal moves out of the saddle joint  This shift in the joint can cause limited motion and weakness, making pinch difficult (see Figure 2)  The next joint above the [facility] may compensate by loosening, causing it to bend further back (hyperextension)  How is the diagnosis made? The diagnosis is made by history and physical evaluation  Pressure and movement such as twisting will produce pain at the joint  A grinding sensation may also be present at the joint (see Figure 3)  X-rays are used to confirm the diagnosis, although symptom severity often does not correlate with x-ray findings  What are the treatment options? Less severe thumb arthritis will usually respond to non-surgical care  Arthritis medication, splinting and limited cortisone injections may help alleviate pain  A hand therapist might provide a variety of rigid and non-rigid splints which can be used while sleeping or during activities  Patients with advanced disease or who fail non-surgical treatment may be candidates for surgical reconstruction  A variety of surgical techniques are available that can successfully reduce or eliminate pain  Surgical procedures include removal of arthritic bone and joint reconstruction (arthroplasty), joint fusion, bone realignment, and even arthroscopy in select cases  A consultation with your hand surgeon can help decide the best option for you (see Figure 4)  © 2012 American Society for Surgery of the Hand  www handcare  org

## 2022-08-31 NOTE — PROGRESS NOTES
ASSESSMENT/PLAN:    Assessment:   S/P Right thumb, long, and ring finger trigger finger releases - Right on 3/8/2022  Right Carpal tunnel syndrome   Right thumb cmc arthritis     Plan:   Patient will be given a Comfort Cool brace to wear on her right thumb as needed during the day  She will also be given a cock-up wrist splint to wear on her right wrist at nighttime for the next 6 weeks  She will begin a formal course of occupational therapy to work on thenar strengthening exercises as well as nerve glides  We will re-evaluate her symptoms at follow-up   was used for visit today  Follow Up:  6  week(s)    To Do Next Visit:       General Discussions:     CMC Arthritis: The anatomy and physiology of carpometacarpal joint arthritis was discussed with the patient today in the office  Deterioration of the articular cartilage eventually leads to hypermobility at the thumb [place] 16 joint, resulting in joint subluxation, osteophyte formation, cystic changes within the trapezium and base of the first metacarpal, as well as subchondral sclerosis  Eventually, pain, limited mobility, and compensatory hyperextension at the metacarpophalangeal joint may develop  While normal activity and usage of the thumb joint may provide a painful experience to the patient, this typically does not result in damage to the thumb or hand  Treatment options include resting thumb spica splints to decreased joint edema, pain, and inflammation  Therapy exercises to strengthen the thenar musculature may relieve pain, but do not alter the overall continued development of osteoarthritis  Oral medications, topical medications, corticosteroid injections may decrease pain and increase overall function  Eventually, approximately 5% of patients may require surgical intervention  Carpal Tunnel Syndrome: The anatomy and physiology of carpal tunnel syndrome was discussed with the patient today    Increase pressure localized under the transverse carpal ligament can cause pain, numbness, tingling, or dysesthesias within the median nerve distribution as well as feelings of fatigue, clumsiness, or awkwardness  These symptoms typically occur at night and worse in the morning upon waking  Eventually, untreated carpal tunnel syndrome can result in weakness and permanent loss of muscle within the thenar compartment of the hand  Treatment options were discussed with the patient  Conservative treatment includes nocturnal resting splints to keep the nerve in a neutral position, ergonomic changes within the work or home environment, activity modification, and tendon gliding exercises  Steroid injections within the carpal canal can help a majority of patients, however this is often self-limited in a majority of patients  Surgical intervention to divide the transverse carpal ligament typically results in a long-lasting relief of the patient's complaints, with the recurrence rate of less than 1%  Operative Discussions:       _____________________________________________________  CHIEF COMPLAINT:  Chief Complaint   Patient presents with    Right Hand - Follow-up, Results         SUBJECTIVE:  Nathaly Stone is a 60 y o  female who presents for follow up regarding right hand numbness  Since last visit, Nathaly Stone has tried activity modification without relief  Today there is Pain  Moderate  Constant  Sharp, Electric and Aching and Numbness to the right wrist and hand      Radiation: Yes to the  wrist and hand  Associated symptoms: No Complaints  Handedness: right    PAST MEDICAL HISTORY:  Past Medical History:   Diagnosis Date    Abnormal mammogram     RESOLVED: [id number]    Anxiety     Anxiety     Arthritis     Depression     Depression     Diabetes mellitus (HCC)     Pt denies    Diverticulosis     GERD (gastroesophageal reflux disease)     Helicobacter pylori infection     Hyperlipidemia     Seborrheic keratosis     LAST ASSESSED: [id number]    Sleep apnea     Sleep apnea     Tinea pedis of left foot 2019       PAST SURGICAL HISTORY:  Past Surgical History:   Procedure Laterality Date    ABDOMINOPLASTY      abdomin    COLONOSCOPY  2017    NV COLONOSCOPY FLX DX W/COLLJ SPEC WHEN PFRMD N/A 2016    Procedure: COLONOSCOPY;  Surgeon: Ry Garcia MD;  Location:  GI LAB; Service: Gastroenterology    NV COLONOSCOPY FLX DX W/COLLJ [facility] WHEN PFRMD N/A 2017    Procedure: EGD AND COLONOSCOPY;  Surgeon: Ry Garcia MD;  Location: [facility] GI LAB;   Service: Gastroenterology    NV INCISE FINGER TENDON SHEATH Right 3/8/2022    Procedure: Right thumb, long, and ring finger trigger finger releases;  Surgeon: Dillon Zapata MD;  Location:  MAIN OR;  Service: Orthopedics    TUBAL LIGATION      TUBAL LIGATION      UPPER GASTROINTESTINAL ENDOSCOPY         FAMILY HISTORY:  Family History   Problem Relation Age of Onset    Diabetes Mother     Hypertension Mother     Heart disease Mother     Diabetes Paternal Grandfather     Alzheimer's disease Father     No Known Problems Sister     No Known Problems Brother     Depression Daughter     ADD / ADHD Son     Depression Son     Diabetes Maternal Grandmother     Stomach cancer Maternal Grandfather     No Known Problems Paternal Grandmother     No Known Problems Sister     Heart disease Sister     Intellectual disability Brother     Schizophrenia Brother     Other Son          as an infant    Elena Pinal Other Daughter          at 2-4 mths old   Elena Pinal Other Son          as an infant        SOCIAL HISTORY:  Social History     Tobacco Use    Smoking status: Never Smoker    Smokeless tobacco: Never Used   Vaping Use    Vaping Use: Never used   Substance Use Topics    Alcohol use: No    Drug use: No       MEDICATIONS:    Current Outpatient Medications:     Acetaminophen (ARTHRITIS PAIN PO), Take by mouth, Disp: , Rfl:     acetaminophen (TYLENOL) 650 mg CR tablet, Take 1 tablet (650 mg total) by mouth every 8 (eight) hours as needed for mild pain, Disp: 30 tablet, Rfl: 0    ARIPiprazole (ABILIFY) 5 mg tablet, Take 5 mg by mouth daily, Disp: , Rfl:     Ascorbic Acid (vitamin C) 1000 MG tablet, Take 1,000 mg by mouth daily, Disp: , Rfl:     atorvastatin (LIPITOR) 10 mg tablet, take 1 tablet by mouth daily, Disp: 90 tablet, Rfl: 3    Calcium Polycarbophil (FIBERCON PO), Take by mouth, Disp: , Rfl:     Cholecalciferol (VITAMIN D3) 125 MCG (5000 UT) CAPS, Take by mouth, Disp: , Rfl:     clonazePAM (KlonoPIN) 1 mg tablet, Take 1 mg by mouth daily at bedtime as needed for anxiety  , Disp: , Rfl:     Diclofenac Sodium (VOLTAREN) 1 %, Apply 2 g topically 4 (four) times a day, Disp: 100 g, Rfl: 0    dicyclomine (BENTYL) 20 mg tablet, Take 1 tablet (20 mg total) by mouth every 6 (six) hours, Disp: 120 tablet, Rfl: 1    DULoxetine (CYMBALTA) 30 mg delayed release capsule, Take 30 mg by mouth daily , Disp: , Rfl:     hydrOXYzine HCL (ATARAX) 25 mg tablet, Take 25 mg by mouth every 6 (six) hours as needed for itching  , Disp: , Rfl:     hyoscyamine (ANASPAZ,LEVSIN) 0 125 MG tablet, Take 1 tablet (0 125 mg total) by mouth every 4 (four) hours as needed for cramping, Disp: 30 tablet, Rfl: 0    Methylcobalamin (B-12) 5000 MCG TBDP, Take by mouth, Disp: , Rfl:     multivitamin (THERAGRAN) TABS, Take 1 tablet by mouth daily, Disp: , Rfl:     naproxen sodium (ALEVE) 220 MG tablet, Take 1 tablet (220 mg total) by mouth in the morning and 1 tablet (220 mg total) in the evening  Take with meals  , Disp: 20 tablet, Rfl: 0    omeprazole (PriLOSEC) 20 mg delayed release capsule, take 1 capsule by mouth once daily, Disp: 90 capsule, Rfl: 3    ALLERGIES:  No Known Allergies    REVIEW OF SYSTEMS:  Pertinent items are noted in HPI      LABS:  HgA1c:   Lab Results   Component Value Date    HGBA1C 6 1 07/28/2021     BMP:   Lab Results   Component Value Date    GLUCOSE 97 10/09/2015 CALCIUM 8 9 03/21/2021     10/09/2015    K 4 3 03/21/2021    CO2 28 03/21/2021     (H) 03/21/2021    BUN 13 03/21/2021    CREATININE 0 99 03/21/2021           _____________________________________________________  PHYSICAL EXAMINATION:  Vital signs: /82   Pulse 86   Ht 5' 1" (1 549 m)   Wt 84 4 kg (186 lb)   LMP  (LMP Unknown)   SpO2 96%   BMI 35 14 kg/m²   General: well developed and well nourished, alert, oriented times 3 and appears comfortable  Psychiatric: Normal  HEENT: Trachea Midline, No torticollis  Cardiovascular: No discernable arrhythmia  Pulmonary: No wheezing or stridor  Abdomen: No rebound or guarding  Extremities: No peripheral edema  Skin: No masses, erythema, lacerations, fluctation, ulcerations  Neurovascular: Pulses Intact    MUSCULOSKELETAL EXAMINATION:  RIGHT SIDE:  CMC: Positive grind and Positive tendnerness CMC and Carpal tunnel:  Weakness APB, Postive Tinel's and AIN 5/5, apb 4+/5    _____________________________________________________  STUDIES REVIEWED:  Images were reviewed in PACS by Dr Patricia Canas and demonstrate: US of right wrist on 7/12/2022 demonstrates CTS         PROCEDURES PERFORMED:  Procedures  No Procedures performed today   Scribe Attestation    I,:  Cisco De Guzman am acting as a scribe while in the presence of the attending physician :       I,:  Dillon Zapata MD personally performed the services described in this documentation    as scribed in my presence :

## 2022-09-01 ENCOUNTER — OFFICE VISIT (OUTPATIENT)
Dept: INTERNAL MEDICINE CLINIC | Facility: CLINIC | Age: 62
End: 2022-09-01

## 2022-09-01 VITALS
HEART RATE: 82 BPM | SYSTOLIC BLOOD PRESSURE: 128 MMHG | WEIGHT: 175.6 LBS | DIASTOLIC BLOOD PRESSURE: 84 MMHG | TEMPERATURE: 97.8 F | HEIGHT: 62 IN | BODY MASS INDEX: 32.31 KG/M2

## 2022-09-01 DIAGNOSIS — M17.9 OSTEOARTHRITIS OF KNEE: ICD-10-CM

## 2022-09-01 DIAGNOSIS — M25.562 LEFT KNEE PAIN: ICD-10-CM

## 2022-09-01 DIAGNOSIS — Z23 NEED FOR PNEUMOCOCCAL VACCINATION: ICD-10-CM

## 2022-09-01 DIAGNOSIS — E78.5 HYPERLIPIDEMIA, UNSPECIFIED HYPERLIPIDEMIA TYPE: ICD-10-CM

## 2022-09-01 DIAGNOSIS — E11.9 DIET-CONTROLLED DIABETES MELLITUS (HCC): ICD-10-CM

## 2022-09-01 DIAGNOSIS — E11.9 TYPE 2 DIABETES MELLITUS WITHOUT COMPLICATION, WITHOUT LONG-TERM CURRENT USE OF INSULIN (HCC): ICD-10-CM

## 2022-09-01 DIAGNOSIS — M25.562 ACUTE PAIN OF LEFT KNEE: ICD-10-CM

## 2022-09-01 DIAGNOSIS — Z00.00 ANNUAL PHYSICAL EXAM: Primary | ICD-10-CM

## 2022-09-01 LAB
LEFT EYE DIABETIC RETINOPATHY: NORMAL
LEFT EYE IMAGE QUALITY: NORMAL
LEFT EYE MACULAR EDEMA: NORMAL
LEFT EYE OTHER RETINOPATHY: NORMAL
RIGHT EYE DIABETIC RETINOPATHY: NORMAL
RIGHT EYE IMAGE QUALITY: NORMAL
RIGHT EYE MACULAR EDEMA: NORMAL
RIGHT EYE OTHER RETINOPATHY: NORMAL
SEVERITY (EYE EXAM): NORMAL
SL AMB POCT HEMOGLOBIN AIC: 7 (ref ?–6.5)

## 2022-09-01 PROCEDURE — 90471 IMMUNIZATION ADMIN: CPT | Performed by: HOSPITALIST

## 2022-09-01 PROCEDURE — 83036 HEMOGLOBIN GLYCOSYLATED A1C: CPT | Performed by: HOSPITALIST

## 2022-09-01 PROCEDURE — 3051F HG A1C>EQUAL 7.0%<8.0%: CPT | Performed by: ORTHOPAEDIC SURGERY

## 2022-09-01 PROCEDURE — 90677 PCV20 VACCINE IM: CPT | Performed by: HOSPITALIST

## 2022-09-01 PROCEDURE — 2025F 7 FLD RTA PHOTO W/O RTNOPTHY: CPT | Performed by: ORTHOPAEDIC SURGERY

## 2022-09-01 PROCEDURE — 99396 PREV VISIT EST AGE 40-64: CPT | Performed by: HOSPITALIST

## 2022-09-01 NOTE — PROGRESS NOTES
[address] [facility] Griselda Girard    NAME: Daisy Buckley  AGE: 60 y o  SEX: female  : 1960     DATE: 2022     Assessment and Plan:     Problem List Items Addressed This Visit        Endocrine    Diet-controlled diabetes mellitus ([address] )    Relevant Medications    metFORMIN (GLUCOPHAGE) 500 mg tablet    Other Relevant Orders    POCT hemoglobin A1c (Completed)       Musculoskeletal and Integument    Osteoarthritis of knee    Relevant Medications    Diclofenac Sodium (VOLTAREN) 1 %       Other    Acute pain of left knee    Relevant Medications    Diclofenac Sodium (VOLTAREN) 1 %      Other Visit Diagnoses     Annual physical exam    -  Primary    Relevant Orders    Lipid Panel with Direct LDL reflex    Comprehensive metabolic panel    CBC and Platelet    Left knee pain        Relevant Medications    Diclofenac Sodium (VOLTAREN) 1 %    Type 2 diabetes mellitus without complication, without long-term current use of insulin (HCC)        Relevant Medications    metFORMIN (GLUCOPHAGE) 500 mg tablet    Other Relevant Orders    Microalbumin / creatinine urine ratio    IRIS Diabetic eye exam    Hyperlipidemia, unspecified hyperlipidemia type        Relevant Orders    Lipid Panel with Direct LDL reflex    Need for pneumococcal vaccination        Relevant Orders    Pneumococcal Conjugate Vaccine 20-valent (Pcv20) (Completed)        1  Newly diagnosed type 2 diabetes  -hemoglobin A1c was 7 0 today  -will order metformin 500 mg twice daily and increase up to 1000 mg twice daily as tolerated  -the patient was also counseled on lifestyle modifications including proper diet and exercise  -microalbumin/creatinine ratio ordered  -will plan to recheck the patient's hemoglobin A1c at the next visit    2   Right wrist pain  -secondary to right-sided carpal tunnel syndrome/arthritis  -patient was encouraged to continue following with Orthopedic surgery and occupational therapy as well as to continue using her right-sided wrist brace  -Voltaren gel added for additional symptomatic relief    3  Hyperlipidemia  -last lipid panel was obtained in March of 2020 and her LDL was 115  -she currently takes atorvastatin 10 mg daily  -will recheck lipid panel, if LDL remains elevated will increase atorvastatin      Immunizations and preventive care screenings were discussed with patient today  Appropriate education was printed on patient's after visit summary  Counseling:  Alcohol/drug use: discussed moderation in alcohol intake, the recommendations for healthy alcohol use, and avoidance of illicit drug use  Dental Health: discussed importance of regular tooth brushing, flossing, and dental visits  Injury prevention: discussed safety/seat belts, safety helmets, smoke detectors, carbon dioxide detectors, and smoking near bedding or upholstery  Sexual health: discussed sexually transmitted diseases, partner selection, use of condoms, avoidance of unintended pregnancy, and contraceptive alternatives  · Exercise: the importance of regular exercise/physical activity was discussed  Recommend exercise 3-5 times per week for at least 30 minutes  Return in about 3 months (around 12/1/2022) for A1C Recheck  Chief Complaint:     Chief Complaint   Patient presents with    Annual Exam      History of Present Illness: The patient is a 59-year-old female with a past medical history of prediabetes (now diabetes), arthritis, anxiety, GERD, ALEC, right trigger finger/carpal tunnel syndrome, hyperlipidemia and diverticulosis who presented to the clinic today for an annual physical   Overall today the patient was feeling well  Her last hemoglobin A1c was 6 1 on 07/28/2021  At today's visit her hemoglobin A1c had increased to 7 0  She was counseled on lifestyle modifications including proper diet and exercise and was agreeable to starting metformin    She underwent right trigger finger release surgery in March of this year  She reports that since the surgery she has continued to experience right-sided wrist and thumb pain  She saw orthopedic surgery yesterday 8/31 and she was given a right wrist brace and was referred to occupational therapy  She has also been taking Tylenol with mild relief  Otherwise she had no other acute complaints  She denies any recent fevers, chills, chest pain, shortness a breath or abdominal pain  Diabetic foot exam was unremarkable  The patient received her diabetic iris exam as well as her pneumococcal vaccine at the end of the visit  Adult Annual Physical   Patient here for a comprehensive physical exam  The patient reports no problems  Diet and Physical Activity  · Diet/Nutrition: well balanced diet  · Exercise: 3-4 times a week on average  Depression Screening  PHQ-2/9 Depression Screening         General Health  · Sleep: sleeps well  · Hearing: normal - bilateral   · Vision: no vision problems  · Dental: regular dental visits  /GYN Health  · Patient is: postmenopausal     Review of Systems:     Review of Systems   Constitutional: Negative for activity change, appetite change, chills, diaphoresis, fatigue and fever  HENT: Negative for congestion, ear discharge, ear pain, hearing loss, sinus pressure, sinus pain and sore throat  Eyes: Negative for pain, discharge, redness and itching  Respiratory: Negative for cough, chest tightness, shortness of breath and wheezing  Cardiovascular: Negative for chest pain, palpitations and leg swelling  Gastrointestinal: Negative for abdominal distention, abdominal pain, blood in stool, constipation, diarrhea, nausea and vomiting  Genitourinary: Negative for difficulty urinating, dysuria, flank pain and frequency  Musculoskeletal: Negative for arthralgias, back pain and gait problem  Right wrist/thumb pain   Skin: Negative for color change, pallor and rash  Neurological: Negative for dizziness, weakness, light-headedness, numbness and headaches  Psychiatric/Behavioral: Negative for agitation, behavioral problems, confusion and decreased concentration  Past Medical History:     Past Medical History:   Diagnosis Date    Abnormal mammogram     RESOLVED: [id number]    Anxiety     Anxiety     Arthritis     Depression     Depression     Diabetes mellitus ([address] )     Pt denies    Diverticulosis     GERD (gastroesophageal reflux disease)     Helicobacter pylori infection     Hyperlipidemia     Seborrheic keratosis     LAST ASSESSED: [id number]    Sleep apnea     Sleep apnea     Tinea pedis of left foot 9/16/2019      Past Surgical History:     Past Surgical History:   Procedure Laterality Date    ABDOMINOPLASTY      abdomin    COLONOSCOPY  2017    MI COLONOSCOPY FLX DX W/COLLJ SPEC WHEN PFRMD N/A 8/11/2016    Procedure: COLONOSCOPY;  Surgeon: Evonne Barragan MD;  Location:  GI LAB; Service: Gastroenterology    MI COLONOSCOPY FLX DX W/COLLJ [facility] WHEN PFRMD N/A 8/29/2017    Procedure: EGD AND COLONOSCOPY;  Surgeon: Evonne Barragan MD;  Location: [facility] GI LAB;   Service: Gastroenterology    MI INCISE FINGER TENDON SHEATH Right 3/8/2022    Procedure: Right thumb, long, and ring finger trigger finger releases;  Surgeon: Brittani Mancini MD;  Location:  MAIN OR;  Service: Orthopedics    TUBAL LIGATION      TUBAL LIGATION      UPPER GASTROINTESTINAL ENDOSCOPY  2017      Social History:     Social History     Socioeconomic History    Marital status: Single     Spouse name: None    Number of children: None    Years of education: None    Highest education level: None   Occupational History    None   Tobacco Use    Smoking status: Never Smoker    Smokeless tobacco: Never Used   Vaping Use    Vaping Use: Never used   Substance and Sexual Activity    Alcohol use: No    Drug use: No    Sexual activity: Yes     Partners: Male     Birth control/protection: Post-menopausal   Other Topics Concern    None   Social History Narrative    None     Social Determinants of Health     Financial Resource Strain: Low Risk     Difficulty of Paying Living Expenses: Not very hard   Food Insecurity: No Food Insecurity    Worried About Running Out of Food in the Last Year: Never true    Apoorva of Food in the Last Year: Never true   Transportation Needs: No Transportation Needs    Lack of Transportation (Medical): No    Lack of Transportation (Non-Medical):  No   Physical Activity: Not on file   Stress: Not on file   Social Connections: Not on file   Intimate Partner Violence: Not on file   Housing Stability: Not on file      Family History:     Family History   Problem Relation Age of Onset    Diabetes Mother     Hypertension Mother     Heart disease Mother     Diabetes Paternal Grandfather     Alzheimer's disease Father     No Known Problems Sister     No Known Problems Brother     Depression Daughter     ADD / ADHD Son     Depression Son     Diabetes Maternal Grandmother     Stomach cancer Maternal Grandfather     No Known Problems Paternal Grandmother     No Known Problems Sister     Heart disease Sister     Intellectual disability Brother     Schizophrenia Brother     Other Son          as an infant    Sherry Taylor Other Daughter          at 2-4 mths old   Sherry Taylor Other Son          as an infant       Current Medications:     Current Outpatient Medications   Medication Sig Dispense Refill    Acetaminophen (ARTHRITIS PAIN PO) Take by mouth      acetaminophen (TYLENOL) 650 mg CR tablet Take 1 tablet (650 mg total) by mouth every 8 (eight) hours as needed for mild pain 30 tablet 0    ARIPiprazole (ABILIFY) 5 mg tablet Take 5 mg by mouth daily      Ascorbic Acid (vitamin C) 1000 MG tablet Take 1,000 mg by mouth daily      atorvastatin (LIPITOR) 10 mg tablet take 1 tablet by mouth daily 90 tablet 3    Calcium Polycarbophil (FIBERCON PO) Take by mouth      Cholecalciferol (VITAMIN D3) 125 MCG (5000 UT) CAPS Take 1,000 Units by mouth      clonazePAM (KlonoPIN) 1 mg tablet Take 1 mg by mouth daily at bedtime as needed for anxiety   Diclofenac Sodium (VOLTAREN) 1 % Apply 2 g topically 4 (four) times a day 150 g 2    DULoxetine (CYMBALTA) 30 mg delayed release capsule Take 30 mg by mouth daily       metFORMIN (GLUCOPHAGE) 500 mg tablet Take 1 tablet (500 mg total) by mouth 2 (two) times a day with meals for 14 days, THEN 2 tablets (1,000 mg total) 2 (two) times a day with meals  148 tablet 0    Methylcobalamin (B-12) 5000 MCG TBDP Take by mouth      multivitamin (THERAGRAN) TABS Take 1 tablet by mouth daily      omeprazole (PriLOSEC) 20 mg delayed release capsule take 1 capsule by mouth once daily 90 capsule 3    dicyclomine (BENTYL) 20 mg tablet Take 1 tablet (20 mg total) by mouth every 6 (six) hours (Patient not taking: Reported on 9/1/2022) 120 tablet 1    hydrOXYzine HCL (ATARAX) 25 mg tablet Take 25 mg by mouth every 6 (six) hours as needed for itching   (Patient not taking: Reported on 9/1/2022)      hyoscyamine (ANASPAZ,LEVSIN) 0 125 MG tablet Take 1 tablet (0 125 mg total) by mouth every 4 (four) hours as needed for cramping (Patient not taking: Reported on 9/1/2022) 30 tablet 0    naproxen sodium (ALEVE) 220 MG tablet Take 1 tablet (220 mg total) by mouth in the morning and 1 tablet (220 mg total) in the evening  Take with meals  (Patient not taking: Reported on 9/1/2022) 20 tablet 0     No current facility-administered medications for this visit  Allergies:     No Known Allergies   Physical Exam:     /84 (BP Location: Left arm, Patient Position: Sitting, Cuff Size: Large)   Pulse 82   Temp 97 8 °F (36 6 °C) (Temporal)   Ht 5' 2" (1 575 m)   Wt 79 7 kg (175 lb 9 6 oz)   LMP  (LMP Unknown)   BMI 32 12 kg/m²     Physical Exam  Constitutional:       Appearance: She is well-developed  She is obese     HENT:      Head: Normocephalic and atraumatic  Nose: Nose normal    Eyes:      Conjunctiva/sclera: Conjunctivae normal       Pupils: Pupils are equal, round, and reactive to light  Neck:      Vascular: No JVD  Cardiovascular:      Rate and Rhythm: Normal rate and regular rhythm  Heart sounds: Normal heart sounds  No murmur heard  No friction rub  No gallop  Pulmonary:      Effort: Pulmonary effort is normal  No respiratory distress  Breath sounds: Normal breath sounds  No stridor  No wheezing or rales  Chest:      Chest wall: No tenderness  Abdominal:      General: Bowel sounds are normal  There is no distension  Palpations: Abdomen is soft  There is no mass  Tenderness: There is no abdominal tenderness  There is no guarding or rebound  Hernia: No hernia is present  Musculoskeletal:         General: No tenderness or deformity  Right lower leg: No edema  Left lower leg: No edema  Comments: Right wrist brace   Skin:     General: Skin is warm and dry  Neurological:      Mental Status: She is alert and oriented to person, place, and time  Psychiatric:         Mood and Affect: Mood normal          Behavior: Behavior normal          Thought Content:  Thought content normal          Judgment: Judgment normal           Mitch Love MD  [address]

## 2022-09-02 ENCOUNTER — APPOINTMENT (OUTPATIENT)
Dept: LAB | Facility: CLINIC | Age: 62
End: 2022-09-02
Payer: COMMERCIAL

## 2022-09-02 DIAGNOSIS — Z00.00 ANNUAL PHYSICAL EXAM: ICD-10-CM

## 2022-09-02 DIAGNOSIS — E11.9 TYPE 2 DIABETES MELLITUS WITHOUT COMPLICATION, WITHOUT LONG-TERM CURRENT USE OF INSULIN (HCC): ICD-10-CM

## 2022-09-02 DIAGNOSIS — E78.5 HYPERLIPIDEMIA, UNSPECIFIED HYPERLIPIDEMIA TYPE: ICD-10-CM

## 2022-09-02 LAB
ALBUMIN SERPL BCP-MCNC: 3.7 G/DL (ref 3.5–5)
ALP SERPL-CCNC: 75 U/L (ref 46–116)
ALT SERPL W P-5'-P-CCNC: 40 U/L (ref 12–78)
ANION GAP SERPL CALCULATED.3IONS-SCNC: 6 MMOL/L (ref 4–13)
AST SERPL W P-5'-P-CCNC: 23 U/L (ref 5–45)
BILIRUB SERPL-MCNC: 0.44 MG/DL (ref 0.2–1)
BUN SERPL-MCNC: 13 MG/DL (ref 5–25)
CALCIUM SERPL-MCNC: 9 MG/DL (ref 8.3–10.1)
CHLORIDE SERPL-SCNC: 108 MMOL/L (ref 96–108)
CHOLEST SERPL-MCNC: 131 MG/DL
CO2 SERPL-SCNC: 26 MMOL/L (ref 21–32)
CREAT SERPL-MCNC: 0.94 MG/DL (ref 0.6–1.3)
CREAT UR-MCNC: 191 MG/DL
ERYTHROCYTE [DISTWIDTH] IN BLOOD BY AUTOMATED COUNT: 13.2 % (ref 11.6–15.1)
GFR SERPL CREATININE-BSD FRML MDRD: 64 ML/MIN/1.73SQ M
GLUCOSE P FAST SERPL-MCNC: 147 MG/DL (ref 65–99)
HCT VFR BLD AUTO: 41.3 % (ref 34.8–46.1)
HDLC SERPL-MCNC: 48 MG/DL
HGB BLD-MCNC: 13.2 G/DL (ref 11.5–15.4)
LDLC SERPL CALC-MCNC: 59 MG/DL (ref 0–100)
MCH RBC QN AUTO: 28.8 PG (ref 26.8–34.3)
MCHC RBC AUTO-ENTMCNC: 32 G/DL (ref 31.4–37.4)
MCV RBC AUTO: 90 FL (ref 82–98)
MICROALBUMIN UR-MCNC: 16.7 MG/L (ref 0–20)
MICROALBUMIN/CREAT 24H UR: 9 MG/G CREATININE (ref 0–30)
PLATELET # BLD AUTO: 278 THOUSANDS/UL (ref 149–390)
PMV BLD AUTO: 9.8 FL (ref 8.9–12.7)
POTASSIUM SERPL-SCNC: 4 MMOL/L (ref 3.5–5.3)
PROT SERPL-MCNC: 7.4 G/DL (ref 6.4–8.4)
RBC # BLD AUTO: 4.59 MILLION/UL (ref 3.81–5.12)
SODIUM SERPL-SCNC: 140 MMOL/L (ref 135–147)
TRIGL SERPL-MCNC: 122 MG/DL
WBC # BLD AUTO: 5.64 THOUSAND/UL (ref 4.31–10.16)

## 2022-09-02 PROCEDURE — 36415 COLL VENOUS BLD VENIPUNCTURE: CPT

## 2022-09-02 PROCEDURE — 82043 UR ALBUMIN QUANTITATIVE: CPT

## 2022-09-02 PROCEDURE — 80061 LIPID PANEL: CPT

## 2022-09-02 PROCEDURE — 85027 COMPLETE CBC AUTOMATED: CPT

## 2022-09-02 PROCEDURE — 80053 COMPREHEN METABOLIC PANEL: CPT

## 2022-09-02 PROCEDURE — 82570 ASSAY OF URINE CREATININE: CPT

## 2022-09-08 ENCOUNTER — APPOINTMENT (OUTPATIENT)
Dept: OCCUPATIONAL THERAPY | Age: 62
End: 2022-09-08
Payer: COMMERCIAL

## 2022-09-22 ENCOUNTER — EVALUATION (OUTPATIENT)
Dept: OCCUPATIONAL THERAPY | Age: 62
End: 2022-09-22
Payer: COMMERCIAL

## 2022-09-22 DIAGNOSIS — G56.01 CARPAL TUNNEL SYNDROME ON RIGHT: ICD-10-CM

## 2022-09-22 DIAGNOSIS — M18.11 ARTHRITIS OF CARPOMETACARPAL (CMC) JOINT OF RIGHT THUMB: ICD-10-CM

## 2022-09-22 PROCEDURE — 97165 OT EVAL LOW COMPLEX 30 MIN: CPT

## 2022-09-22 PROCEDURE — 97110 THERAPEUTIC EXERCISES: CPT

## 2022-09-22 NOTE — PROGRESS NOTES
OT Evaluation     Today's date: 2022  Patient name: Luke Huerta  : 1960  MRN: [id number]  Referring provider: Colleen Dubon MD  Dx:   Encounter Diagnosis     ICD-10-CM    1  Carpal tunnel syndrome on right  G56 01 Ambulatory Referral to PT/OT Hand Therapy   2  Arthritis of carpometacarpal [place]) joint of right thumb  M18 11 Ambulatory Referral to PT/OT Hand Therapy                  Assessment  Assessment details:  service [employer] used during eval- [id number] and [id number]  Pt is a 61 y/o female, who reports moderate pain to her R hand; worse in thumb and in hand  Pt had previous TFR sx in March but c/o continued pain at palm of hand  Pt noted with + CMC grind test as well as Positive tinels and Phalen test for CTS and noted with moderate tension during median nerve tension testing, with moderate thenar muscle and  weakness  Pt provided with CMC comfort cool brace for the days and MD order of nocturnal splinting for 6 weeks  Therapist edu pt on [facility] stretching and strengthening HEP, as well as MNGs and CTS edu/positioning  Pt would benefit from skilled OT tx sessions to reduce pain from CTS, CMC arthritis and compensatory techniques to inc independence and QOL  Impairments: abnormal coordination, abnormal or restricted ROM, activity intolerance, impaired physical strength, lacks appropriate home exercise program and pain with function    Symptom irritability: moderateUnderstanding of Dx/Px/POC: good   Prognosis: good    Goals  STGs:  1  Pt will report dec pain by 50%  2  Pt will inc  strength + 5#  3  Pt will be independent with HEP and splint wear day/night  4  Pt will demo dec neural tension to mild  LTGs:  1  Pt will inc R hand strength to WFLs  2  Pt will report no pain during ADL tasks  3  Pt demo no neural tension  4   Pt will inc FOTO score    Plan  Patient would benefit from: skilled occupational therapy  Planned modality interventions: thermotherapy: hydrocollator packs and thermotherapy: paraffin bath  Planned therapy interventions: activity modification, ADL training, manual therapy, massage, patient education, compression, strengthening, stretching, therapeutic activities, therapeutic exercise, home exercise program and functional ROM exercises  Frequency: 2x week  Duration in weeks: 6  Treatment plan discussed with: patient        Subjective Evaluation    History of Present Illness  Date of surgery: 3/8/2022  Mechanism of injury: trauma  Mechanism of injury: Pt is a 59 y/o female, who underwent R thumb, LF and RF TFR on 3/8/22  Pt participated in skilled OT tx from 2022 to 2022; During her treatment, pt had c/o worsening hand pain  Pt sx of trigger finger a success but pain increased  Pt had additional testings of US and x-rays which showed CMC arthritis and carpal tunnel syndrome  Recurrent probem    Quality of life: poor    Pain  Current pain ratin  At best pain ratin  At worst pain ratin  Location: R thumb, and digits  Quality: throbbing, sharp and burning  Relieving factors: rest and support  Progression: worsening    Hand dominance: right    Treatments  Previous treatment: occupational therapy  Current treatment: occupational therapy  Patient Goals  Patient goals for therapy: decreased pain, increased strength and independence with ADLs/IADLs          Objective     Tenderness     Right Wrist/Hand   Tenderness in the carpometacarpal joint       Neurological Testing     Sensation     Wrist/Hand     Right   Intact: light touch, pin prick and sharp/dull discrimination    Active Range of Motion     Left Wrist   Normal active range of motion    Right Wrist   Wrist flexion: 55 degrees   Wrist extension: 55 degrees with pain    Right Thumb   Flexion     MP: 44    DIP: 48  Extension     MP: 0  Palmar Abduction    CMC: 35  Radial Abduction    CMC: 50    Strength/Myotome Testing     Left Wrist/Hand   Wrist extension: 5  Wrist flexion: 5  Radial deviation: 5  Ulnar deviation: 5     (2nd hand position)     Trial 1: 55 9    Right Wrist/Hand   Wrist extension: 4-  Wrist flexion: 4-  Radial deviation: 4-  Ulnar deviation: 4-     (2nd hand position)     Trial 1: 11 1    Tests     Right Wrist/Hand   Positive CMC grind, Phalen's sign and Tinel's sign (medial nerve)  Negative Finkelstein's  Precautions: Faroese Speaking;   needed; Universal  HEP- TGE, MNGs, CMC stretch and strengthening RB       Manuals             MNGs             STM/cupping             PROM Thumb             KT             Neuro Re-Ed             Joint distraction grade 1-2 mobs gentle                                                                                           Ther Ex             CMC ROM             Thumb ROM             CMC/thenar strengthening             MNGs             Flexor stretch             TGE                                       Ther Activity                                       Gait Training                          fluido             Modalities             MP/CP             Paraffin

## 2022-09-26 ENCOUNTER — OFFICE VISIT (OUTPATIENT)
Dept: OCCUPATIONAL THERAPY | Age: 62
End: 2022-09-26
Payer: COMMERCIAL

## 2022-09-26 DIAGNOSIS — M18.11 ARTHRITIS OF CARPOMETACARPAL (CMC) JOINT OF RIGHT THUMB: ICD-10-CM

## 2022-09-26 DIAGNOSIS — G56.01 CARPAL TUNNEL SYNDROME ON RIGHT: Primary | ICD-10-CM

## 2022-09-26 PROCEDURE — 97110 THERAPEUTIC EXERCISES: CPT

## 2022-09-26 PROCEDURE — 97140 MANUAL THERAPY 1/> REGIONS: CPT

## 2022-09-26 NOTE — PROGRESS NOTES
Daily Note     Today's date: 2022  Patient name: Nathaly Stone  : 1960  MRN: [id number]  Referring provider: Jonathan Alvarado MD  Dx:   Encounter Diagnosis     ICD-10-CM    1  Carpal tunnel syndrome on right  G56 01    2  Arthritis of carpometacarpal (CMC) joint of right thumb  M18 11                   Subjective: "The pain is intermittent now, but still intense "      Objective: See treatment diary below      Assessment: Tolerated treatment well  Moderate to Mild neural tension of median nerve  Inc tenderness ans welling at volar thumb at thenar eminence, possible synovitis as well  Trial KT and compression glove for pain reliefPatient would benefit from continued OT      Plan: Continue per plan of care  Precautions: Moldovan Speaking;   needed; Universal  HEP- TGE, MNGs, CMC stretch and strengthening RB       Manuals             MNGs 5'            STM/cupping 5'            PROM Thumb 2'            KT perf            Neuro Re-Ed             Joint distraction grade 1-2 mobs gentle                                                                                           Ther Ex             CMC ROM             Thumb ROM             CMC/thenar strengthening             MNGs 5x            Flexor stretch 3x 30 sec            TGE 3 sets 5                                      Ther Activity                                       Gait Training                          fluido 5' ROM            Modalities             MP/CP             Paraffin

## 2022-09-29 ENCOUNTER — OFFICE VISIT (OUTPATIENT)
Dept: OCCUPATIONAL THERAPY | Age: 62
End: 2022-09-29
Payer: COMMERCIAL

## 2022-09-29 DIAGNOSIS — M18.11 ARTHRITIS OF CARPOMETACARPAL (CMC) JOINT OF RIGHT THUMB: ICD-10-CM

## 2022-09-29 DIAGNOSIS — G56.01 CARPAL TUNNEL SYNDROME ON RIGHT: Primary | ICD-10-CM

## 2022-09-29 PROCEDURE — 97110 THERAPEUTIC EXERCISES: CPT

## 2022-09-29 PROCEDURE — 97140 MANUAL THERAPY 1/> REGIONS: CPT

## 2022-09-30 NOTE — PROGRESS NOTES
Daily Note     Today's date: 2022  Patient name: Basia Pacheco  : 1960  MRN: [id number]  Referring provider: Veronica Martinez MD  Dx:   Encounter Diagnosis     ICD-10-CM    1  Carpal tunnel syndrome on right  G56 01    2  Arthritis of carpometacarpal (CMC) joint of right thumb  M18 11               [facility] [id number]     Subjective: "The pain is at night time now "      Objective: See treatment diary below      Assessment: Tolerated treatment well  Dec neural tension of median nerve noted  Inc tenderness and swelling at volar thumb at thenar eminence, possible synovitis as well  Focus on STM for pain relief  Therapist fabricated thumb spica orthosis for complete immobilization if inc pain and for night  Patient would benefit from continued OT      Plan: Continue per plan of care  Precautions: [place]orese Speaking;   needed; Universal  HEP- TGE, MNGs, CMC stretch and strengthening RB       Manuals            MNGs 5' 5'           STM/cupping 5' 5'           PROM Thumb 2' 2'           KT perf            Neuro Re-Ed             Joint distraction grade 1-2 mobs gentle                                                                                           Ther Ex             CMC ROM  ABD tennis ball slides 2x15           Thumb ROM  IP, MP, RA, PA 10x           CMC/thenar strengthening             MNGs 5x 5x           Flexor stretch 3x 30 sec [id number]           TGE 3 sets 5                                      Ther Activity                                       Gait Training                          fluido 5' ROM 5' +ROM           Modalities             MP/CP             Paraffin

## 2022-10-03 ENCOUNTER — OFFICE VISIT (OUTPATIENT)
Dept: OCCUPATIONAL THERAPY | Age: 62
End: 2022-10-03
Payer: COMMERCIAL

## 2022-10-03 DIAGNOSIS — M18.11 ARTHRITIS OF CARPOMETACARPAL (CMC) JOINT OF RIGHT THUMB: ICD-10-CM

## 2022-10-03 DIAGNOSIS — G56.01 CARPAL TUNNEL SYNDROME ON RIGHT: Primary | ICD-10-CM

## 2022-10-03 PROCEDURE — 97140 MANUAL THERAPY 1/> REGIONS: CPT

## 2022-10-03 PROCEDURE — 97110 THERAPEUTIC EXERCISES: CPT

## 2022-10-03 PROCEDURE — 97112 NEUROMUSCULAR REEDUCATION: CPT

## 2022-10-04 NOTE — PROGRESS NOTES
Daily Note     Today's date: 10/3/2022  Patient name: Bob Garza  : 1960  MRN: [id number]  Referring provider: Rosana Bruno MD  Dx:   Encounter Diagnosis     ICD-10-CM    1  Carpal tunnel syndrome on right  G56 01    2  Arthritis of carpometacarpal (CMC) joint of right thumb  M18 11                   Subjective: "The pain was bad over the weekend because of the rain "      Objective: See treatment diary below      Assessment: Tolerated treatment well  Dec neural tension of median nerve noted  Dec tenderness  at volar thumb at thenar eminence  Pt reports more pain at nights, edu to ensure she is wearing wrist brace for CTS  Dec intrinsic tightness  Dec n/t as per pt; Pt pain still notes to reach 7/10 at times and at nights  Patient would benefit from continued OT      Plan: Continue per plan of care  Precautions: Kyrgyz Speaking;   needed; Universal  HEP- TGE, MNGs, CMC stretch and strengthening RB       Manuals 9/26 9/29 10/3          MNGs 5' 5' 8'          STM/cupping 5' 5' 5'          PROM Thumb 2' 2' 2'          KT perf            Neuro Re-Ed             Joint distraction grade 1-2 mobs gentle   8'                                                                                        Ther Ex             CMC ROM  ABD tennis ball slides 2x15           Thumb ROM  IP, MP, RA, PA 10x fluido 5'          CMC/thenar strengthening             MNGs 5x 5x 5x          Flexor stretch 3x 30 sec [id number] 3x30 sec          TGE 3 sets 5  10x                                    Ther Activity                                       Gait Training                          fluido 5' ROM 5' +ROM           Modalities             MP/CP             Paraffin

## 2022-10-06 ENCOUNTER — OFFICE VISIT (OUTPATIENT)
Dept: OCCUPATIONAL THERAPY | Age: 62
End: 2022-10-06
Payer: COMMERCIAL

## 2022-10-06 DIAGNOSIS — G56.01 CARPAL TUNNEL SYNDROME ON RIGHT: Primary | ICD-10-CM

## 2022-10-06 DIAGNOSIS — M18.11 ARTHRITIS OF CARPOMETACARPAL (CMC) JOINT OF RIGHT THUMB: ICD-10-CM

## 2022-10-06 PROCEDURE — 97140 MANUAL THERAPY 1/> REGIONS: CPT

## 2022-10-06 PROCEDURE — 97112 NEUROMUSCULAR REEDUCATION: CPT

## 2022-10-06 PROCEDURE — 97110 THERAPEUTIC EXERCISES: CPT

## 2022-10-06 NOTE — PROGRESS NOTES
Daily Note     Today's date: 10/6/2022  Patient name: Ke Levy  : 1960  MRN: [id number]  Referring provider: Pinkey Canavan, MD  Dx:   Encounter Diagnosis     ICD-10-CM    1  Carpal tunnel syndrome on right  G56 01    2  Arthritis of carpometacarpal (CMC) joint of right thumb  M18 11        Start Time: 1405  Stop Time: 5646  Total time in clinic (min): 40 minutes    Subjective: Patient reported that she gets numbness in her fingers  "It's getting better a little bit "      Objective: See treatment diary below      Assessment: Tolerated treatment well  Patient reported being compliant with wrist brace at night time  She reported decreased paresthesia  Patient tolerated all manual technique/exercises well this date well with minimal complaints of pain  She noted some pain with composite fist and full fist was taken out of tendon gliding this date  Patient would benefit from continued OT      Plan: Continue per plan of care  Precautions: Maltese Speaking;   needed; Universal  HEP- TGE, MNGs, CMC stretch and strengthening RB       Manuals 9/26 9/29 10/3 10/6         MNGs 5' 5' 8' 5'         STM/cupping 5' 5' 5' 8'         PROM Thumb 2' 2' 2' 3'         KT perf            Neuro Re-Ed             Joint distraction grade 1-2 mobs gentle   8' 8'                                                                                       Ther Ex             CMC ROM  ABD tennis ball slides 2x15           Thumb ROM  IP, MP, RA, PA 10x fluido 5'          CMC/thenar strengthening             MNGs 5x 5x 5x          Flexor stretch 3x 30 sec [id number] 3x30 sec 3x30 sec         TGE 3 sets 5  10x 10x                                   Ther Activity                                       Gait Training                          fluido 5' ROM 5' +ROM           Modalities             MP/CP             Paraffin

## 2022-10-10 ENCOUNTER — OFFICE VISIT (OUTPATIENT)
Dept: OCCUPATIONAL THERAPY | Age: 62
End: 2022-10-10
Payer: COMMERCIAL

## 2022-10-10 DIAGNOSIS — M18.11 ARTHRITIS OF CARPOMETACARPAL (CMC) JOINT OF RIGHT THUMB: ICD-10-CM

## 2022-10-10 DIAGNOSIS — G56.01 CARPAL TUNNEL SYNDROME ON RIGHT: Primary | ICD-10-CM

## 2022-10-10 PROCEDURE — 97140 MANUAL THERAPY 1/> REGIONS: CPT

## 2022-10-10 PROCEDURE — 97110 THERAPEUTIC EXERCISES: CPT

## 2022-10-10 NOTE — PROGRESS NOTES
Daily Note     Today's date: 10/10/2022  Patient name: Wayne Albert  : 1960  MRN: [id number]  Referring provider: Morteza Logan MD  Dx:   Encounter Diagnosis     ICD-10-CM    1  Carpal tunnel syndrome on right  G56 01    2  Arthritis of carpometacarpal [place]) joint of right thumb  M18 11            [facility] [facility] [id number]       Subjective: "I cleaned over the weekend and now my hand hurts"      Objective: See treatment diary below      Assessment: Tolerated treatment poor  Patient reported inc pain in hand and digits; LF the most  Pt c/o moderate 6/10 pain at her hand, LF and up her arm with MNGs, wrist flexor stretch, and STM  Pt reported pain with any movement of LF; trialed gutter splint for immobilization for rest  Pt edu to wear wrist brace for CTS during the day instead of hand based thumb spica to dec pain and give rest  Patient would benefit from continued OT      Plan: Continue per plan of care  Precautions: Turkish Speaking;   needed; Universal  HEP- TGE, MNGs, CMC stretch and strengthening RB       Manuals 9/26 9/29 10/3 10/6 10/10        MNGs 5' 5' 8' 5' 5'        STM/cupping 5' 5' 5' 8' 5'        PROM Thumb 2' 2' 2' 3' 3'        KT perf            Neuro Re-Ed             Joint distraction grade 1-2 mobs gentle   8' 8' 5'        Orthotic mgmt     5'                                                                         Ther Ex             CMC ROM  ABD tennis ball slides 2x15           Thumb ROM  IP, MP, RA, PA 10x fluido 5'  fluido 7'        CMC/thenar strengthening             MNGs 5x 5x 5x          Flexor stretch 3x 30 sec [id number] 3x30 sec 3x30 sec 3x30 sec; pain ful stopped        TGE 3 sets 5  10x 10x painful, stopped                                  Ther Activity                                       Gait Training                          fluido 5' ROM 5' +ROM           Modalities             MP/CP             Paraffin

## 2022-10-17 ENCOUNTER — APPOINTMENT (OUTPATIENT)
Dept: OCCUPATIONAL THERAPY | Age: 62
End: 2022-10-17

## 2022-10-18 ENCOUNTER — OFFICE VISIT (OUTPATIENT)
Dept: OCCUPATIONAL THERAPY | Age: 62
End: 2022-10-18
Payer: COMMERCIAL

## 2022-10-18 DIAGNOSIS — M18.11 ARTHRITIS OF CARPOMETACARPAL (CMC) JOINT OF RIGHT THUMB: ICD-10-CM

## 2022-10-18 DIAGNOSIS — G56.01 CARPAL TUNNEL SYNDROME ON RIGHT: Primary | ICD-10-CM

## 2022-10-18 PROCEDURE — 97140 MANUAL THERAPY 1/> REGIONS: CPT

## 2022-10-18 PROCEDURE — 97112 NEUROMUSCULAR REEDUCATION: CPT

## 2022-10-18 NOTE — PROGRESS NOTES
Daily Note     Today's date: 10/18/2022  Patient name: José Luis Michele  : 1960  MRN: [id number]  Referring provider: Rosa New MD  Dx:   Encounter Diagnosis     ICD-10-CM    1  Carpal tunnel syndrome on right  G56 01    2  Arthritis of carpometacarpal (CMC) joint of right thumb  M18 11                  Subjective: "the bigger splint does help more"      Objective: See treatment diary below      Assessment: Tolerated treatment fair  Median nerve mild tension at end range  Pt wear FA thumb spica for wrist protection for CTS and thumb for arthritis, dec pain noted  Pt denied n/t r pain from tinels of phalens test  Inc sensitivity of hand noted  Therapist to focus tx on desensitizations treatment  Patient would benefit from continued OT      Plan: Continue per plan of care  Precautions: Sami Speaking;   needed; Universal  HEP- TGE, MNGs, [facility] stretch and strengthening RB       Manuals 9/26 9/29 10/3 10/6 10/10 10/18       MNGs 5' 5' 8' 5' 5' 5' stopped 2* pain       STM/cupping 5' 5' 5' 8' 5' 5/ CTS and arthritis       PROM Thumb 2' 2' 2' 3' 3' 2'       KT perf            Neuro Re-Ed             Joint distraction grade 1-2 mobs gentle   8' 8' 5'        Orthotic mgmt     5'        desensitization      Poly pellets and marbles 8', TENS 8', brushing 3'                                                           Ther Ex             CMC ROM  ABD tennis ball slides 2x15           Thumb ROM  IP, MP, RA, PA 10x fluido 5'  fluido 7'        CMC/thenar strengthening             MNGs 5x 5x 5x          Flexor stretch 3x 30 sec [id number] 3x30 sec 3x30 sec 3x30 sec; pain ful stopped        TGE 3 sets 5  10x 10x painful, stopped                                  Ther Activity                                       Gait Training                          fluido 5' ROM 5' +ROM           Modalities             MP/CP             Paraffin

## 2022-10-20 ENCOUNTER — OFFICE VISIT (OUTPATIENT)
Dept: OCCUPATIONAL THERAPY | Age: 62
End: 2022-10-20
Payer: COMMERCIAL

## 2022-10-20 DIAGNOSIS — M18.11 ARTHRITIS OF CARPOMETACARPAL (CMC) JOINT OF RIGHT THUMB: ICD-10-CM

## 2022-10-20 DIAGNOSIS — G56.01 CARPAL TUNNEL SYNDROME ON RIGHT: Primary | ICD-10-CM

## 2022-10-20 PROCEDURE — 97168 OT RE-EVAL EST PLAN CARE: CPT

## 2022-10-20 PROCEDURE — 97112 NEUROMUSCULAR REEDUCATION: CPT

## 2022-10-20 PROCEDURE — 97140 MANUAL THERAPY 1/> REGIONS: CPT

## 2022-10-20 NOTE — PROGRESS NOTES
OT Re-Evaluation     Today's date: 10/20/2022  Patient name: Bhavana Núñez  : 1960  MRN: [id number]  Referring provider: Ana Paula Levy MD  Dx:   Encounter Diagnosis     ICD-10-CM    1  Carpal tunnel syndrome on right  G56 01    2  Arthritis of carpometacarpal [place]) joint of right thumb  M18 11                   Assessment  Assessment details: 10/20/22- Pt has been participating and progressing in skilled OT tx  Pt had reported severe pain to R hand and LF  Pt has now reported dec pain from 8/10 to 5/10 at worst at wrist and hand  Pt continued to report occ pain at Aia 16, but it is from her arthritis  Pt edu and provided with FA base thumb spica brace to use instead of just comfort cool brace as she was moving her wrist and placing pressure on median nerve with repetitive wrist movements; which has helped dec her pain  Therapist has refocused tx on desensitization exercises 2* nerve pain and sensitivity, but denies pain, n/t with tinels or phalen test  Pt responding well  Skilled OT tx to continue with desensitization exercise to dec pain, dec sensitivity and inc QOL   service [employer] used during eval- [id number] and [id number]  Pt is a 59 y/o female, who reports moderate pain to her R hand; worse in thumb and in hand  Pt had previous TFR sx in March but c/o continued pain at palm of hand  Pt noted with + CMC grind test as well as Positive tinels and Phalen test for CTS and noted with moderate tension during median nerve tension testing, with moderate thenar muscle and  weakness  Pt provided with CMC comfort cool brace for the days and MD order of nocturnal splinting for 6 weeks  Therapist edu pt on Aia 16 stretching and strengthening HEP, as well as MNGs and CTS edu/positioning  Pt would benefit from skilled OT tx sessions to reduce pain from CTS, CMC arthritis and compensatory techniques to inc independence and QOL      Impairments: abnormal coordination, abnormal or restricted ROM, activity intolerance, impaired physical strength, lacks appropriate home exercise program and pain with function    Symptom irritability: moderateUnderstanding of Dx/Px/POC: good   Prognosis: good    Goals  STGs:  1  Pt will report dec pain by 50% MET  2  Pt will inc  strength + 5# not met  3  Pt will be independent with HEP and splint wear day/night MET  4  Pt will demo dec neural tension to mild  MET  LTGs:  1  Pt will inc R hand strength to WFLs  2  Pt will report no pain during ADL tasks  3  Pt demo no neural tension  4  Pt will inc FOTO score    Plan  Patient would benefit from: skilled occupational therapy  Planned modality interventions: thermotherapy: hydrocollator packs and thermotherapy: paraffin bath  Planned therapy interventions: activity modification, ADL training, manual therapy, massage, patient education, compression, strengthening, stretching, therapeutic activities, therapeutic exercise, home exercise program and functional ROM exercises  Frequency: 2x week  Duration in weeks: 6  Treatment plan discussed with: patient        Subjective Evaluation    History of Present Illness  Date of surgery: 3/8/2022  Mechanism of injury: trauma  Mechanism of injury: Pt is a 61 y/o female, who underwent R thumb, LF and RF TFR on 3/8/22  Pt participated in skilled OT tx from 2022 to 2022; During her treatment, pt had c/o worsening hand pain  Pt sx of trigger finger a success but pain increased  Pt had additional testings of US and x-rays which showed CMC arthritis and carpal tunnel syndrome            Recurrent probem    Quality of life: poor    Pain  Current pain rating: 3  At best pain rating: 3  At worst pain ratin  Location: R thumb, and digits  Quality: throbbing, sharp and burning  Relieving factors: rest and support  Progression: improved    Hand dominance: right    Treatments  Previous treatment: occupational therapy  Current treatment: occupational therapy  Patient Goals  Patient goals for therapy: decreased pain, increased strength and independence with ADLs/IADLs  Patient goal: to avoid surgery         Objective     Tenderness     Right Wrist/Hand   Tenderness in the carpometacarpal joint  Neurological Testing     Sensation     Wrist/Hand     Right   Intact: light touch, pin prick and sharp/dull discrimination    Active Range of Motion     Left Wrist   Normal active range of motion    Right Wrist   Wrist flexion: 55 degrees   Wrist extension: 55 degrees with pain    Right Thumb   Flexion     MP: 44    DIP: 48  Extension     MP: 0  Palmar Abduction    CMC: 35  Radial Abduction    CMC: 50    Strength/Myotome Testing     Left Wrist/Hand   Wrist extension: 5  Wrist flexion: 5  Radial deviation: 5  Ulnar deviation: 5     (2nd hand position)     Trial 1: 55 9    Right Wrist/Hand   Wrist extension: 4-  Wrist flexion: 4-  Radial deviation: 4-  Ulnar deviation: 4-     (2nd hand position)     Trial 1: 11 1    Tests     Right Wrist/Hand   Positive CMC grind, Phalen's sign and Tinel's sign (medial nerve)  Negative Finkelstein's  Precautions: Slovenian Speaking;   needed; Universal  HEP- TGE, MNGs, Aia 16 stretch and strengthening RB          Manuals 9/26 9/29 10/3 10/6 10/10 10/18  10/20         MNGs 5' 5' 8' 5' 5' 5' stopped 2* pain  5'         STM/cupping 5' 5' 5' 8' 5' 5/ CTS and arthritis  8' CTS Aia 16 arth         PROM Thumb 2' 2' 2' 3' 3' 2'  2'         KT perf                     Neuro Re-Ed                       Joint distraction grade 1-2 mobs gentle     8' 8' 5'    5'         Orthotic mgmt         5'             desensitization           Poly pellets and marbles 8', TENS 8', brushing 3'  Poly pellets and marbles 8', TENS 8', brushing 3' brushing 2'                                                                                                         Ther Ex                       CMC ROM   ABD tennis ball slides 2x15                   Thumb ROM   IP, MP, RA, PA 10x fluido 5'   fluido 7'             CMC/thenar strengthening                       MNGs 5x 5x 5x                 Flexor stretch 3x 30 sec [id number] 3x30 sec 3x30 sec 3x30 sec; pain ful stopped             TGE 3 sets 5   10x 10x painful, stopped                                                             Ther Activity                                                                       Gait Training                                               fluido 5' ROM 5' +ROM                   Modalities                       MP/CP                       Paraffin

## 2022-10-25 ENCOUNTER — OFFICE VISIT (OUTPATIENT)
Dept: OCCUPATIONAL THERAPY | Age: 62
End: 2022-10-25
Payer: COMMERCIAL

## 2022-10-25 DIAGNOSIS — G56.01 CARPAL TUNNEL SYNDROME ON RIGHT: Primary | ICD-10-CM

## 2022-10-25 DIAGNOSIS — M18.11 ARTHRITIS OF CARPOMETACARPAL (CMC) JOINT OF RIGHT THUMB: ICD-10-CM

## 2022-10-25 PROCEDURE — 97110 THERAPEUTIC EXERCISES: CPT

## 2022-10-25 PROCEDURE — 97140 MANUAL THERAPY 1/> REGIONS: CPT

## 2022-10-25 NOTE — PROGRESS NOTES
Daily Note     Today's date: 10/25/2022  Patient name: Galdino Cross  : 1960  MRN: [id number]  Referring provider: Jeanna Andrade MD  Dx:   Encounter Diagnosis     ICD-10-CM    1  Carpal tunnel syndrome on right  G56 01    2  Arthritis of carpometacarpal (CMC) joint of right thumb  M18 11                   Subjective: "My thumb was bad yesterday "      Objective: See treatment diary below      Assessment: Tolerated treatment fair  Pt reporting mild to moderate pain to R thumb CMC and pointed to 1st DC but no inc pain with finklestein or eichoff test  Pt has demo dec pain to hand and wrist and dec n/t now that she has been using forearm thumb spica and decreasing amount for wrist flexion  Pt median nerve has decreased in tightness; mild pain/tension at end range of neural testing  Pt did report tightness feeling at forearm flexors  By end of tx, noted to dec from mod tightness to mild  Pt pain seem to be coming from her thumb arthritis right now  Plan: MD appt 10/26; await further advisement from MD>      Precautions: Iraqi Speaking;   needed; Universal  HEP- TGE, MNGs, [facility] stretch and strengthening RB          Manuals 10/25 9/29 10/3 10/6 10/10 10/18  10/20         MNGs 5' 5' 8' 5' 5' 5' stopped 2* pain  5'         STM/cupping 25' wrist, hand, thumb and flexors 5' 5' 8' 5' 5/ CTS and arthritis  8' CTS [facility] arth         PROM Thumb 2' 2' 2' 3' 3' 2'  2'         KT perf                     Neuro Re-Ed                       Joint distraction grade 1-2 mobs gentle  2'   8' 8' 5'    5'         Orthotic mgmt         5'             desensitization           Poly pellets and marbles 8', TENS 8', brushing 3'  Poly pellets and marbles 8', TENS 8', brushing 3' brushing 2'                                                                                                         Ther Ex                       CMC ROM   ABD tennis ball slides 2x15                   Thumb ROM   IP, MP, RA, PA 10x fluido 5'   fluido 7'             CMC/thenar strengthening                       MNGs 5x 5x 5x                 Flexor stretch 3x 30 sec [id number] 3x30 sec 3x30 sec 3x30 sec; pain ful stopped             TGE 3 sets 5   10x 10x painful, stopped                                                             Ther Activity                                                                       Gait Training                                               fluido 5' ROM 5' +ROM                   Modalities                       MP/CP                       Paraffin

## 2022-10-26 ENCOUNTER — OFFICE VISIT (OUTPATIENT)
Dept: OBGYN CLINIC | Facility: HOSPITAL | Age: 62
End: 2022-10-26
Payer: COMMERCIAL

## 2022-10-26 VITALS
HEIGHT: 62 IN | BODY MASS INDEX: 32.07 KG/M2 | SYSTOLIC BLOOD PRESSURE: 110 MMHG | DIASTOLIC BLOOD PRESSURE: 80 MMHG | OXYGEN SATURATION: 98 % | HEART RATE: 94 BPM | WEIGHT: 174.3 LBS

## 2022-10-26 DIAGNOSIS — M65.331 TRIGGER MIDDLE FINGER OF RIGHT HAND: ICD-10-CM

## 2022-10-26 DIAGNOSIS — M65.311 TRIGGER THUMB OF RIGHT HAND: ICD-10-CM

## 2022-10-26 DIAGNOSIS — M18.11 ARTHRITIS OF CARPOMETACARPAL (CMC) JOINT OF RIGHT THUMB: ICD-10-CM

## 2022-10-26 DIAGNOSIS — M65.341 TRIGGER RING FINGER OF RIGHT HAND: ICD-10-CM

## 2022-10-26 DIAGNOSIS — G56.01 CARPAL TUNNEL SYNDROME ON RIGHT: Primary | ICD-10-CM

## 2022-10-26 PROCEDURE — 20600 DRAIN/INJ JOINT/BURSA W/O US: CPT | Performed by: ORTHOPAEDIC SURGERY

## 2022-10-26 PROCEDURE — 99213 OFFICE O/P EST LOW 20 MIN: CPT | Performed by: ORTHOPAEDIC SURGERY

## 2022-10-26 RX ORDER — BETAMETHASONE SODIUM PHOSPHATE AND BETAMETHASONE ACETATE 3; 3 MG/ML; MG/ML
3 INJECTION, SUSPENSION INTRA-ARTICULAR; INTRALESIONAL; INTRAMUSCULAR; SOFT TISSUE
Status: COMPLETED | OUTPATIENT
Start: 2022-10-26 | End: 2022-10-26

## 2022-10-26 RX ORDER — BUPIVACAINE HYDROCHLORIDE 2.5 MG/ML
0.5 INJECTION, SOLUTION INFILTRATION; PERINEURAL
Status: COMPLETED | OUTPATIENT
Start: 2022-10-26 | End: 2022-10-26

## 2022-10-26 RX ADMIN — BETAMETHASONE SODIUM PHOSPHATE AND BETAMETHASONE ACETATE 3 MG: 3; 3 INJECTION, SUSPENSION INTRA-ARTICULAR; INTRALESIONAL; INTRAMUSCULAR; SOFT TISSUE at 15:49

## 2022-10-26 RX ADMIN — BUPIVACAINE HYDROCHLORIDE 0.5 ML: 2.5 INJECTION, SOLUTION INFILTRATION; PERINEURAL at 15:49

## 2022-10-26 NOTE — PROGRESS NOTES
ASSESSMENT/PLAN:    Assessment:   S/P Right thumb, long, and ring finger trigger finger releases - Right on 3/8/2022  Right Carpal tunnel syndrome   Right thumb cmc arthritis     Plan:   Patient will proceed with a steroid injection into her right thumb cmc jt today  She was advised that her carpal tunnel seems to be greatly improved at this time as well as her triggers  She can continue to wear her splints if they continue to help her  She was advised that the steroid injection today will not heal her arthritis however it should help with her pain and inflammation associated with her arthritis  She was also given a comfort cool brace today  Follow Up:  3  month(s)    To Do Next Visit:       General Discussions:     CMC Arthritis: The anatomy and physiology of carpometacarpal joint arthritis was discussed with the patient today in the office  Deterioration of the articular cartilage eventually leads to hypermobility at the thumb [facility] joint, resulting in joint subluxation, osteophyte formation, cystic changes within the trapezium and base of the first metacarpal, as well as subchondral sclerosis  Eventually, pain, limited mobility, and compensatory hyperextension at the metacarpophalangeal joint may develop  While normal activity and usage of the thumb joint may provide a painful experience to the patient, this typically does not result in damage to the thumb or hand  Treatment options include resting thumb spica splints to decreased joint edema, pain, and inflammation  Therapy exercises to strengthen the thenar musculature may relieve pain, but do not alter the overall continued development of osteoarthritis  Oral medications, topical medications, corticosteroid injections may decrease pain and increase overall function  Eventually, approximately 5% of patients may require surgical intervention         Operative Discussions:       _____________________________________________________  CHIEF COMPLAINT:  Chief Complaint   Patient presents with   • Right Thumb - Follow-up     [facility] Arthritis    • Right Wrist - Carpal Tunnel, Follow-up         SUBJECTIVE:  Frieda Nayak is a 60 y o  female who presents for follow up regarding S/P Right thumb, long, and ring finger trigger finger releases - Right on 3/8/2022, Right Carpal tunnel syndrome  And Right thumb cmc arthritis    Since last visit, Frieda Nayak has tried therapy and bracing with only partial relief  Today there is Pain  Moderate  Intermittant  Sharp and Aching to the right thumb  She reports pain when she peels potatoes and cuts onions  Her numbness is much better as well  Radiation: Yes to the  thumb  Associated symptoms: No Complaints    PAST MEDICAL HISTORY:  Past Medical History:   Diagnosis Date   • Abnormal mammogram     RESOLVED: [id number]   • Anxiety    • Anxiety    • Arthritis    • Depression    • Depression    • Diabetes mellitus ([facility] [address] )     Pt denies   • Diverticulosis    • GERD (gastroesophageal reflux disease)    • Helicobacter pylori infection    • Hyperlipidemia    • Seborrheic keratosis     LAST ASSESSED: [id number]   • Sleep apnea    • Sleep apnea    • Tinea pedis of left foot 9/16/2019       PAST SURGICAL HISTORY:  Past Surgical History:   Procedure Laterality Date   • ABDOMINOPLASTY      abdomin   • COLONOSCOPY  2017   • WA COLONOSCOPY FLX DX W/COLLJ SPEC WHEN PFRMD N/A 8/11/2016    Procedure: COLONOSCOPY;  Surgeon: Rylie Mancera MD;  Location:  GI LAB; Service: Gastroenterology   • WA COLONOSCOPY FLX DX W/COLLJ SPEC WHEN PFRMD N/A 8/29/2017    Procedure: EGD AND COLONOSCOPY;  Surgeon: Rylie Mancera MD;  Location: [facility] GI LAB;   Service: Gastroenterology   • WA INCISE FINGER TENDON SHEATH Right 3/8/2022    Procedure: Right thumb, long, and ring finger trigger finger releases;  Surgeon: Ovidio Closs, MD;  Location:  MAIN OR;  Service: Orthopedics   • TUBAL LIGATION     • TUBAL LIGATION     • UPPER GASTROINTESTINAL ENDOSCOPY         FAMILY HISTORY:  Family History   Problem Relation Age of Onset   • Diabetes Mother    • Hypertension Mother    • Heart disease Mother    • Diabetes Paternal Grandfather    • Alzheimer's disease Father    • No Known Problems Sister    • No Known Problems Brother    • Depression Daughter    • ADD / ADHD Son    • Depression Son    • Diabetes Maternal Grandmother    • Stomach cancer Maternal Grandfather    • No Known Problems Paternal Grandmother    • No Known Problems Sister    • Heart disease Sister    • Intellectual disability Brother    • Schizophrenia Brother    • Other Son          as an infant    • Other Daughter          at 2-4 mths old   • Other Son          as an infant        SOCIAL HISTORY:  Social History     Tobacco Use   • Smoking status: Never Smoker   • Smokeless tobacco: Never Used   Vaping Use   • Vaping Use: Never used   Substance Use Topics   • Alcohol use: No   • Drug use: No       MEDICATIONS:    Current Outpatient Medications:   •  Acetaminophen (ARTHRITIS PAIN PO), Take by mouth, Disp: , Rfl:   •  acetaminophen (TYLENOL) 650 mg CR tablet, Take 1 tablet (650 mg total) by mouth every 8 (eight) hours as needed for mild pain, Disp: 30 tablet, Rfl: 0  •  ARIPiprazole (ABILIFY) 5 mg tablet, Take 5 mg by mouth daily, Disp: , Rfl:   •  Ascorbic Acid (vitamin C) 1000 MG tablet, Take 1,000 mg by mouth daily, Disp: , Rfl:   •  atorvastatin (LIPITOR) 10 mg tablet, take 1 tablet by mouth daily, Disp: 90 tablet, Rfl: 3  •  Calcium Polycarbophil (FIBERCON PO), Take by mouth, Disp: , Rfl:   •  Cholecalciferol (VITAMIN D3) 125 MCG (5000 UT) CAPS, Take 1,000 Units by mouth, Disp: , Rfl:   •  clonazePAM (KlonoPIN) 1 mg tablet, Take 1 mg by mouth daily at bedtime as needed for anxiety    , Disp: , Rfl:   •  Diclofenac Sodium (VOLTAREN) 1 %, Apply 2 g topically 4 (four) times a day, Disp: 150 g, Rfl: 2  •  dicyclomine (BENTYL) 20 mg tablet, Take 1 tablet (20 mg total) by mouth every 6 (six) hours (Patient not taking: No sig reported), Disp: 120 tablet, Rfl: 1  •  DULoxetine (CYMBALTA) 30 mg delayed release capsule, Take 30 mg by mouth daily , Disp: , Rfl:   •  hydrOXYzine HCL (ATARAX) 25 mg tablet, Take 25 mg by mouth every 6 (six) hours as needed for itching   (Patient not taking: No sig reported), Disp: , Rfl:   •  hyoscyamine (ANASPAZ,LEVSIN) 0 125 MG tablet, Take 1 tablet (0 125 mg total) by mouth every 4 (four) hours as needed for cramping (Patient not taking: No sig reported), Disp: 30 tablet, Rfl: 0  •  metFORMIN (GLUCOPHAGE) 500 mg tablet, Take 2 tablets (1,000 mg total) by mouth 2 (two) times a day with meals, Disp: 360 tablet, Rfl: 3  •  Methylcobalamin (B-12) 5000 MCG TBDP, Take by mouth, Disp: , Rfl:   •  multivitamin (THERAGRAN) TABS, Take 1 tablet by mouth daily, Disp: , Rfl:   •  omeprazole (PriLOSEC) 20 mg delayed release capsule, take 1 capsule by mouth once daily, Disp: 90 capsule, Rfl: 3    ALLERGIES:  No Known Allergies    REVIEW OF SYSTEMS:  Pertinent items are noted in HPI      LABS:  HgA1c:   Lab Results   Component Value Date    HGBA1C 7 0 (A) 09/01/2022     BMP:   Lab Results   Component Value Date    GLUCOSE 97 10/09/2015    CALCIUM 9 0 09/02/2022     10/09/2015    K 4 0 09/02/2022    CO2 26 09/02/2022     09/02/2022    BUN 13 09/02/2022    CREATININE 0 94 09/02/2022           _____________________________________________________  PHYSICAL EXAMINATION:  Vital signs: /80   Pulse 94   Ht 5' 2" (1 575 m)   Wt 79 1 kg (174 lb 4 8 oz)   LMP  (LMP Unknown)   SpO2 98%   BMI 31 88 kg/m²   General: well developed and well nourished, alert, oriented times 3 and appears comfortable  Psychiatric: Normal  HEENT: Trachea Midline, No torticollis  Cardiovascular: No discernable arrhythmia  Pulmonary: No wheezing or stridor  Abdomen: No rebound or guarding  Extremities: No peripheral edema  Skin: No masses, erythema, lacerations, fluctation, ulcerations  Neurovascular: Sensation Intact to the Median, Ulnar, Radial Nerve, Motor Intact to the Median, Ulnar, Radial Nerve and Pulses Intact    MUSCULOSKELETAL EXAMINATION:  RIGHT SIDE:  CMC: No Instability, Positive grind, Positive tendnerness CMC and Positive Shoulder Sign negative tinels over carpal tunnel, AIN 5/5, apb 4+/5 with pain      _____________________________________________________  STUDIES REVIEWED:  No Studies to review      PROCEDURES PERFORMED:  Small joint arthrocentesis: R thumb CMC  [place] Protocol:  Consent: Verbal consent obtained    Risks and benefits: risks, benefits and alternatives were discussed  Consent given by: patient  Site marked: the operative site was marked  Supporting Documentation  Indications: pain   Procedure Details  Location: thumb - R thumb CMC  Medications administered: 3 mg betamethasone acetate-betamethasone sodium phosphate 6 (3-3) mg/mL; 0 5 mL bupivacaine 0 25 %    Patient tolerance: patient tolerated the procedure well with no immediate complications  Dressing:  Sterile dressing applied            Scribe Attestation    I,:  Siva Reza am acting as a scribe while in the presence of the attending physician :       I,:  Justine Bush MD personally performed the services described in this documentation    as scribed in my presence :

## 2022-10-27 ENCOUNTER — APPOINTMENT (OUTPATIENT)
Dept: OCCUPATIONAL THERAPY | Age: 62
End: 2022-10-27

## 2022-10-28 NOTE — PROGRESS NOTES
Daily Note     Today's date: 2022  Patient name: Tina Beatty  : 1960  MRN: [id number]  Referring provider: Phillip Freitas*  Dx:   Encounter Diagnosis     ICD-10-CM    1  Right hand pain  M79 641                   Subjective: "My hand hurt yesterday afternoon "      Objective: See treatment diary below      Assessment: Lynn Kim presented with sensitivity of the hand and inconsistent pain in the palm of the 3rd digit  Fluidotherapy was used to help with desensitization and for AROM  Median nerve glides were used to see if the was and potential median nerve involvement  Tens and heat were applied for relief form pain  Plan: Continue per plan of care  Precautions: Universal  DOS: 3/8/22  HEP: Tendon Glides, Gripping (R putty), WE/WF PROM, Intrinsic Stretches  Manuals    STM 15' 8' 10 15 (gentle IASTM)   15 15  15 25   MEM 3' 2'            KT applied  5 performed          Splint Making              Neuro Re-Ed              Iso  Grasp Small 10x 5 sec             In Hand Manip  Eileen Weeks Hor   X 15             Translation              Blocking  Each digit 10x     30 30 30     Desensitization     Poly pellets 10 marbles           Median nerve glide      15        Ther Ex              PROM 10' 10' 10 10 30  10 10   10   Digit Flexion MP flexion, lumbrical stretch 10x 10 sec MP flexion, lumbrical stretch 10x 10 sec            Grasping Pencils 3x, isotube small; RPW 2x10 flex/ext Pencils 3x, isotube small;     pencils x 5, isotubes 3x10, AROM Pencils x5, isotubes 3x10, AROM pencils x 5, isotubes 3x10, YPW 3x10 RPW 3x10 RPW  3x10    Yelow Flexbar  2x10    Pinching           Y   TT  1x     WF/WE              EDC Sm RB x25        lrg rb x 30 lrg rb x 45 lrg rb x 45   TGE   2x5 review  2x10                        Ther Activity              WB              Wall Walking         10x tb  10x  tb   Gait Training Please resend to scarlett per patient request.   Modalities              MHP 5 5 pre tx 5 5 5' 10+ Tens post tx 5 5 5  5   Fluido      15

## 2022-11-08 ENCOUNTER — OFFICE VISIT (OUTPATIENT)
Dept: OCCUPATIONAL THERAPY | Age: 62
End: 2022-11-08

## 2022-11-08 DIAGNOSIS — M18.11 ARTHRITIS OF CARPOMETACARPAL (CMC) JOINT OF RIGHT THUMB: ICD-10-CM

## 2022-11-08 DIAGNOSIS — G56.01 CARPAL TUNNEL SYNDROME ON RIGHT: Primary | ICD-10-CM

## 2022-11-08 NOTE — PROGRESS NOTES
Daily Note     Today's date: 2022  Patient name: Daryle Provencal  : 1960  MRN: [id number]  Referring provider: Aubree Jurado MD  Dx:   Encounter Diagnosis     ICD-10-CM    1  Carpal tunnel syndrome on right  G56 01    2  Arthritis of carpometacarpal (CMC) joint of right thumb  M18 11                   Subjective: "My thumb feels better, the injection helped "      Objective: See treatment diary below      Assessment: Tolerated treatment well  G ROM, dec pain in wrist and thumb  Provided with compression glove for arthritis relief  CTS n/t and pain decreased, no neutral tension noted  Plan: Continue with POC for arthritis mgmt     Precautions: Israeli Speaking;   needed; Universal  HEP- TGE, MNGs, Aia 16 stretch and strengthening RB          Manuals 10/25 11/8 10/3 10/6 10/10 10/18  10/20         MNGs 5' 5' 8' 5' 5' 5' stopped 2* pain  5'         STM/cupping 25' wrist, hand, thumb and flexors 15' 5' 8' 5' 5/ CTS and arthritis  8' CTS Aia 16 arth         PROM Thumb 2' 2' 2' 3' 3' 2'  2'         KT perf                     Neuro Re-Ed                       Joint distraction grade 1-2 mobs gentle  2'  5' 8' 8' 5'    5'         Orthotic mgmt         5'             desensitization   Cara pellets 5', TENS 10'       Poly pellets and marbles 8', TENS 8', brushing 3'  Poly pellets and marbles 8', TENS 8', brushing 3' brushing 2'                                                                                                         Ther Ex                       CMC ROM   ABD tennis ball slides 2x15                   Thumb ROM   IP, MP, RA, PA 10x fluido 5'   fluido 7'             CMC/thenar strengthening                       MNGs 5x  5x                 Flexor stretch 3x 30 sec [id number] 3x30 sec 3x30 sec 3x30 sec; pain ful stopped             TGE 3 sets 5   10x 10x painful, stopped                                                             Ther Activity                                                                       Gait Training                                               fluido 5' ROM 5' +ROM                   Modalities                       MP/CP                       Paraffin

## 2022-11-10 ENCOUNTER — OFFICE VISIT (OUTPATIENT)
Dept: OCCUPATIONAL THERAPY | Age: 62
End: 2022-11-10

## 2022-11-10 DIAGNOSIS — M18.11 ARTHRITIS OF CARPOMETACARPAL (CMC) JOINT OF RIGHT THUMB: ICD-10-CM

## 2022-11-10 DIAGNOSIS — G56.01 CARPAL TUNNEL SYNDROME ON RIGHT: Primary | ICD-10-CM

## 2022-11-11 NOTE — PROGRESS NOTES
Daily Note     Today's date: 11/10/2022  Patient name: Eileen Lara  : 1960  MRN: [id number]  Referring provider: Natasha Corrales MD  Dx:   Encounter Diagnosis     ICD-10-CM    1  Carpal tunnel syndrome on right  G56 01    2  Arthritis of carpometacarpal (CMC) joint of right thumb  M18 11                   Subjective: "my thumb is better, only mild pain but not as frequent "      Objective: See treatment diary below      Assessment: Tolerated treatment well  G ROM, dec pain in wrist and thumb  Provided with compression glove for arthritis relief  CTS n/t and pain decreased, no neutral tension noted  Sessions now only 1x per week for the rest of the month as feeling better and F+ carryover of HEP  Plan: Continue with POC for arthritis mgmt     Precautions: Pashto Speaking;   needed; Universal  HEP- TGE, MNGs, [facility] stretch and strengthening RB          Manuals 10/25 11/8 11/10 10/6 10/10 10/18  10/20         MNGs 5' 5' 5' 5' 5' 5' stopped 2* pain  5'         STM/cupping 25' wrist, hand, thumb and flexors 15' 10' 8' 5' 5/ CTS and arthritis  8' CTS [facility] arth         PROM Thumb 2' 2' 2' 3' 3' 2'  2'         KT perf                     Neuro Re-Ed                       Joint distraction grade 1-2 mobs gentle  2'  5' 5' 8' 5'    5'         Orthotic mgmt         5'             desensitization   Cara pellets 5', TENS 10'       Poly pellets and marbles 8', TENS 8', brushing 3'  Poly pellets and marbles 8', TENS 8', brushing 3' brushing 2'                                                                                                         Ther Ex                       CMC ROM   ABD tennis ball slides 2x15                   Thumb ROM   IP, MP, RA, PA 10x fluido 5'   fluido 7'             CMC/thenar strengthening                       MNGs 5x  5x                 Flexor stretch 3x 30 sec [id number] 3x30 sec 3x30 sec 3x30 sec; pain ful stopped             TGE 3 sets 5   10x 10x painful, stopped                                                             Ther Activity                                                                       Gait Training                                               fluido 5' ROM 5' +ROM                   Modalities                       MP/CP                       Paraffin

## 2022-11-15 ENCOUNTER — OFFICE VISIT (OUTPATIENT)
Dept: OCCUPATIONAL THERAPY | Age: 62
End: 2022-11-15

## 2022-11-15 DIAGNOSIS — M18.11 ARTHRITIS OF CARPOMETACARPAL (CMC) JOINT OF RIGHT THUMB: ICD-10-CM

## 2022-11-15 DIAGNOSIS — G56.01 CARPAL TUNNEL SYNDROME ON RIGHT: Primary | ICD-10-CM

## 2022-11-16 NOTE — PROGRESS NOTES
Daily Note     Today's date: 11/15/2022  Patient name: Juan R Garcia  : 1960  MRN: [id number]  Referring provider: Ariel Foster MD  Dx:   Encounter Diagnosis     ICD-10-CM    1  Carpal tunnel syndrome on right  G56 01    2  Arthritis of carpometacarpal (CMC) joint of right thumb  M18 11                   Subjective: "I feel good today "      Objective: See treatment diary below      Assessment: Tolerated treatment well  G ROM, dec pain in wrist and thumb  Able to complete strengthening of thumb with tennis ball squeeze in joint protection OK pinch without pain or issues  Plan: Continue with POC for arthritis mgmt     Precautions: Lao Speaking;   needed; Universal  HEP- TGE, MNGs, Aia 16 stretch and strengthening RB          Manuals 10/25 11/8 11/10 11/15 10/10 10/18  10/20         MNGs 5' 5' 5' 5' 5' 5' stopped 2* pain  5'         STM/cupping 25' wrist, hand, thumb and flexors 15' 10' IASTM and STM 10' 5' 5/ CTS and arthritis  8' CTS Aia 16 arth         PROM Thumb 2' 2' 2' 3' 3' 2'  2'         KT perf                     Neuro Re-Ed                       Joint distraction grade 1-2 mobs gentle  2'  5' 5' 2' 5'    5'         Orthotic mgmt         5'             desensitization   Chelo pellets 5', TENS 10'       Poly pellets and marbles 8', TENS 8', brushing 3'  Poly pellets and marbles 8', TENS 8', brushing 3' brushing 2'                                                                                                         Ther Ex                       CMC ROM   ABD tennis ball slides 2x15                   Thumb ROM   IP, MP, RA, PA 10x fluido 5'  chelo pellets fluido 7'             CMC/thenar strengthening        tennis ball and peg squeeze               MNGs 5x  5x                 Flexor stretch 3x 30 sec [id number] 3x30 sec 3x30 sec 3x30 sec; pain ful stopped             TGE 3 sets 5   10x  painful, stopped                                                             Ther Activity                                                                       Gait Training                                               fluido 5' ROM 5' +ROM                   Modalities                       MP/CP                       Paraffin

## 2022-11-21 ENCOUNTER — OFFICE VISIT (OUTPATIENT)
Dept: OCCUPATIONAL THERAPY | Age: 62
End: 2022-11-21

## 2022-11-21 DIAGNOSIS — G56.01 CARPAL TUNNEL SYNDROME ON RIGHT: Primary | ICD-10-CM

## 2022-11-21 DIAGNOSIS — M18.11 ARTHRITIS OF CARPOMETACARPAL (CMC) JOINT OF RIGHT THUMB: ICD-10-CM

## 2022-11-22 NOTE — PROGRESS NOTES
Daily Note / D/C Note    Today's date: 2022  Patient name: Dougie Watts  : 1960  MRN: [id number]  Referring provider: Koko Begum MD  Dx:   Encounter Diagnosis     ICD-10-CM    1  Carpal tunnel syndrome on right  G56 01       2  Arthritis of carpometacarpal (CMC) joint of right thumb  M18 11                      Subjective: "No pain "      Objective: See treatment diary below      Assessment: Tolerated treatment well  G ROM, no c/o pain in wrist or thumb  No n/t to hand  Rebecca strengthening exercises and HEP well  Re-edu on orthosis mgmt, joint protection, stretching, pain mgmt and strengthening HEP  Plan: D/C to HEPt     Precautions: Sammarinese Speaking;   needed; Universal  HEP- TGE, MNGs, Aia 16 stretch and strengthening RB          Manuals 10/25 11/8 11/10 11/15 11/21 10/18  10/20         MNGs 5' 5' 5' 5' 5' 5' stopped 2* pain  5'         STM/cupping 25' wrist, hand, thumb and flexors 15' 10' IASTM and STM 10'  IASTM 10' 5/ CTS and arthritis  8' CTS Aia 16 arth         PROM Thumb 2' 2' 2' 3' 3' 2'  2'         KT perf                     Neuro Re-Ed                       Joint distraction grade 1-2 mobs gentle  2'  5' 5' 2' 5'    5'         Orthotic mgmt                      desensitization   Chelo pellets 5', TENS 10'       Poly pellets and marbles 8', TENS 8', brushing 3'  Poly pellets and marbles 8', TENS 8', brushing 3' brushing 2'                                                                                                         Ther Ex                       CMC ROM   ABD tennis ball slides 2x15      ABD tennis ball slides 2x15             Thumb ROM   IP, MP, RA, PA 10x fluido 5'  chelo pellets              CMC/thenar strengthening        tennis ball and peg squeeze  tennis ball and peg squeeze             MNGs 5x  5x                 Flexor stretch 3x 30 sec [id number] 3x30 sec 3x30 sec 3x 30 sec             TGE 3 sets 5   10x  10x                                                             Ther Activity                                                                       Gait Training                                               fluido 5' ROM 5' +ROM                   Modalities                       MP/CP          5'             Paraffin

## 2022-11-26 ENCOUNTER — HOSPITAL ENCOUNTER (EMERGENCY)
Facility: HOSPITAL | Age: 62
Discharge: HOME | End: 2022-11-26
Attending: EMERGENCY MEDICINE

## 2022-11-26 VITALS
RESPIRATION RATE: 18 BRPM | TEMPERATURE: 97.4 F | HEART RATE: 97 BPM | HEIGHT: 62 IN | OXYGEN SATURATION: 98 % | WEIGHT: 172 LBS | SYSTOLIC BLOOD PRESSURE: 157 MMHG | BODY MASS INDEX: 31.65 KG/M2 | DIASTOLIC BLOOD PRESSURE: 88 MMHG

## 2022-11-26 DIAGNOSIS — R39.15 URINARY URGENCY: ICD-10-CM

## 2022-11-26 DIAGNOSIS — N39.0 UTI (URINARY TRACT INFECTION): ICD-10-CM

## 2022-11-26 DIAGNOSIS — R30.0 DYSURIA: Primary | ICD-10-CM

## 2022-11-26 LAB
BACTERIA UR QL AUTO: ABNORMAL /HPF
BILIRUB UR QL STRIP: NEGATIVE
CLARITY UR: CLEAR
COLOR UR: YELLOW
GLUCOSE SERPL-MCNC: 112 MG/DL (ref 65–140)
GLUCOSE UR STRIP-MCNC: NEGATIVE MG/DL
HGB UR QL STRIP.AUTO: ABNORMAL
KETONES UR STRIP-MCNC: NEGATIVE MG/DL
LEUKOCYTE ESTERASE UR QL STRIP: ABNORMAL
NITRITE UR QL STRIP: NEGATIVE
NON-SQ EPI CELLS URNS QL MICRO: ABNORMAL /HPF
PH UR STRIP.AUTO: 6 [PH] (ref 4.5–8)
PROT UR STRIP-MCNC: ABNORMAL MG/DL
RBC #/AREA URNS AUTO: ABNORMAL /HPF
SP GR UR STRIP.AUTO: >=1.03 (ref 1–1.03)
UROBILINOGEN UR QL STRIP.AUTO: 0.2 E.U./DL
WBC #/AREA URNS AUTO: ABNORMAL /HPF
WBC CLUMPS # UR AUTO: ABNORMAL /UL

## 2022-11-26 RX ORDER — CEPHALEXIN 500 MG/1
500 CAPSULE ORAL ONCE
Status: COMPLETED | OUTPATIENT
Start: 2022-11-26 | End: 2022-11-26

## 2022-11-26 RX ORDER — CEPHALEXIN 500 MG/1
500 CAPSULE ORAL EVERY 6 HOURS SCHEDULED
Qty: 20 CAPSULE | Refills: 0 | Status: SHIPPED | OUTPATIENT
Start: 2022-11-26 | End: 2022-12-01

## 2022-11-26 RX ADMIN — CEPHALEXIN 500 MG: 500 CAPSULE ORAL at 13:18

## 2022-11-26 NOTE — ED ATTENDING ATTESTATION
11/26/2022  I, Preethi Prado MD, saw and evaluated the patient  I have discussed the patient with the resident/non-physician practitioner and agree with the resident's/non-physician practitioner's findings, Plan of Care, and MDM as documented in the resident's/non-physician practitioner's note, except where noted  All available labs and Radiology studies were reviewed  I was present for key portions of any procedure(s) performed by the resident/non-physician practitioner and I was immediately available to provide assistance  At this point I agree with the current assessment done in the Emergency Department    I have conducted an independent evaluation of this patient a history and physical is as follows:  Patient presents for evaluation of frequency urgency small volumes urine symptoms started today she does have dysuria she has no complaints of fever chills no complaints of abdominal pain and no complaints of vomiting or diarrhea  Exam well-appearing no acute distress sclerae anicteric lungs clear heart regular abdomen soft positive bowel sounds nontender nondistended no CVA tenderness noted  Impression urinary tract infection  ED Course         Critical Care Time  Procedures

## 2022-11-26 NOTE — ED PROVIDER NOTES
History  Chief Complaint   Patient presents with   • Painful Urination     Patient presents with urinary burning and frequency since last night  Denies any blood in urine  Denies any fevers/chills  Rashard Leary is a 76-year-old woman with medical history significant for diabetes presenting with dysuria starting last evening  She has not taken anything for it  She has never had this happen before  She describes urinary urgency and dysuria  She has some left lower back pain which occurs occasionally when she urinates in stands too long, and is not there otherwise  No hematuria  No history of a kidney stone  She reports no nausea, vomiting, fevers, chills  Prior to Admission Medications   Prescriptions Last Dose Informant Patient Reported? Taking? ARIPiprazole (ABILIFY) 5 mg tablet   Yes No   Sig: Take 5 mg by mouth daily   Acetaminophen (ARTHRITIS PAIN PO)   Yes No   Sig: Take by mouth   Ascorbic Acid (vitamin C) 1000 MG tablet   Yes No   Sig: Take 1,000 mg by mouth daily   Calcium Polycarbophil (FIBERCON PO)   Yes No   Sig: Take by mouth   Cholecalciferol (VITAMIN D3) 125 MCG (5000 UT) CAPS   Yes No   Sig: Take 1,000 Units by mouth   DULoxetine (CYMBALTA) 30 mg delayed release capsule   Yes No   Sig: Take 30 mg by mouth daily    Diclofenac Sodium (VOLTAREN) 1 %   No No   Sig: Apply 2 g topically 4 (four) times a day   Methylcobalamin (B-12) 5000 MCG TBDP   Yes No   Sig: Take by mouth   acetaminophen (TYLENOL) 650 mg CR tablet   No No   Sig: Take 1 tablet (650 mg total) by mouth every 8 (eight) hours as needed for mild pain   atorvastatin (LIPITOR) 10 mg tablet   No No   Sig: take 1 tablet by mouth daily   clonazePAM (KlonoPIN) 1 mg tablet   Yes No   Sig: Take 1 mg by mouth daily at bedtime as needed for anxiety       dicyclomine (BENTYL) 20 mg tablet   No No   Sig: Take 1 tablet (20 mg total) by mouth every 6 (six) hours   Patient not taking: No sig reported   hydrOXYzine HCL (ATARAX) 25 mg tablet   Yes No   Sig: Take 25 mg by mouth every 6 (six) hours as needed for itching     Patient not taking: No sig reported   hyoscyamine (ANASPAZ,LEVSIN) 0 125 MG tablet   No No   Sig: Take 1 tablet (0 125 mg total) by mouth every 4 (four) hours as needed for cramping   Patient not taking: No sig reported   metFORMIN (GLUCOPHAGE) 500 mg tablet   No No   Sig: Take 2 tablets (1,000 mg total) by mouth 2 (two) times a day with meals   multivitamin (THERAGRAN) TABS   Yes No   Sig: Take 1 tablet by mouth daily   omeprazole (PriLOSEC) 20 mg delayed release capsule   No No   Sig: take 1 capsule by mouth once daily      Facility-Administered Medications: None       Past Medical History:   Diagnosis Date   • Abnormal mammogram     RESOLVED: [id number]   • Anxiety    • Anxiety    • Arthritis    • Depression    • Depression    • Diabetes mellitus ([facility] [address] )     Pt denies   • Diverticulosis    • GERD (gastroesophageal reflux disease)    • Helicobacter pylori infection    • Hyperlipidemia    • Seborrheic keratosis     LAST ASSESSED: [id number]   • Sleep apnea    • Sleep apnea    • Tinea pedis of left foot 9/16/2019       Past Surgical History:   Procedure Laterality Date   • ABDOMINOPLASTY      abdomin   • COLONOSCOPY  2017   • ND COLONOSCOPY FLX DX W/COLLJ SPEC WHEN PFRMD N/A 8/11/2016    Procedure: COLONOSCOPY;  Surgeon: Becky Ding MD;  Location:  GI LAB; Service: Gastroenterology   • ND COLONOSCOPY FLX DX W/COLLJ SPEC WHEN PFRMD N/A 8/29/2017    Procedure: EGD AND COLONOSCOPY;  Surgeon: Becky Ding MD;  Location: [facility] GI [facility];   Service: Gastroenterology   • ND INCISE FINGER TENDON SHEATH Right 3/8/2022    Procedure: Right thumb, long, and ring finger trigger finger releases;  Surgeon: Anish Arriaga MD;  Location:  MAIN OR;  Service: Orthopedics   • TUBAL LIGATION     • TUBAL LIGATION     • UPPER GASTROINTESTINAL ENDOSCOPY  2017       Family History   Problem Relation Age of Onset   • Diabetes Mother    • Hypertension Mother    • Heart disease Mother    • Diabetes Paternal Grandfather    • Alzheimer's disease Father    • No Known Problems Sister    • No Known Problems Brother    • Depression Daughter    • ADD / ADHD Son    • Depression Son    • Diabetes Maternal Grandmother    • Stomach cancer Maternal Grandfather    • No Known Problems Paternal Grandmother    • No Known Problems Sister    • Heart disease Sister    • Intellectual disability Brother    • Schizophrenia Brother    • Other Son          as an infant    • Other Daughter          at 2-4 mths old   • Other Son          as an infant      I have reviewed and agree with the history as documented  E-Cigarette/Vaping   • E-Cigarette Use Never User      E-Cigarette/Vaping Substances   • Nicotine No    • THC No    • CBD No    • Flavoring No    • Other No    • Unknown No      Social History     Tobacco Use   • Smoking status: Never   • Smokeless tobacco: Never   Vaping Use   • Vaping Use: Never used   Substance Use Topics   • Alcohol use: No   • Drug use: No        Review of Systems   All other systems reviewed and are negative  Physical Exam  ED Triage Vitals   Temperature Pulse Respirations Blood Pressure SpO2   22 1228 22 1228 22 1228 22 1228 22 1228   (!) 97 4 °F (36 3 °C) 97 18 157/88 98 %      Temp Source Heart Rate Source Patient Position - Orthostatic VS BP Location FiO2 (%)   22 1228 22 1228 22 1228 22 1228 --   Tympanic Monitor Sitting Left arm       Pain Score       22 1225       No Pain             Orthostatic Vital Signs  Vitals:    22 1228   BP: 157/88   Pulse: 97   Patient Position - Orthostatic VS: Sitting       Physical Exam  Vitals and nursing note reviewed  Constitutional:       General: Ayah oRbles is not in acute distress  Appearance: Ayah Robles is well-developed  Comments: Overall well appearing adult      HENT:      Head: Normocephalic and atraumatic  Right Ear: External ear normal       Left Ear: External ear normal       Nose: Nose normal    Eyes:      Conjunctiva/sclera: Conjunctivae normal    Cardiovascular:      Rate and Rhythm: Normal rate and regular rhythm  Pulmonary:      Effort: Pulmonary effort is normal  No respiratory distress  Breath sounds: Normal breath sounds  Abdominal:      General: There is no distension  Palpations: Abdomen is soft  Tenderness: There is no abdominal tenderness  There is no right CVA tenderness, left CVA tenderness or guarding  Musculoskeletal:         General: No deformity  Cervical back: Neck supple  Skin:     General: Skin is warm and dry  Neurological:      General: No focal deficit present  Mental Status: Ayah Robles is alert and oriented to person, place, and time  ED Medications  Medications   cephalexin (KEFLEX) capsule 500 mg (500 mg Oral Given 11/26/22 1318)       Diagnostic Studies  Results Reviewed     Procedure Component Value Units Date/Time    Urine Microscopic [[id number]]  (Abnormal) Collected: 11/26/22 1243    Lab Status: Final result Specimen: Urine, Clean Catch Updated: 11/26/22 1327     RBC, UA 4-10 /hpf      WBC, UA 20-30 /hpf      Epithelial Cells Occasional /hpf      Bacteria, UA Occasional /hpf      WBC Clumps occasional    Urine culture [[id number]] Collected: 11/26/22 1243    Lab Status:  In process Specimen: Urine, Clean Catch Updated: 11/26/22 1326    Fingerstick Glucose (POCT) [[id number]]  (Normal) Collected: 11/26/22 1320    Lab Status: Final result Updated: 11/26/22 1321     POC Glucose 112 mg/dl     Urine Macroscopic, POC [[id number]]  (Abnormal) Collected: 11/26/22 1243    Lab Status: Final result Specimen: Urine Updated: 11/26/22 1244     Color, UA Yellow     Clarity, UA Clear     pH, UA 6 0     Leukocytes, UA Small     Nitrite, UA Negative     Protein, UA 30 (1+) mg/dl      Glucose, UA Negative mg/dl Ketones, UA Negative mg/dl      Urobilinogen, UA 0 2 E U /dl      Bilirubin, UA Negative     Occult Blood, UA Moderate     Specific Gravity, UA >=1 030    Narrative:      CLINITEK RESULT                 No orders to display         Procedures  Procedures      ED Course                             SBIRT 22yo+    Flowsheet Row Most Recent Value   SBIRT (25 yo +)    In order to provide better care to our patients, we are screening all of our patients for alcohol and drug use  Would it be okay to ask you these screening questions? No Filed at: 11/26/2022 1252                MDM  Number of Diagnoses or Management Options  Dysuria  Urinary urgency  UTI (urinary tract infection)  Diagnosis management comments: 59 yo woman presenting with dysuria, urinary urgency  Most likely UTI  I do not believe the patient's back pain represents pyelonephritis as she is overall well appearing, afebrile, no CVA tenderness on exam, and is intermittent  UA consistent with UTI  Will treat with Keflex  Follow up with PCP  Instructed pt to follow up with PCP about elevated blood pressure reading  Discharged home in stable condition, return precautions given  Disposition  Final diagnoses:   Dysuria   Urinary urgency   UTI (urinary tract infection)     Time reflects when diagnosis was documented in both MDM as applicable and the Disposition within this note     Time User Action Codes Description Comment    11/26/2022  1:11 PM Nery Franco Add [R30 0] Dysuria     11/26/2022  1:11 PM Nery Franco Add [R39 15] Urinary urgency     11/26/2022  1:11 PM Nery Franco Add [N39 0] UTI (urinary tract infection)       ED Disposition     ED Disposition   Discharge    Condition   Stable    Date/Time   Sat Nov 26, 2022  1:11 PM    Comment   Maeve Tariq discharge to home/self care                 Follow-up Information     Follow up With Specialties Details Why Contact Info Additional Mihir Bynum MD Internal Medicine   [address]  [address] [facility] Emergency Department Emergency Medicine  If symptoms worsen [address] [phone]  [address] [facility] [address], [address] , [place], [place], [address]          Patient's Medications   Discharge Prescriptions    CEPHALEXIN (KEFLEX) 500 MG CAPSULE    Take 1 capsule (500 mg total) by mouth every 6 (six) hours for 5 days       Start Date: 11/26/2022End Date: 12/1/2022       Order Dose: 500 mg       Quantity: 20 capsule    Refills: 0     No discharge procedures on file  PDMP Review       Value Time User    PDMP Reviewed  Yes 3/8/2022  8:35 AM Katja Benitez PA-C           ED Provider  Attending physically available and evaluated Joey Garcia I managed the patient along with the ED Attending      Electronically Signed by         Joaquin Barraza MD  11/26/22 6896

## 2022-11-28 ENCOUNTER — APPOINTMENT (OUTPATIENT)
Dept: OCCUPATIONAL THERAPY | Age: 62
End: 2022-11-28

## 2022-11-28 LAB — BACTERIA UR CULT: ABNORMAL

## 2022-12-19 ENCOUNTER — OFFICE VISIT (OUTPATIENT)
Dept: INTERNAL MEDICINE CLINIC | Facility: CLINIC | Age: 62
End: 2022-12-19

## 2022-12-19 VITALS
HEART RATE: 96 BPM | DIASTOLIC BLOOD PRESSURE: 77 MMHG | BODY MASS INDEX: 30.48 KG/M2 | SYSTOLIC BLOOD PRESSURE: 125 MMHG | TEMPERATURE: 97.7 F | WEIGHT: 172 LBS | HEIGHT: 63 IN

## 2022-12-19 DIAGNOSIS — Z12.31 ENCOUNTER FOR SCREENING MAMMOGRAM FOR MALIGNANT NEOPLASM OF BREAST: ICD-10-CM

## 2022-12-19 DIAGNOSIS — R10.9 ABDOMINAL PAIN: ICD-10-CM

## 2022-12-19 DIAGNOSIS — Z23 NEED FOR INFLUENZA VACCINATION: Primary | ICD-10-CM

## 2022-12-19 DIAGNOSIS — R31.9 URINARY TRACT INFECTION WITH HEMATURIA, SITE UNSPECIFIED: ICD-10-CM

## 2022-12-19 DIAGNOSIS — E11.9 TYPE 2 DIABETES MELLITUS WITHOUT COMPLICATION, WITHOUT LONG-TERM CURRENT USE OF INSULIN (HCC): ICD-10-CM

## 2022-12-19 DIAGNOSIS — N39.0 URINARY TRACT INFECTION WITH HEMATURIA, SITE UNSPECIFIED: ICD-10-CM

## 2022-12-19 DIAGNOSIS — K58.9 IRRITABLE BOWEL SYNDROME, UNSPECIFIED TYPE: ICD-10-CM

## 2022-12-19 DIAGNOSIS — E11.9 DIET-CONTROLLED DIABETES MELLITUS (HCC): ICD-10-CM

## 2022-12-19 LAB
SL AMB  POCT GLUCOSE, UA: NEGATIVE
SL AMB LEUKOCYTE ESTERASE,UA: NORMAL
SL AMB POCT BILIRUBIN,UA: NEGATIVE
SL AMB POCT BLOOD,UA: NORMAL
SL AMB POCT CLARITY,UA: CLEAR
SL AMB POCT COLOR,UA: YELLOW
SL AMB POCT HEMOGLOBIN AIC: 6.4 (ref ?–6.5)
SL AMB POCT KETONES,UA: NORMAL
SL AMB POCT NITRITE,UA: NEGATIVE
SL AMB POCT PH,UA: 5
SL AMB POCT SPECIFIC GRAVITY,UA: 1
SL AMB POCT URINE PROTEIN: NEGATIVE
SL AMB POCT UROBILINOGEN: 0.2

## 2022-12-19 RX ORDER — DICYCLOMINE HCL 20 MG
20 TABLET ORAL EVERY 6 HOURS
Qty: 120 TABLET | Refills: 1 | Status: SHIPPED | OUTPATIENT
Start: 2022-12-19

## 2022-12-19 RX ORDER — BLOOD SUGAR DIAGNOSTIC
STRIP MISCELLANEOUS
Qty: 100 EACH | Refills: 3 | Status: SHIPPED | OUTPATIENT
Start: 2022-12-19

## 2022-12-19 RX ORDER — BLOOD-GLUCOSE METER
KIT MISCELLANEOUS
Qty: 1 KIT | Refills: 0 | Status: SHIPPED | OUTPATIENT
Start: 2022-12-19

## 2022-12-19 RX ORDER — LANCETS 33 GAUGE
EACH MISCELLANEOUS
Qty: 100 EACH | Refills: 3 | Status: SHIPPED | OUTPATIENT
Start: 2022-12-19

## 2022-12-19 RX ORDER — SULFAMETHOXAZOLE AND TRIMETHOPRIM 800; 160 MG/1; MG/1
1 TABLET ORAL EVERY 12 HOURS SCHEDULED
Qty: 6 TABLET | Refills: 0 | Status: SHIPPED | OUTPATIENT
Start: 2022-12-19 | End: 2022-12-22

## 2022-12-19 NOTE — PROGRESS NOTES
[facility] Visit Note  Blanca  60 y o  female   MRN: [id number]    Assessment and Plan      1  Need for influenza vaccination  -     influenza vaccine, quadrivalent, recombinant, PF, 0 5 mL, for patients 18 yr+ (FLUBLOK)    2  Type 2 diabetes mellitus without complication, without long-term current use of insulin ([facility])  -     Blood Glucose Monitoring Suppl (OneTouch Verio Reflect) w/Device KIT; Check blood sugars once daily  Please substitute with appropriate alternative as covered by patient's insurance  Dx: E11 65  -     glucose blood (OneTouch Verio) test strip; Check blood sugars once daily  Please substitute with appropriate alternative as covered by patient's insurance  Dx: E11 65  -     OneTouch Delica Lancets 33L MISC; Check blood sugars once daily  Please substitute with appropriate alternative as covered by patient's insurance  Dx: E11 65  -     metFORMIN (GLUCOPHAGE) 500 mg tablet; Take 1 tablet (500 mg total) by mouth 2 (two) times a day with meals  - Hemoglobin A1c was 6 4 today, improved from 7 0 in September        -     We will continue current regimen of metformin 500 mg twice daily  Patient was also counseled on continuing with lifestyle modifications including proper diet and exercise  The patient requested glucose monitoring supplies and so they were ordered and sent to her pharmacy as well  -     POCT hemoglobin A1c    3  Abdominal pain/IBS        - The patient reports a longstanding history of intermittent abdominal pain which improves with bowel movements  She previously followed gastroenterology but has been lost to follow-up  We will place ambulatory referral to gastroenterology  We will also order Bentyl and instruct the patient to continue taking Tylenol and omeprazole as well  -     dicyclomine (BENTYL) 20 mg tablet;  Take 1 tablet (20 mg total) by mouth every 6 (six) hours  -     Ambulatory Referral to Gastroenterology; Future    4  Encounter for screening mammogram for malignant neoplasm of breast  -     Mammo screening bilateral w 3d & cad; Future; Expected date: 12/19/2022    5  Urinary tract infection with hematuria, site unspecified        - The patient recently presented to the emergency department in late November complaining of urinary symptoms  At that time she was diagnosed with an E  coli UTI and completed a course of Keflex  Today she reports that she is continuing to experience burning with urination as well as the sensation of incomplete bladder emptying  UA was obtained which showed small amount of leukocytes, moderate amount of blood and a small amount of ketones  Will order a 3-day course of Bactrim for now and instruct the patient to let us know if her symptoms do not resolve  If she continues to experience persistent symptoms will require further evaluation   -     POCT urine dip  -     sulfamethoxazole-trimethoprim (BACTRIM DS) 800-160 mg per tablet; Take 1 tablet by mouth every 12 (twelve) hours for 3 days          Follow up in our clinic in 3 month(s) for A1C recheck  Subjective   HISTORY OF PRESENT ILLNESS:  Seth Shaw is a 60 y o  female with a past medical history of type 2 diabetes, anxiety, arthritis, GERD, ALEC, hyperlipidemia, depression and IBS who presented to the clinic today for a 3-month follow-up and hemoglobin A1c recheck  At her previous visit in September 2022 her hemoglobin A1c was 7 0  At today's visit her A1c had improved to 6 4  She was prescribed metformin 1000 mg twice daily, however she reports that she experienced GI upset when taking 2 pills at a time and so she has only been taking 500 mg twice daily  Overall today she was feeling well  She went to the emergency department and November complaining of urinary symptoms and was diagnosed with a UTI  Culture was growing E  coli  She completed a course of Keflex    However, today she reports that she is continuing to experience burning with urination and the sensation of incomplete bladder emptying  She also complains of intermittent abdominal pain that improves with bowel movements  She had followed gastroenterology in the past however was lost to follow-up  Otherwise she had no other acute complaints  Review of Systems   Constitutional: Negative for activity change, appetite change, chills, diaphoresis, fatigue and fever  HENT: Negative for congestion, ear discharge, ear pain, hearing loss, sinus pressure, sinus pain and sore throat  Eyes: Negative for pain, discharge, redness and itching  Respiratory: Negative for cough, chest tightness, shortness of breath and wheezing  Cardiovascular: Negative for chest pain, palpitations and leg swelling  Gastrointestinal: Positive for abdominal pain  Negative for abdominal distention, blood in stool, constipation, diarrhea, nausea and vomiting  Genitourinary: Positive for dysuria and frequency  Negative for difficulty urinating and flank pain  Musculoskeletal: Negative for arthralgias, back pain and gait problem  Skin: Negative for color change, pallor and rash  Neurological: Negative for dizziness, weakness, light-headedness, numbness and headaches  Psychiatric/Behavioral: Negative for agitation, behavioral problems, confusion and decreased concentration  Objective     Vitals:    12/19/22 0944 12/19/22 0950   BP: 142/84 125/77   BP Location: Left arm Left arm   Patient Position: Sitting Sitting   Cuff Size: Large Large   Pulse: 94 96   Temp: 97 7 °F (36 5 °C)    TempSrc: Temporal    Weight: 78 kg (172 lb)    Height: 5' 2 5" (1 588 m)      Physical Exam  Constitutional:       Appearance: Stu Rodríguez is well-developed  HENT:      Head: Normocephalic and atraumatic  Nose: Nose normal    Eyes:      Conjunctiva/sclera: Conjunctivae normal       Pupils: Pupils are equal, round, and reactive to light  Neck:      Vascular: No JVD  Cardiovascular:      Rate and Rhythm: Normal rate and regular rhythm  Heart sounds: Normal heart sounds  No murmur heard  No friction rub  No gallop  Pulmonary:      Effort: Pulmonary effort is normal  No respiratory distress  Breath sounds: Normal breath sounds  No stridor  No wheezing or rales  Chest:      Chest wall: No tenderness  Abdominal:      General: Bowel sounds are normal  There is no distension  Palpations: Abdomen is soft  There is no mass  Tenderness: There is no abdominal tenderness  There is no guarding or rebound  Hernia: No hernia is present  Musculoskeletal:         General: No tenderness or deformity  Right lower leg: No edema  Left lower leg: No edema  Skin:     General: Skin is warm and dry  Neurological:      Mental Status: Yolis Easton is alert and oriented to person, place, and time  Psychiatric:         Mood and Affect: Mood normal          Behavior: Behavior normal          Thought Content: Thought content normal          Judgment: Judgment normal          History     Current Outpatient Medications:   •  acetaminophen (TYLENOL) 650 mg CR tablet, Take 1 tablet (650 mg total) by mouth every 8 (eight) hours as needed for mild pain, Disp: 30 tablet, Rfl: 0  •  ARIPiprazole (ABILIFY) 5 mg tablet, Take 5 mg by mouth daily, Disp: , Rfl:   •  Ascorbic Acid (vitamin C) 1000 MG tablet, Take 1,000 mg by mouth daily, Disp: , Rfl:   •  atorvastatin (LIPITOR) 10 mg tablet, take 1 tablet by mouth daily, Disp: 90 tablet, Rfl: 3  •  Blood Glucose Monitoring Suppl (OneTouch Verio Reflect) w/Device KIT, Check blood sugars once daily  Please substitute with appropriate alternative as covered by patient's insurance   Dx: E11 65, Disp: 1 kit, Rfl: 0  •  Calcium Polycarbophil (FIBERCON PO), Take by mouth, Disp: , Rfl:   •  Cholecalciferol (VITAMIN D3) 125 MCG (5000 UT) CAPS, Take 1,000 Units by mouth, Disp: , Rfl:   •  clonazePAM (KlonoPIN) 1 mg tablet, Take 1 mg by mouth daily at bedtime as needed for anxiety  , Disp: , Rfl:   •  Diclofenac Sodium (VOLTAREN) 1 %, Apply 2 g topically 4 (four) times a day, Disp: 150 g, Rfl: 2  •  dicyclomine (BENTYL) 20 mg tablet, Take 1 tablet (20 mg total) by mouth every 6 (six) hours, Disp: 120 tablet, Rfl: 1  •  DULoxetine (CYMBALTA) 30 mg delayed release capsule, Take 30 mg by mouth daily , Disp: , Rfl:   •  glucose blood (OneTouch Verio) test strip, Check blood sugars once daily  Please substitute with appropriate alternative as covered by patient's insurance  Dx: E11 65, Disp: 100 each, Rfl: 3  •  metFORMIN (GLUCOPHAGE) 500 mg tablet, Take 1 tablet (500 mg total) by mouth 2 (two) times a day with meals, Disp: 360 tablet, Rfl: 3  •  Methylcobalamin (B-12) 5000 MCG TBDP, Take by mouth, Disp: , Rfl:   •  multivitamin (THERAGRAN) TABS, Take 1 tablet by mouth daily, Disp: , Rfl:   •  omeprazole (PriLOSEC) 20 mg delayed release capsule, take 1 capsule by mouth once daily, Disp: 90 capsule, Rfl: 3  •  OneTouch Delica Lancets 29C MISC, Check blood sugars once daily  Please substitute with appropriate alternative as covered by patient's insurance   Dx: E11 65, Disp: 100 each, Rfl: 3  •  sulfamethoxazole-trimethoprim (BACTRIM DS) 800-160 mg per tablet, Take 1 tablet by mouth every 12 (twelve) hours for 3 days, Disp: 6 tablet, Rfl: 0  •  Acetaminophen (ARTHRITIS PAIN PO), Take by mouth (Patient not taking: Reported on 12/19/2022), Disp: , Rfl:   •  hydrOXYzine HCL (ATARAX) 25 mg tablet, Take 25 mg by mouth every 6 (six) hours as needed for itching   (Patient not taking: Reported on 9/1/2022), Disp: , Rfl:   •  hyoscyamine (ANASPAZ,LEVSIN) 0 125 MG tablet, Take 1 tablet (0 125 mg total) by mouth every 4 (four) hours as needed for cramping (Patient not taking: Reported on 9/1/2022), Disp: 30 tablet, Rfl: 0  No Known Allergies   Past Medical History:   Diagnosis Date   • Abnormal mammogram     RESOLVED: [id number]   • Anxiety    • Anxiety • Arthritis    • Depression    • Depression    • Diabetes mellitus ([facility] [address] )     Pt denies   • Diverticulosis    • GERD (gastroesophageal reflux disease)    • Helicobacter pylori infection    • Hyperlipidemia    • Seborrheic keratosis     LAST ASSESSED: [id number]   • Sleep apnea    • Sleep apnea    • Tinea pedis of left foot 9/16/2019     Past Surgical History:   Procedure Laterality Date   • ABDOMINOPLASTY      abdomin   • COLONOSCOPY  2017   • ME COLONOSCOPY FLX DX W/COLLJ SPEC WHEN PFRMD N/A 8/11/2016    Procedure: COLONOSCOPY;  Surgeon: Ke Babin MD;  Location:  GI LAB; Service: Gastroenterology   • ME COLONOSCOPY FLX DX W/COLLJ SPEC WHEN PFRMD N/A 8/29/2017    Procedure: EGD AND COLONOSCOPY;  Surgeon: Ke Babin MD;  Location: [facility] GI LAB;   Service: Gastroenterology   • ME INCISE FINGER TENDON SHEATH Right 3/8/2022    Procedure: Right thumb, long, and ring finger trigger finger releases;  Surgeon: Josie Ledezma MD;  Location:  MAIN OR;  Service: Orthopedics   • TUBAL LIGATION     • TUBAL LIGATION     • UPPER GASTROINTESTINAL ENDOSCOPY  2017     Social History     Socioeconomic History   • Marital status: Single     Spouse name: Not on file   • Number of children: Not on file   • Years of education: Not on file   • Highest education level: Not on file   Occupational History   • Not on file   Tobacco Use   • Smoking status: Never   • Smokeless tobacco: Never   Vaping Use   • Vaping Use: Never used   Substance and Sexual Activity   • Alcohol use: No   • Drug use: No   • Sexual activity: Yes     Partners: Male     Birth control/protection: Post-menopausal   Other Topics Concern   • Not on file   Social History Narrative   • Not on file     Social Determinants of Health     Financial Resource Strain: Low Risk    • Difficulty of Paying Living Expenses: Not very hard   Food Insecurity: No Food Insecurity   • Worried About Running Out of Food in the Last Year: Never true   • Ran Out of Food in the Last Year: Never true   Transportation Needs: No Transportation Needs   • Lack of Transportation (Medical): No   • Lack of Transportation (Non-Medical): No   Physical Activity: Not on file   Stress: Not on file   Social Connections: Not on file   Intimate Partner Violence: Not on file   Housing Stability: Not on file     Family History   Problem Relation Age of Onset   • Diabetes Mother    • Hypertension Mother    • Heart disease Mother    • Diabetes Paternal Grandfather    • Alzheimer's disease Father    • No Known Problems Sister    • No Known Problems Brother    • Depression Daughter    • ADD / ADHD Son    • Depression Son    • Diabetes Maternal Grandmother    • Stomach cancer Maternal Grandfather    • No Known Problems Paternal Grandmother    • No Known Problems Sister    • Heart disease Sister    • Intellectual disability Brother    • Schizophrenia Brother    • Other Son          as an infant    • Other Daughter          at 2-4 mths old   • Other Son          as an infant        MD Dotty Carl [facility] Internal Medicine PGY-3  [address] [facility]  [address] [facility], [address] [facility]    PLEASE NOTE:  This encounter was completed utilizing the [employer] Reader/Informative Direct Speech Voice Recognition Software  Grammatical errors, random word insertions, pronoun errors and incomplete sentences are occasional consequences of the system due to software limitations, ambient noise and hardware issues  These may be missed by proof reading prior to affixing electronic signature  Any questions or concerns about the content, text or information contained within the body of this dictation should be directly addressed to the physician for clarification  Please do not hesitate to call me directly if you have any any questions or concerns

## 2022-12-19 NOTE — PATIENT INSTRUCTIONS
Please take Bactrim 1 pill twice daily for 3 days for urinary tract infection  Please continue taking metformin 500 mg twice daily  Please check your blood sugars at home and continue with lifestyle modifications including proper diet and exercise  Please obtain screening mammography  Please continue taking Tylenol, Bentyl and omeprazole for abdominal pain  Please follow-up with gastroenterology

## 2023-02-11 NOTE — TELEPHONE ENCOUNTER
Order updated for Sleep Medicine, I faxed it to the number that Nas Alvarez gave me [phone]  Home

## 2023-02-22 ENCOUNTER — OFFICE VISIT (OUTPATIENT)
Dept: GASTROENTEROLOGY | Facility: CLINIC | Age: 63
End: 2023-02-22

## 2023-02-22 VITALS
TEMPERATURE: 97.8 F | SYSTOLIC BLOOD PRESSURE: 118 MMHG | WEIGHT: 168 LBS | BODY MASS INDEX: 30.91 KG/M2 | DIASTOLIC BLOOD PRESSURE: 76 MMHG | HEIGHT: 62 IN

## 2023-02-22 DIAGNOSIS — K58.9 IRRITABLE BOWEL SYNDROME, UNSPECIFIED TYPE: ICD-10-CM

## 2023-02-22 NOTE — PROGRESS NOTES
[facility] - Outpatient Consultation  Aide Payan 60 y o  female MRN: [id number]  Encounter: [id number]      PCP: Tyrell Madrid MD  Referring: Tyrell Madrid MD  [facility],  [address] [place] [address]      ASSESSMENT AND PLAN:    69-year-old female who presents to the clinic for follow up  1  Irritable bowel syndrome, unspecified type    Has ongoing periumbilical pain which is imrpoved after having a bowel movement    · We discussed that antispasmodic agents are not recommended in current guidelines for IBS however if she has improvement in symptoms she can continue taking it   · Recommend trial of IBgard  · EGD 2021 was unremarkable, biopsies negative for H  pylori and celiac disease  · Previously had recommended low FODMAP diet but patient states that she did not try  · She denies any constipation    - Ambulatory Referral to Gastroenterology    2  GERD     Omeprazole 20 mg daily helps with symptoms, can continue taking it for now     3  Colon cancer surveillance    Last colonoscopy in September 2021 did not have any polyps but she has a history of colon polyps and therefore repeat colonoscopy recommended in 5 years     Follow-up in clinic as needed    Jessi Lozano MD   Gastroenterology Fellow   ______________________________________________________________________    CC:  Chief Complaint   Patient presents with   • Follow-up     Patient states that she is having on the lower left side of the abdomen       HPI:  69-year-old female who presents to the clinic for follow up  Patient is Guyanese speaking, interpretor used     She continues to have periumbilical abdominal pain, which is improved after having a bowel movement   Occasionally would have loose stools   States she has occasional heartburn and is well controlled on omeprazole 20 mg daily   Denies any nausea or vomiting  Using bentyl with improvement in symtoms        REVIEW OF SYSTEMS:    CONSTITUTIONAL: Denies any fever, chills, rigors, and weight loss  HEENT: No earache or tinnitus  Denies hearing loss or visual disturbances  CARDIOVASCULAR: No chest pain or palpitations  RESPIRATORY: Denies any cough, hemoptysis, shortness of breath or dyspnea on exertion  GASTROINTESTINAL: As noted in the History of Present Illness  GENITOURINARY: No problems with urination  Denies any hematuria or dysuria  NEUROLOGIC: No dizziness or vertigo, denies headaches  MUSCULOSKELETAL: Denies any muscle or joint pain  SKIN: Denies skin rashes or itching  ENDOCRINE: Denies excessive thirst  Denies intolerance to heat or cold  PSYCHOSOCIAL: Denies depression or anxiety  Denies any recent memory loss  Historical Information   Past Medical History:   Diagnosis Date   • Abnormal mammogram     RESOLVED: [id number]   • Anxiety    • Anxiety    • Arthritis    • Depression    • Depression    • Diabetes mellitus ([address] )     Pt denies   • Diverticulosis    • GERD (gastroesophageal reflux disease)    • Helicobacter pylori infection    • Hyperlipidemia    • Seborrheic keratosis     LAST ASSESSED: [id number]   • Sleep apnea    • Sleep apnea    • Tinea pedis of left foot 9/16/2019     Past Surgical History:   Procedure Laterality Date   • ABDOMINOPLASTY      abdomin   • COLONOSCOPY  2017   • AK COLONOSCOPY FLX DX W/COLLJ SPEC WHEN PFRMD N/A 8/11/2016    Procedure: COLONOSCOPY;  Surgeon: Lorie Villareal MD;  Location: [facility]; Service: Gastroenterology   • AK COLONOSCOPY FLX DX W/COLLJ SPEC WHEN PFRMD N/A 8/29/2017    Procedure: EGD AND COLONOSCOPY;  Surgeon: Lorie Villareal MD;  Location: [facility] GI LAB;   Service: Gastroenterology   • AK TENDON SHEATH INCISION Right 3/8/2022    Procedure: Right thumb, long, and ring finger trigger finger releases;  Surgeon: Karlo Rubalcava MD;  Location:  MAIN OR;  Service: Orthopedics   • TUBAL LIGATION     • TUBAL LIGATION     • UPPER GASTROINTESTINAL ENDOSCOPY  2017     Social History   Social History     Substance and Sexual Activity   Alcohol Use No     Social History     Substance and Sexual Activity   Drug Use No     Social History     Tobacco Use   Smoking Status Never   Smokeless Tobacco Never     Family History   Problem Relation Age of Onset   • Diabetes Mother    • Hypertension Mother    • Heart disease Mother    • Diabetes Paternal Grandfather    • Alzheimer's disease Father    • No Known Problems Sister    • No Known Problems Brother    • Depression Daughter    • ADD / ADHD Son    • Depression Son    • Diabetes Maternal Grandmother    • Stomach cancer Maternal Grandfather    • No Known Problems Paternal Grandmother    • No Known Problems Sister    • Heart disease Sister    • Intellectual disability Brother    • Schizophrenia Brother    • Other Son          as an infant    • Other Daughter          at 2-4 mths old   • Other Son          as an infant        Meds/Allergies       Current Outpatient Medications:   •  acetaminophen (TYLENOL) 650 mg CR tablet  •  ARIPiprazole (ABILIFY) 5 mg tablet  •  Ascorbic Acid (vitamin C) 1000 MG tablet  •  atorvastatin (LIPITOR) 10 mg tablet  •  Blood Glucose Monitoring Suppl (OneTouch Verio Reflect) w/Device KIT  •  Calcium Polycarbophil (FIBERCON PO)  •  Cholecalciferol (VITAMIN D3) 125 MCG (5000 UT) CAPS  •  clonazePAM (KlonoPIN) 1 mg tablet  •  Diclofenac Sodium (VOLTAREN) 1 %  •  dicyclomine (BENTYL) 20 mg tablet  •  DULoxetine (CYMBALTA) 30 mg delayed release capsule  •  glucose blood (OneTouch Verio) test strip  •  metFORMIN (GLUCOPHAGE) 500 mg tablet  •  Methylcobalamin (B-12) 5000 MCG TBDP  •  multivitamin (THERAGRAN) TABS  •  omeprazole (PriLOSEC) 20 mg delayed release capsule  •  OneTouch Delica Lancets 58Z MISC  •  Acetaminophen (ARTHRITIS PAIN PO)  •  hydrOXYzine HCL (ATARAX) 25 mg tablet  •  hyoscyamine (ANASPAZ,LEVSIN) 0 125 MG tablet    No Known Allergies        Objective     Blood pressure 118/76, temperature 97 8 °F (36 6 °C), temperature source Tympanic, height 5' 2" (1 575 m), weight 76 2 kg (168 lb), not currently breastfeeding  Body mass index is 30 73 kg/m²  PHYSICAL EXAM:      General Appearance:   Alert, cooperative, no distress   HEENT:   Normocephalic, atraumatic, anicteric  Neck:  Supple, symmetrical, trachea midline   Lungs:   Clear to auscultation bilaterally; no rales, rhonchi or wheezing; respirations unlabored    Heart[de-identified]   Regular rate and rhythm; no murmur, rub, or gallop  Abdomen:   Soft, non-tender, non-distended; normal bowel sounds; no masses, no organomegaly    Genitalia:   Deferred    Rectal:   Deferred    Extremities:  No cyanosis, clubbing or edema    Pulses:  2+ and symmetric    Skin:  No jaundice, rashes, or lesions    Lymph nodes:  No palpable cervical lymphadenopathy        Lab Results:     Lab Results   Component Value Date    WBC 5 64 09/02/2022    HGB 13 2 09/02/2022    HCT 41 3 09/02/2022    MCV 90 09/02/2022     09/02/2022       Lab Results   Component Value Date     10/09/2015    K 4 0 09/02/2022     09/02/2022    CO2 26 09/02/2022    ANIONGAP 6 10/09/2015    BUN 13 09/02/2022    CREATININE 0 94 09/02/2022    GLUCOSE 97 10/09/2015    GLUF 147 (H) 09/02/2022    CALCIUM 9 0 09/02/2022    AST 23 09/02/2022    ALT 40 09/02/2022    ALKPHOS 75 09/02/2022    PROT 7 3 10/09/2015    BILITOT 0 27 10/09/2015    EGFR 64 09/02/2022       Lab Results   Component Value Date    INR 0 99 04/13/2015    PROTIME 12 9 04/13/2015         Radiology Results:   No results found  Portions of the record may have been created with voice recognition software  Occasional wrong word or "sound a like" substitutions may have occurred due to the inherent limitations of voice recognition software  Read the chart carefully and recognize, using context, where substitutions have occurred

## 2023-06-08 ENCOUNTER — HOSPITAL ENCOUNTER (OUTPATIENT)
Dept: RADIOLOGY | Age: 63
Discharge: HOME | End: 2023-06-08
Payer: COMMERCIAL

## 2023-06-08 VITALS — BODY MASS INDEX: 30.91 KG/M2 | WEIGHT: 168 LBS | HEIGHT: 62 IN

## 2023-06-08 DIAGNOSIS — Z12.31 ENCOUNTER FOR SCREENING MAMMOGRAM FOR MALIGNANT NEOPLASM OF BREAST: ICD-10-CM

## 2023-06-08 PROCEDURE — 77067 SCR MAMMO BI INCL CAD: CPT

## 2023-06-08 PROCEDURE — 77063 BREAST TOMOSYNTHESIS BI: CPT

## 2023-06-13 ENCOUNTER — HOSPITAL ENCOUNTER (OUTPATIENT)
Dept: MAMMOGRAPHY | Facility: CLINIC | Age: 63
Discharge: HOME | End: 2023-06-13
Payer: COMMERCIAL

## 2023-06-13 DIAGNOSIS — R92.8 ABNORMAL SCREENING MAMMOGRAM: ICD-10-CM

## 2023-06-13 PROCEDURE — 77065 DX MAMMO INCL CAD UNI: CPT

## 2023-06-26 DIAGNOSIS — R10.9 ABDOMINAL PAIN: ICD-10-CM

## 2023-06-26 DIAGNOSIS — K21.9 GASTROESOPHAGEAL REFLUX DISEASE: ICD-10-CM

## 2023-06-28 RX ORDER — DICYCLOMINE HCL 20 MG
TABLET ORAL
Qty: 120 TABLET | Refills: 3 | Status: SHIPPED | OUTPATIENT
Start: 2023-06-28

## 2023-06-28 RX ORDER — OMEPRAZOLE 20 MG/1
CAPSULE, DELAYED RELEASE ORAL
Qty: 90 CAPSULE | Refills: 3 | Status: SHIPPED | OUTPATIENT
Start: 2023-06-28

## 2023-08-01 ENCOUNTER — TELEPHONE (OUTPATIENT)
Age: 63
End: 2023-08-01

## 2023-08-01 NOTE — TELEPHONE ENCOUNTER
Patient called in to follow up post OV 2/22/23 and reports provider wanted to see patient in 3 months and no one has contacted patient to schedule OV. Patient reports she has moderate (5) abdominal pain that is relieved post BM and pain returns for the past one week with no relief from oral medication (dicyclomine; has not trialled IBgard as noted in office notes)    Patient reports watery diarrhea since Saturday 7/29. Reports 5 episodes this morning since awakening at 0800. Patient requesting OV as soon as possible and care advise. Please follow up.  Pinky # [phone] assisted with call and was disconnected toward end of call.

## 2023-08-23 ENCOUNTER — OFFICE VISIT (OUTPATIENT)
Dept: GASTROENTEROLOGY | Facility: CLINIC | Age: 63
End: 2023-08-23
Payer: COMMERCIAL

## 2023-08-23 VITALS
BODY MASS INDEX: 30.55 KG/M2 | TEMPERATURE: 99.2 F | HEIGHT: 62 IN | DIASTOLIC BLOOD PRESSURE: 90 MMHG | SYSTOLIC BLOOD PRESSURE: 124 MMHG | WEIGHT: 166 LBS

## 2023-08-23 DIAGNOSIS — Z86.19 HISTORY OF HELICOBACTER PYLORI INFECTION: ICD-10-CM

## 2023-08-23 DIAGNOSIS — Z12.11 COLON CANCER SCREENING: ICD-10-CM

## 2023-08-23 DIAGNOSIS — R19.7 DIARRHEA, UNSPECIFIED TYPE: ICD-10-CM

## 2023-08-23 DIAGNOSIS — K58.0 IRRITABLE BOWEL SYNDROME WITH DIARRHEA: Primary | ICD-10-CM

## 2023-08-23 DIAGNOSIS — K21.9 GASTROESOPHAGEAL REFLUX DISEASE WITHOUT ESOPHAGITIS: ICD-10-CM

## 2023-08-23 DIAGNOSIS — K57.30 DIVERTICULOSIS OF COLON: Chronic | ICD-10-CM

## 2023-08-23 DIAGNOSIS — D12.6 ADENOMATOUS POLYP OF COLON, UNSPECIFIED PART OF COLON: ICD-10-CM

## 2023-08-23 PROCEDURE — 99214 OFFICE O/P EST MOD 30 MIN: CPT | Performed by: INTERNAL MEDICINE

## 2023-08-23 RX ORDER — LOPERAMIDE HYDROCHLORIDE 2 MG/1
2 CAPSULE ORAL 2 TIMES DAILY PRN
Qty: 30 CAPSULE | Refills: 0 | Status: SHIPPED | OUTPATIENT
Start: 2023-08-23

## 2023-08-23 NOTE — PATIENT INSTRUCTIONS
Will check stool studies to rule out infectious diarrhea. Recommend addition of benefiber or metamucil to diet  If infectious stool studies are negative - can start using Imodium as needed for excessive diarrhea  Recommend addition of peppermint oil for IBS symptom relief    Patient  was given order for stool studies, and also set up recall for 6 months follow up   Patient is DIABETIC.

## 2023-09-26 ENCOUNTER — VBI (OUTPATIENT)
Dept: ADMINISTRATIVE | Facility: OTHER | Age: 63
End: 2023-09-26

## 2023-09-27 ENCOUNTER — VBI (OUTPATIENT)
Dept: ADMINISTRATIVE | Facility: OTHER | Age: 63
End: 2023-09-27

## 2023-11-16 ENCOUNTER — OFFICE VISIT (OUTPATIENT)
Dept: INTERNAL MEDICINE CLINIC | Facility: CLINIC | Age: 63
End: 2023-11-16

## 2023-11-16 VITALS
SYSTOLIC BLOOD PRESSURE: 136 MMHG | DIASTOLIC BLOOD PRESSURE: 84 MMHG | WEIGHT: 164.6 LBS | TEMPERATURE: 97.3 F | BODY MASS INDEX: 30.11 KG/M2 | OXYGEN SATURATION: 97 % | HEART RATE: 96 BPM

## 2023-11-16 DIAGNOSIS — F32.89 OTHER DEPRESSION: ICD-10-CM

## 2023-11-16 DIAGNOSIS — Z23 ENCOUNTER FOR IMMUNIZATION: ICD-10-CM

## 2023-11-16 DIAGNOSIS — K29.00 ACUTE SUPERFICIAL GASTRITIS WITHOUT HEMORRHAGE: ICD-10-CM

## 2023-11-16 DIAGNOSIS — E78.2 MODERATE MIXED HYPERLIPIDEMIA NOT REQUIRING STATIN THERAPY: ICD-10-CM

## 2023-11-16 DIAGNOSIS — E11.9 TYPE 2 DIABETES MELLITUS WITHOUT COMPLICATION, WITHOUT LONG-TERM CURRENT USE OF INSULIN (HCC): Primary | ICD-10-CM

## 2023-11-16 DIAGNOSIS — G47.33 OSA (OBSTRUCTIVE SLEEP APNEA): ICD-10-CM

## 2023-11-16 LAB — SL AMB POCT HEMOGLOBIN AIC: 6.3 (ref ?–6.5)

## 2023-11-16 NOTE — PROGRESS NOTES
[address] [facility] [place]    NAME: Jared Chopra  AGE: 58 y.o. SEX: female  : 1960     DATE: 2023     Assessment and Plan:     Problem List Items Addressed This Visit       ALEC (obstructive sleep apnea)  Patient with history of ALEC and using CPAP overnight without any issues  Hasn't seen sleep medicine in a while hence would like a referral to see them    Relevant Orders    Ambulatory Referral to Sleep Medicine    Gastritis  Patient complains of epigastric and LUQ abdominal pain since last night after eating some chips. No associated nausea, vomiting, diarrhea, constipation, fever, chills, blood in stool, black tarry stools.   Does not mention any NSAID use  She does have history of GERD and gastritis    Advised patient to take omeprazole and MALOX syrup and have bland diet and avoid fried and spicy foods for a few days  Advised to reach out to clinic if develops any new symptoms or pain does not resolve in a few days      Depression  Stable on current medication regimen  Follows with psychiatry and psychology   Patient mentions no issues currently      Moderate mixed hyperlipidemia   On atorvastatin 10 mg OD    Last LDL 59 in 2022     Other Visit Diagnoses       Type 2 diabetes mellitus without complication, without long-term current use of insulin ([address])    -  Primary    Patient with history of controlled diabetes on metformin 500 mg BID    Hba1c today : 6.4    Foot exam normal today; eye exam, GFR and MACR ratio due    Plan  Continue metformin 500 mg BID  Check MACR and GFR  Eye exam ordered  Repeat Hba1c in 6 months  Can switch to yearly hba1c if next one below 6.5      Relevant Orders    POCT hemoglobin A1c (Completed)    IRIS Diabetic eye exam    Basic metabolic panel    Albumin / creatinine urine ratio    Encounter for immunization        Relevant Orders    influenza vaccine, quadrivalent, 0.5 mL, preservative-free, for adult and pediatric patients 6 mos+ (AFLURIA, FLUARIX, FLULAVAL, FLUZONE) (Completed)          Health Maintenance  DM foot exam done; eye exam, GFR, MACR ordered  Flu shot given today  Mammogram scheduled on 12/19/2023; patient advised to get it done  Uptodate on other age appropriate immunizations and screening recommendations    Immunizations and preventive care screenings were discussed with patient today. Appropriate education was printed on patient's after visit summary. Counseling:  Alcohol/drug use: discussed moderation in alcohol intake, the recommendations for healthy alcohol use, and avoidance of illicit drug use. Dental Health: discussed importance of regular tooth brushing, flossing, and dental visits. Injury prevention: discussed safety/seat belts, safety helmets, smoke detectors, carbon dioxide detectors, and smoking near bedding or upholstery. Sexual health: discussed sexually transmitted diseases, partner selection, use of condoms, avoidance of unintended pregnancy, and contraceptive alternatives. Exercise: the importance of regular exercise/physical activity was discussed. Recommend exercise 3-5 times per week for at least 30 minutes. Return in about 6 months (around 5/16/2024) for Next scheduled follow up for diabetes. Chief Complaint:     Chief Complaint   Patient presents with    Physical Exam    Flank Pain     Started last night,       History of Present Illness:     Zakia Craven ([id number]) [address] [facility] CONVERSATION    Adult Annual Physical   Patient here for a comprehensive physical exam. The patient reports problems - abdominal pain as described above . Diet and Physical Activity  Diet/Nutrition: well balanced diet, limited junk food, low fat diet, low carb diet, and consuming 3-5 servings of fruits/vegetables daily. Exercise: no formal exercise.       Depression Screening  PHQ-2/9 Depression Screening    Little interest or pleasure in doing things: 0 - not at all  Feeling down, depressed, or hopeless: 0 - not at all  Trouble falling or staying asleep, or sleeping too much: 0 - not at all  Feeling tired or having little energy: 0 - not at all  Poor appetite or overeatin - not at all  Feeling bad about yourself - or that you are a failure or have let yourself or your family down: 0 - not at all  Trouble concentrating on things, such as reading the newspaper or watching television: 0 - not at all  Moving or speaking so slowly that other people could have noticed. Or the opposite - being so fidgety or restless that you have been moving around a lot more than usual: 0 - not at all  Thoughts that you would be better off dead, or of hurting yourself in some way: 0 - not at all  PHQ-9 Score: 0   PHQ-9 Interpretation: No or Minimal depression          General Health  Sleep: sleeps well and gets 4-6 hours of sleep on average. Hearing: normal - bilateral.  Vision: no vision problems. Dental: no dental visits for >1 year. /GYN Health  Follows with gynecology? no   Patient is: postmenopausal  Contraceptive method:  post menopausal .    Advanced Care Planning  Do you have an advanced directive? no  Do you have a durable medical power of ? no     Review of Systems:     Review of Systems   Constitutional:  Negative for chills and fever. HENT:  Negative for ear pain and sore throat. Eyes:  Negative for pain and visual disturbance. Respiratory:  Negative for cough and shortness of breath. Cardiovascular:  Negative for chest pain and palpitations. Gastrointestinal:  Positive for abdominal pain (epigastric, left upper quadrant). Negative for abdominal distention, anal bleeding, blood in stool, constipation, diarrhea, nausea, rectal pain and vomiting. Genitourinary:  Negative for dysuria and hematuria. Musculoskeletal:  Negative for arthralgias and back pain. Skin:  Negative for color change and rash.    Neurological: Negative for seizures and syncope. All other systems reviewed and are negative. Past Medical History:     Past Medical History:   Diagnosis Date    Abnormal mammogram     RESOLVED: [id number]    Anxiety     Anxiety     Arthritis     Depression     Depression     Diabetes mellitus ([address])     Pt denies    Diverticulosis     GERD (gastroesophageal reflux disease)     Helicobacter pylori infection     Hyperlipidemia     Seborrheic keratosis     LAST ASSESSED: [id number]    Sleep apnea     Sleep apnea     Tinea pedis of left foot 9/16/2019      Past Surgical History:     Past Surgical History:   Procedure Laterality Date    ABDOMINOPLASTY      abdomin    COLONOSCOPY  2017    ND COLONOSCOPY FLX DX W/COLLJ SPEC WHEN PFRMD N/A 8/11/2016    Procedure: COLONOSCOPY;  Surgeon: Angi Iverson MD;  Location:  GI LAB; Service: Gastroenterology    ND COLONOSCOPY FLX DX W/COLLJ [place] WHEN PFRMD N/A 8/29/2017    Procedure: EGD AND COLONOSCOPY;  Surgeon: Angi Iverson MD;  Location: [facility] GI LAB;   Service: Gastroenterology    ND TENDON SHEATH INCISION Right 3/8/2022    Procedure: Right thumb, long, and ring finger trigger finger releases;  Surgeon: Darcie Holt MD;  Location:  MAIN OR;  Service: Orthopedics    TUBAL LIGATION      TUBAL LIGATION      UPPER GASTROINTESTINAL ENDOSCOPY  2017      Social History:     Social History     Socioeconomic History    Marital status: Single     Spouse name: None    Number of children: None    Years of education: None    Highest education level: None   Occupational History    None   Tobacco Use    Smoking status: Never    Smokeless tobacco: Never   Vaping Use    Vaping Use: Never used   Substance and Sexual Activity    Alcohol use: No    Drug use: No    Sexual activity: Yes     Partners: Male     Birth control/protection: Post-menopausal   Other Topics Concern    None   Social History Narrative    None     Social Determinants of Health     Financial Resource Strain: Low Risk (11/16/2023)    Overall Financial Resource Strain (CARDIA)     Difficulty of Paying Living Expenses: Not hard at all   Food Insecurity: No Food Insecurity (11/16/2023)    Hunger Vital Sign     Worried About Running Out of Food in the Last Year: Never true     Ran Out of Food in the Last Year: Never true   Transportation Needs: No Transportation Needs (11/16/2023)    PRAPARE - Transportation     Lack of Transportation (Medical): No     Lack of Transportation (Non-Medical): No   Physical Activity: Inactive (3/25/2021)    Exercise Vital Sign     Days of Exercise per Week: 0 days     Minutes of Exercise per Session: 0 min   Stress: Stress Concern Present (3/25/2021)    [facility]     Feeling of Stress : To some extent   Social Connections:  Moderately Isolated (3/25/2021)    Social Connection and Isolation Panel [NHANES]     Frequency of Communication with Friends and Family: More than three times a week     Frequency of Social Gatherings with Friends and Family: More than three times a week     Attends Judaism Services: More than 4 times per year     Active Member of [employer] or Organizations: No     Attends Club or Organization Meetings: Never     Marital Status:    Intimate Partner Violence: Not At Risk (3/25/2021)    Humiliation, Afraid, Rape, and Kick questionnaire     Fear of Current or Ex-Partner: No     Emotionally Abused: No     Physically Abused: No     Sexually Abused: No   Housing Stability: Not on file      Family History:     Family History   Problem Relation Age of Onset    Diabetes Mother     Hypertension Mother     Heart disease Mother     Diabetes Paternal Grandfather     Alzheimer's disease Father     No Known Problems Sister     No Known Problems Brother     Depression Daughter     ADD / ADHD Son     Depression Son     Diabetes Maternal Grandmother     Stomach cancer Maternal Grandfather     No Known Problems Paternal Grandmother No Known Problems Sister     Heart disease Sister     Intellectual disability Brother     Schizophrenia Brother     Other Son          as an infant     Other Daughter          at 2-1 mths old    Other Son          as an infant       Current Medications:     Current Outpatient Medications   Medication Sig Dispense Refill    acetaminophen (TYLENOL) 650 mg CR tablet Take 1 tablet (650 mg total) by mouth every 8 (eight) hours as needed for mild pain 30 tablet 0    aluminum-magnesium hydroxide-simethicone (MAALOX MAX) 400-400-40 MG/5ML suspension Take 5 mL by mouth every 6 (six) hours as needed for indigestion or heartburn 355 mL 1    Antacid Regular Strength 200-200-20 MG/5ML suspension TAKE 5 MLS BY MOUTH EVERY 6 HOURS AS NEEDED FOR INDIGESTION OR HEARTBURN      ARIPiprazole (ABILIFY) 5 mg tablet Take 5 mg by mouth daily      Ascorbic Acid (vitamin C) 1000 MG tablet Take 1,000 mg by mouth daily      atorvastatin (LIPITOR) 10 mg tablet Take 1 tablet (10 mg total) by mouth daily 90 tablet 3    Blood Glucose Monitoring Suppl (OneTouch Verio Reflect) w/Device KIT Check blood sugars once daily. Please substitute with appropriate alternative as covered by patient's insurance. Dx: E11.65 1 kit 0    Calcium Polycarbophil (FIBERCON PO) Take by mouth      Cholecalciferol (VITAMIN D3) 125 MCG (5000 UT) CAPS Take 1,000 Units by mouth      clonazePAM (KlonoPIN) 1 mg tablet Take 1 mg by mouth daily at bedtime as needed for anxiety. Diclofenac Sodium (VOLTAREN) 1 % Apply 2 g topically 4 (four) times a day 150 g 2    dicyclomine (BENTYL) 20 mg tablet take 1 tablet by mouth every 6 hours 120 tablet 3    DULoxetine (CYMBALTA) 30 mg delayed release capsule Take 30 mg by mouth daily       glucose blood (OneTouch Verio) test strip Check blood sugars once daily. Please substitute with appropriate alternative as covered by patient's insurance.  Dx: E11.65 100 each 3    loperamide (IMODIUM) 2 mg capsule Take 1 capsule (2 mg total) by mouth 2 (two) times a day as needed for diarrhea 30 capsule 0    metFORMIN (GLUCOPHAGE) 500 mg tablet Take 1 tablet (500 mg total) by mouth 2 (two) times a day with meals 360 tablet 3    Methylcobalamin (B-12) 5000 MCG TBDP Take by mouth      multivitamin (THERAGRAN) TABS Take 1 tablet by mouth daily      omeprazole (PriLOSEC) 20 mg delayed release capsule take 1 capsule by mouth once daily 90 capsule 3    OneTouch Delica Lancets 23K MISC Check blood sugars once daily. Please substitute with appropriate alternative as covered by patient's insurance. Dx: E11.65 100 each 3    Peppermint Oil 90 MG CPCR Take 360 mg by mouth 3 (three) times a day 360 capsule 3     No current facility-administered medications for this visit. Allergies:     No Known Allergies   Physical Exam:     /84 (BP Location: Right arm, Patient Position: Sitting, Cuff Size: Standard)   Pulse 96   Temp (!) 97.3 °F (36.3 °C) (Temporal)   Wt 74.7 kg (164 lb 9.6 oz)   LMP  (LMP Unknown)   SpO2 97%   BMI 30.11 kg/m²     Physical Exam  Vitals reviewed. Constitutional:       General: Ericka Gupta is not in acute distress. Appearance: Normal appearance. Ericka Gupta is well-developed. Ericka Gupta is obese. Ericka Gupta is not toxic-appearing. HENT:      Head: Normocephalic and atraumatic. Mouth/Throat:      Mouth: Mucous membranes are moist.   Eyes:      Conjunctiva/sclera: Conjunctivae normal.      Pupils: Pupils are equal, round, and reactive to light. Cardiovascular:      Rate and Rhythm: Normal rate and regular rhythm. Pulses: Normal pulses. no weak pulses          Dorsalis pedis pulses are 2+ on the right side and 2+ on the left side. Posterior tibial pulses are 2+ on the right side and 2+ on the left side. Heart sounds: Normal heart sounds. No murmur heard. Pulmonary:      Effort: Pulmonary effort is normal. No respiratory distress.       Breath sounds: Normal breath sounds. Abdominal:      General: Bowel sounds are normal. There is no distension. Palpations: Abdomen is soft. There is no mass. Tenderness: There is abdominal tenderness (epigastric, left upper quadrant). Musculoskeletal:         General: No swelling. Cervical back: Neck supple. Right lower leg: No edema. Left lower leg: No edema. Feet:      Right foot:      Skin integrity: No ulcer, skin breakdown, erythema, warmth, callus or dry skin. Left foot:      Skin integrity: No ulcer, skin breakdown, erythema, warmth, callus or dry skin. Skin:     General: Skin is warm and dry. Capillary Refill: Capillary refill takes less than 2 seconds. Neurological:      General: No focal deficit present. Mental Status: Ismael Eduardo is alert and oriented to person, place, and time. Psychiatric:         Mood and Affect: Mood normal.         Behavior: Behavior normal.          Diabetic Foot Exam    Patient's shoes and socks removed. Right Foot/Ankle   Right Foot Inspection  Skin Exam: skin normal and skin intact. No dry skin, no warmth, no callus, no erythema, no maceration, no abnormal color, no pre-ulcer, no ulcer and no callus. Toe Exam: ROM and strength within normal limits. No swelling, no tenderness, erythema and  no right toe deformity    Sensory   Monofilament testing: intact    Vascular  Capillary refills: < 3 seconds  The right DP pulse is 2+. The right PT pulse is 2+. Left Foot/Ankle  Left Foot Inspection  Skin Exam: skin normal and skin intact. No dry skin, no warmth, no erythema, no maceration, normal color, no pre-ulcer, no ulcer and no callus. Toe Exam: ROM and strength within normal limits. No swelling, no tenderness, no erythema and no left toe deformity. Sensory   Monofilament testing: intact    Vascular  Capillary refills: < 3 seconds  The left DP pulse is 2+. The left PT pulse is 2+.      Assign Risk Category  No deformity present  No loss of protective sensation  No weak pulses  Risk: Lyndon Persaud MD  PGY-2, Internal Medicine  [address] [facility]

## 2023-11-21 ENCOUNTER — APPOINTMENT (OUTPATIENT)
Dept: LAB | Facility: CLINIC | Age: 63
End: 2023-11-21
Payer: COMMERCIAL

## 2023-11-21 DIAGNOSIS — E11.9 TYPE 2 DIABETES MELLITUS WITHOUT COMPLICATION, WITHOUT LONG-TERM CURRENT USE OF INSULIN (HCC): ICD-10-CM

## 2023-11-21 DIAGNOSIS — R19.7 DIARRHEA, UNSPECIFIED TYPE: ICD-10-CM

## 2023-11-21 LAB
ANION GAP SERPL CALCULATED.3IONS-SCNC: 6 MMOL/L
BUN SERPL-MCNC: 10 MG/DL (ref 5–25)
CALCIUM SERPL-MCNC: 9.8 MG/DL (ref 8.4–10.2)
CHLORIDE SERPL-SCNC: 107 MMOL/L (ref 96–108)
CO2 SERPL-SCNC: 30 MMOL/L (ref 21–32)
CREAT SERPL-MCNC: 0.96 MG/DL (ref 0.6–1.3)
CREAT UR-MCNC: 165.1 MG/DL
GFR SERPL CREATININE-BSD FRML MDRD: 62 ML/MIN/1.73SQ M
GLUCOSE SERPL-MCNC: 141 MG/DL (ref 65–140)
MICROALBUMIN UR-MCNC: <7 MG/L
MICROALBUMIN/CREAT 24H UR: <4 MG/G CREATININE (ref 0–30)
POTASSIUM SERPL-SCNC: 4 MMOL/L (ref 3.5–5.3)
SODIUM SERPL-SCNC: 143 MMOL/L (ref 135–147)

## 2023-11-21 PROCEDURE — 36415 COLL VENOUS BLD VENIPUNCTURE: CPT

## 2023-11-21 PROCEDURE — 82570 ASSAY OF URINE CREATININE: CPT

## 2023-11-21 PROCEDURE — 82043 UR ALBUMIN QUANTITATIVE: CPT

## 2023-11-21 PROCEDURE — 80048 BASIC METABOLIC PNL TOTAL CA: CPT

## 2023-12-19 ENCOUNTER — HOSPITAL ENCOUNTER (OUTPATIENT)
Dept: MAMMOGRAPHY | Facility: CLINIC | Age: 63
Discharge: HOME | End: 2023-12-19
Payer: COMMERCIAL

## 2023-12-19 VITALS — WEIGHT: 168 LBS | BODY MASS INDEX: 30.91 KG/M2 | HEIGHT: 62 IN

## 2023-12-19 DIAGNOSIS — R92.8 ABNORMAL FINDINGS ON DIAGNOSTIC IMAGING OF BREAST: ICD-10-CM

## 2023-12-19 PROCEDURE — 77065 DX MAMMO INCL CAD UNI: CPT

## 2024-01-03 DIAGNOSIS — F32.89 OTHER DEPRESSION: Primary | ICD-10-CM

## 2024-01-04 RX ORDER — DULOXETIN HYDROCHLORIDE 30 MG/1
30 CAPSULE, DELAYED RELEASE ORAL DAILY
Qty: 90 CAPSULE | Refills: 3 | Status: SHIPPED | OUTPATIENT
Start: 2024-01-04

## 2024-01-18 ENCOUNTER — OFFICE VISIT (OUTPATIENT)
Dept: INTERNAL MEDICINE CLINIC | Facility: CLINIC | Age: 64
End: 2024-01-18

## 2024-01-18 VITALS
WEIGHT: 166 LBS | DIASTOLIC BLOOD PRESSURE: 63 MMHG | HEART RATE: 83 BPM | TEMPERATURE: 98 F | BODY MASS INDEX: 30.36 KG/M2 | SYSTOLIC BLOOD PRESSURE: 146 MMHG

## 2024-01-18 DIAGNOSIS — G47.33 OSA (OBSTRUCTIVE SLEEP APNEA): ICD-10-CM

## 2024-01-18 DIAGNOSIS — E78.2 MODERATE MIXED HYPERLIPIDEMIA NOT REQUIRING STATIN THERAPY: ICD-10-CM

## 2024-01-18 DIAGNOSIS — E11.9 TYPE 2 DIABETES MELLITUS WITHOUT COMPLICATION, WITHOUT LONG-TERM CURRENT USE OF INSULIN (HCC): Primary | ICD-10-CM

## 2024-01-18 DIAGNOSIS — K58.0 IRRITABLE BOWEL SYNDROME WITH DIARRHEA: ICD-10-CM

## 2024-01-18 LAB — SL AMB POCT HEMOGLOBIN AIC: 6.3 (ref ?–6.5)

## 2024-01-18 PROCEDURE — 83036 HEMOGLOBIN GLYCOSYLATED A1C: CPT | Performed by: STUDENT IN AN ORGANIZED HEALTH CARE EDUCATION/TRAINING PROGRAM

## 2024-01-18 PROCEDURE — 99213 OFFICE O/P EST LOW 20 MIN: CPT | Performed by: STUDENT IN AN ORGANIZED HEALTH CARE EDUCATION/TRAINING PROGRAM

## 2024-01-18 NOTE — PROGRESS NOTES
[facility]  INTERNAL MEDICINE OFFICE VISIT     PATIENT INFORMATION     Trice Donaldson   63 y.o. female   MRN: [id number]    ASSESSMENT/PLAN     Problem List Items Addressed This Visit       ALEC (obstructive sleep apnea)     Patient has history of ALEC that she states she did see a sleep doctor many years ago.  Patient currently has a sleep study scheduled for April 2024.  Patient does endorse trouble sleeping at night with snoring/waking up.  Blood pressure today was 146/63 which is most likely attributed to this.    -Await sleep study results  -Counseled patient on trialing tennis ball on back while sleeping to avoid directly sleeping on back  -BP today is most likely related to ALEC.  Discussed with patient about getting blood pressure cuff to take 3 times weekly at home and keeping a log           Type 2 diabetes mellitus (HCC) - Primary     History of type 2 diabetes.  She is now diet controlled as well as being on metformin 500 twice daily.  She does not take her sugars.  Patient's A1c today was 6.3 from 6.3 measured 3 months ago.  Patient has no complaints and does not endorse any neuropathy at this time.    -Will continue current regimen of metformin 500 twice daily  -Continue to  patient on diet/exercise           Relevant Orders    POCT hemoglobin A1c (Completed)    Lipid panel    Irritable bowel syndrome with diarrhea     Patient is roughly 5 episodes of diarrhea a day.  This has been going on for greater than 6 months.  She was seen in clinic and referred to GI and was then seen in August 2023.  GI ordered multiple stool cultures but patient unfortunately never got these done.  Follow-up appointment with GI in the next 2-3 months    -Counseled patient on getting fecal studies  -F/u with GI          Moderate mixed hyperlipidemia not requiring statin therapy     Currently on Lipitor 10. Will get Lipid panel prior to next appointment.          Relevant Orders    Lipid  panel     Schedule a follow-up appointment in 6 months.    HEALTH MAINTENANCE     Immunization History   Administered Date(s) Administered    COVID-19 PFIZER VACCINE 0.3 ML IM 03/29/2021, 04/19/2021, 12/28/2021, 06/09/2022    Influenza Quadrivalent Preservative Free 3 years and older IM 11/14/2017    Influenza, injectable, quadrivalent, preservative free 0.5 mL 11/16/2023    Influenza, recombinant, quadrivalent,injectable, preservative free 09/11/2018, 09/24/2020    Influenza, seasonal, injectable 08/12/2014    Pneumococcal Conjugate Vaccine 20-valent (Pcv20), Polysace 09/01/2022    Pneumococcal Polysaccharide PPV23 09/11/2018    Tdap 02/12/2015     CHIEF COMPLAINT     Chief Complaint   Patient presents with    Follow-up     Wants to discuss why she keeps having mammograms done every 6 months      HISTORY OF PRESENT ILLNESS     Patient is a 63-year-old with past medical history of ALEC, depression, diabetes, GERD, IBS.  Patient is here for 6-month follow-up.  Patient was last seen in November 2023 and had an A1c 6.4 and is currently on metformin 500 twice daily.  Additionally patient at that time complained of gastritis with left upper quadrant pain and was referred to GI because she was having 5 episodes of diarrhea daily.  Patient was seen by GI in August 2023 and stool studies were ordered. Unfortunately, patient did not get these done at the time. Patient is still actively having diarrhea daily. Denies blood or significant pain and states this is now normal for her. Patient has no other complaints.       Interpretor: [id number]    REVIEW OF SYSTEMS     Review of Systems   Constitutional:  Negative for fatigue.   Respiratory:  Negative for shortness of breath.    Cardiovascular:  Negative for chest pain, palpitations and leg swelling.   Gastrointestinal:  Positive for diarrhea.   Musculoskeletal: Negative.    Neurological: Negative.    Psychiatric/Behavioral: Negative.       OBJECTIVE     Vitals:    01/18/24 0917    BP: 146/63   BP Location: Left arm   Patient Position: Sitting   Cuff Size: Large   Pulse: 83   Temp: 98 °F (36.7 °C)   TempSrc: Temporal   Weight: 75.3 kg (166 lb)     Physical Exam  Vitals reviewed.   Constitutional:       Appearance: Normal appearance.   HENT:      Head: Normocephalic and atraumatic.      Mouth/Throat:      Mouth: Mucous membranes are moist.      Pharynx: Oropharynx is clear.   Cardiovascular:      Rate and Rhythm: Normal rate and regular rhythm.   Pulmonary:      Effort: Pulmonary effort is normal.      Breath sounds: Normal breath sounds.   Abdominal:      General: Abdomen is flat. Bowel sounds are normal. There is no distension.   Musculoskeletal:      Right lower leg: No edema.      Left lower leg: No edema.   Skin:     General: Skin is warm and dry.   Neurological:      Mental Status: Trice is alert and oriented to person, place, and time.   Psychiatric:         Mood and Affect: Mood normal.         Behavior: Behavior normal.       CURRENT MEDICATIONS     Current Outpatient Medications:     acetaminophen (TYLENOL) 650 mg CR tablet, Take 1 tablet (650 mg total) by mouth every 8 (eight) hours as needed for mild pain, Disp: 30 tablet, Rfl: 0    aluminum-magnesium hydroxide-simethicone (MAALOX MAX) 400-400-40 MG/5ML suspension, Take 5 mL by mouth every 6 (six) hours as needed for indigestion or heartburn, Disp: 355 mL, Rfl: 1    Antacid Regular Strength 200-200-20 MG/5ML suspension, TAKE 5 MLS BY MOUTH EVERY 6 HOURS AS NEEDED FOR INDIGESTION OR HEARTBURN, Disp: , Rfl:     ARIPiprazole (ABILIFY) 5 mg tablet, Take 5 mg by mouth daily, Disp: , Rfl:     Ascorbic Acid (vitamin C) 1000 MG tablet, Take 1,000 mg by mouth daily, Disp: , Rfl:     atorvastatin (LIPITOR) 10 mg tablet, Take 1 tablet (10 mg total) by mouth daily, Disp: 90 tablet, Rfl: 3    Blood Glucose Monitoring Suppl (OneTouch Verio Reflect) w/Device KIT, Check blood sugars once daily. Please substitute with appropriate alternative as  covered by patient's insurance. Dx: E11.65, Disp: 1 kit, Rfl: 0    Calcium Polycarbophil (FIBERCON PO), Take by mouth, Disp: , Rfl:     Cholecalciferol (VITAMIN D3) 125 MCG (5000 UT) CAPS, Take 1,000 Units by mouth, Disp: , Rfl:     clonazePAM (KlonoPIN) 1 mg tablet, Take 1 mg by mouth daily at bedtime as needed for anxiety.  , Disp: , Rfl:     Diclofenac Sodium (VOLTAREN) 1 %, Apply 2 g topically 4 (four) times a day, Disp: 150 g, Rfl: 2    dicyclomine (BENTYL) 20 mg tablet, take 1 tablet by mouth every 6 hours, Disp: 120 tablet, Rfl: 3    DULoxetine (CYMBALTA) 30 mg delayed release capsule, Take 1 capsule (30 mg total) by mouth daily, Disp: 90 capsule, Rfl: 3    glucose blood (OneTouch Verio) test strip, Check blood sugars once daily. Please substitute with appropriate alternative as covered by patient's insurance. Dx: E11.65, Disp: 100 each, Rfl: 3    loperamide (IMODIUM) 2 mg capsule, Take 1 capsule (2 mg total) by mouth 2 (two) times a day as needed for diarrhea, Disp: 30 capsule, Rfl: 0    metFORMIN (GLUCOPHAGE) 500 mg tablet, Take 1 tablet (500 mg total) by mouth 2 (two) times a day with meals, Disp: 360 tablet, Rfl: 3    Methylcobalamin (B-12) 5000 MCG TBDP, Take by mouth, Disp: , Rfl:     multivitamin (THERAGRAN) TABS, Take 1 tablet by mouth daily, Disp: , Rfl:     omeprazole (PriLOSEC) 20 mg delayed release capsule, take 1 capsule by mouth once daily, Disp: 90 capsule, Rfl: 3    OneTouch Delica Lancets 33G MISC, Check blood sugars once daily. Please substitute with appropriate alternative as covered by patient's insurance. Dx: E11.65, Disp: 100 each, Rfl: 3    Peppermint Oil 90 MG CPCR, Take 360 mg by mouth 3 (three) times a day, Disp: 360 capsule, Rfl: 3    PAST MEDICAL & SURGICAL HISTORY     Past Medical History:   Diagnosis Date    Abnormal mammogram     RESOLVED: 14NOV2017    Anxiety     Anxiety     Arthritis     Depression     Depression     Diabetes mellitus (HCC)     Pt denies    Diverticulosis      GERD (gastroesophageal reflux disease)     Helicobacter pylori infection     Hyperlipidemia     Seborrheic keratosis     LAST ASSESSED: 05JUN2017    Sleep apnea     Sleep apnea     Tinea pedis of left foot 9/16/2019     Past Surgical History:   Procedure Laterality Date    ABDOMINOPLASTY      abdomin    COLONOSCOPY  2017    VT COLONOSCOPY FLX DX W/COLLJ SPEC WHEN PFRMD N/A 8/11/2016    Procedure: COLONOSCOPY;  Surgeon: Bart Espinoza MD;  Location:  GI LAB;  Service: Gastroenterology    VT COLONOSCOPY FLX DX W/COLLJ SPEC WHEN PFRMD N/A 8/29/2017    Procedure: EGD AND COLONOSCOPY;  Surgeon: Bart Espinoza MD;  Location: [facility] GI LAB;  Service: Gastroenterology    VT TENDON SHEATH INCISION Right 3/8/2022    Procedure: Right thumb, long, and ring finger trigger finger releases;  Surgeon: Gary Wray MD;  Location:  MAIN OR;  Service: Orthopedics    TUBAL LIGATION      TUBAL LIGATION      UPPER GASTROINTESTINAL ENDOSCOPY  2017     SOCIAL & FAMILY HISTORY     Social History     Socioeconomic History    Marital status: Single     Spouse name: Not on file    Number of children: Not on file    Years of education: Not on file    Highest education level: Not on file   Occupational History    Not on file   Tobacco Use    Smoking status: Never    Smokeless tobacco: Never   Vaping Use    Vaping status: Never Used   Substance and Sexual Activity    Alcohol use: No    Drug use: No    Sexual activity: Yes     Partners: Male     Birth control/protection: Post-menopausal   Other Topics Concern    Not on file   Social History Narrative    Not on file     Social Determinants of Health     Financial Resource Strain: Low Risk  (11/16/2023)    Overall Financial Resource Strain (CARDIA)     Difficulty of Paying Living Expenses: Not hard at all   Food Insecurity: No Food Insecurity (11/16/2023)    Hunger Vital Sign     Worried About Running Out of Food in the Last Year: Never true     Ran Out of Food in the Last Year: Never true    Transportation Needs: No Transportation Needs (11/16/2023)    PRAPARE - Transportation     Lack of Transportation (Medical): No     Lack of Transportation (Non-Medical): No   Physical Activity: Inactive (3/25/2021)    Exercise Vital Sign     Days of Exercise per Week: 0 days     Minutes of Exercise per Session: 0 min   Stress: Stress Concern Present (3/25/2021)    [employer] [place] [employer] - Occupational Stress Questionnaire     Feeling of Stress : To some extent   Social Connections: Moderately Isolated (3/25/2021)    Social Connection and Isolation Panel [NHANES]     Frequency of Communication with Friends and Family: More than three times a week     Frequency of Social Gatherings with Friends and Family: More than three times a week     Attends L[employer] day Services: More than 4 times per year     Active Member of Clubs or Organizations: No     Attends Club or Organization Meetings: Never     Marital Status:    Intimate Partner Violence: Not At Risk (3/25/2021)    Humiliation, Afraid, Rape, and Kick questionnaire     Fear of Current or Ex-Partner: No     Emotionally Abused: No     Physically Abused: No     Sexually Abused: No   Housing Stability: Not on file     Social History     Substance and Sexual Activity   Alcohol Use No       Social History     Substance and Sexual Activity   Drug Use No     Social History     Tobacco Use   Smoking Status Never   Smokeless Tobacco Never     Family History   Problem Relation Age of Onset    Diabetes Mother     Hypertension Mother     Heart disease Mother     Diabetes Paternal Grandfather     Alzheimer's disease Father     No Known Problems Sister     No Known Problems Brother     Depression Daughter     ADD / ADHD Son     Depression Son     Diabetes Maternal Grandmother     Stomach cancer Maternal Grandfather     No Known Problems Paternal Grandmother     No Known Problems Sister     Heart disease Sister     Intellectual disability Brother      Schizophrenia Brother     Other Son          as an infant     Other Daughter          at 1-2 mths old    Other Son          as an infant      ==  Jani Caceres MD  PGY-2  [facility] Internal Medicine [facility]    [address], [address]  [place] [address]  Office: [phone]  Fax: [phone]

## 2024-01-18 NOTE — ASSESSMENT & PLAN NOTE
History of type 2 diabetes.  She is now diet controlled as well as being on metformin 500 twice daily.  She does not take her sugars.  Patient's A1c today was 6.3 from 6.3 measured 3 months ago.  Patient has no complaints and does not endorse any neuropathy at this time.    -Will continue current regimen of metformin 500 twice daily  -Continue to  patient on diet/exercise

## 2024-01-18 NOTE — ASSESSMENT & PLAN NOTE
Patient has history of ALEC that she states she did see a sleep doctor many years ago.  Patient currently has a sleep study scheduled for April 2024.  Patient does endorse trouble sleeping at night with snoring/waking up.  Blood pressure today was 146/63 which is most likely attributed to this.    -Await sleep study results  -Counseled patient on trialing tennis ball on back while sleeping to avoid directly sleeping on back  -BP today is most likely related to ALEC.  Discussed with patient about getting blood pressure cuff to take 3 times weekly at home and keeping a log

## 2024-01-18 NOTE — ASSESSMENT & PLAN NOTE
Patient is roughly 5 episodes of diarrhea a day.  This has been going on for greater than 6 months.  She was seen in clinic and referred to GI and was then seen in August 2023.  GI ordered multiple stool cultures but patient unfortunately never got these done.  Follow-up appointment with GI in the next 2-3 months    -Counseled patient on getting fecal studies  -F/u with GI

## 2024-01-25 DIAGNOSIS — K21.9 GASTROESOPHAGEAL REFLUX DISEASE: ICD-10-CM

## 2024-01-25 DIAGNOSIS — R10.9 ABDOMINAL PAIN: ICD-10-CM

## 2024-01-25 RX ORDER — DICYCLOMINE HCL 20 MG
20 TABLET ORAL EVERY 6 HOURS
Qty: 120 TABLET | Refills: 3 | Status: SHIPPED | OUTPATIENT
Start: 2024-01-25

## 2024-02-19 DIAGNOSIS — E11.9 TYPE 2 DIABETES MELLITUS WITHOUT COMPLICATION, WITHOUT LONG-TERM CURRENT USE OF INSULIN (HCC): ICD-10-CM

## 2024-03-18 ENCOUNTER — OFFICE VISIT (OUTPATIENT)
Dept: URGENT CARE | Age: 64
End: 2024-03-18
Payer: COMMERCIAL

## 2024-03-18 ENCOUNTER — APPOINTMENT (OUTPATIENT)
Dept: RADIOLOGY | Age: 64
End: 2024-03-18
Payer: COMMERCIAL

## 2024-03-18 VITALS
OXYGEN SATURATION: 97 % | HEART RATE: 93 BPM | DIASTOLIC BLOOD PRESSURE: 80 MMHG | TEMPERATURE: 97.4 F | SYSTOLIC BLOOD PRESSURE: 116 MMHG | RESPIRATION RATE: 16 BRPM

## 2024-03-18 DIAGNOSIS — R05.1 ACUTE COUGH: ICD-10-CM

## 2024-03-18 DIAGNOSIS — R05.1 ACUTE COUGH: Primary | ICD-10-CM

## 2024-03-18 PROCEDURE — 99213 OFFICE O/P EST LOW 20 MIN: CPT | Performed by: STUDENT IN AN ORGANIZED HEALTH CARE EDUCATION/TRAINING PROGRAM

## 2024-03-18 PROCEDURE — 71046 X-RAY EXAM CHEST 2 VIEWS: CPT

## 2024-03-18 RX ORDER — AZITHROMYCIN 250 MG/1
TABLET, FILM COATED ORAL
Qty: 6 TABLET | Refills: 0 | Status: SHIPPED | OUTPATIENT
Start: 2024-03-18 | End: 2024-03-22

## 2024-03-18 RX ORDER — BENZONATATE 200 MG/1
200 CAPSULE ORAL 3 TIMES DAILY PRN
Qty: 20 CAPSULE | Refills: 0 | Status: SHIPPED | OUTPATIENT
Start: 2024-03-18

## 2024-03-18 NOTE — PATIENT INSTRUCTIONS
Please take antibiotic (azithromycin) as prescribed.  I am also sending in a medication to help with cough.  If your symptoms are not improving in the next week, please follow up for recheck with your PCP.  You may take tylenol alternate with motrin for back pain. Heat packs will also be helpful.

## 2024-03-18 NOTE — PROGRESS NOTES
[facility]        NAME: Trice Donaldson is a 63 y.o. female  : 1960    MRN: [id number]  DATE: 2024  TIME: 2:33 PM    Assessment and Plan   Acute cough [R05.1]  1. Acute cough  XR chest pa & lateral    azithromycin (ZITHROMAX) 250 mg tablet    benzonatate (TESSALON) 200 MG capsule            Patient Instructions   Please take antibiotic (azithromycin) as prescribed.  I am also sending in a medication to help with cough.  If your symptoms are not improving in the next week, please follow up for recheck with your PCP.  You may take tylenol alternate with motrin for back pain. Heat packs will also be helpful.    Follow up with PCP in 3-5 days.  Proceed to  ER if symptoms worsen.    If tests have been performed at [facility], our office will contact you with results if changes need to be made to the care plan discussed with you at the visit.  [facility]    Chief Complaint     Chief Complaint   Patient presents with    Cough    Back Pain     Patient had an URI last week but the cough is getting progressively worse with back pain from the intensity of coughing.          History of Present Illness       Patient presents for 2 weeks of cough.  Weeks ago, she had viral URI symptoms, treated with OTC medications.  Symptoms were improving, over the last few days she has had increasing cough, nonproductive.  She denies nasal congestion/sinus pressure.  Due to coughing fits, she is having lot of muscle tightness in her thoracic back.  Pain with coughing, no pain with breathing.  No shortness of breath.  No fevers or chills.  No history of lung disease including denies asthma, COPD, smoking.        Review of Systems   Review of Systems   All other systems reviewed and are negative.        Current Medications       Current Outpatient Medications:     acetaminophen (TYLENOL) 650 mg CR tablet, Take 1 tablet (650 mg total) by mouth every 8 (eight) hours as  needed for mild pain, Disp: 30 tablet, Rfl: 0    aluminum-magnesium hydroxide-simethicone (MAALOX MAX) 400-400-40 MG/5ML suspension, Take 5 mL by mouth every 6 (six) hours as needed for indigestion or heartburn, Disp: 355 mL, Rfl: 1    Antacid Regular Strength 200-200-20 MG/5ML suspension, TAKE 5 MLS BY MOUTH EVERY 6 HOURS AS NEEDED FOR INDIGESTION OR HEARTBURN, Disp: , Rfl:     ARIPiprazole (ABILIFY) 5 mg tablet, Take 5 mg by mouth daily, Disp: , Rfl:     Ascorbic Acid (vitamin C) 1000 MG tablet, Take 1,000 mg by mouth daily, Disp: , Rfl:     atorvastatin (LIPITOR) 10 mg tablet, Take 1 tablet (10 mg total) by mouth daily, Disp: 90 tablet, Rfl: 3    azithromycin (ZITHROMAX) 250 mg tablet, Take 2 tablets today then 1 tablet daily x 4 days, Disp: 6 tablet, Rfl: 0    benzonatate (TESSALON) 200 MG capsule, Take 1 capsule (200 mg total) by mouth 3 (three) times a day as needed for cough, Disp: 20 capsule, Rfl: 0    Blood Glucose Monitoring Suppl (OneTouch Verio Reflect) w/Device KIT, Check blood sugars once daily. Please substitute with appropriate alternative as covered by patient's insurance. Dx: E11.65, Disp: 1 kit, Rfl: 0    Calcium Polycarbophil (FIBERCON PO), Take by mouth, Disp: , Rfl:     Cholecalciferol (VITAMIN D3) 125 MCG (5000 UT) CAPS, Take 1,000 Units by mouth, Disp: , Rfl:     clonazePAM (KlonoPIN) 1 mg tablet, Take 1 mg by mouth daily at bedtime as needed for anxiety.  , Disp: , Rfl:     Diclofenac Sodium (VOLTAREN) 1 %, Apply 2 g topically 4 (four) times a day, Disp: 150 g, Rfl: 2    dicyclomine (BENTYL) 20 mg tablet, Take 1 tablet (20 mg total) by mouth every 6 (six) hours, Disp: 120 tablet, Rfl: 3    DULoxetine (CYMBALTA) 30 mg delayed release capsule, Take 1 capsule (30 mg total) by mouth daily, Disp: 90 capsule, Rfl: 3    glucose blood (OneTouch Verio) test strip, Check blood sugars once daily. Please substitute with appropriate alternative as covered by patient's insurance. Dx: E11.65, Disp: 100  each, Rfl: 3    loperamide (IMODIUM) 2 mg capsule, Take 1 capsule (2 mg total) by mouth 2 (two) times a day as needed for diarrhea, Disp: 30 capsule, Rfl: 0    metFORMIN (GLUCOPHAGE) 500 mg tablet, Take 1 tablet (500 mg total) by mouth 2 (two) times a day with meals, Disp: 360 tablet, Rfl: 3    Methylcobalamin (B-12) 5000 MCG TBDP, Take by mouth, Disp: , Rfl:     multivitamin (THERAGRAN) TABS, Take 1 tablet by mouth daily, Disp: , Rfl:     omeprazole (PriLOSEC) 20 mg delayed release capsule, take 1 capsule by mouth once daily, Disp: 90 capsule, Rfl: 3    OneTouch Delica Lancets 33G MISC, Check blood sugars once daily. Please substitute with appropriate alternative as covered by patient's insurance. Dx: E11.65, Disp: 100 each, Rfl: 3    Peppermint Oil 90 MG CPCR, Take 360 mg by mouth 3 (three) times a day, Disp: 360 capsule, Rfl: 3    Current Allergies     Allergies as of 03/18/2024    (No Known Allergies)            The following portions of the patient's history were reviewed and updated as appropriate: allergies, current medications, past family history, past medical history, past social history, past surgical history and problem list.     Past Medical History:   Diagnosis Date    Abnormal mammogram     RESOLVED: 14NOV2017    Anxiety     Anxiety     Arthritis     Depression     Depression     Diabetes mellitus (HCC)     Pt denies    Diverticulosis     GERD (gastroesophageal reflux disease)     Helicobacter pylori infection     Hyperlipidemia     Seborrheic keratosis     LAST ASSESSED: 05JUN2017    Sleep apnea     Sleep apnea     Tinea pedis of left foot 9/16/2019       Past Surgical History:   Procedure Laterality Date    ABDOMINOPLASTY      abdomin    COLONOSCOPY  2017    ME COLONOSCOPY FLX DX W/COLLJ SPEC WHEN PFRMD N/A 8/11/2016    Procedure: COLONOSCOPY;  Surgeon: Bart Espinoza MD;  Location: [facility];  Service: Gastroenterology    ME COLONOSCOPY FLX DX W/COLLJ SPEC WHEN PFRMD N/A 8/29/2017    Procedure: EGD  AND COLONOSCOPY;  Surgeon: Bart Espinoza MD;  [facility];  Service: Gastroenterology    MT TENDON SHEATH INCISION Right 3/8/2022    Procedure: Right thumb, long, and ring finger trigger finger releases;  Surgeon: Gary Wray MD;  Location:  MAIN OR;  Service: Orthopedics    TUBAL LIGATION      TUBAL LIGATION      UPPER GASTROINTESTINAL ENDOSCOPY  2017       Family History   Problem Relation Age of Onset    Diabetes Mother     Hypertension Mother     Heart disease Mother     Diabetes Paternal Grandfather     Alzheimer's disease Father     No Known Problems Sister     No Known Problems Brother     Depression Daughter     ADD / ADHD Son     Depression Son     Diabetes Maternal Grandmother     Stomach cancer Maternal Grandfather     No Known Problems Paternal Grandmother     No Known Problems Sister     Heart disease Sister     Intellectual disability Brother     Schizophrenia Brother     Other Son          as an infant     Other Daughter          at 1-2 mths old    Other Son          as an infant          Medications have been verified.        Objective   /80   Pulse 93   Temp (!) 97.4 °F (36.3 °C)   Resp 16   LMP  (LMP Unknown)   SpO2 97%   No LMP recorded (lmp unknown). Patient is postmenopausal.       Physical Exam     Physical Exam  Vitals and nursing note reviewed.   Constitutional:       General: Trice is not in acute distress.     Appearance: Normal appearance. Trice is not ill-appearing or toxic-appearing.   HENT:      Head: Normocephalic and atraumatic.      Right Ear: Tympanic membrane, ear canal and external ear normal.      Left Ear: Tympanic membrane, ear canal and external ear normal.      Nose: Nose normal.      Mouth/Throat:      Mouth: Mucous membranes are moist.   Eyes:      Extraocular Movements: Extraocular movements intact.   Cardiovascular:      Rate and Rhythm: Normal rate and regular rhythm.      Heart sounds: Normal heart sounds.   Pulmonary:       Effort: Pulmonary effort is normal. No respiratory distress.      Breath sounds: Normal breath sounds. No stridor. No wheezing, rhonchi or rales.      Comments: Cough with deep breaths  Musculoskeletal:         General: No swelling, tenderness or deformity.      Right lower leg: No edema.      Left lower leg: No edema.   Skin:     General: Skin is warm and dry.      Capillary Refill: Capillary refill takes less than 2 seconds.      Findings: No rash.   Neurological:      Mental Status: Trice is alert.      Gait: Gait normal.   Psychiatric:         Behavior: Behavior normal.

## 2024-04-09 ENCOUNTER — OFFICE VISIT (OUTPATIENT)
Dept: SLEEP CENTER | Facility: CLINIC | Age: 64
End: 2024-04-09
Payer: COMMERCIAL

## 2024-04-09 VITALS
SYSTOLIC BLOOD PRESSURE: 124 MMHG | DIASTOLIC BLOOD PRESSURE: 72 MMHG | BODY MASS INDEX: 30 KG/M2 | OXYGEN SATURATION: 94 % | HEIGHT: 62 IN | WEIGHT: 163 LBS | HEART RATE: 96 BPM

## 2024-04-09 DIAGNOSIS — G47.19 EXCESSIVE DAYTIME SLEEPINESS: ICD-10-CM

## 2024-04-09 DIAGNOSIS — G25.81 RESTLESS LEG SYNDROME: ICD-10-CM

## 2024-04-09 DIAGNOSIS — G47.33 OSA (OBSTRUCTIVE SLEEP APNEA): ICD-10-CM

## 2024-04-09 DIAGNOSIS — R06.83 SNORING: ICD-10-CM

## 2024-04-09 DIAGNOSIS — E61.1 IRON DEFICIENCY: Primary | ICD-10-CM

## 2024-04-09 PROCEDURE — 99204 OFFICE O/P NEW MOD 45 MIN: CPT | Performed by: INTERNAL MEDICINE

## 2024-04-09 NOTE — PROGRESS NOTES
Sleep Consultation   Trice Donaldson 63 y.o. female MRN: [id number]      Reason for consultation: Obstructive sleep apnea    Requesting physician: Chris Ferreira MD PCP    Assessment/Plan    Mild obstructive sleep apnea   Diagnostic PSG in 2011 showed an AHI of 8.9 events per hour, subsequent CPAP study showed optimal pressure at 13 cm H2O  Compliance data not on file  Machine is 10 years old  I explained that she should get a new CPAP machine every 5 years  Current [place] score of 8  She still feels tired and sleepy throughout the day    Plan  Ordered APAP 5-20 cm H2O as patient has had weight changes since last sleep studies    2.  Snoring  Unsure if she still snores    3.  Excessive daytime sleepiness  Still having residual daytime sleepiness    4.  Restless leg syndrome  Bothersome symptoms  Patient is on duloxetine which can cause restless leg symptoms    Plan  Ordered iron panel        History of Present Illness   HPI:  Trice Donaldson is a 63 y.o. female with PMHx GERD, type 2 diabetes, hyperlipidemia who presents for evaluation of suspected sleep apnea.  She is primarily Azeri speaker.  She has been on CPAP since 2011.  Diagnostic PSG showed an AHI of 8.9 events and CPAP study showed optimal pressure of 13 cm H2O.  She previously saw Dr. Frank.  She has not been seen in many years.  She does note residual symptoms of daytime sleepiness and tiredness.  She is unsure if she is snoring.  She has used the same machine for the past 10 years.  She states that she is still using it.  She does note improvement in symptoms.  She has bothersome symptoms of restless legs.  She reports significant anxiety for which she is seeing psychiatry.  She is currently taking Abilify, clonazepam, duloxetine.  She feels her depression/anxiety is somewhat well-controlled.          Review of Systems      Genitourinary none   Cardiology none   Gastrointestinal none   Neurology none   Constitutional none   Integumentary none    Psychiatry none   Musculoskeletal none   Pulmonary none   ENT none   Endocrine none   Hematological none         Historical Information   Past Medical History:   Diagnosis Date    Abnormal mammogram     RESOLVED: 2017    Anxiety     Anxiety     Arthritis     Depression     Depression     Diabetes mellitus (HCC)     Pt denies    Diverticulosis     GERD (gastroesophageal reflux disease)     Helicobacter pylori infection     Hyperlipidemia     Seborrheic keratosis     LAST ASSESSED: 2017    Sleep apnea     Sleep apnea     Tinea pedis of left foot 2019     Past Surgical History:   Procedure Laterality Date    ABDOMINOPLASTY      abdomin    COLONOSCOPY  2017    MT COLONOSCOPY FLX DX W/COLLJ SPEC WHEN PFRMD N/A 2016    Procedure: COLONOSCOPY;  Surgeon: Bart Espinoza MD;  Location:  GI LAB;  Service: Gastroenterology    MT COLONOSCOPY FLX DX W/COLLJ SPEC WHEN PFRMD N/A 2017    Procedure: EGD AND COLONOSCOPY;  Surgeon: Bart Espinoza MD;  Location: [facility];  Service: Gastroenterology    MT TENDON SHEATH INCISION Right 3/8/2022    Procedure: Right thumb, long, and ring finger trigger finger releases;  Surgeon: Gary Wray MD;  Location:  MAIN OR;  Service: Orthopedics    TUBAL LIGATION      TUBAL LIGATION      UPPER GASTROINTESTINAL ENDOSCOPY       Family History   Problem Relation Age of Onset    Diabetes Mother     Hypertension Mother     Heart disease Mother     Diabetes Paternal Grandfather     Alzheimer's disease Father     No Known Problems Sister     No Known Problems Brother     Depression Daughter     ADD / ADHD Son     Depression Son     Diabetes Maternal Grandmother     Stomach cancer Maternal Grandfather     No Known Problems Paternal Grandmother     No Known Problems Sister     Heart disease Sister     Intellectual disability Brother     Schizophrenia Brother     Other Son          as an infant     Other Daughter          at 1-2 mths old    Other Son           as an infant      Social History     Socioeconomic History    Marital status: Single     Spouse name: Not on file    Number of children: Not on file    Years of education: Not on file    Highest education level: Not on file   Occupational History    Not on file   Tobacco Use    Smoking status: Never    Smokeless tobacco: Never   Vaping Use    Vaping status: Never Used   Substance and Sexual Activity    Alcohol use: No    Drug use: No    Sexual activity: Yes     Partners: Male     Birth control/protection: Post-menopausal   Other Topics Concern    Not on file   Social History Narrative    Not on file     Social Determinants of Health     Financial Resource Strain: Low Risk  (2023)    Overall Financial Resource Strain (CARDIA)     Difficulty of Paying Living Expenses: Not hard at all   Food Insecurity: No Food Insecurity (2023)    Hunger Vital Sign     Worried About Running Out of Food in the Last Year: Never true     Ran Out of Food in the Last Year: Never true   Transportation Needs: No Transportation Needs (2023)    PRAPARE - Transportation     Lack of Transportation (Medical): No     Lack of Transportation (Non-Medical): No   Physical Activity: Inactive (3/25/2021)    Exercise Vital Sign     Days of Exercise per Week: 0 days     Minutes of Exercise per Session: 0 min   Stress: Stress Concern Present (3/25/2021)    [employer] [place] [employer] - Occupational Stress Questionnaire     Feeling of Stress : To some extent   Social Connections: Moderately Isolated (3/25/2021)    Social Connection and Isolation Panel [NHANES]     Frequency of Communication with Friends and Family: More than three times a week     Frequency of Social Gatherings with Friends and Family: More than three times a week     Attends [employer]ism Services: More than 4 times per year     Active Member of Clubs or Organizations: No     Attends Club or Organization Meetings: Never     Marital Status:     Intimate Partner Violence: Not At Risk (3/25/2021)    Humiliation, Afraid, Rape, and Kick questionnaire     Fear of Current or Ex-Partner: No     Emotionally Abused: No     Physically Abused: No     Sexually Abused: No   Housing Stability: Not on file       Occupational History: NA    Meds/Allergies   No Known Allergies    Home medications:  Prior to Admission medications    Medication Sig Start Date End Date Taking? Authorizing Provider   acetaminophen (TYLENOL) 650 mg CR tablet Take 1 tablet (650 mg total) by mouth every 8 (eight) hours as needed for mild pain 3/8/22  Yes Thang Freeman PA-C   aluminum-magnesium hydroxide-simethicone (MAALOX MAX) 400-400-40 MG/5ML suspension Take 5 mL by mouth every 6 (six) hours as needed for indigestion or heartburn 4/18/23  Yes Hoang Perkins MD   Antacid Regular Strength 200-200-20 MG/5ML suspension TAKE 5 MLS BY MOUTH EVERY 6 HOURS AS NEEDED FOR INDIGESTION OR HEARTBURN 5/27/23  Yes Historical Provider, MD   ARIPiprazole (ABILIFY) 5 mg tablet Take 5 mg by mouth daily   Yes Historical Provider, MD   Ascorbic Acid (vitamin C) 1000 MG tablet Take 1,000 mg by mouth daily   Yes Historical Provider, MD   atorvastatin (LIPITOR) 10 mg tablet Take 1 tablet (10 mg total) by mouth daily 4/18/23  Yes Hoang Perkins MD   Calcium Polycarbophil (FIBERCON PO) Take by mouth   Yes Historical Provider, MD   Cholecalciferol (VITAMIN D3) 125 MCG (5000 UT) CAPS Take 1,000 Units by mouth   Yes Historical Provider, MD   clonazePAM (KlonoPIN) 1 mg tablet Take 1 mg by mouth daily at bedtime as needed for anxiety.     Yes Historical Provider, MD   Diclofenac Sodium (VOLTAREN) 1 % Apply 2 g topically 4 (four) times a day 4/18/23  Yes Hoang Perkins MD   dicyclomine (BENTYL) 20 mg tablet Take 1 tablet (20 mg total) by mouth every 6 (six) hours 1/25/24  Yes Malika Lopez DO   metFORMIN (GLUCOPHAGE) 500 mg tablet Take 1 tablet (500 mg total) by mouth 2 (two) times a day with meals 2/20/24   "Yes Malika Lopez, DO   Methylcobalamin (B-12) 5000 MCG TBDP Take by mouth   Yes Historical Provider, MD   multivitamin (THERAGRAN) TABS Take 1 tablet by mouth daily   Yes Historical Provider, MD   omeprazole (PriLOSEC) 20 mg delayed release capsule take 1 capsule by mouth once daily 6/28/23  Yes Hoang Perkins MD   Peppermint Oil 90 MG CPCR Take 360 mg by mouth 3 (three) times a day 2/22/23  Yes Paolo Barnett MD   benzonatate (TESSALON) 200 MG capsule Take 1 capsule (200 mg total) by mouth 3 (three) times a day as needed for cough  Patient not taking: Reported on 4/9/2024 3/18/24   Corry Cruz, DO   Blood Glucose Monitoring Suppl (OneTouch Verio Reflect) w/Device KIT Check blood sugars once daily. Please substitute with appropriate alternative as covered by patient's insurance. Dx: E11.65  Patient not taking: Reported on 4/9/2024 12/19/22   Hoang Perkins MD   DULoxetine (CYMBALTA) 30 mg delayed release capsule Take 1 capsule (30 mg total) by mouth daily 1/4/24   Malika Lopez, DO   glucose blood (OneTouch Verio) test strip Check blood sugars once daily. Please substitute with appropriate alternative as covered by patient's insurance. Dx: E11.65  Patient not taking: Reported on 4/9/2024 12/19/22   Hoang Perkins MD   loperamide (IMODIUM) 2 mg capsule Take 1 capsule (2 mg total) by mouth 2 (two) times a day as needed for diarrhea  Patient not taking: Reported on 4/9/2024 8/23/23   Eli Rivera, DO   OneTouch Delica Lancets 33G MISC Check blood sugars once daily. Please substitute with appropriate alternative as covered by patient's insurance. Dx: E11.65  Patient not taking: Reported on 4/9/2024 12/19/22   Hoang Perkins MD       Vitals:   Blood pressure 124/72, pulse 96, height 5' 2\" (1.575 m), weight 73.9 kg (163 lb), SpO2 94%, not currently breastfeeding.,  Body mass index is 29.81 kg/m².  Neck Circumference: 15    Physical Exam  General: Awake alert and oriented x 3, conversant without conversational " "dyspnea, NAD, normal affect  HEENT:  PERRL, Sclera noninjected, nonicteric OU, Nares patent,  no craniofacial abnormalities, Mucous membranes, moist, no oral lesions, normal dentition  NECK:  Trachea midline, no accessory muscle use, no stridor, no cervical or supraclavicular adenopathy, JVP not elevated  CARDIAC: Reg, single s1/S2, no m/r/g  PULM: CTA bilaterally no wheezing, rhonchi or rales  EXT: No cyanosis, no clubbing, no edema, normal capillary refill  NEURO: no focal neurologic deficits, AAOx3, moving all extremities appropriately    Labs: I have personally reviewed pertinent lab results.  Lab Results   Component Value Date    WBC 5.64 09/02/2022    HGB 13.2 09/02/2022    HCT 41.3 09/02/2022    MCV 90 09/02/2022     09/02/2022      Lab Results   Component Value Date    GLUCOSE 97 10/09/2015    CALCIUM 9.8 11/21/2023     10/09/2015    K 4.0 11/21/2023    CO2 30 11/21/2023     11/21/2023    BUN 10 11/21/2023    CREATININE 0.96 11/21/2023     No results found for: \"IRON\", \"TIBC\", \"FERRITIN\"  No results found for: \"ZDNXKZQT31\"  No results found for: \"FOLATE\"      Arterial Blood Gas result:  JASMIN Morales MD  [facility]   "

## 2024-04-11 ENCOUNTER — TELEPHONE (OUTPATIENT)
Dept: SLEEP CENTER | Facility: CLINIC | Age: 64
End: 2024-04-11

## 2024-04-13 NOTE — DISCHARGE INSTRUCTIONS
Cain presión arterial estaba arleen hoy aquí en la rocio de emergencias  Consulte a cain médico de atención primaria para que le vuelva a controlar la presión arterial     Si tiene náuseas, vómitos, fiebre, escalofríos u otros síntomas nuevos o que [place], regrese a la rocio de emergencias más cercana 
No

## 2024-04-22 LAB
LEFT EYE DIABETIC RETINOPATHY: NORMAL
RIGHT EYE DIABETIC RETINOPATHY: NORMAL

## 2024-04-22 PROCEDURE — 2023F DILAT RTA XM W/O RTNOPTHY: CPT | Performed by: INTERNAL MEDICINE

## 2024-04-29 ENCOUNTER — HOSPITAL ENCOUNTER (EMERGENCY)
Facility: HOSPITAL | Age: 64
Discharge: HOME | End: 2024-04-29
Attending: EMERGENCY MEDICINE
Payer: COMMERCIAL

## 2024-04-29 VITALS
DIASTOLIC BLOOD PRESSURE: 66 MMHG | RESPIRATION RATE: 20 BRPM | BODY MASS INDEX: 30.36 KG/M2 | HEIGHT: 62 IN | SYSTOLIC BLOOD PRESSURE: 139 MMHG | TEMPERATURE: 98.2 F | WEIGHT: 165 LBS | HEART RATE: 79 BPM | OXYGEN SATURATION: 97 %

## 2024-04-29 DIAGNOSIS — M54.50 ACUTE LOW BACK PAIN: Primary | ICD-10-CM

## 2024-04-29 PROCEDURE — 99284 EMERGENCY DEPT VISIT MOD MDM: CPT | Performed by: EMERGENCY MEDICINE

## 2024-04-29 PROCEDURE — 96372 THER/PROPH/DIAG INJ SC/IM: CPT

## 2024-04-29 PROCEDURE — 99283 EMERGENCY DEPT VISIT LOW MDM: CPT

## 2024-04-29 RX ORDER — LIDOCAINE 50 MG/G
1 PATCH TOPICAL ONCE
Status: DISCONTINUED | OUTPATIENT
Start: 2024-04-29 | End: 2024-04-29 | Stop reason: HOSPADM

## 2024-04-29 RX ORDER — ACETAMINOPHEN 325 MG/1
975 TABLET ORAL ONCE
Status: COMPLETED | OUTPATIENT
Start: 2024-04-29 | End: 2024-04-29

## 2024-04-29 RX ORDER — KETOROLAC TROMETHAMINE 30 MG/ML
30 INJECTION, SOLUTION INTRAMUSCULAR; INTRAVENOUS ONCE
Status: COMPLETED | OUTPATIENT
Start: 2024-04-29 | End: 2024-04-29

## 2024-04-29 RX ADMIN — KETOROLAC TROMETHAMINE 30 MG: 30 INJECTION, SOLUTION INTRAMUSCULAR; INTRAVENOUS at 10:21

## 2024-04-29 RX ADMIN — ACETAMINOPHEN 975 MG: 325 TABLET, FILM COATED ORAL at 10:19

## 2024-04-29 RX ADMIN — LIDOCAINE 1 PATCH: 50 PATCH CUTANEOUS at 10:20

## 2024-04-29 NOTE — ED ATTENDING ATTESTATION
4/29/2024  I, Jeff Tinoco DO, saw and evaluated the patient. I have discussed the patient with the resident/non-physician practitioner and agree with the resident's/non-physician practitioner's findings, Plan of Care, and MDM as documented in the resident's/non-physician practitioner's note, except where noted. All available labs and Radiology studies were reviewed.  I was present for key portions of any procedure(s) performed by the resident/non-physician practitioner and I was immediately available to provide assistance.       At this point I agree with the current assessment done in the Emergency Department.  I have conducted an independent evaluation of this patient a history and physical is as follows:    Patient is a 63-year-old female with a history of type 2 diabetes, depression, knee osteoarthritis, accompanied by her daughter.  She is predominately Bulgarian-speaking, patient indicates she would prefer to have the daughter as a  rather than using a  service.  Saturday, 2 days ago she began having some mild right-sided low back pain when she would twist or bend or move.  Better with remaining still.  No symptoms at rest.  Initially would ambulate but this morning the pain was too bad for her to build to get up so she called an ambulance.  The patient has not taken any medication for pain prior to arrival since the onset of the pain 2 days ago.  There is been no fall, no bladder or bowel incontinence, no saddle anesthesia, no leg weakness, no IV drug use, no fever or chills, no previous back pain like this.    General:  Patient is well-appearing  Head:  Atraumatic  Eyes:  Conjunctiva pink  ENT:  Mucous membranes are moist  Neck:  Supple  Cardiac:  S1-S2, without murmurs  Lungs:  Clear to auscultation bilaterally  Abdomen:  Soft, nontender, normal bowel sounds, no CVA tenderness  Extremities:  Normal range of motion   Back: No warmth or redness to the back.  There is no CVA  tenderness, no spinal tenderness, no warmth or fluctuance.  Neurologic:  Awake, fluent speech, normal comprehension, sensation intact and symmetric in the b/l  legs. Strength is 5/5 at the bilateral hips, knees, ankles, reflexes are 2/4 at the bilateral knees and ankles.  Can dorsiflex & plantarflex great toes bilaterally without difficulty, no saddle anesthesia, negative straight leg raise bilaterally. AAOx3  Skin:  Pink warm and dry, no rash  Psychiatric:  Alert, pleasant, cooperative      ED Course     On reassessment after analgesia patient was feeling better, able to ambulate, says she felt comfortable going home.    Based on the patient's history and physical exam findings, I do not find any evidence of neurosurgical emergency or need for emergent imaging studies as there is no bladder or bowel incontinence, no saddle anesthesia, and no significant neurologic abnormalities. There is no evidence of cauda equina syndrome, The patient is afebrile, has no significant vertebral tenderness or fluctuance, and has no risk factors for spinal abscess such as IV drug use, significant trauma, or recent spinal injections. I believe it is appropriate for the patient to be discharged and managed conservatively as an outpatient.It was discussed with the patient that this may be the early presentation of a more significant problem, and if they notice any of the signs/symptoms in the discharge instruction sheets, or they are otherwise concerned about their medical condition, they should return to the nearest emergency department.     DIAGNOSIS:  Acute nonspecific low back pain    MEDICAL DECISION MAKING CODING      Chronic conditions affecting care: As per HPI    COLLECTION AND INTERPRETATION OF DATA  Additional history obtained from: EMS and daughter  I reviewed prior external notes, including January 18, 2024 internal medicine office visit      Tests considered but not ordered: See above    RISK  Drugs (OTC, Rx, Controlled  substances): Prescription management  All of the patient's current prescription medications should be continued.      Critical Care Time  Procedures

## 2024-04-29 NOTE — Clinical Note
Trice Donadlson was seen and treated in our emergency department on 4/29/2024.                Diagnosis:     Trice  may return to work on return date.    Trice may return on this date: 04/30/2024         If you have any questions or concerns, please don't hesitate to call.      Drew Dudley MD    ______________________________           _______________          _______________  Hospital Representative                              Date                                Time

## 2024-04-29 NOTE — ED PROVIDER NOTES
History  Chief Complaint   Patient presents with    Back Pain     Per ems pt has had back pain since Friday, 10/10 pain, has not tried OTC medications to help with pain.      63-year-old female with a history of type 2 diabetes presents emergency department today for evaluation of 2-day history of right low back pain.  Pain initially started as mild and was only present when she would move or bend over.  No symptoms at rest.  Initially would ambulate but this morning the pain was too bad for her to stand and walk without significant pain, therefore she called an ambulance to bring her to the emergency department for an evaluation.  She is the pain is primarily in the right side of the back and rates down to her right lateral hip but denies radiation down the leg or up into the thoracic area.  She said there is no overlying rash.  She specifically mentions on multiple occasions that it does not hurt when any pressure is applied and only hurts when she moves.  The patient has not taken any medication for pain prior to arrival since the onset of the pain 2 days ago.  There is been no fall, no bladder or bowel incontinence, no saddle anesthesia, no leg weakness, no IV drug use, no fever or chills, no previous back pain like this.        Prior to Admission Medications   Prescriptions Last Dose Informant Patient Reported? Taking?   ARIPiprazole (ABILIFY) 5 mg tablet  Self Yes No   Sig: Take 5 mg by mouth daily   Antacid Regular Strength 200-200-20 MG/5ML suspension  Self Yes No   Sig: TAKE 5 MLS BY MOUTH EVERY 6 HOURS AS NEEDED FOR INDIGESTION OR HEARTBURN   Ascorbic Acid (vitamin C) 1000 MG tablet  Self Yes No   Sig: Take 1,000 mg by mouth daily   Blood Glucose Monitoring Suppl (OneTouch Verio Reflect) w/Device KIT  Self No No   Sig: Check blood sugars once daily. Please substitute with appropriate alternative as covered by patient's insurance. Dx: E11.65   Patient not taking: Reported on 4/9/2024   Calcium Polycarbophil  (FIBERCON PO)  Self Yes No   Sig: Take by mouth   Cholecalciferol (VITAMIN D3) 125 MCG (5000 UT) CAPS  Self Yes No   Sig: Take 1,000 Units by mouth   DULoxetine (CYMBALTA) 30 mg delayed release capsule   No No   Sig: Take 1 capsule (30 mg total) by mouth daily   Diclofenac Sodium (VOLTAREN) 1 %  Self No No   Sig: Apply 2 g topically 4 (four) times a day   Methylcobalamin (B-12) 5000 MCG TBDP  Self Yes No   Sig: Take by mouth   OneTouch Delica Lancets 33G MISC  Self No No   Sig: Check blood sugars once daily. Please substitute with appropriate alternative as covered by patient's insurance. Dx: E11.65   Patient not taking: Reported on 4/9/2024   Peppermint Oil 90 MG CPCR  Self No No   Sig: Take 360 mg by mouth 3 (three) times a day   acetaminophen (TYLENOL) 650 mg CR tablet  Self No No   Sig: Take 1 tablet (650 mg total) by mouth every 8 (eight) hours as needed for mild pain   aluminum-magnesium hydroxide-simethicone (MAALOX MAX) 400-400-40 MG/5ML suspension  Self No No   Sig: Take 5 mL by mouth every 6 (six) hours as needed for indigestion or heartburn   atorvastatin (LIPITOR) 10 mg tablet  Self No No   Sig: Take 1 tablet (10 mg total) by mouth daily   benzonatate (TESSALON) 200 MG capsule   No No   Sig: Take 1 capsule (200 mg total) by mouth 3 (three) times a day as needed for cough   Patient not taking: Reported on 4/9/2024   clonazePAM (KlonoPIN) 1 mg tablet  Self Yes No   Sig: Take 1 mg by mouth daily at bedtime as needed for anxiety.     dicyclomine (BENTYL) 20 mg tablet   No No   Sig: Take 1 tablet (20 mg total) by mouth every 6 (six) hours   glucose blood (OneTouch Verio) test strip  Self No No   Sig: Check blood sugars once daily. Please substitute with appropriate alternative as covered by patient's insurance. Dx: E11.65   Patient not taking: Reported on 4/9/2024   loperamide (IMODIUM) 2 mg capsule   No No   Sig: Take 1 capsule (2 mg total) by mouth 2 (two) times a day as needed for diarrhea   Patient not  taking: Reported on 4/9/2024   metFORMIN (GLUCOPHAGE) 500 mg tablet   No No   Sig: Take 1 tablet (500 mg total) by mouth 2 (two) times a day with meals   multivitamin (THERAGRAN) TABS  Self Yes No   Sig: Take 1 tablet by mouth daily   omeprazole (PriLOSEC) 20 mg delayed release capsule  Self No No   Sig: take 1 capsule by mouth once daily      Facility-Administered Medications: None       Past Medical History:   Diagnosis Date    Abnormal mammogram     RESOLVED: [id number]    Anxiety     Anxiety     Arthritis     Depression     Depression     Diabetes mellitus (HCC)     Pt denies    Diverticulosis     GERD (gastroesophageal reflux disease)     Helicobacter pylori infection     Hyperlipidemia     Seborrheic keratosis     LAST ASSESSED: [id number]    Sleep apnea     Sleep apnea     Tinea pedis of left foot 9/16/2019       Past Surgical History:   Procedure Laterality Date    ABDOMINOPLASTY      abdomin    COLONOSCOPY  2017    MN COLONOSCOPY FLX DX W/COLLJ SPEC WHEN PFRMD N/A 8/11/2016    Procedure: COLONOSCOPY;  Surgeon: Bart Espinoza MD;  Location: [facility];  Service: Gastroenterology    MN COLONOSCOPY FLX DX W/COLLJ SPEC WHEN PFRMD N/A 8/29/2017    Procedure: EGD AND COLONOSCOPY;  Surgeon: Bart Espinoza MD;  Location: [facility];  Service: Gastroenterology    MN TENDON SHEATH INCISION Right 3/8/2022    Procedure: Right thumb, long, and ring finger trigger finger releases;  Surgeon: Gary Wray MD;  Location:  MAIN OR;  Service: Orthopedics    TUBAL LIGATION      TUBAL LIGATION      UPPER GASTROINTESTINAL ENDOSCOPY  2017       Family History   Problem Relation Age of Onset    Diabetes Mother     Hypertension Mother     Heart disease Mother     Diabetes Paternal Grandfather     Alzheimer's disease Father     No Known Problems Sister     No Known Problems Brother     Depression Daughter     ADD / ADHD Son     Depression Son     Diabetes Maternal Grandmother     Stomach cancer Maternal Grandfather     No  Known Problems Paternal Grandmother     No Known Problems Sister     Heart disease Sister     Intellectual disability Brother     Schizophrenia Brother     Other Son          as an infant     Other Daughter          at 1-2 mths old    Other Son          as an infant      I have reviewed and agree with the history as documented.    E-Cigarette/Vaping    E-Cigarette Use Never User      E-Cigarette/Vaping Substances    Nicotine No     THC No     CBD No     Flavoring No     Other No     Unknown No      Social History     Tobacco Use    Smoking status: Never    Smokeless tobacco: Never   Vaping Use    Vaping status: Never Used   Substance Use Topics    Alcohol use: No    Drug use: No        Review of Systems   Constitutional:  Positive for activity change. Negative for appetite change, chills, fatigue and fever.   Eyes:  Negative for visual disturbance.   Respiratory:  Negative for shortness of breath.    Cardiovascular:  Negative for chest pain.   Gastrointestinal:  Negative for nausea and vomiting.   Genitourinary:  Negative for dysuria, flank pain, frequency, hematuria and urgency.   Musculoskeletal:  Positive for back pain. Negative for myalgias, neck pain and neck stiffness.   Skin:  Negative for rash and wound.   Neurological:  Positive for weakness. Negative for dizziness, tremors, syncope, numbness and headaches.       Physical Exam  ED Triage Vitals [24 08]   Temperature Pulse Respirations Blood Pressure SpO2   98.2 °F (36.8 °C) 79 20 131/73 98 %      Temp Source Heart Rate Source Patient Position - Orthostatic VS BP Location FiO2 (%)   Oral Monitor Lying Left arm --      Pain Score       10 - Worst Possible Pain             Orthostatic Vital Signs  Vitals:    24 0822 24 1000   BP: 131/73 139/66   Pulse: 79 79   Patient Position - Orthostatic VS: Lying Lying       Physical Exam  Vitals reviewed.   Constitutional:       General: Trice is not in acute distress.     Appearance: Normal  appearance.   HENT:      Head: Normocephalic and atraumatic.   Cardiovascular:      Rate and Rhythm: Normal rate and regular rhythm.      Pulses: Normal pulses.      Heart sounds: Normal heart sounds. No murmur heard.  Pulmonary:      Effort: Pulmonary effort is normal. No respiratory distress.      Breath sounds: Normal breath sounds.   Abdominal:      General: Abdomen is flat.      Tenderness: There is no abdominal tenderness.   Musculoskeletal:         General: No swelling, tenderness, deformity or signs of injury.      Cervical back: Normal range of motion. No rigidity or tenderness.      Right lower leg: No edema.      Left lower leg: No edema.      Comments: Straight leg raise negative, although it does elicit some severe pain in the right low back.  No midline tenderness to palpation.  No step-off fractures noted.   Skin:     General: Skin is warm and dry.      Findings: No rash.   Neurological:      General: No focal deficit present.      Mental Status: Trice is alert and oriented to person, place, and time.      Sensory: No sensory deficit.      Motor: No weakness.      Comments: Patient unable to lift right leg off the bed due to pain upon initial examination.   Psychiatric:         Mood and Affect: Mood normal.         Behavior: Behavior normal.         ED Medications  Medications   acetaminophen (TYLENOL) tablet 975 mg (975 mg Oral Given 4/29/24 1019)   ketorolac (TORADOL) injection 30 mg (30 mg Intramuscular Given 4/29/24 1021)       Diagnostic Studies  Results Reviewed       None                   No orders to display         Procedures  Procedures      ED Course                             SBIRT 22yo+      Flowsheet Row Most Recent Value   Initial Alcohol Screen: US AUDIT-C     1. How often do you have a drink containing alcohol? 0 Filed at: 04/29/2024 0826   Audit-C Score 0 Filed at: 04/29/2024 0826   TABATHA: How many times in the past year have you...    Used an illegal drug or used a prescription  medication for non-medical reasons? Never Filed at: 04/29/2024 0826                  Medical Decision Making  63-year-old female presents emergency department today for evaluation of 2-day history of right-sided low back pain.    No tenderness to palpation.  No signs of trauma.  No overlying skin rash.  Pain only with movement. Based on history and physical examination, this most likely represents back pain of muscular etiology.  We will begin treatment with Tylenol, Toradol, and lidocaine patch.  After reassessment the patient states her pain is much improved and she would like to go home.  Her relatives are here with her and feel comfortable taking her back home and helping her around the house if she needed.  We discussed outpatient therapy that she can try at home to keep the pain today.  We reviewed emergent symptoms which require her to come back to the emergency department to which all parties verbalized understanding and agree.  Advised her to follow-up with her primary care doctor if the pain continues.  She remained hemodynamically stable, symptoms improved with treatment here in the emergency department, and she is appropriate for discharge home with clear return precautions.    Risk  OTC drugs.  Prescription drug management.          Disposition  Final diagnoses:   Acute low back pain     Time reflects when diagnosis was documented in both MDM as applicable and the Disposition within this note       Time User Action Codes Description Comment    4/29/2024 10:55 AM Drew Dudley Add [M54.50] Acute low back pain           ED Disposition       ED Disposition   Discharge    Condition   Stable    Date/Time   Mon Apr 29, 2024 1055    Comment   Trice Donaldson discharge to home/self care.                   Follow-up Information    None         Discharge Medication List as of 4/29/2024 10:55 AM        CONTINUE these medications which have NOT CHANGED    Details   acetaminophen (TYLENOL) 650 mg CR tablet Take  1 tablet (650 mg total) by mouth every 8 (eight) hours as needed for mild pain, Starting Tue 3/8/2022, Normal      aluminum-magnesium hydroxide-simethicone (MAALOX MAX) 400-400-40 MG/5ML suspension Take 5 mL by mouth every 6 (six) hours as needed for indigestion or heartburn, Starting Tue 4/18/2023, Normal      Antacid Regular Strength 200-200-20 MG/5ML suspension TAKE 5 MLS BY MOUTH EVERY 6 HOURS AS NEEDED FOR INDIGESTION OR HEARTBURN, Historical Med      ARIPiprazole (ABILIFY) 5 mg tablet Take 5 mg by mouth daily, Historical Med      Ascorbic Acid (vitamin C) 1000 MG tablet Take 1,000 mg by mouth daily, Historical Med      atorvastatin (LIPITOR) 10 mg tablet Take 1 tablet (10 mg total) by mouth daily, Starting Tue 4/18/2023, Normal      benzonatate (TESSALON) 200 MG capsule Take 1 capsule (200 mg total) by mouth 3 (three) times a day as needed for cough, Starting Mon 3/18/2024, Normal      Blood Glucose Monitoring Suppl (OneTouch Verio Reflect) w/Device KIT Check blood sugars once daily. Please substitute with appropriate alternative as covered by patient's insurance. Dx: E11.65, Normal      Calcium Polycarbophil (FIBERCON PO) Take by mouth, Historical Med      Cholecalciferol (VITAMIN D3) 125 MCG (5000 UT) CAPS Take 1,000 Units by mouth, Historical Med      clonazePAM (KlonoPIN) 1 mg tablet Take 1 mg by mouth daily at bedtime as needed for anxiety.  , Historical Med      Diclofenac Sodium (VOLTAREN) 1 % Apply 2 g topically 4 (four) times a day, Starting Tue 4/18/2023, Normal      dicyclomine (BENTYL) 20 mg tablet Take 1 tablet (20 mg total) by mouth every 6 (six) hours, Starting Thu 1/25/2024, Normal      DULoxetine (CYMBALTA) 30 mg delayed release capsule Take 1 capsule (30 mg total) by mouth daily, Starting Thu 1/4/2024, Normal      glucose blood (OneTouch Verio) test strip Check blood sugars once daily. Please substitute with appropriate alternative as covered by patient's insurance. Dx: E11.65, Normal       loperamide (IMODIUM) 2 mg capsule Take 1 capsule (2 mg total) by mouth 2 (two) times a day as needed for diarrhea, Starting Wed 8/23/2023, Normal      metFORMIN (GLUCOPHAGE) 500 mg tablet Take 1 tablet (500 mg total) by mouth 2 (two) times a day with meals, Starting Tue 2/20/2024, Normal      Methylcobalamin (B-12) 5000 MCG TBDP Take by mouth, Historical Med      multivitamin (THERAGRAN) TABS Take 1 tablet by mouth daily, Historical Med      omeprazole (PriLOSEC) 20 mg delayed release capsule take 1 capsule by mouth once daily, Normal      OneTouch Delica Lancets 33G MISC Check blood sugars once daily. Please substitute with appropriate alternative as covered by patient's insurance. Dx: E11.65, Normal      Peppermint Oil 90 MG CPCR Take 360 mg by mouth 3 (three) times a day, Starting Wed 2/22/2023, Normal           No discharge procedures on file.    PDMP Review         Value Time User    PDMP Reviewed  Yes 3/8/2022  8:35 AM Thang Freeman PA-C             ED Provider  Attending physically available and evaluated Trice Donaldson. I managed the patient along with the ED Attending.    Electronically Signed by           Drew Dudley MD  04/29/24 5898

## 2024-05-02 ENCOUNTER — OFFICE VISIT (OUTPATIENT)
Dept: INTERNAL MEDICINE CLINIC | Facility: CLINIC | Age: 64
End: 2024-05-02

## 2024-05-02 VITALS
BODY MASS INDEX: 29.45 KG/M2 | WEIGHT: 161 LBS | TEMPERATURE: 98.1 F | HEART RATE: 83 BPM | SYSTOLIC BLOOD PRESSURE: 125 MMHG | DIASTOLIC BLOOD PRESSURE: 79 MMHG

## 2024-05-02 DIAGNOSIS — M54.50 ACUTE RIGHT-SIDED LOW BACK PAIN WITHOUT SCIATICA: Primary | ICD-10-CM

## 2024-05-02 DIAGNOSIS — E78.5 HYPERLIPIDEMIA, UNSPECIFIED HYPERLIPIDEMIA TYPE: ICD-10-CM

## 2024-05-02 RX ORDER — TIZANIDINE 4 MG/1
4 TABLET ORAL
Qty: 5 TABLET | Refills: 0 | Status: SHIPPED | OUTPATIENT
Start: 2024-05-02 | End: 2024-05-07

## 2024-05-02 RX ORDER — ATORVASTATIN CALCIUM 10 MG/1
10 TABLET, FILM COATED ORAL DAILY
Qty: 90 TABLET | Refills: 3 | Status: SHIPPED | OUTPATIENT
Start: 2024-05-02

## 2024-05-02 NOTE — PROGRESS NOTES
Name: Trice Donaldson      : 1960      MRN: [id number]  Encounter Provider: Chris Ferreira MD  Encounter Date: 2024   Encounter department: [facility]    Assessment & Plan     1. Acute right-sided low back pain without sciatica  Patient presents with acute right sided lower back pain insidous onset on Saturday morning noticed first when woke up in the morning  Gradually worsening since then  No associated trauma / prior history of similar complains  No red flag signs like fever, bowel/bladder incontinence, loss of sensation, weakness, history of cancer  On exam no tenderness on back palpation   Mentions pain only with movement     She presented to the ED for the same on Monday and was discharged with tylenol and lidocaine patch  She has been taking tylenol and motrin 200 alternatively and has lidocaine patch on but that is not helping    Plan  Advised patient to take Motrin 800 mg TID for 3 days and then as needed for 2 days; advised gastritis precautions  Continue tylenol TID  Zanaflex 4 mg once at bedtime for 5 days  Advised on conservative management with cold compress     Follow up as needed or next scheduled follow up    Subjective     Trice Donaldson is a 63/F with PMH of controlled diabetes who presents with acute right sided lower back pain insidous onset on Saturday morning noticed first when woke up in the morning. Gradually worsening since then. No associated trauma / prior history of similar complains. No red flag signs like fever, bowel/bladder incontinence, loss of sensation, weakness, history of cancer. On exam no tenderness on back palpation . Mentions pain only with movement. She presented to the ED for the same on Monday and was discharged with tylenol and lidocaine patch. She has been taking tylenol and motrin 200 alternatively and has lidocaine patch on but that is not helping.      Back Pain  This is a new problem. The current episode  started in the past 7 days. The problem occurs constantly. The problem has been gradually worsening since onset. The pain is present in the lumbar spine. The quality of the pain is described as aching. The pain does not radiate. The pain is at a severity of 7/10. The pain is severe. The pain is Worse during the night. The symptoms are aggravated by bending, lying down, twisting and position. Pertinent negatives include no abdominal pain, bladder incontinence, bowel incontinence, chest pain, dysuria, fever, headaches, leg pain, numbness, paresis, paresthesias, pelvic pain, perianal numbness, tingling, weakness or weight loss. Risk factors include menopause and lack of exercise. Trice has tried NSAIDs for the symptoms. The treatment provided no relief.     Review of Systems   Constitutional:  Negative for chills, fever and weight loss.   HENT:  Negative for ear pain and sore throat.    Eyes:  Negative for pain and visual disturbance.   Respiratory:  Negative for cough and shortness of breath.    Cardiovascular:  Negative for chest pain and palpitations.   Gastrointestinal:  Negative for abdominal pain, bowel incontinence and vomiting.   Genitourinary:  Negative for bladder incontinence, dysuria, hematuria and pelvic pain.   Musculoskeletal:  Positive for back pain (right lower back). Negative for arthralgias.   Skin:  Negative for color change and rash.   Neurological:  Negative for tingling, seizures, syncope, weakness, numbness, headaches and paresthesias.   All other systems reviewed and are negative.      Past Medical History:   Diagnosis Date    Abnormal mammogram     RESOLVED: 14NOV2017    Anxiety     Anxiety     Arthritis     Depression     Depression     Diabetes mellitus (HCC)     Pt denies    Diverticulosis     GERD (gastroesophageal reflux disease)     Helicobacter pylori infection     Hyperlipidemia     Seborrheic keratosis     LAST ASSESSED: 05JUN2017    Sleep apnea     Sleep apnea     Tinea pedis of left  foot 2019     Past Surgical History:   Procedure Laterality Date    ABDOMINOPLASTY      abdomin    COLONOSCOPY  2017    WI COLONOSCOPY FLX DX W/COLLJ SPEC WHEN PFRMD N/A 2016    Procedure: COLONOSCOPY;  Surgeon: Bart Espinoza MD;  Location:  GI LAB;  Service: Gastroenterology    WI COLONOSCOPY FLX DX W/COLLJ SPEC WHEN PFRMD N/A 2017    Procedure: EGD AND COLONOSCOPY;  Surgeon: Bart Espinoza MD;  Location: [facility] GI [facility];  Service: Gastroenterology    WI TENDON SHEATH INCISION Right 3/8/2022    Procedure: Right thumb, long, and ring finger trigger finger releases;  Surgeon: Gary Wray MD;  Location:  MAIN OR;  Service: Orthopedics    TUBAL LIGATION      TUBAL LIGATION      UPPER GASTROINTESTINAL ENDOSCOPY       Family History   Problem Relation Age of Onset    Diabetes Mother     Hypertension Mother     Heart disease Mother     Diabetes Paternal Grandfather     Alzheimer's disease Father     No Known Problems Sister     No Known Problems Brother     Depression Daughter     ADD / ADHD Son     Depression Son     Diabetes Maternal Grandmother     Stomach cancer Maternal Grandfather     No Known Problems Paternal Grandmother     No Known Problems Sister     Heart disease Sister     Intellectual disability Brother     Schizophrenia Brother     Other Son          as an infant     Other Daughter          at 1-2 mths old    Other Son          as an infant      Social History     Socioeconomic History    Marital status: Single     Spouse name: None    Number of children: None    Years of education: None    Highest education level: None   Occupational History    None   Tobacco Use    Smoking status: Never    Smokeless tobacco: Never   Vaping Use    Vaping status: Never Used   Substance and Sexual Activity    Alcohol use: No    Drug use: No    Sexual activity: Yes     Partners: Male     Birth control/protection: Post-menopausal   Other Topics Concern    None   Social History Narrative     None     Social Determinants of Health     Financial Resource Strain: Low Risk  (5/2/2024)    Overall Financial Resource Strain (CARDIA)     Difficulty of Paying Living Expenses: Not hard at all   Food Insecurity: No Food Insecurity (5/2/2024)    Hunger Vital Sign     Worried About Running Out of Food in the Last Year: Never true     Ran Out of Food in the Last Year: Never true   Transportation Needs: No Transportation Needs (5/2/2024)    PRAPARE - Transportation     Lack of Transportation (Medical): No     Lack of Transportation (Non-Medical): No   Physical Activity: Inactive (3/25/2021)    Exercise Vital Sign     Days of Exercise per Week: 0 days     Minutes of Exercise per Session: 0 min   Stress: Stress Concern Present (3/25/2021)    [employer] [place] [employer] - Occupational Stress Questionnaire     Feeling of Stress : To some extent   Social Connections: Moderately Isolated (3/25/2021)    Social Connection and Isolation Panel [NHANES]     Frequency of Communication with Friends and Family: More than three times a week     Frequency of Social Gatherings with Friends and Family: More than three times a week     Attends [employer] Services: More than 4 times per year     Active Member of Clubs or Organizations: No     Attends Club or Organization Meetings: Never     Marital Status:    Intimate Partner Violence: Not At Risk (3/25/2021)    Humiliation, Afraid, Rape, and Kick questionnaire     Fear of Current or Ex-Partner: No     Emotionally Abused: No     Physically Abused: No     Sexually Abused: No   Housing Stability: Low Risk  (5/2/2024)    Housing Stability Vital Sign     Unable to Pay for Housing in the Last Year: No     Number of Places Lived in the Last Year: 1     Unstable Housing in the Last Year: No     Current Outpatient Medications on File Prior to Visit   Medication Sig    acetaminophen (TYLENOL) 650 mg CR tablet Take 1 tablet (650 mg total) by mouth every 8 (eight) hours as  needed for mild pain    aluminum-magnesium hydroxide-simethicone (MAALOX MAX) 400-400-40 MG/5ML suspension Take 5 mL by mouth every 6 (six) hours as needed for indigestion or heartburn    Antacid Regular Strength 200-200-20 MG/5ML suspension TAKE 5 MLS BY MOUTH EVERY 6 HOURS AS NEEDED FOR INDIGESTION OR HEARTBURN    ARIPiprazole (ABILIFY) 5 mg tablet Take 5 mg by mouth daily    Ascorbic Acid (vitamin C) 1000 MG tablet Take 1,000 mg by mouth daily    benzonatate (TESSALON) 200 MG capsule Take 1 capsule (200 mg total) by mouth 3 (three) times a day as needed for cough (Patient not taking: Reported on 4/9/2024)    Blood Glucose Monitoring Suppl (OneTouch Verio Reflect) w/Device KIT Check blood sugars once daily. Please substitute with appropriate alternative as covered by patient's insurance. Dx: E11.65 (Patient not taking: Reported on 4/9/2024)    Calcium Polycarbophil (FIBERCON PO) Take by mouth    Cholecalciferol (VITAMIN D3) 125 MCG (5000 UT) CAPS Take 1,000 Units by mouth    clonazePAM (KlonoPIN) 1 mg tablet Take 1 mg by mouth daily at bedtime as needed for anxiety.      Diclofenac Sodium (VOLTAREN) 1 % Apply 2 g topically 4 (four) times a day    dicyclomine (BENTYL) 20 mg tablet Take 1 tablet (20 mg total) by mouth every 6 (six) hours    DULoxetine (CYMBALTA) 30 mg delayed release capsule Take 1 capsule (30 mg total) by mouth daily    glucose blood (OneTouch Verio) test strip Check blood sugars once daily. Please substitute with appropriate alternative as covered by patient's insurance. Dx: E11.65 (Patient not taking: Reported on 4/9/2024)    loperamide (IMODIUM) 2 mg capsule Take 1 capsule (2 mg total) by mouth 2 (two) times a day as needed for diarrhea (Patient not taking: Reported on 4/9/2024)    metFORMIN (GLUCOPHAGE) 500 mg tablet Take 1 tablet (500 mg total) by mouth 2 (two) times a day with meals    Methylcobalamin (B-12) 5000 MCG TBDP Take by mouth    multivitamin (THERAGRAN) TABS Take 1 tablet by mouth  daily    omeprazole (PriLOSEC) 20 mg delayed release capsule take 1 capsule by mouth once daily    OneTouch Delica Lancets 33G MISC Check blood sugars once daily. Please substitute with appropriate alternative as covered by patient's insurance. Dx: E11.65 (Patient not taking: Reported on 4/9/2024)    Peppermint Oil 90 MG CPCR Take 360 mg by mouth 3 (three) times a day    [DISCONTINUED] atorvastatin (LIPITOR) 10 mg tablet Take 1 tablet (10 mg total) by mouth daily     No Known Allergies  Immunization History   Administered Date(s) Administered    COVID-19 PFIZER VACCINE 0.3 ML IM 03/29/2021, 04/19/2021, 12/28/2021, 06/09/2022    COVID-19 Pfizer Vac BIVALENT Ivan-sucrose 12 Yr+ IM 12/19/2022    Influenza Quadrivalent Preservative Free 3 years and older IM 11/14/2017    Influenza, injectable, quadrivalent, preservative free 0.5 mL 11/16/2023    Influenza, recombinant, quadrivalent,injectable, preservative free 09/11/2018, 09/24/2020    Influenza, seasonal, injectable 08/12/2014    Pneumococcal Conjugate Vaccine 20-valent (Pcv20), Polysace 09/01/2022    Pneumococcal Polysaccharide PPV23 09/11/2018    Tdap 02/12/2015       Objective     /79 (BP Location: Right arm, Patient Position: Sitting, Cuff Size: Large)   Pulse 83   Temp 98.1 °F (36.7 °C) (Temporal)   Wt 73 kg (161 lb)   LMP  (LMP Unknown)   BMI 29.45 kg/m²     Physical Exam  Constitutional:       Appearance: Normal appearance.   HENT:      Head: Normocephalic and atraumatic.      Mouth/Throat:      Mouth: Mucous membranes are moist.      Pharynx: Oropharynx is clear.   Eyes:      Conjunctiva/sclera: Conjunctivae normal.      Pupils: Pupils are equal, round, and reactive to light.   Cardiovascular:      Rate and Rhythm: Normal rate and regular rhythm.      Pulses: Normal pulses.      Heart sounds: Normal heart sounds.   Pulmonary:      Effort: Pulmonary effort is normal. No respiratory distress.      Breath sounds: Normal breath sounds. No wheezing or  rales.   Abdominal:      General: Abdomen is flat. There is no distension.      Palpations: Abdomen is soft. There is no mass.      Tenderness: There is no abdominal tenderness.   Musculoskeletal:         General: No swelling, tenderness (no lower back tenderness; pain with torso twisting and movement), deformity or signs of injury. Normal range of motion.      Right lower leg: No edema.      Left lower leg: No edema.   Skin:     General: Skin is warm.      Capillary Refill: Capillary refill takes less than 2 seconds.      Coloration: Skin is not jaundiced or pale.   Neurological:      General: No focal deficit present.      Mental Status: Trice is alert and oriented to person, place, and time.      Sensory: No sensory deficit.      Motor: No weakness.   Psychiatric:         Mood and Affect: Mood normal.         Behavior: Behavior normal.       Chris Ferreira MD  PGY-2, Internal Medicine  [facility]

## 2024-05-15 ENCOUNTER — APPOINTMENT (OUTPATIENT)
Dept: LAB | Facility: CLINIC | Age: 64
End: 2024-05-15
Payer: COMMERCIAL

## 2024-05-15 ENCOUNTER — TRANSCRIBE ORDERS (OUTPATIENT)
Dept: LAB | Facility: CLINIC | Age: 64
End: 2024-05-15

## 2024-05-15 DIAGNOSIS — E78.2 MODERATE MIXED HYPERLIPIDEMIA NOT REQUIRING STATIN THERAPY: ICD-10-CM

## 2024-05-15 DIAGNOSIS — F33.2 SEVERE RECURRENT MAJOR DEPRESSION WITHOUT PSYCHOTIC FEATURES (HCC): ICD-10-CM

## 2024-05-15 DIAGNOSIS — F33.2 SEVERE RECURRENT MAJOR DEPRESSION WITHOUT PSYCHOTIC FEATURES (HCC): Primary | ICD-10-CM

## 2024-05-15 DIAGNOSIS — E61.1 IRON DEFICIENCY: ICD-10-CM

## 2024-05-15 DIAGNOSIS — E11.9 TYPE 2 DIABETES MELLITUS WITHOUT COMPLICATION, WITHOUT LONG-TERM CURRENT USE OF INSULIN (HCC): ICD-10-CM

## 2024-05-15 LAB
25(OH)D3 SERPL-MCNC: 30.5 NG/ML (ref 30–100)
ALBUMIN SERPL BCP-MCNC: 4.5 G/DL (ref 3.5–5)
ALP SERPL-CCNC: 61 U/L (ref 34–104)
ALT SERPL W P-5'-P-CCNC: 12 U/L (ref 7–52)
ANION GAP SERPL CALCULATED.3IONS-SCNC: 10 MMOL/L (ref 4–13)
AST SERPL W P-5'-P-CCNC: 13 U/L (ref 13–39)
BASOPHILS # BLD AUTO: 0.05 THOUSANDS/ÂΜL (ref 0–0.1)
BASOPHILS NFR BLD AUTO: 1 % (ref 0–1)
BILIRUB SERPL-MCNC: 0.38 MG/DL (ref 0.2–1)
BUN SERPL-MCNC: 13 MG/DL (ref 5–25)
C COLI+JEJUNI TUF STL QL NAA+PROBE: NEGATIVE
C DIFF TOX GENS STL QL NAA+PROBE: NEGATIVE
CALCIUM SERPL-MCNC: 9.4 MG/DL (ref 8.4–10.2)
CHLORIDE SERPL-SCNC: 104 MMOL/L (ref 96–108)
CHOLEST SERPL-MCNC: 126 MG/DL
CO2 SERPL-SCNC: 28 MMOL/L (ref 21–32)
CREAT SERPL-MCNC: 0.91 MG/DL (ref 0.6–1.3)
EC STX1+STX2 GENES STL QL NAA+PROBE: NEGATIVE
EOSINOPHIL # BLD AUTO: 0.11 THOUSAND/ÂΜL (ref 0–0.61)
EOSINOPHIL NFR BLD AUTO: 2 % (ref 0–6)
ERYTHROCYTE [DISTWIDTH] IN BLOOD BY AUTOMATED COUNT: 13.4 % (ref 11.6–15.1)
EST. AVERAGE GLUCOSE BLD GHB EST-MCNC: 137 MG/DL
FERRITIN SERPL-MCNC: 51 NG/ML (ref 11–307)
GFR SERPL CREATININE-BSD FRML MDRD: 66 ML/MIN/1.73SQ M
GLUCOSE P FAST SERPL-MCNC: 121 MG/DL (ref 65–99)
HBA1C MFR BLD: 6.4 %
HCT VFR BLD AUTO: 41.2 % (ref 34.8–46.1)
HDLC SERPL-MCNC: 44 MG/DL
HGB BLD-MCNC: 12.6 G/DL (ref 11.5–15.4)
IMM GRANULOCYTES # BLD AUTO: 0.02 THOUSAND/UL (ref 0–0.2)
IMM GRANULOCYTES NFR BLD AUTO: 0 % (ref 0–2)
IRON SATN MFR SERPL: 23 % (ref 15–50)
IRON SERPL-MCNC: 79 UG/DL (ref 50–212)
LDLC SERPL CALC-MCNC: 56 MG/DL (ref 0–100)
LYMPHOCYTES # BLD AUTO: 2.09 THOUSANDS/ÂΜL (ref 0.6–4.47)
LYMPHOCYTES NFR BLD AUTO: 35 % (ref 14–44)
MCH RBC QN AUTO: 28.1 PG (ref 26.8–34.3)
MCHC RBC AUTO-ENTMCNC: 30.6 G/DL (ref 31.4–37.4)
MCV RBC AUTO: 92 FL (ref 82–98)
MONOCYTES # BLD AUTO: 0.57 THOUSAND/ÂΜL (ref 0.17–1.22)
MONOCYTES NFR BLD AUTO: 10 % (ref 4–12)
NEUTROPHILS # BLD AUTO: 3.13 THOUSANDS/ÂΜL (ref 1.85–7.62)
NEUTS SEG NFR BLD AUTO: 52 % (ref 43–75)
NONHDLC SERPL-MCNC: 82 MG/DL
NRBC BLD AUTO-RTO: 0 /100 WBCS
PLATELET # BLD AUTO: 337 THOUSANDS/UL (ref 149–390)
PMV BLD AUTO: 9.8 FL (ref 8.9–12.7)
POTASSIUM SERPL-SCNC: 4.2 MMOL/L (ref 3.5–5.3)
PROT SERPL-MCNC: 7.4 G/DL (ref 6.4–8.4)
RBC # BLD AUTO: 4.48 MILLION/UL (ref 3.81–5.12)
SALMONELLA SP SPAO STL QL NAA+PROBE: NEGATIVE
SHIGELLA SP+EIEC IPAH STL QL NAA+PROBE: NEGATIVE
SODIUM SERPL-SCNC: 142 MMOL/L (ref 135–147)
TIBC SERPL-MCNC: 339 UG/DL (ref 250–450)
TRIGL SERPL-MCNC: 129 MG/DL
TSH SERPL DL<=0.05 MIU/L-ACNC: 2.53 UIU/ML (ref 0.45–4.5)
UIBC SERPL-MCNC: 260 UG/DL (ref 155–355)
WBC # BLD AUTO: 5.97 THOUSAND/UL (ref 4.31–10.16)

## 2024-05-15 PROCEDURE — 85025 COMPLETE CBC W/AUTO DIFF WBC: CPT

## 2024-05-15 PROCEDURE — 82306 VITAMIN D 25 HYDROXY: CPT

## 2024-05-15 PROCEDURE — 80053 COMPREHEN METABOLIC PANEL: CPT

## 2024-05-15 PROCEDURE — 36415 COLL VENOUS BLD VENIPUNCTURE: CPT

## 2024-05-15 PROCEDURE — 82728 ASSAY OF FERRITIN: CPT

## 2024-05-15 PROCEDURE — 83540 ASSAY OF IRON: CPT

## 2024-05-15 PROCEDURE — 83550 IRON BINDING TEST: CPT

## 2024-05-15 PROCEDURE — 84443 ASSAY THYROID STIM HORMONE: CPT

## 2024-05-15 PROCEDURE — 83036 HEMOGLOBIN GLYCOSYLATED A1C: CPT

## 2024-05-15 PROCEDURE — 80061 LIPID PANEL: CPT

## 2024-05-19 LAB — ELASTASE PANC STL-MCNT: 349 UG ELAST./G

## 2024-05-30 ENCOUNTER — TELEPHONE (OUTPATIENT)
Dept: GASTROENTEROLOGY | Facility: CLINIC | Age: 64
End: 2024-05-30

## 2024-05-30 NOTE — TELEPHONE ENCOUNTER
Pt has been scheduled 6/7/ 2 pm Dr. Barrera GONZALEZ    ----- Message from Eli Rivera DO sent at 5/30/2024  9:07 AM EDT -----  Called patient utilizing [employer]  [place]ardo [phone]. Discussed negative stool studies. She continues to have intermittent abdominal with diarrhea. Reviewed IBS-D and offered follow up appointment in office as she hasn't been seen since 8/2023. She was very appreciative and requested follow up appointment.    Clerical team - could you please call to arrange follow up appointment? Thank you in advance. Please let know if there are any issues.     Thanks,  Miguel

## 2024-06-07 ENCOUNTER — OFFICE VISIT (OUTPATIENT)
Dept: GASTROENTEROLOGY | Facility: CLINIC | Age: 64
End: 2024-06-07
Payer: COMMERCIAL

## 2024-06-07 VITALS
BODY MASS INDEX: 29.59 KG/M2 | WEIGHT: 160.8 LBS | TEMPERATURE: 98.2 F | DIASTOLIC BLOOD PRESSURE: 78 MMHG | HEIGHT: 62 IN | SYSTOLIC BLOOD PRESSURE: 136 MMHG

## 2024-06-07 DIAGNOSIS — K21.9 GASTROESOPHAGEAL REFLUX DISEASE WITHOUT ESOPHAGITIS: Primary | ICD-10-CM

## 2024-06-07 DIAGNOSIS — D12.6 ADENOMATOUS POLYP OF COLON, UNSPECIFIED PART OF COLON: ICD-10-CM

## 2024-06-07 DIAGNOSIS — K58.0 IRRITABLE BOWEL SYNDROME WITH DIARRHEA: ICD-10-CM

## 2024-06-07 DIAGNOSIS — K57.30 DIVERTICULOSIS OF COLON: Chronic | ICD-10-CM

## 2024-06-07 PROCEDURE — 99215 OFFICE O/P EST HI 40 MIN: CPT | Performed by: INTERNAL MEDICINE

## 2024-06-07 RX ORDER — OMEPRAZOLE 40 MG/1
40 CAPSULE, DELAYED RELEASE ORAL 2 TIMES DAILY
Qty: 180 CAPSULE | Refills: 3 | Status: SHIPPED | OUTPATIENT
Start: 2024-06-07

## 2024-06-07 RX ORDER — DEXAMETHASONE SODIUM PHOSPHATE 1 MG/ML
SOLUTION/ DROPS OPHTHALMIC
COMMUNITY
Start: 2024-04-23

## 2024-06-07 RX ORDER — CYCLOSPORINE 0.5 MG/ML
EMULSION OPHTHALMIC
COMMUNITY
Start: 2024-04-24

## 2024-06-07 NOTE — PROGRESS NOTES
[facility] - Outpatient Follow-up Note  Trice Donaldson 63 y.o. female MRN: [id number]  Encounter: [id number]      ASSESSMENT AND PLAN:  63 year old female here for follow up.      1. Gastroesophageal reflux disease without esophagitis  -recommend changing omeprazole to 40 mg at bedtime  -have 3 hours before lying flat  -avoid food triggers  -will give pt education in Yoruba on gerd in the after visit summary   2. Irritable bowel syndrome with diarrhea  -symptoms well controlled currently     3. Diverticulosis of colon  -rec high fiber diet     4. Adenomatous polyp of colon, unspecified part of colon  Colon in 2021, given a five year follow up; no polyps on this one but has had prior polyps, due for repeat colonoscopy in 2026    Follow up in a few months time      ______________________________________________________________________    SUBJECTIVE:  Pt here for follow up.  History is obtained from the Yoruba interpretor.      She reports abdominal pain worse in the morning.  Denies diarrhea. Denies GERD symptoms.    Takes tylenol.  Taking omeprazole 40 mg every morning.      Drinks decaf coffee.        REVIEW OF SYSTEMS IS OTHERWISE NEGATIVE.      Historical Information   Past Medical History:   Diagnosis Date   • Abnormal mammogram     RESOLVED: 14NOV2017   • Anxiety    • Anxiety    • Arthritis    • Depression    • Depression    • Diabetes mellitus (HCC)     Pt denies   • Diverticulosis    • GERD (gastroesophageal reflux disease)    • Helicobacter pylori infection    • Hyperlipidemia    • Seborrheic keratosis     LAST ASSESSED: 05JUN2017   • Sleep apnea    • Sleep apnea    • Tinea pedis of left foot 9/16/2019     Past Surgical History:   Procedure Laterality Date   • ABDOMINOPLASTY      abdomin   • COLONOSCOPY  2017   • [place] COLONOSCOPY FLX DX W/COLLJ SPEC WHEN PFRMD N/A 8/11/2016    Procedure: COLONOSCOPY;  Surgeon: Bart Espinoza MD;  Location: [facility];  Service: Gastroenterology    • [place] COLONOSCOPY FLX DX W/COLLJ SPEC WHEN PFRMD N/A 2017    Procedure: EGD AND COLONOSCOPY;  Surgeon: Bart Espinoza MD;  Location: [facility];  Service: Gastroenterology   • KS TENDON SHEATH INCISION Right 3/8/2022    Procedure: Right thumb, long, and ring finger trigger finger releases;  Surgeon: Gary Wray MD;  Location: [facility];  Service: Orthopedics   • TUBAL LIGATION     • TUBAL LIGATION     • UPPER GASTROINTESTINAL ENDOSCOPY       Social History   Social History     Substance and Sexual Activity   Alcohol Use No     Social History     Substance and Sexual Activity   Drug Use No     Social History     Tobacco Use   Smoking Status Never   Smokeless Tobacco Never     Family History   Problem Relation Age of Onset   • Diabetes Mother    • Hypertension Mother    • Heart disease Mother    • Diabetes Paternal Grandfather    • Alzheimer's disease Father    • No Known Problems Sister    • No Known Problems Brother    • Depression Daughter    • ADD / ADHD Son    • Depression Son    • Diabetes Maternal Grandmother    • Stomach cancer Maternal Grandfather    • No Known Problems Paternal Grandmother    • No Known Problems Sister    • Heart disease Sister    • Intellectual disability Brother    • Schizophrenia Brother    • Other Son          as an infant    • Other Daughter          at 1-2 mths old   • Other Son          as an infant        Meds/Allergies       Current Outpatient Medications:   •  acetaminophen (TYLENOL) 650 mg CR tablet  •  aluminum-magnesium hydroxide-simethicone (MAALOX MAX) 400-400-40 MG/5ML suspension  •  ARIPiprazole (ABILIFY) 5 mg tablet  •  Ascorbic Acid (vitamin C) 1000 MG tablet  •  atorvastatin (LIPITOR) 10 mg tablet  •  Calcium Polycarbophil (FIBERCON PO)  •  Cholecalciferol (VITAMIN D3) 125 MCG (5000 UT) CAPS  •  clonazePAM (KlonoPIN) 1 mg tablet  •  dexamethasone sodium phosphate 0.1 % ophthalmic solution  •  Diclofenac Sodium (VOLTAREN) 1 %  •   "dicyclomine (BENTYL) 20 mg tablet  •  DULoxetine (CYMBALTA) 30 mg delayed release capsule  •  metFORMIN (GLUCOPHAGE) 500 mg tablet  •  Methylcobalamin (B-12) 5000 MCG TBDP  •  multivitamin (THERAGRAN) TABS  •  omeprazole (PriLOSEC) 40 MG capsule  •  Restasis 0.05 % ophthalmic emulsion  •  Blood Glucose Monitoring Suppl (OneTouch Verio Reflect) w/Device KIT  •  tiZANidine (Zanaflex) 4 mg tablet    No Known Allergies        Objective     Blood pressure 136/78, temperature 98.2 °F (36.8 °C), temperature source Tympanic, height 5' 2\" (1.575 m), weight 72.9 kg (160 lb 12.8 oz), not currently breastfeeding. Body mass index is 29.41 kg/m².      PHYSICAL EXAM:      General Appearance:   Alert, cooperative, no distress   HEENT:   Normocephalic, atraumatic, anicteric.     Neck:  Supple, symmetrical, trachea midline   Lungs:   Clear to auscultation bilaterally; no rales, rhonchi or wheezing; respirations unlabored    Heart::   Regular rate and rhythm; no murmur, rub, or gallop.   Abdomen:   Soft, non-tender, non-distended; normal bowel sounds; no masses, no organomegaly    Genitalia:   Deferred    Rectal:   Deferred    Extremities:  No cyanosis, clubbing or edema    Pulses:  2+ and symmetric    Skin:  No jaundice, rashes, or lesions    Lymph nodes:  No palpable cervical lymphadenopathy        Lab Results:   No visits with results within 1 Day(s) from this visit.   Latest known visit with results is:   Appointment on 05/15/2024   Component Date Value   • Cholesterol 05/15/2024 126    • Triglycerides 05/15/2024 129    • HDL, Direct 05/15/2024 44 (L)    • LDL Calculated 05/15/2024 56    • Non-HDL-Chol (CHOL-HDL) 05/15/2024 82    • WBC 05/15/2024 5.97    • RBC 05/15/2024 4.48    • Hemoglobin 05/15/2024 12.6    • Hematocrit 05/15/2024 41.2    • MCV 05/15/2024 92    • MCH 05/15/2024 28.1    • MCHC 05/15/2024 30.6 (L)    • RDW 05/15/2024 13.4    • MPV 05/15/2024 9.8    • Platelets 05/15/2024 337    • nRBC 05/15/2024 0    • Segmented " % 05/15/2024 52    • Immature Grans % 05/15/2024 0    • Lymphocytes % 05/15/2024 35    • Monocytes % 05/15/2024 10    • Eosinophils Relative 05/15/2024 2    • Basophils Relative 05/15/2024 1    • Absolute Neutrophils 05/15/2024 3.13    • Absolute Immature Grans 05/15/2024 0.02    • Absolute Lymphocytes 05/15/2024 2.09    • Absolute Monocytes 05/15/2024 0.57    • Eosinophils Absolute 05/15/2024 0.11    • Basophils Absolute 05/15/2024 0.05    • Sodium 05/15/2024 142    • Potassium 05/15/2024 4.2    • Chloride 05/15/2024 104    • CO2 05/15/2024 28    • ANION GAP 05/15/2024 10    • BUN 05/15/2024 13    • Creatinine 05/15/2024 0.91    • Glucose, Fasting 05/15/2024 121 (H)    • Calcium 05/15/2024 9.4    • AST 05/15/2024 13    • ALT 05/15/2024 12    • Alkaline Phosphatase 05/15/2024 61    • Total Protein 05/15/2024 7.4    • Albumin 05/15/2024 4.5    • Total Bilirubin 05/15/2024 0.38    • eGFR 05/15/2024 66    • TSH 3RD GENERATON 05/15/2024 2.526    • Hemoglobin A1C 05/15/2024 6.4 (H)    • EAG 05/15/2024 137    • Vit D, 25-Hydroxy 05/15/2024 30.5    • Iron Saturation 05/15/2024 23    • TIBC 05/15/2024 339    • Iron 05/15/2024 79    • UIBC 05/15/2024 260    • Ferritin 05/15/2024 51          Radiology Results:   No results found.

## 2024-06-18 ENCOUNTER — HOSPITAL ENCOUNTER (OUTPATIENT)
Dept: MAMMOGRAPHY | Facility: CLINIC | Age: 64
Discharge: HOME | End: 2024-06-18
Payer: COMMERCIAL

## 2024-06-18 VITALS — WEIGHT: 168 LBS | BODY MASS INDEX: 30.91 KG/M2 | HEIGHT: 62 IN

## 2024-06-18 DIAGNOSIS — R92.8 FOLLOW-UP EXAMINATION OF ABNORMAL MAMMOGRAM: ICD-10-CM

## 2024-06-18 PROCEDURE — 77066 DX MAMMO INCL CAD BI: CPT

## 2024-06-18 PROCEDURE — G0279 TOMOSYNTHESIS, MAMMO: HCPCS

## 2024-06-19 ENCOUNTER — OFFICE VISIT (OUTPATIENT)
Dept: INTERNAL MEDICINE CLINIC | Facility: CLINIC | Age: 64
End: 2024-06-19

## 2024-06-19 VITALS
BODY MASS INDEX: 29.78 KG/M2 | TEMPERATURE: 98.6 F | WEIGHT: 162.8 LBS | SYSTOLIC BLOOD PRESSURE: 125 MMHG | OXYGEN SATURATION: 98 % | HEART RATE: 91 BPM | DIASTOLIC BLOOD PRESSURE: 81 MMHG

## 2024-06-19 DIAGNOSIS — F32.89 OTHER DEPRESSION: ICD-10-CM

## 2024-06-19 DIAGNOSIS — G47.33 OSA (OBSTRUCTIVE SLEEP APNEA): ICD-10-CM

## 2024-06-19 DIAGNOSIS — K58.0 IRRITABLE BOWEL SYNDROME WITH DIARRHEA: ICD-10-CM

## 2024-06-19 DIAGNOSIS — K57.30 DIVERTICULOSIS OF COLON: Chronic | ICD-10-CM

## 2024-06-19 DIAGNOSIS — Z23 ENCOUNTER FOR IMMUNIZATION: ICD-10-CM

## 2024-06-19 DIAGNOSIS — E78.2 MIXED HYPERLIPIDEMIA: ICD-10-CM

## 2024-06-19 DIAGNOSIS — K21.9 GASTROESOPHAGEAL REFLUX DISEASE WITHOUT ESOPHAGITIS: ICD-10-CM

## 2024-06-19 DIAGNOSIS — E11.9 TYPE 2 DIABETES MELLITUS WITHOUT COMPLICATION, WITHOUT LONG-TERM CURRENT USE OF INSULIN (HCC): Primary | ICD-10-CM

## 2024-06-19 PROCEDURE — 90471 IMMUNIZATION ADMIN: CPT | Performed by: INTERNAL MEDICINE

## 2024-06-19 PROCEDURE — 99213 OFFICE O/P EST LOW 20 MIN: CPT | Performed by: INTERNAL MEDICINE

## 2024-06-19 PROCEDURE — 90750 HZV VACC RECOMBINANT IM: CPT | Performed by: INTERNAL MEDICINE

## 2024-06-19 RX ORDER — OMEPRAZOLE 20 MG/1
20 CAPSULE, DELAYED RELEASE ORAL DAILY
Qty: 30 CAPSULE | Refills: 2 | Status: SHIPPED | OUTPATIENT
Start: 2024-06-19 | End: 2024-09-17

## 2024-06-20 NOTE — PROGRESS NOTES
Ambulatory Visit  Name: Trice Donaldson      : 1960      MRN: [id number]  Encounter Provider: Chris Ferreira MD  Encounter Date: 2024   Encounter department: [facility]    Assessment & Plan   1. Type 2 diabetes mellitus without complication, without long-term current use of insulin (HCC)  Patient with history of very well controlled DM2 on Metformin 500 mg BID and lifestyle modifications  Hba1c (2024) - 6.4  A1c non-diabetic range since belem 2 years  Uptodate one foot exam, eye exam, and MACR    Plan  Continue metformin 500 mg BID   Switch to yearly A1c measurement    2. Mixed hyperlipidemia  Prior LDL elevations   Now LDL 56 on atorvastatin 10 mg OD    Plan  Continue atorvastatin 10 mg OD  Lipid panel every 2 years    3. Gastroesophageal reflux disease without esophagitis  4. Diverticulosis of colon  5. Irritable bowel syndrome with diarrhea  Patient with history of GERD, IBS-D and diverticulosis  Continues to have lower abdominal pain/discomfort that improves/resolves after bowel movement  Has on and off diarrhea  Previously on omeprazole 40 mg early morning  Recently seen by GI for diarrhea and GERD   All stool studies including O&P, elastase, C diff and bacterial panel negative    Advised to switch omeprazole from morning to night but patient under impression of BID dosing    Plan  Educated on IBS-D and diverticulosis lifestyle management  Provided instructions for the same in [place]  Encourage high fibre diet and exercise  Advised to switch to once daily dosing of omeprazole at night - diarrhea can be associated with PPI as well    6. ALEC (obstructive sleep apnea)  Diagnosed in  on PSG   On CPAP since then  Re-established with sleep medicine recently    Plan  Continue CPAP and follow up as per their recommendations    7. Other depression  Stable on current medications  Follows with psychiatrist and psychology    8. Encounter for immunization  -      Zoster Vaccine Recombinant IM    BMI Counseling: Body mass index is 29.78 kg/m². The BMI is above normal. Nutrition recommendations include reducing portion sizes, decreasing overall calorie intake, reducing fast food intake, consuming healthier snacks, moderation in carbohydrate intake, increasing intake of lean protein, reducing intake of saturated fat and trans fat, and reducing intake of cholesterol. Exercise recommendations include moderate aerobic physical activity for 150 minutes/week, exercising 3-5 times per week, joining a gym, and strength training exercises.      Health Maintenance  Screenings : mammogram uptodate, colonoscopy next due 2026, non smoker  Immunizations : due to shingrix - administered today    Follow up in 6 months for annual physical    History of Present Illness     Trice Donaldson is a 63/F with PMH controlled DM2, hyperlipidemia, ALEC on CPAP, Depression in remission, IBS-D, Colon polyps who presents for DM2 follow up and mentions ongoing abdominal pain and diarrhea as outlined above.    [place] INTERPRETOR NESHA ([id number]) UTILIZED FOR THIS ENCOUNTER    Diabetes  Trice presents for Trice's follow-up diabetic visit. Trice has type 2 diabetes mellitus. Trice's disease course has been stable. There are no hypoglycemic associated symptoms. Pertinent negatives for hypoglycemia include no seizures. There are no diabetic associated symptoms. Pertinent negatives for diabetes include no chest pain. There are no hypoglycemic complications. Symptoms are resolved. There are no diabetic complications. Risk factors for coronary artery disease include dyslipidemia, sedentary lifestyle, post-menopausal, obesity and diabetes mellitus. Current diabetic treatment includes oral agent (monotherapy) and diet. Trice is compliant with treatment all of the time. Trice's weight is stable. Trice is following a diabetic diet. Meal planning includes calorie counting, avoidance of concentrated sweets and carbohydrate  counting. Trice has not had a previous visit with a dietitian. Trice rarely participates in exercise. There is no change in Trice's home blood glucose trend. Trice does not see a podiatrist.Eye exam is current.     Review of Systems   Constitutional:  Negative for chills and fever.   HENT:  Negative for ear pain and sore throat.    Eyes:  Negative for pain and visual disturbance.   Respiratory:  Negative for cough and shortness of breath.    Cardiovascular:  Negative for chest pain and palpitations.   Gastrointestinal:  Positive for abdominal pain and diarrhea. Negative for constipation, nausea and vomiting.   Genitourinary:  Negative for dysuria and hematuria.   Musculoskeletal:  Negative for arthralgias and back pain.   Skin:  Negative for color change and rash.   Neurological:  Negative for seizures and syncope.   Psychiatric/Behavioral:  Negative for agitation and behavioral problems.    All other systems reviewed and are negative.    Past Medical History:   Diagnosis Date    Abnormal mammogram     RESOLVED: [id number]    Anxiety     Anxiety     Arthritis     Depression     Depression     Diabetes mellitus (HCC)     Pt denies    Diverticulosis     GERD (gastroesophageal reflux disease)     Helicobacter pylori infection     Hyperlipidemia     Seborrheic keratosis     LAST ASSESSED: [id number]    Sleep apnea     Sleep apnea     Tinea pedis of left foot 9/16/2019     Past Surgical History:   Procedure Laterality Date    ABDOMINOPLASTY      abdomin    COLONOSCOPY  2017    HI COLONOSCOPY FLX DX W/COLLJ SPEC WHEN PFRMD N/A 8/11/2016    Procedure: COLONOSCOPY;  Surgeon: Bart Espinoza MD;  Location:  GI LAB;  Service: Gastroenterology    HI COLONOSCOPY FLX DX W/COLLJ SPEC WHEN PFRMD N/A 8/29/2017    Procedure: EGD AND COLONOSCOPY;  Surgeon: Bart Espinoza MD;  Location: [facility] GI LAB;  Service: Gastroenterology    HI TENDON SHEATH INCISION Right 3/8/2022    Procedure: Right thumb, long, and ring finger trigger finger  releases;  Surgeon: Gary Wray MD;  Location: [facility];  Service: Orthopedics    TUBAL LIGATION      TUBAL LIGATION      UPPER GASTROINTESTINAL ENDOSCOPY  2017     Family History   Problem Relation Age of Onset    Diabetes Mother     Hypertension Mother     Heart disease Mother     Diabetes Paternal Grandfather     Alzheimer's disease Father     No Known Problems Sister     No Known Problems Brother     Depression Daughter     ADD / ADHD Son     Depression Son     Diabetes Maternal Grandmother     Stomach cancer Maternal Grandfather     No Known Problems Paternal Grandmother     No Known Problems Sister     Heart disease Sister     Intellectual disability Brother     Schizophrenia Brother     Other Son          as an infant     Other Daughter          at 1-2 mths old    Other Son          as an infant      Social History     Tobacco Use    Smoking status: Never    Smokeless tobacco: Never   Vaping Use    Vaping status: Never Used   Substance and Sexual Activity    Alcohol use: No    Drug use: No    Sexual activity: Yes     Partners: Male     Birth control/protection: Post-menopausal     Current Outpatient Medications on File Prior to Visit   Medication Sig    acetaminophen (TYLENOL) 650 mg CR tablet Take 1 tablet (650 mg total) by mouth every 8 (eight) hours as needed for mild pain    aluminum-magnesium hydroxide-simethicone (MAALOX MAX) 400-400-40 MG/5ML suspension Take 5 mL by mouth every 6 (six) hours as needed for indigestion or heartburn    ARIPiprazole (ABILIFY) 5 mg tablet Take 5 mg by mouth daily    Ascorbic Acid (vitamin C) 1000 MG tablet Take 1,000 mg by mouth daily    atorvastatin (LIPITOR) 10 mg tablet Take 1 tablet (10 mg total) by mouth daily    Blood Glucose Monitoring Suppl (OneTouch Verio Reflect) w/Device KIT Check blood sugars once daily. Please substitute with appropriate alternative as covered by patient's insurance. Dx: E11.65 (Patient not taking: Reported on 2024)     Calcium Polycarbophil (FIBERCON PO) Take by mouth    Cholecalciferol (VITAMIN D3) 125 MCG (5000 UT) CAPS Take 1,000 Units by mouth    clonazePAM (KlonoPIN) 1 mg tablet Take 1 mg by mouth daily at bedtime as needed for anxiety.      dexamethasone sodium phosphate 0.1 % ophthalmic solution     Diclofenac Sodium (VOLTAREN) 1 % Apply 2 g topically 4 (four) times a day    dicyclomine (BENTYL) 20 mg tablet Take 1 tablet (20 mg total) by mouth every 6 (six) hours    DULoxetine (CYMBALTA) 30 mg delayed release capsule Take 1 capsule (30 mg total) by mouth daily    metFORMIN (GLUCOPHAGE) 500 mg tablet Take 1 tablet (500 mg total) by mouth 2 (two) times a day with meals    Methylcobalamin (B-12) 5000 MCG TBDP Take by mouth    multivitamin (THERAGRAN) TABS Take 1 tablet by mouth daily    Restasis 0.05 % ophthalmic emulsion     [DISCONTINUED] omeprazole (PriLOSEC) 40 MG capsule Take 1 capsule (40 mg total) by mouth 2 (two) times a day    [DISCONTINUED] tiZANidine (Zanaflex) 4 mg tablet Take 1 tablet (4 mg total) by mouth daily at bedtime for 5 days     No Known Allergies  Immunization History   Administered Date(s) Administered    COVID-19 PFIZER VACCINE 0.3 ML IM 03/29/2021, 04/19/2021, 12/28/2021, 06/09/2022    COVID-19 Pfizer Vac BIVALENT Ivan-sucrose 12 Yr+ IM 12/19/2022    Influenza Quadrivalent Preservative Free 3 years and older IM 11/14/2017    Influenza, injectable, quadrivalent, preservative free 0.5 mL 11/16/2023    Influenza, recombinant, quadrivalent,injectable, preservative free 09/11/2018, 09/24/2020    Influenza, seasonal, injectable 08/12/2014    Pneumococcal Conjugate Vaccine 20-valent (Pcv20), Polysace 09/01/2022    Pneumococcal Polysaccharide PPV23 09/11/2018    Tdap 02/12/2015    Zoster Vaccine Recombinant 06/19/2024     Objective     /81 (BP Location: Left arm, Patient Position: Sitting, Cuff Size: Standard)   Pulse 91   Temp 98.6 °F (37 °C) (Temporal)   Wt 73.8 kg (162 lb 12.8 oz)   LMP  (LMP  Unknown)   SpO2 98%   BMI 29.78 kg/m²     Physical Exam  Vitals and nursing note reviewed.   Constitutional:       General: Trice is not in acute distress.     Appearance: Trice is well-developed.   HENT:      Head: Normocephalic and atraumatic.   Eyes:      Conjunctiva/sclera: Conjunctivae normal.   Cardiovascular:      Rate and Rhythm: Normal rate and regular rhythm.      Heart sounds: No murmur heard.  Pulmonary:      Effort: Pulmonary effort is normal. No respiratory distress.      Breath sounds: Normal breath sounds.   Abdominal:      Palpations: Abdomen is soft.      Tenderness: There is no abdominal tenderness.   Musculoskeletal:         General: No swelling.      Cervical back: Neck supple.   Skin:     General: Skin is warm and dry.      Capillary Refill: Capillary refill takes less than 2 seconds.   Neurological:      General: No focal deficit present.      Mental Status: Trice is alert and oriented to person, place, and time.   Psychiatric:         Mood and Affect: Mood normal.         Behavior: Behavior normal.       Administrative Statements   I have spent a total time of 40 minutes on 06/19/24 In caring for this patient including Diagnostic results, Prognosis, Risks and benefits of tx options, Instructions for management, Patient and family education, Importance of tx compliance, Risk factor reductions, Impressions, Counseling / Coordination of care, Documenting in the medical record, Reviewing / ordering tests, medicine, procedures  , and Obtaining or reviewing history  .    Chris Ferreira MD  PGY-2, Internal Medicine  [facility]

## 2024-07-10 ENCOUNTER — TELEPHONE (OUTPATIENT)
Dept: INTERNAL MEDICINE CLINIC | Facility: CLINIC | Age: 64
End: 2024-07-10

## 2024-07-10 DIAGNOSIS — M25.562 LEFT KNEE PAIN: ICD-10-CM

## 2024-07-10 DIAGNOSIS — M17.9 OSTEOARTHRITIS OF KNEE: ICD-10-CM

## 2024-07-10 DIAGNOSIS — M25.562 ACUTE PAIN OF LEFT KNEE: ICD-10-CM

## 2024-07-10 NOTE — TELEPHONE ENCOUNTER
Received call from patient. Interpretor ID [id number]. Introduced self and role. Patient stated she contacted office for assistance with setting up a dental appointment. Patient spoke with someone and no other current concerns/questions at this time.

## 2024-08-05 DIAGNOSIS — E78.5 HYPERLIPIDEMIA, UNSPECIFIED HYPERLIPIDEMIA TYPE: ICD-10-CM

## 2024-08-06 RX ORDER — ATORVASTATIN CALCIUM 10 MG/1
10 TABLET, FILM COATED ORAL DAILY
Qty: 90 TABLET | Refills: 3 | Status: SHIPPED | OUTPATIENT
Start: 2024-08-06 | End: 2024-08-15 | Stop reason: SDUPTHER

## 2024-08-15 DIAGNOSIS — E78.5 HYPERLIPIDEMIA, UNSPECIFIED HYPERLIPIDEMIA TYPE: ICD-10-CM

## 2024-08-15 RX ORDER — ATORVASTATIN CALCIUM 10 MG/1
10 TABLET, FILM COATED ORAL DAILY
Qty: 90 TABLET | Refills: 3 | Status: SHIPPED | OUTPATIENT
Start: 2024-08-15

## 2024-08-15 NOTE — TELEPHONE ENCOUNTER
Received call from [facility] requesting Atorvastatin script be resent by a physician enrolled in Medicaid.     Script resent under an attending.

## 2024-09-04 ENCOUNTER — PATIENT OUTREACH (OUTPATIENT)
Dept: INTERNAL MEDICINE CLINIC | Facility: CLINIC | Age: 64
End: 2024-09-04

## 2024-09-04 ENCOUNTER — TELEPHONE (OUTPATIENT)
Dept: INTERNAL MEDICINE CLINIC | Facility: CLINIC | Age: 64
End: 2024-09-04

## 2024-09-04 ENCOUNTER — OFFICE VISIT (OUTPATIENT)
Dept: INTERNAL MEDICINE CLINIC | Facility: CLINIC | Age: 64
End: 2024-09-04

## 2024-09-04 VITALS
WEIGHT: 159.31 LBS | TEMPERATURE: 97 F | HEIGHT: 60 IN | BODY MASS INDEX: 31.28 KG/M2 | SYSTOLIC BLOOD PRESSURE: 125 MMHG | HEART RATE: 81 BPM | OXYGEN SATURATION: 98 % | DIASTOLIC BLOOD PRESSURE: 69 MMHG

## 2024-09-04 DIAGNOSIS — Z78.9 NEEDS ASSISTANCE WITH COMMUNITY RESOURCES: Primary | ICD-10-CM

## 2024-09-04 DIAGNOSIS — E11.9 TYPE 2 DIABETES MELLITUS WITHOUT COMPLICATION, WITHOUT LONG-TERM CURRENT USE OF INSULIN (HCC): ICD-10-CM

## 2024-09-04 DIAGNOSIS — L81.4 LENTIGO: Primary | ICD-10-CM

## 2024-09-04 PROBLEM — M25.562 ACUTE PAIN OF LEFT KNEE: Status: RESOLVED | Noted: 2021-03-20 | Resolved: 2024-09-04

## 2024-09-04 PROBLEM — L30.9 DERMATITIS: Status: RESOLVED | Noted: 2019-09-16 | Resolved: 2024-09-04

## 2024-09-04 PROBLEM — Z86.19 HISTORY OF HELICOBACTER PYLORI INFECTION: Status: RESOLVED | Noted: 2018-06-18 | Resolved: 2024-09-04

## 2024-09-04 PROBLEM — S90.829A: Status: RESOLVED | Noted: 2020-05-26 | Resolved: 2024-09-04

## 2024-09-04 PROBLEM — B35.3 TINEA PEDIS OF LEFT FOOT: Status: RESOLVED | Noted: 2019-09-16 | Resolved: 2024-09-04

## 2024-09-04 PROBLEM — K57.92 ACUTE DIVERTICULITIS: Status: RESOLVED | Noted: 2020-11-10 | Resolved: 2024-09-04

## 2024-09-04 PROBLEM — R51.9 HEADACHE: Chronic | Status: RESOLVED | Noted: 2018-07-19 | Resolved: 2024-09-04

## 2024-09-04 PROBLEM — L08.9: Status: RESOLVED | Noted: 2020-05-26 | Resolved: 2024-09-04

## 2024-09-04 PROBLEM — R19.4 CHANGE IN BOWEL HABIT: Status: RESOLVED | Noted: 2018-08-27 | Resolved: 2024-09-04

## 2024-09-04 PROBLEM — R09.81 NASAL CONGESTION: Status: RESOLVED | Noted: 2019-09-16 | Resolved: 2024-09-04

## 2024-09-04 PROCEDURE — 99214 OFFICE O/P EST MOD 30 MIN: CPT | Performed by: PHYSICIAN ASSISTANT

## 2024-09-04 RX ORDER — CYCLOSPORINE 0 G/ML
SOLUTION/ DROPS OPHTHALMIC; TOPICAL
COMMUNITY
Start: 2024-09-03

## 2024-09-04 RX ORDER — HYDROQUINONE 40 MG/G
CREAM TOPICAL 2 TIMES DAILY
Qty: 28 G | Refills: 3 | Status: SHIPPED | OUTPATIENT
Start: 2024-09-04

## 2024-09-04 NOTE — ASSESSMENT & PLAN NOTE
Patient also would like to apply for the waiver program as her granddaughter helps take care of her. She helps with her house, ADLs, and transportation to her appointments. Referral to Faiza MARQUEZ. Patient states she will come back later today to start the process.

## 2024-09-04 NOTE — TELEPHONE ENCOUNTER
Folder Color: Blue     Name of Form: Physician Certification        Form to be filled out by: Hanna     Form to be Faxed: [phone]     Patient made aware of 10 day business policy.

## 2024-09-04 NOTE — PROGRESS NOTES
SW has met with pt and her 27 y/o Grand dgt Apolinar to assist then with a PA Waiver referral as per PCP request.  Pt's Granddgt assisted with translation as per her request.  Pt /grand dgt shares the Pt needs assistance with her ADLs.  Her Grand dgt sees her daily and assists with bathing / dressing.  She assist with meal preparation and medication administration and housekeeping.  Pt has poor use of her hands which makes functioning difficult.  Pt also has MH issues. She attends [facility] and her MH Provider is recommending in home services as well.  Sw has reviewed the PA waiver application process and pt is agreeable.    Sw assisted with a call to the [facility] and pt has her first [facility] appointment schedule for thi Friday 9/6/24 between 1 PM - 3 PM with Cristina Hanna.    SW has started a TASK to have PCP complete Medical Certification form to be faxed into the [facility].    Pt is from [facility].    She lives alone in her own apt with her service dog.  Her Grand dgt assist her with transportation and [facility].  ALMA will ask [facility] to f/u with pt/family to assist with the PA Waiver application as needed.

## 2024-09-04 NOTE — PROGRESS NOTES
Ambulatory Visit  Name: Trice Donaldson      : 1960      MRN: [id number]  Encounter Provider: Luisa Buck PA-C  Encounter Date: 2024   Encounter department: [facility]    Assessment & Plan   1. Lentigo  Assessment & Plan:  Reassure patient that the area of concern is an age spot/sun spot - Lentigo. She is not happy with the appearance and would like to try something. Declines referral to dermatology at this time. Agreeable to try Hydroquinone 4% cream BID. Will recheck at next visit in 3 months. Return sooner if needed.   Orders:  -     hydroquinone 4 % cream; Apply topically 2 (two) times a day  2. Type 2 diabetes mellitus without complication, without long-term current use of insulin ([facility])  Assessment & Plan:  Patient also would like to apply for the waiver program as her granddaughter helps take care of her. She helps with her house, ADLs, and transportation to her appointments. Referral to Faiza MARQUEZ. Patient states she will come back later today to start the process.     Orders:  -     Ambulatory Referral to Social Work Care Management Program; Future       History of Present Illness     Patient is a 64 year old female presenting for a concern. States she has had a spot on her left cheek for awhile. It is not painful. It has not spread. It has not changed in appearance. No family history of skin cancer. She has not tried anything for it yet.   Use of  services was utilized during visit.         Review of Systems   Constitutional:  Negative for chills and fever.   Gastrointestinal:  Negative for nausea and vomiting.   Skin:  Negative for rash.   Neurological:  Negative for dizziness, weakness, light-headedness and headaches.   Hematological:  Negative for adenopathy.     Past Medical History   Past Medical History:   Diagnosis Date    Abnormal mammogram     RESOLVED: 2017    Anxiety     Anxiety     Arthritis     Depression     Depression      Diabetes mellitus (HCC)     Pt denies    Diverticulosis     GERD (gastroesophageal reflux disease)     Helicobacter pylori infection     History of Helicobacter pylori infection 2018    Hyperlipidemia     Seborrheic keratosis     LAST ASSESSED: 2017    Sleep apnea     Sleep apnea     Tinea pedis of left foot 2019     Past Surgical History:   Procedure Laterality Date    ABDOMINOPLASTY      abdomin    COLONOSCOPY  2017    MT COLONOSCOPY FLX DX W/COLLJ SPEC WHEN PFRMD N/A 2016    Procedure: COLONOSCOPY;  Surgeon: Bart Espinoza MD;  Location:  GI LAB;  Service: Gastroenterology    MT COLONOSCOPY FLX DX W/COLLJ SPEC WHEN PFRMD N/A 2017    Procedure: EGD AND COLONOSCOPY;  Surgeon: Bart Espinoza MD;  Location: [facility] GI LAB;  Service: Gastroenterology    MT TENDON SHEATH INCISION Right 3/8/2022    Procedure: Right thumb, long, and ring finger trigger finger releases;  Surgeon: Gary Wray MD;  Location:  MAIN OR;  Service: Orthopedics    TUBAL LIGATION      TUBAL LIGATION      UPPER GASTROINTESTINAL ENDOSCOPY       Family History   Problem Relation Age of Onset    Diabetes Mother     Hypertension Mother     Heart disease Mother     Diabetes Paternal Grandfather     Alzheimer's disease Father     No Known Problems Sister     No Known Problems Brother     Depression Daughter     ADD / ADHD Son     Depression Son     Diabetes Maternal Grandmother     Stomach cancer Maternal Grandfather     No Known Problems Paternal Grandmother     No Known Problems Sister     Heart disease Sister     Intellectual disability Brother     Schizophrenia Brother     Other Son          as an infant     Other Daughter          at 1-2 mths old    Other Son          as an infant      Current Outpatient Medications on File Prior to Visit   Medication Sig Dispense Refill    Cequa 0.09 % SOLN       acetaminophen (TYLENOL) 650 mg CR tablet Take 1 tablet (650 mg total) by mouth every 8 (eight) hours  as needed for mild pain 30 tablet 0    ARIPiprazole (ABILIFY) 5 mg tablet Take 5 mg by mouth daily      Ascorbic Acid (vitamin C) 1000 MG tablet Take 1,000 mg by mouth daily      atorvastatin (LIPITOR) 10 mg tablet Take 1 tablet (10 mg total) by mouth daily 90 tablet 3    Blood Glucose Monitoring Suppl (OneTouch Verio Reflect) w/Device KIT Check blood sugars once daily. Please substitute with appropriate alternative as covered by patient's insurance. Dx: E11.65 (Patient not taking: Reported on 4/9/2024) 1 kit 0    Calcium Polycarbophil (FIBERCON PO) Take by mouth      Cholecalciferol (VITAMIN D3) 125 MCG (5000 UT) CAPS Take 1,000 Units by mouth      clonazePAM (KlonoPIN) 1 mg tablet Take 1 mg by mouth daily at bedtime as needed for anxiety.        dexamethasone sodium phosphate 0.1 % ophthalmic solution       Diclofenac Sodium (VOLTAREN) 1 % Apply 2 g topically 4 (four) times a day 150 g 2    dicyclomine (BENTYL) 20 mg tablet Take 1 tablet (20 mg total) by mouth every 6 (six) hours 120 tablet 3    DULoxetine (CYMBALTA) 30 mg delayed release capsule Take 1 capsule (30 mg total) by mouth daily 90 capsule 3    metFORMIN (GLUCOPHAGE) 500 mg tablet Take 1 tablet (500 mg total) by mouth 2 (two) times a day with meals 360 tablet 3    Methylcobalamin (B-12) 5000 MCG TBDP Take by mouth      multivitamin (THERAGRAN) TABS Take 1 tablet by mouth daily      omeprazole (PriLOSEC) 20 mg delayed release capsule Take 1 capsule (20 mg total) by mouth daily 30 capsule 2    [DISCONTINUED] aluminum-magnesium hydroxide-simethicone (MAALOX MAX) 400-400-40 MG/5ML suspension Take 5 mL by mouth every 6 (six) hours as needed for indigestion or heartburn 355 mL 1    [DISCONTINUED] Restasis 0.05 % ophthalmic emulsion        No current facility-administered medications on file prior to visit.   No Known Allergies   Current Outpatient Medications on File Prior to Visit   Medication Sig Dispense Refill    Cequa 0.09 % SOLN       acetaminophen  (TYLENOL) 650 mg CR tablet Take 1 tablet (650 mg total) by mouth every 8 (eight) hours as needed for mild pain 30 tablet 0    ARIPiprazole (ABILIFY) 5 mg tablet Take 5 mg by mouth daily      Ascorbic Acid (vitamin C) 1000 MG tablet Take 1,000 mg by mouth daily      atorvastatin (LIPITOR) 10 mg tablet Take 1 tablet (10 mg total) by mouth daily 90 tablet 3    Blood Glucose Monitoring Suppl (OneTouch Verio Reflect) w/Device KIT Check blood sugars once daily. Please substitute with appropriate alternative as covered by patient's insurance. Dx: E11.65 (Patient not taking: Reported on 4/9/2024) 1 kit 0    Calcium Polycarbophil (FIBERCON PO) Take by mouth      Cholecalciferol (VITAMIN D3) 125 MCG (5000 UT) CAPS Take 1,000 Units by mouth      clonazePAM (KlonoPIN) 1 mg tablet Take 1 mg by mouth daily at bedtime as needed for anxiety.        dexamethasone sodium phosphate 0.1 % ophthalmic solution       Diclofenac Sodium (VOLTAREN) 1 % Apply 2 g topically 4 (four) times a day 150 g 2    dicyclomine (BENTYL) 20 mg tablet Take 1 tablet (20 mg total) by mouth every 6 (six) hours 120 tablet 3    DULoxetine (CYMBALTA) 30 mg delayed release capsule Take 1 capsule (30 mg total) by mouth daily 90 capsule 3    metFORMIN (GLUCOPHAGE) 500 mg tablet Take 1 tablet (500 mg total) by mouth 2 (two) times a day with meals 360 tablet 3    Methylcobalamin (B-12) 5000 MCG TBDP Take by mouth      multivitamin (THERAGRAN) TABS Take 1 tablet by mouth daily      omeprazole (PriLOSEC) 20 mg delayed release capsule Take 1 capsule (20 mg total) by mouth daily 30 capsule 2    [DISCONTINUED] aluminum-magnesium hydroxide-simethicone (MAALOX MAX) 400-400-40 MG/5ML suspension Take 5 mL by mouth every 6 (six) hours as needed for indigestion or heartburn 355 mL 1    [DISCONTINUED] Restasis 0.05 % ophthalmic emulsion        No current facility-administered medications on file prior to visit.      Social History     Tobacco Use    Smoking status: Never     Smokeless tobacco: Never   Vaping Use    Vaping status: Never Used   Substance and Sexual Activity    Alcohol use: No    Drug use: No    Sexual activity: Yes     Partners: Male     Birth control/protection: Post-menopausal     Objective     /69 (BP Location: Left arm, Patient Position: Sitting, Cuff Size: Standard)   Pulse 81   Temp (!) 97 °F (36.1 °C) (Temporal)   Ht 5' (1.524 m)   Wt 72.3 kg (159 lb 5 oz)   LMP  (LMP Unknown)   SpO2 98%   BMI 31.11 kg/m²     Physical Exam  Vitals and nursing note reviewed.   Constitutional:       General: Trice is awake. Trice is not in acute distress.     Appearance: Normal appearance. Trice is well-developed and well-groomed. Trice is obese. Trice is not ill-appearing.   HENT:      Head: Normocephalic and atraumatic.   Eyes:      General: No scleral icterus.     Conjunctiva/sclera: Conjunctivae normal.   Cardiovascular:      Rate and Rhythm: Normal rate and regular rhythm.      Pulses: Normal pulses.      Heart sounds: Normal heart sounds. No murmur heard.  Pulmonary:      Effort: Pulmonary effort is normal. No respiratory distress.      Breath sounds: Normal breath sounds and air entry. No decreased air movement. No decreased breath sounds, wheezing, rhonchi or rales.   Skin:     General: Skin is warm.      Coloration: Skin is not jaundiced.      Findings: Lesion (hyperpigmented patch left cheek over zygotmatic arch) present. No rash.   Neurological:      General: No focal deficit present.      Mental Status: Trice is alert and oriented to person, place, and time. Mental status is at baseline.      Coordination: Coordination is intact.      Gait: Gait is intact.   Psychiatric:         Attention and Perception: Attention normal.         Mood and Affect: Mood and affect normal.         Speech: Speech normal.         Behavior: Behavior normal. Behavior is cooperative.       Administrative Statements   I have spent a total time of 30 minutes in caring for this patient on the  day of the visit/encounter including Diagnostic results, Prognosis, Risks and benefits of tx options, Instructions for management, Patient and family education, Importance of tx compliance, Risk factor reductions, Impressions, Counseling / Coordination of care, Reviewing / ordering tests, medicine, procedures  , and Obtaining or reviewing history  .

## 2024-09-04 NOTE — ASSESSMENT & PLAN NOTE
Reassure patient that the area of concern is an age spot/sun spot - Lentigo. She is not happy with the appearance and would like to try something. Declines referral to dermatology at this time. Agreeable to try Hydroquinone 4% cream BID. Will recheck at next visit in 3 months. Return sooner if needed.

## 2024-09-05 PROBLEM — R73.03 PREDIABETES: Status: RESOLVED | Noted: 2017-11-14 | Resolved: 2024-09-05

## 2024-09-07 NOTE — PROGRESS NOTES
Sleep Medicine Outpatient Follow Up Note   Trice oDnaldson 64 y.o. female MRN: [id number]  9/9/2024      Reason for Consultation:    Chief Complaint   Patient presents with    Sleep Apnea     Assessment/Plan:    1. ALEC (obstructive sleep apnea)  Assessment & Plan:  History of mild ALEC AHI 8.9 events per hour.  She had a old machine and new machine was ordered by Dr. Morales last visit in April 2024.  She has received the new CPAP which is a Elli G3.  She has not started using it because she did not know if it was calibrated.    I manually changed her pressure to 6-15 cm H2O and gave her some instructions on how to use the machine.  She has masks at home that she will be using.    She will start using the new CPAP and see us back in a couple months for compliance review.  I instructed that if she has any issues with her machine from a technical standpoint she could call us back and we can help her set up an appointment with [facility]  Orders:  -     PAP DME Resupply/Reorder  2. Restless legs syndrome  Assessment & Plan:  Mild to moderate symptoms of RLS.  Last ferritin 51.  I have asked her to start Vitron-C every Monday Wednesday Friday.  Instructed her on the side effects of constipation.  She would not feel benefit if there is benefit for at least 4 to 6 weeks.  Duloxetine and Abilify likely contributing.  Orders:  -     Iron-Vitamin C (Vitron-C)  MG TABS; Take 1 tablet by mouth 3 (three) times a week    Health Maintenance  Immunization History   Administered Date(s) Administered    COVID-19 PFIZER VACCINE 0.3 ML IM 03/29/2021, 04/19/2021, 12/28/2021, 06/09/2022    COVID-19 Pfizer Vac BIVALENT Ivan-sucrose 12 Yr+ IM 12/19/2022    INFLUENZA 12/26/2021, 12/19/2022    Influenza Quadrivalent Preservative Free 3 years and older IM 11/14/2017    Influenza, injectable, quadrivalent, preservative free 0.5 mL 11/16/2023    Influenza, recombinant, quadrivalent,injectable, preservative free 09/11/2018, 09/24/2020     Influenza, seasonal, injectable 08/12/2014    Pneumococcal Conjugate Vaccine 20-valent (Pcv20), Polysace 09/01/2022    Pneumococcal Polysaccharide PPV23 09/11/2018    Tdap 02/12/2015    Zoster Vaccine Recombinant 06/19/2024        Return in about 2 months (around 11/9/2024).    History of Present Illness   HPI:  Trice Donaldson is a 64 y.o. female who has a past medical history of HLD, DM2, anxiety, who is here for the follow up of ALEC and RLS.      9/9/24 - FU with me - she has received the new machine which is a Elli machine.  She is still using the old machine because she is not sure if the new machine was calibrated correctly.  She had the machine delivered by mail by [employer].  She did not do a CPAP set up in person..  [place] 11.  Still having bothersome symptoms of restless legs that occur mainly during the night.  Sometimes they occur in the middle of the night after waking up    4/9/24 - Consult with Dr. Morales - has a hx of mild ALEC AHI 8.9 events/hour.  CPAP study showed optimal pressure at 13cm H2O.  Machine was 10 years ago so new auto CPAP 5-20cm H2O ordered.  Also has some residual EDS and RLS.  Iron panel ordered (sat 23, ferritin 51)    Meds/Allergies     Current Outpatient Medications:     acetaminophen (TYLENOL) 650 mg CR tablet, Take 1 tablet (650 mg total) by mouth every 8 (eight) hours as needed for mild pain (Patient taking differently: Take 650 mg by mouth if needed for mild pain), Disp: 30 tablet, Rfl: 0    ARIPiprazole (ABILIFY) 5 mg tablet, Take 5 mg by mouth daily, Disp: , Rfl:     Ascorbic Acid (vitamin C) 1000 MG tablet, Take 1,000 mg by mouth daily, Disp: , Rfl:     atorvastatin (LIPITOR) 10 mg tablet, Take 1 tablet (10 mg total) by mouth daily, Disp: 90 tablet, Rfl: 3    Blood Glucose Monitoring Suppl (OneTouch Verio Reflect) w/Device KIT, Check blood sugars once daily. Please substitute with appropriate alternative as covered by patient's insurance. Dx: E11.65, Disp: 1  kit, Rfl: 0    Calcium Polycarbophil (FIBERCON PO), Take by mouth, Disp: , Rfl:     Cequa 0.09 % SOLN, , Disp: , Rfl:     Cholecalciferol (VITAMIN D3) 125 MCG (5000 UT) CAPS, Take 1,000 Units by mouth, Disp: , Rfl:     clonazePAM (KlonoPIN) 1 mg tablet, Take 1 mg by mouth daily at bedtime as needed for anxiety.  , Disp: , Rfl:     dexamethasone sodium phosphate 0.1 % ophthalmic solution, , Disp: , Rfl:     Diclofenac Sodium (VOLTAREN) 1 %, Apply 2 g topically 4 (four) times a day, Disp: 150 g, Rfl: 2    dicyclomine (BENTYL) 20 mg tablet, Take 1 tablet (20 mg total) by mouth every 6 (six) hours, Disp: 120 tablet, Rfl: 3    DULoxetine (CYMBALTA) 30 mg delayed release capsule, Take 1 capsule (30 mg total) by mouth daily, Disp: 90 capsule, Rfl: 3    hydroquinone 4 % cream, Apply topically 2 (two) times a day, Disp: 28 g, Rfl: 3    Iron-Vitamin C (Vitron-C)  MG TABS, Take 1 tablet by mouth 3 (three) times a week, Disp: 90 tablet, Rfl: 1    metFORMIN (GLUCOPHAGE) 500 mg tablet, Take 1 tablet (500 mg total) by mouth 2 (two) times a day with meals, Disp: 360 tablet, Rfl: 3    Methylcobalamin (B-12) 5000 MCG TBDP, Take by mouth, Disp: , Rfl:     multivitamin (THERAGRAN) TABS, Take 1 tablet by mouth daily, Disp: , Rfl:     omeprazole (PriLOSEC) 20 mg delayed release capsule, Take 1 capsule (20 mg total) by mouth daily, Disp: 30 capsule, Rfl: 2    Pred Mild 0.12 % ophthalmic suspension, , Disp: , Rfl:     prednisoLONE sodium phosphate (INFLAMASE FORTE) 1 % ophthalmic solution, , Disp: , Rfl:   No Known Allergies    Vitals: Blood pressure 122/76, height 5' (1.524 m), weight 72 kg (158 lb 12.8 oz), not currently breastfeeding. Body mass index is 31.01 kg/m².      Physical Exam  Vitals and nursing note reviewed.   Constitutional:       General: Trice is not in acute distress.     Appearance: Trice is well-developed. Trice is not ill-appearing, toxic-appearing or diaphoretic.   HENT:      Head: Normocephalic and atraumatic.  "  Eyes:      General: No scleral icterus.     Extraocular Movements: Extraocular movements intact.      Conjunctiva/sclera: Conjunctivae normal.   Cardiovascular:      Rate and Rhythm: Normal rate and regular rhythm.   Pulmonary:      Effort: Pulmonary effort is normal. No respiratory distress.      Breath sounds: Normal breath sounds. No stridor.   Abdominal:      Tenderness: There is no guarding.   Musculoskeletal:         General: No swelling.      Cervical back: Normal range of motion and neck supple. No rigidity.      Right lower leg: No edema.      Left lower leg: No edema.   Skin:     General: Skin is warm and dry.      Coloration: Skin is not jaundiced.   Neurological:      General: No focal deficit present.      Mental Status: Trice is alert and oriented to person, place, and time. Mental status is at baseline.   Psychiatric:         Mood and Affect: Mood normal.             Labs:   I have personally reviewed pertinent lab results.    ABG: No results found for: \"PHART\", \"RGV0MRL\", \"PO2ART\", \"WLD6CAC\", \"U6UNCNUI\", \"BEART\", \"SOURCE\",   CBC:  Lab Results   Component Value Date    WBC 5.97 05/15/2024    HGB 12.6 05/15/2024    HCT 41.2 05/15/2024    MCV 92 05/15/2024     05/15/2024    EOSPCT 2 05/15/2024    EOSABS 0.11 05/15/2024    NEUTOPHILPCT 52 05/15/2024    LYMPHOPCT 35 05/15/2024   ,   CMP:   Lab Results   Component Value Date    SODIUM 142 05/15/2024    K 4.2 05/15/2024     05/15/2024    CO2 28 05/15/2024    ANIONGAP 6 10/09/2015    BUN 13 05/15/2024    CREATININE 0.91 05/15/2024    GLUCOSE 97 10/09/2015    CALCIUM 9.4 05/15/2024    AST 13 05/15/2024    ALT 12 05/15/2024    ALKPHOS 61 05/15/2024    PROT 7.3 10/09/2015    BILITOT 0.27 10/09/2015    EGFR 66 05/15/2024   ,   Ferrtin: No components found for: \"FERRTIN\",  Magensium: No results found for: \"MAGNESIUM\",    Imaging and other studies: I have personally reviewed pertinent reports.   and I have personally reviewed pertinent films in " "PACS    Sleep Study:  Sleep study in 2021 with AHI 8.9 events per hour.  CPAP study afterwards showing optimal pressure of 13 cm H2O    Compliance data:  Not available as she is using a old CPAP machine.  Not currently using the new CPAP machine she received    Hoang Linder MD  Pulmonary, Critical Care and Sleep Medicine  [facility]     Portions of the record may have been created with voice recognition software. Occasional wrong word or \"sound a like\" substitutions may have occurred due to the inherent limitations of voice recognition software. Please read the chart carefully and recognize, using context, where substitutions have occurred.     "

## 2024-09-09 ENCOUNTER — OFFICE VISIT (OUTPATIENT)
Dept: SLEEP CENTER | Facility: CLINIC | Age: 64
End: 2024-09-09
Payer: COMMERCIAL

## 2024-09-09 VITALS
WEIGHT: 158.8 LBS | BODY MASS INDEX: 31.18 KG/M2 | SYSTOLIC BLOOD PRESSURE: 122 MMHG | HEIGHT: 60 IN | DIASTOLIC BLOOD PRESSURE: 76 MMHG

## 2024-09-09 DIAGNOSIS — G47.33 OSA (OBSTRUCTIVE SLEEP APNEA): Primary | ICD-10-CM

## 2024-09-09 DIAGNOSIS — G25.81 RESTLESS LEGS SYNDROME: ICD-10-CM

## 2024-09-09 PROCEDURE — 99214 OFFICE O/P EST MOD 30 MIN: CPT | Performed by: INTERNAL MEDICINE

## 2024-09-09 PROCEDURE — G2211 COMPLEX E/M VISIT ADD ON: HCPCS | Performed by: INTERNAL MEDICINE

## 2024-09-09 RX ORDER — PREDNISOLONE SODIUM PHOSPHATE 10 MG/ML
SOLUTION/ DROPS OPHTHALMIC
COMMUNITY
Start: 2024-09-04

## 2024-09-09 RX ORDER — PREDNISOLONE ACETATE 1.2 MG/ML
SUSPENSION/ DROPS OPHTHALMIC
COMMUNITY
Start: 2024-09-04

## 2024-09-09 RX ORDER — BACILLUS COAGULANS 1B CELL
1 CAPSULE ORAL 3 TIMES WEEKLY
Qty: 90 TABLET | Refills: 1 | Status: SHIPPED | OUTPATIENT
Start: 2024-09-09

## 2024-09-09 NOTE — ASSESSMENT & PLAN NOTE
Mild to moderate symptoms of RLS.  Last ferritin 51.  I have asked her to start Vitron-C every Monday Wednesday Friday.  Instructed her on the side effects of constipation.  She would not feel benefit if there is benefit for at least 4 to 6 weeks.  Duloxetine and Abilify likely contributing.

## 2024-09-09 NOTE — ASSESSMENT & PLAN NOTE
History of mild ALEC AHI 8.9 events per hour.  She had a old machine and new machine was ordered by Dr. Morales last visit in April 2024.  She has received the new CPAP which is a Elli G3.  She has not started using it because she did not know if it was calibrated.    I manually changed her pressure to 6-15 cm H2O and gave her some instructions on how to use the machine.  She has masks at home that she will be using.    She will start using the new CPAP and see us back in a couple months for compliance review.  I instructed that if she has any issues with her machine from a technical standpoint she could call us back and we can help her set up an appointment with [facility]

## 2024-09-16 ENCOUNTER — PATIENT OUTREACH (OUTPATIENT)
Dept: INTERNAL MEDICINE CLINIC | Facility: CLINIC | Age: 64
End: 2024-09-16

## 2024-09-16 NOTE — PROGRESS NOTES
Outgoing call   09/16/2024    CMOC received referral from [facility] to assist patient with Waiver Home services.     1st Outreach    CMOC reviewed chart. CMOC left message for granddaughter Joanna Brown to call me back at [phone].     Next outreach is schedule for Wed 09/18/2024.

## 2024-09-18 ENCOUNTER — TELEPHONE (OUTPATIENT)
Dept: SLEEP CENTER | Facility: CLINIC | Age: 64
End: 2024-09-18

## 2024-09-18 NOTE — TELEPHONE ENCOUNTER
Received call from patient who states she started using a new CPAP after her 9/9/24 appointment with Dr. Linder.  It was fine the first night but since then has been making a noise like water boiling.  It is loud and preventing her from sleeping.      Offered to have the Critical access hospital liaison call her to assist.  Patient agreeable.  Email sent to [facility] liaisons asking someone to call patient today.

## 2024-09-25 ENCOUNTER — TELEPHONE (OUTPATIENT)
Dept: INTERNAL MEDICINE CLINIC | Facility: CLINIC | Age: 64
End: 2024-09-25

## 2024-09-25 DIAGNOSIS — F41.9 ANXIETY: Primary | ICD-10-CM

## 2024-09-25 DIAGNOSIS — K21.9 GASTROESOPHAGEAL REFLUX DISEASE WITHOUT ESOPHAGITIS: ICD-10-CM

## 2024-09-25 DIAGNOSIS — F32.89 OTHER DEPRESSION: ICD-10-CM

## 2024-09-25 RX ORDER — DULOXETIN HYDROCHLORIDE 30 MG/1
30 CAPSULE, DELAYED RELEASE ORAL DAILY
Qty: 90 CAPSULE | Refills: 3 | Status: SHIPPED | OUTPATIENT
Start: 2024-09-25

## 2024-09-25 RX ORDER — CLONAZEPAM 1 MG/1
TABLET ORAL
Qty: 45 TABLET | Refills: 0 | Status: SHIPPED | OUTPATIENT
Start: 2024-09-25

## 2024-09-25 RX ORDER — ARIPIPRAZOLE 5 MG/1
5 TABLET ORAL DAILY
Qty: 90 TABLET | Refills: 1 | Status: SHIPPED | OUTPATIENT
Start: 2024-09-25

## 2024-09-25 NOTE — TELEPHONE ENCOUNTER
Received call from patient. Introduced self and role with Interpretor ID [id number]. Patient stated she is currently looking for a new psychiatrist. Patient stated she does not have a psychiatrist due to her insurance. Practice is no longer accepting Medicaid.     Patient requesting if PCP can refill the following medications:    Clonazepam (Klonopin) 1 mg tablet-Patient takes 1 1/2 tablets every HS    Duloxetine (Cymbalta) 30 mg- 1 capsule every morning    Aripiprazole (Abilify) 5 mg-1 tablet by mouth daily     Patient expressed interest in having a SW reach out to her to assist with finding a new psychiatrist.     Please review and advise.

## 2024-09-27 ENCOUNTER — TELEPHONE (OUTPATIENT)
Age: 64
End: 2024-09-27

## 2024-09-27 ENCOUNTER — PATIENT OUTREACH (OUTPATIENT)
Dept: INTERNAL MEDICINE CLINIC | Facility: CLINIC | Age: 64
End: 2024-09-27

## 2024-09-27 NOTE — TELEPHONE ENCOUNTER
----- Message from Gretchen VERA sent at 9/27/2024 10:29 AM EDT -----  Regarding: Can you add this Cymro Speaking pt to your waiting lists for Talk Therapy and Medication Management?  Thanks,  Faiza

## 2024-09-27 NOTE — PROGRESS NOTES
SW has reached out to pt via  ID # [id number] to assist with request for OP MH Resources.  Pt has shared she has was active with [facility] in [place] who no longer takes her insurance.  SW has reviewed that it is difficult to  find Medicare providers.    SW has reviewed that [facility] takes Medicare but have a long waiting list.  Pt does want to on their list.    SW has sent an In Basket Message to them.    SW has reviewed a Virtual Option with [facility] but she prefers in person.    Sw has discussed there are several possible options in [place].  SW has reviewed that she or family will need to get an appointment with them.  She asked for SW to call her Granddgt Shabnam Brown [phone] re same.    SW has agreed to same and has also shared the SW can mail her a list of possible options as well.  Pt shares that she lives alone but that her Grand dgt helps her with transportation and some ADLS.    CM TEAM is also assisting pt with her [place] Waiver referral.    [facility] has reached out to pt's Robert Haque this date but had to leave a message with [facility] contact #s.   ALMA to remain available for her return call.  ALMA notes Gabriella CMOC is also reaching out re [place] Waiver Program.    CM team to f/u re above.

## 2024-09-27 NOTE — PROGRESS NOTES
Outgoing call   09/27/2024    CMOC received referral from [facility] to assist patient with Waiver Services and help to download the Pursue care application.     CMOC spoke to patient today. CMOC introduce self and role. Pt states the person who handles all her visits is her granddaughter.     CMOC spoke to granddaughter Bia Brown today. Bia states she has already help with waiver services and the case is for review by [facility] office.     CMOC will call patient and granddaughter on Monday to help them download the pursue care application.     Next outreach is schedule for 09/30/2024.

## 2024-09-27 NOTE — TELEPHONE ENCOUNTER
Patient has been added to the Medication Management and Talk Therapy wait list with a referral.    Insurance:   Medicare (part B only) = Primary  [place] = Secondary  [facility] verified  Insurance Type:    Commercial []   Medicaid [x]   [facility] (if applicable) [place] [x]  Were outside resources sent: Yes [] No [x]

## 2024-09-30 ENCOUNTER — PATIENT OUTREACH (OUTPATIENT)
Dept: INTERNAL MEDICINE CLINIC | Facility: CLINIC | Age: 64
End: 2024-09-30

## 2024-09-30 NOTE — PROGRESS NOTES
Outgoing call   09/30/2024     [facility] left message in [place] for patient to call me back at [phone].     [facility] left message for granddaughter Bia Brown to call me back.     Re: Pursue care application for Mental health services provider.     Next outreach is schedule for 10/07/2024.

## 2024-10-02 ENCOUNTER — PATIENT OUTREACH (OUTPATIENT)
Dept: INTERNAL MEDICINE CLINIC | Facility: CLINIC | Age: 64
End: 2024-10-02

## 2024-10-02 DIAGNOSIS — F32.A DEPRESSION, UNSPECIFIED DEPRESSION TYPE: Primary | ICD-10-CM

## 2024-10-02 DIAGNOSIS — F41.9 ANXIETY: ICD-10-CM

## 2024-10-02 NOTE — PROGRESS NOTES
Incoming call   10/02/2024    Incoming call from granddaughter Bia. [facility] gave her the instructions to download the Persue care application for belem for mental provider.     Bia is going to call me when she downloads the application and sets it up.     Per [facility] pt approved for Waiver services. Minh Warner is the  and will be going to patient home on Monday 10/07.     Next outreach is schedule for 10/09/2024

## 2024-10-02 NOTE — PROGRESS NOTES
ALMA has met with pt as per her request.  ALMA spoke via  ID # [id number] to assist her with info she requested for her Intake Appointment with her [facility]  Minh Stoddard [phone].  ALMA assisted with a call to him who confirmed his appointment with her next week.  ALMA assisted pt with her Face sheet, medication list and recent AVS.  He recommends calling in about a week after this meeting to see what hours have been approved.    ALMA also reviewed with her some additional OP MH resources as pt is not sure she is comfortable with Virtual visits.  She is still open to trying same.  She is on the [facility] list.  ALMA assisted pt with calls to [facility] [place] [phone] but had to leave a message.  We also called [facility] [phone] and left a message .  In addition we called [facility] [phone] and they share they are not taking new referrals.    ALMA has stress that pt and her Grand dgt  need to keep calling them to ask for an appointment.  ALMA also called pt's Grand dgt and left a message re same.  ALMA has provided pt with a list that she indicted she will f/u on.    CM Team to follow and assist as indicated,  ALMA also assisted pt with calls to

## 2024-11-04 ENCOUNTER — OFFICE VISIT (OUTPATIENT)
Dept: GASTROENTEROLOGY | Facility: CLINIC | Age: 64
End: 2024-11-04
Payer: MEDICARE

## 2024-11-04 VITALS
WEIGHT: 162 LBS | SYSTOLIC BLOOD PRESSURE: 130 MMHG | HEIGHT: 60 IN | DIASTOLIC BLOOD PRESSURE: 72 MMHG | HEART RATE: 68 BPM | BODY MASS INDEX: 31.8 KG/M2

## 2024-11-04 DIAGNOSIS — E66.9 OBESITY (BMI 30-39.9): Chronic | ICD-10-CM

## 2024-11-04 DIAGNOSIS — F32.89 OTHER DEPRESSION: ICD-10-CM

## 2024-11-04 DIAGNOSIS — K58.0 IRRITABLE BOWEL SYNDROME WITH DIARRHEA: Primary | ICD-10-CM

## 2024-11-04 DIAGNOSIS — D12.6 ADENOMATOUS POLYP OF COLON, UNSPECIFIED PART OF COLON: ICD-10-CM

## 2024-11-04 DIAGNOSIS — F41.9 ANXIETY: ICD-10-CM

## 2024-11-04 DIAGNOSIS — K57.30 DIVERTICULOSIS OF COLON: Chronic | ICD-10-CM

## 2024-11-04 PROBLEM — E66.09 CLASS 1 OBESITY DUE TO EXCESS CALORIES IN ADULT: Status: RESOLVED | Noted: 2018-03-06 | Resolved: 2024-11-04

## 2024-11-04 PROBLEM — E66.811 CLASS 1 OBESITY DUE TO EXCESS CALORIES IN ADULT: Status: RESOLVED | Noted: 2018-03-06 | Resolved: 2024-11-04

## 2024-11-04 PROCEDURE — G2211 COMPLEX E/M VISIT ADD ON: HCPCS | Performed by: INTERNAL MEDICINE

## 2024-11-04 PROCEDURE — 99214 OFFICE O/P EST MOD 30 MIN: CPT | Performed by: INTERNAL MEDICINE

## 2024-11-04 RX ORDER — DICYCLOMINE HCL 20 MG
20 TABLET ORAL EVERY 6 HOURS PRN
Qty: 120 TABLET | Refills: 5 | Status: SHIPPED | OUTPATIENT
Start: 2024-11-04

## 2024-11-04 NOTE — PROGRESS NOTES
Ambulatory Visit  Name: Trice Donaldson      : 1960      MRN: [id number]  Encounter Provider: Hazel Rust DO  Encounter Date: 2024   Encounter department: [facility] [place]    Assessment & Plan  Irritable bowel syndrome with diarrhea  -trial of bentyl as needed, if not improved consider segmental colitis associated with diverticulosis as a cause and can try a trial of mesalamine to use daily   Orders:    dicyclomine (BENTYL) 20 mg tablet; Take 1 tablet (20 mg total) by mouth every 6 (six) hours as needed (abdominal pain as needed)    Adenomatous polyp of colon, unspecified part of colon  No polyps at colon in  but did have a precancerous polyp in  , but given a 5 year follow up, due for repeat colonoscopy in         Diverticulosis of colon  High fiber diet        Obesity (BMI 30-39.9)         Follow up in 4-6 months time  History of Present Illness     Trice Donaldson is a 64 y.o. female who presents for follow up.    More often constipated but also has looser stools and mucous.    Reports that her bowel movements come out as mucous mixed with stool.  Main issue is pain that is associated with a BM.  Weight is stable.  Denies blood in the stools.      Colonoscopy in  showed diverticulosis and hemorrhoids with neg biopsies for microscopic colitis.  Also had an EGD at that time with normal stomach and small bowel biopsies.        Review of Systems        Objective     /72   Pulse 68   Ht 5' (1.524 m)   Wt 73.5 kg (162 lb)   LMP  (LMP Unknown)   BMI 31.64 kg/m²     Physical Exam  Constitutional:       Appearance: Normal appearance.   HENT:      Head: Normocephalic and atraumatic.   Cardiovascular:      Rate and Rhythm: Normal rate and regular rhythm.   Pulmonary:      Effort: Pulmonary effort is normal.      Breath sounds: Normal breath sounds.   Abdominal:      General: Bowel sounds are normal.      Palpations: Abdomen is  soft.   Neurological:      Mental Status: Trice is alert.

## 2024-11-04 NOTE — ASSESSMENT & PLAN NOTE
-trial of bentyl as needed, if not improved consider segmental colitis associated with diverticulosis as a cause and can try a trial of mesalamine to use daily   Orders:    dicyclomine (BENTYL) 20 mg tablet; Take 1 tablet (20 mg total) by mouth every 6 (six) hours as needed (abdominal pain as needed)

## 2024-11-04 NOTE — ASSESSMENT & PLAN NOTE
No polyps at colon in 2021 but did have a precancerous polyp in 2017 , but given a 5 year follow up, due for repeat colonoscopy in 2026

## 2024-11-04 NOTE — TELEPHONE ENCOUNTER
[employer]  #([id number]) used (Jamaican) for the duration of the telephone call.    I explained to patient that it looks like she had additional refills at pharmacy but she said she was told that she didn't.

## 2024-11-05 RX ORDER — ARIPIPRAZOLE 5 MG/1
5 TABLET ORAL DAILY
Qty: 90 TABLET | Refills: 1 | OUTPATIENT
Start: 2024-11-05

## 2024-11-05 RX ORDER — CLONAZEPAM 1 MG/1
TABLET ORAL
Qty: 45 TABLET | Refills: 0 | Status: SHIPPED | OUTPATIENT
Start: 2024-11-05

## 2024-11-05 RX ORDER — DULOXETIN HYDROCHLORIDE 30 MG/1
30 CAPSULE, DELAYED RELEASE ORAL DAILY
Qty: 90 CAPSULE | Refills: 3 | OUTPATIENT
Start: 2024-11-05

## 2024-11-05 NOTE — TELEPHONE ENCOUNTER
Contacted patient's [facility] pharmacy. Spoke with pharmacist. Per pharmacist, patient picked up Abilify and Cymbalta on 10/2/24 for a 90 day supply. Patient not due for refill for another 54 days.     Spoke with patient on the phone. Interpretor ID [id number]. Introduced self and role. Patient updated nurse contacted her [facility]. Pharmacist stated she picked up a 90 day supply of both medications on 10/2/24. Patient is not due for refill and insurance will not cover a refill at this time.     Patient stated she only picked up a 30 day supply. Nurse encouraged patient to review her medication bottles at home. Patient educated to contact pharmacy directly and show them last bottles that were filled for both medications. If only 30 day supply filled, then pharmacy can make adjustments. Patient aware to contact office if any further issues/concerns. Patient confirmed she is only take 1 (one) tablet daily of both.     All questions/concerns answered at this time.

## 2024-11-11 ENCOUNTER — PATIENT OUTREACH (OUTPATIENT)
Dept: INTERNAL MEDICINE CLINIC | Facility: CLINIC | Age: 64
End: 2024-11-11

## 2024-11-11 NOTE — PROGRESS NOTES
Outgoing call   11/11/2024    CMOC left message for patient granddaughter Bia to return my call at [phone]. CMOC need to know if patient was connected with [facility] mental health providers.     CMOC spoke to Attila from [facility] and he states there is no account made for this patient.     Next outreach is schedule for 11/18/2024.

## 2024-11-30 DIAGNOSIS — K58.0 IRRITABLE BOWEL SYNDROME WITH DIARRHEA: ICD-10-CM

## 2024-12-02 ENCOUNTER — OFFICE VISIT (OUTPATIENT)
Dept: SLEEP CENTER | Facility: CLINIC | Age: 64
End: 2024-12-02
Payer: MEDICARE

## 2024-12-02 VITALS
DIASTOLIC BLOOD PRESSURE: 70 MMHG | WEIGHT: 158.8 LBS | BODY MASS INDEX: 31.18 KG/M2 | HEIGHT: 60 IN | SYSTOLIC BLOOD PRESSURE: 126 MMHG

## 2024-12-02 DIAGNOSIS — M25.562 ACUTE PAIN OF LEFT KNEE: ICD-10-CM

## 2024-12-02 DIAGNOSIS — G47.33 OSA (OBSTRUCTIVE SLEEP APNEA): Primary | ICD-10-CM

## 2024-12-02 DIAGNOSIS — K21.9 GASTROESOPHAGEAL REFLUX DISEASE WITHOUT ESOPHAGITIS: ICD-10-CM

## 2024-12-02 DIAGNOSIS — M17.9 OSTEOARTHRITIS OF KNEE: ICD-10-CM

## 2024-12-02 DIAGNOSIS — E11.9 TYPE 2 DIABETES MELLITUS WITHOUT COMPLICATION, WITHOUT LONG-TERM CURRENT USE OF INSULIN (HCC): ICD-10-CM

## 2024-12-02 DIAGNOSIS — F41.9 ANXIETY: ICD-10-CM

## 2024-12-02 DIAGNOSIS — E78.5 HYPERLIPIDEMIA, UNSPECIFIED HYPERLIPIDEMIA TYPE: ICD-10-CM

## 2024-12-02 DIAGNOSIS — E83.10 IRON METABOLISM DISORDER: ICD-10-CM

## 2024-12-02 DIAGNOSIS — G25.81 RESTLESS LEG SYNDROME: ICD-10-CM

## 2024-12-02 DIAGNOSIS — M25.562 LEFT KNEE PAIN: ICD-10-CM

## 2024-12-02 PROCEDURE — 99214 OFFICE O/P EST MOD 30 MIN: CPT | Performed by: INTERNAL MEDICINE

## 2024-12-02 PROCEDURE — G2211 COMPLEX E/M VISIT ADD ON: HCPCS | Performed by: INTERNAL MEDICINE

## 2024-12-02 RX ORDER — ATORVASTATIN CALCIUM 10 MG/1
10 TABLET, FILM COATED ORAL DAILY
Qty: 90 TABLET | Refills: 3 | Status: SHIPPED | OUTPATIENT
Start: 2024-12-02

## 2024-12-02 RX ORDER — DICYCLOMINE HCL 20 MG
TABLET ORAL
Qty: 360 TABLET | Refills: 1 | Status: SHIPPED | OUTPATIENT
Start: 2024-12-02

## 2024-12-02 RX ORDER — CLONAZEPAM 1 MG/1
TABLET ORAL
Qty: 45 TABLET | Refills: 0 | Status: CANCELLED | OUTPATIENT
Start: 2024-12-02

## 2024-12-02 NOTE — ASSESSMENT & PLAN NOTE
History of mild ALEC AHI 8.9 events per hour.  She had a old machine and new machine was ordered by Dr. Morales last visit in April 2024.  She has received the new CPAP which is a Elli G3.  I calibrated her machine last visit    Compliance data  60% use more than 4 hours over the past 30 days  Average use 6 hours and 18 minutes  Auto CPAP Elli G3 7-16  Average pressure 8.3, P95 10.9  AHI 5.1, AMANDA 1.4     Since she is still having some daytime sleepiness, I think we should try to treat the residual obstructive sleep apnea.  I write a prescription for the DME to increase pressures to minimum CPAP 9, maximum CPAP 12.  Hopefully narrowing the pressure will help decrease obstructive sleep apnea.    -Pressure change prescription written, DME adapt  -Resupply prescription provided, DME adapt  -Follow-up in 6 months  Orders:    PAP DME Resupply/Reorder    PAP DME Pressure Change

## 2024-12-02 NOTE — TELEPHONE ENCOUNTER
Spoke with patient on the phone. Interpretor ID [id number]. Introduced self and role. Patient requesting refills for 5 (five) medications.     Omeprazole, Clonazepam, Metformin, Atorvastatin, and Voltaren gel.     Contacted patient's [facility] pharmacy at . Spoke with pharmacist. Patient has refill ready for  for Metformin. Omeprazole refill requested. Patient does not need new script for Omeprazole.     Scripts sent for Clonazepam, Voltaren and Atorvastatin.

## 2024-12-02 NOTE — TELEPHONE ENCOUNTER
Spoke with patient on the phone. Interpretor ID [id number]. Introduced self and role. Patient requesting a refill of her clonazepam.     PDMP reviewed. Last filled on 11/5/24. Due for refill on 12/25/24.

## 2024-12-02 NOTE — PROGRESS NOTES
Name: Trice Donaldson      : 1960      MRN: [id number]  Encounter Provider: Hoang Linder MD  Encounter Date: 2024   Encounter department: [facility] [place]    :  Assessment & Plan  ALEC (obstructive sleep apnea)  History of mild ALEC AHI 8.9 events per hour.  She had a old machine and new machine was ordered by Dr. Morales last visit in 2024.  She has received the new CPAP which is a Elli G3.  I calibrated her machine last visit    Compliance data  60% use more than 4 hours over the past 30 days  Average use 6 hours and 18 minutes  Auto CPAP Elli G3 7-16  Average pressure 8.3, P95 10.9  AHI 5.1, AMANDA 1.4     Since she is still having some daytime sleepiness, I think we should try to treat the residual obstructive sleep apnea.  I write a prescription for the DME to increase pressures to minimum CPAP 9, maximum CPAP 12.  Hopefully narrowing the pressure will help decrease obstructive sleep apnea.    -Pressure change prescription written, DME adapt  -Resupply prescription provided, DME adapt  -Follow-up in 6 months  Orders:    PAP DME Resupply/Reorder    PAP DME Pressure Change    Restless leg syndrome  Mild to moderate symptoms of RLS. Last ferritin 51. I have asked her to start Vitron-C every . Instructed her on the side effects of constipation. Duloxetine and Abilify likely contributing. Symptoms are well controlled    -continue Vitron-C TIW  -Recheck iron panel       Iron metabolism disorder  -Recheck iron panel now that she is on Vitron-C  Orders:    Iron Panel (Includes Ferritin, Iron Sat%, Iron, and TIBC); Future        History of Present Illness   HPI  64 y.o. female who has a past medical history of HLD, DM2, anxiety, who is here for the follow up of ALEC and RLS.       24 - FU with me - she has been wearing CPAP nightly. [place] 6. She notices an occasional leak but tightens mask and it improves.  She notes some improvement with her daytime  symptoms but still has some residual excessive daytime sleepiness.  She does have nights where she does not sleep much due to GI issues. She reports improvement in restless legs. She continues to take vitron-C every MWF.     9/9/24 - FU with me - she has received the new machine which is a Elli machine.  She is still using the old machine because she is not sure if the new machine was calibrated correctly.  She had the machine delivered by mail by [employer].  She did not do a CPAP set up in person..  [place] 11.  Still having bothersome symptoms of restless legs that occur mainly during the night.  Sometimes they occur in the middle of the night after waking up     4/9/24 - Consult with Dr. Morales - has a hx of mild ALEC AHI 8.9 events/hour.  CPAP study showed optimal pressure at 13cm H2O.  Machine was 10 years ago so new auto CPAP 5-20cm H2O ordered.  Also has some residual EDS and RLS.  Iron panel ordered (sat 23, ferritin 51)  Review of Systems  I have personally reviewed the MA's review of systems and made changes as necessary.    Past Medical History   Past Medical History:   Diagnosis Date    Abnormal mammogram     RESOLVED: [id number]    Anxiety     Anxiety     Arthritis     Depression     Depression     Diabetes mellitus (HCC)     Pt denies    Diverticulosis     GERD (gastroesophageal reflux disease)     Helicobacter pylori infection     History of Helicobacter pylori infection 06/18/2018    Hyperlipidemia     Seborrheic keratosis     LAST ASSESSED: [id number]    Sleep apnea     Sleep apnea     Tinea pedis of left foot 09/16/2019     Past Surgical History:   Procedure Laterality Date    ABDOMINOPLASTY      abdomin    COLONOSCOPY  2017    NM COLONOSCOPY FLX DX W/COLLJ SPEC WHEN PFRMD N/A 8/11/2016    Procedure: COLONOSCOPY;  Surgeon: Bart Espinoza MD;  Location: [facility];  Service: Gastroenterology    NM COLONOSCOPY FLX DX W/COLLJ SPEC WHEN PFRMD N/A 8/29/2017    Procedure: EGD AND COLONOSCOPY;  Surgeon: Bart  MD Alexis;  Location: [facility];  Service: Gastroenterology    AZ TENDON SHEATH INCISION Right 3/8/2022    Procedure: Right thumb, long, and ring finger trigger finger releases;  Surgeon: Gary Wray MD;  Location:  MAIN OR;  Service: Orthopedics    TUBAL LIGATION      TUBAL LIGATION      UPPER GASTROINTESTINAL ENDOSCOPY  2017     Family History   Problem Relation Age of Onset    Diabetes Mother     Hypertension Mother     Heart disease Mother     Diabetes Paternal Grandfather     Alzheimer's disease Father     No Known Problems Sister     No Known Problems Brother     Depression Daughter     ADD / ADHD Son     Depression Son     Diabetes Maternal Grandmother     Stomach cancer Maternal Grandfather     No Known Problems Paternal Grandmother     No Known Problems Sister     Heart disease Sister     Intellectual disability Brother     Schizophrenia Brother     Other Son          as an infant     Other Daughter          at 1-2 mths old    Other Son          as an infant       reports that Trice has never smoked. Trice has never used smokeless tobacco. Trice reports that Trice does not drink alcohol and does not use drugs.  Current Outpatient Medications on File Prior to Visit   Medication Sig Dispense Refill    acetaminophen (TYLENOL) 650 mg CR tablet Take 1 tablet (650 mg total) by mouth every 8 (eight) hours as needed for mild pain (Patient taking differently: Take 650 mg by mouth if needed for mild pain) 30 tablet 0    ARIPiprazole (ABILIFY) 5 mg tablet Take 1 tablet (5 mg total) by mouth daily 90 tablet 1    Ascorbic Acid (vitamin C) 1000 MG tablet Take 1,000 mg by mouth daily      Blood Glucose Monitoring Suppl (OneTouch Verio Reflect) w/Device KIT Check blood sugars once daily. Please substitute with appropriate alternative as covered by patient's insurance. Dx: E11.65 1 kit 0    Calcium Polycarbophil (FIBERCON PO) Take by mouth      Cequa 0.09 % SOLN       Cholecalciferol (VITAMIN D3) 125  MCG (5000 UT) CAPS Take 1,000 Units by mouth      clonazePAM (KlonoPIN) 1 mg tablet Take 1.5mg at night as needed for anxiety, depression, or their effects on severely disrupted sleep 45 tablet 0    dexamethasone sodium phosphate 0.1 % ophthalmic solution       dicyclomine (BENTYL) 20 mg tablet Take 1 tablet (20 mg total) by mouth every 6 (six) hours 120 tablet 3    dicyclomine (BENTYL) 20 mg tablet take 1 tablet by mouth every 6 hours if needed for abdominal pain 360 tablet 1    DULoxetine (CYMBALTA) 30 mg delayed release capsule Take 1 capsule (30 mg total) by mouth daily 90 capsule 3    hydroquinone 4 % cream Apply topically 2 (two) times a day 28 g 3    metFORMIN (GLUCOPHAGE) 500 mg tablet Take 1 tablet (500 mg total) by mouth 2 (two) times a day with meals 360 tablet 3    Methylcobalamin (B-12) 5000 MCG TBDP Take by mouth      multivitamin (THERAGRAN) TABS Take 1 tablet by mouth daily      omeprazole (PriLOSEC) 20 mg delayed release capsule take 1 capsule by mouth daily 30 capsule 3    Pred Mild 0.12 % ophthalmic suspension       prednisoLONE sodium phosphate (INFLAMASE FORTE) 1 % ophthalmic solution       Iron-Vitamin C (Vitron-C)  MG TABS Take 1 tablet by mouth 3 (three) times a week (Patient not taking: Reported on 12/2/2024) 90 tablet 1    [DISCONTINUED] atorvastatin (LIPITOR) 10 mg tablet Take 1 tablet (10 mg total) by mouth daily 90 tablet 3    [DISCONTINUED] Diclofenac Sodium (VOLTAREN) 1 % Apply 2 g topically 4 (four) times a day 150 g 2    [DISCONTINUED] dicyclomine (BENTYL) 20 mg tablet Take 1 tablet (20 mg total) by mouth every 6 (six) hours as needed (abdominal pain as needed) 120 tablet 5     No current facility-administered medications on file prior to visit.   No Known Allergies      Objective   LMP  (LMP Unknown)     Physical Exam  Constitutional:       General: Trice is not in acute distress.     Appearance: Normal appearance. Trice is not ill-appearing, toxic-appearing or diaphoretic.    Eyes:      General: No scleral icterus.     Extraocular Movements: Extraocular movements intact.   Cardiovascular:      Rate and Rhythm: Normal rate.   Pulmonary:      Effort: Pulmonary effort is normal. No respiratory distress.      Breath sounds: Normal breath sounds.   Abdominal:      Tenderness: There is no guarding.   Musculoskeletal:         General: Normal range of motion.      Cervical back: Normal range of motion and neck supple. No rigidity.      Right lower leg: No edema.      Left lower leg: No edema.   Neurological:      General: No focal deficit present.      Mental Status: Trice is alert and oriented to person, place, and time.       Neurologic Exam     Mental Status   Oriented to person, place, and time.       Results/Data:  I have reviewed the results and images from the compliance data in detail with the patient.    Sleep Study:  Sleep study in 2021 with AHI 8.9 events per hour.  CPAP study afterwards showing optimal pressure of 13 cm H2O     Compliance data:  60% use more than 4 hours over the past 30 days  Average use 6 hours and 18 minutes  Auto CPAP Elli G3 7-16  Average pressure 8.3, P95 10.9  AHI 5.1, AMANDA 1.4

## 2024-12-03 ENCOUNTER — TELEPHONE (OUTPATIENT)
Dept: SLEEP CENTER | Facility: CLINIC | Age: 64
End: 2024-12-03

## 2024-12-03 ENCOUNTER — PATIENT OUTREACH (OUTPATIENT)
Dept: INTERNAL MEDICINE CLINIC | Facility: CLINIC | Age: 64
End: 2024-12-03

## 2024-12-03 ENCOUNTER — OFFICE VISIT (OUTPATIENT)
Dept: INTERNAL MEDICINE CLINIC | Facility: CLINIC | Age: 64
End: 2024-12-03

## 2024-12-03 VITALS
WEIGHT: 160 LBS | SYSTOLIC BLOOD PRESSURE: 124 MMHG | TEMPERATURE: 98.1 F | BODY MASS INDEX: 31.41 KG/M2 | HEART RATE: 82 BPM | HEIGHT: 60 IN | DIASTOLIC BLOOD PRESSURE: 82 MMHG

## 2024-12-03 DIAGNOSIS — G47.33 OSA (OBSTRUCTIVE SLEEP APNEA): ICD-10-CM

## 2024-12-03 DIAGNOSIS — Z00.00 MEDICARE ANNUAL WELLNESS VISIT, INITIAL: Primary | ICD-10-CM

## 2024-12-03 DIAGNOSIS — E11.9 TYPE 2 DIABETES MELLITUS WITHOUT COMPLICATION, WITHOUT LONG-TERM CURRENT USE OF INSULIN (HCC): ICD-10-CM

## 2024-12-03 DIAGNOSIS — Z13.820 OSTEOPOROSIS SCREENING: ICD-10-CM

## 2024-12-03 DIAGNOSIS — Z23 ENCOUNTER FOR IMMUNIZATION: ICD-10-CM

## 2024-12-03 LAB
DME PARACHUTE DELIVERY DATE REQUESTED: NORMAL
DME PARACHUTE ITEM DESCRIPTION: NORMAL
DME PARACHUTE ORDER STATUS: NORMAL
DME PARACHUTE SUPPLIER NAME: NORMAL
DME PARACHUTE SUPPLIER PHONE: NORMAL
SL AMB POCT HEMOGLOBIN AIC: 6.3 (ref ?–6.5)

## 2024-12-03 PROCEDURE — 90472 IMMUNIZATION ADMIN EACH ADD: CPT

## 2024-12-03 PROCEDURE — 83036 HEMOGLOBIN GLYCOSYLATED A1C: CPT

## 2024-12-03 PROCEDURE — 90662 IIV NO PRSV INCREASED AG IM: CPT

## 2024-12-03 PROCEDURE — 90471 IMMUNIZATION ADMIN: CPT

## 2024-12-03 PROCEDURE — 90750 HZV VACC RECOMBINANT IM: CPT

## 2024-12-03 PROCEDURE — G0438 PPPS, INITIAL VISIT: HCPCS

## 2024-12-03 NOTE — ASSESSMENT & PLAN NOTE
Patient with history of well controlled DM2 on metformin 500 mg BID  Hba1c today : 6.3    Foot exam : completed today and normal  Eye : uptodate and normal  Kidney health : MACR due and ordered today  LDL earlier this year : 56 on atorvastatin 10 mg OD    Lab Results   Component Value Date    HGBA1C 6.3 12/03/2024     Plan  Continue metformin 500 mg BID and atorvastatin 10 mg OD  Obtain MACR  Follow up in 6 months  Orders:    POCT hemoglobin A1c    Albumin / creatinine urine ratio; Future

## 2024-12-03 NOTE — PROGRESS NOTES
SW has met with pt after her PCP appointment who shares that she still has not connected with [facility] .  SW shared that they may not be taking her insurance now.  She is on [facility] list and can wait if our PCP prescribes her Medication.    SW had previously reviewed possible other options ans she said she would have her granddgt call Sw re same.    Sw then also called [facility]  [phone] got the update they are still taking her insurance but she is still not enrolled.    SW attempted to call her Granddgt Apolinar [phone] but had to leave a message.  SW to also ask [facility] to f/u as able.    Pt has been approved for the  PA Waiver program .

## 2024-12-03 NOTE — PATIENT INSTRUCTIONS
Medicare Preventive Visit Patient Instructions  Thank you for completing your Welcome to Medicare Visit or Medicare Annual Wellness Visit today. Your next wellness visit will be due in one year (12/4/2025).  The screening/preventive services that you may require over the next 5-10 years are detailed below. Some tests may not apply to you based off risk factors and/or age. Screening tests ordered at today's visit but not completed yet may show as past due. Also, please note that scanned in results may not display below.  Preventive Screenings:  Service Recommendations Previous Testing/Comments   Colorectal Cancer Screening  * Colonoscopy    * Fecal Occult Blood Test (FOBT)/Fecal Immunochemical Test (FIT)  * Fecal DNA/Cologuard Test  * Flexible Sigmoidoscopy Age: 45-75 years old   Colonoscopy: every 10 years (may be performed more frequently if at higher risk)  OR  FOBT/FIT: every 1 year  OR  Cologuard: every 3 years  OR  Sigmoidoscopy: every 5 years  Screening may be recommended earlier than age 45 if at higher risk for colorectal cancer. Also, an individualized decision between you and your healthcare provider will decide whether screening between the ages of 76-85 would be appropriate. Colonoscopy: 09/30/2021  FOBT/FIT: Not on file  Cologuard: Not on file  Sigmoidoscopy: Not on file          Breast Cancer Screening Age: 40+ years old  Frequency: every 1-2 years  Not required if history of left and right mastectomy Mammogram: 06/18/2024        Cervical Cancer Screening Between the ages of 21-29, pap smear recommended once every 3 years.   Between the ages of 30-65, can perform pap smear with HPV co-testing every 5 years.   Recommendations may differ for women with a history of total hysterectomy, cervical cancer, or abnormal pap smears in past. Pap Smear: 08/16/2021        Hepatitis C Screening Once for adults born between 1945 and 1965  More frequently in patients at high risk for Hepatitis C Hep C Antibody:  03/16/2020        Diabetes Screening 1-2 times per year if you're at risk for diabetes or have pre-diabetes Fasting glucose: 121 mg/dL (5/15/2024)  A1C: 6.4 % (5/15/2024)      Cholesterol Screening Once every 5 years if you don't have a lipid disorder. May order more often based on risk factors. Lipid panel: 05/15/2024          Other Preventive Screenings Covered by Medicare:  Abdominal Aortic Aneurysm (AAA) Screening: covered once if your at risk. You're considered to be at risk if you have a family history of AAA.  Lung Cancer Screening: covers low dose CT scan once per year if you meet all of the following conditions: (1) Age 55-77; (2) No signs or symptoms of lung cancer; (3) Current smoker or have quit smoking within the last 15 years; (4) You have a tobacco smoking history of at least 20 pack years (packs per day multiplied by number of years you smoked); (5) You get a written order from a healthcare provider.  Glaucoma Screening: covered annually if you're considered high risk: (1) You have diabetes OR (2) Family history of glaucoma OR (3)  aged 50 and older OR (4)  American aged 65 and older  Osteoporosis Screening: covered every 2 years if you meet one of the following conditions: (1) You're estrogen deficient and at risk for osteoporosis based off medical history and other findings; (2) Have a vertebral abnormality; (3) On glucocorticoid therapy for more than 3 months; (4) Have primary hyperparathyroidism; (5) On osteoporosis medications and need to assess response to drug therapy.   Last bone density test (DXA Scan): Not on file.  HIV Screening: covered annually if you're between the age of 15-65. Also covered annually if you are younger than 15 and older than 65 with risk factors for HIV infection. For pregnant patients, it is covered up to 3 times per pregnancy.    Immunizations:  Immunization Recommendations   Influenza Vaccine Annual influenza vaccination during flu season is  recommended for all persons aged >= 6 months who do not have contraindications   Pneumococcal Vaccine   * Pneumococcal conjugate vaccine = PCV13 (Prevnar 13), PCV15 (Vaxneuvance), PCV20 (Prevnar 20)  * Pneumococcal polysaccharide vaccine = PPSV23 (Pneumovax) Adults 19-65 yo with certain risk factors or if 65+ yo  If never received any pneumonia vaccine: recommend Prevnar 20 (PCV20)  Give PCV20 if previously received 1 dose of PCV13 or PPSV23   Hepatitis B Vaccine 3 dose series if at intermediate or high risk (ex: diabetes, end stage renal disease, liver disease)   Respiratory syncytial virus (RSV) Vaccine - COVERED BY MEDICARE PART D  * RSVPreF3 (Arexvy) CDC recommends that adults 60 years of age and older may receive a single dose of RSV vaccine using shared clinical decision-making (SCDM)   Tetanus (Td) Vaccine - COST NOT COVERED BY MEDICARE PART B Following completion of primary series, a booster dose should be given every 10 years to maintain immunity against tetanus. Td may also be given as tetanus wound prophylaxis.   Tdap Vaccine - COST NOT COVERED BY MEDICARE PART B Recommended at least once for all adults. For pregnant patients, recommended with each pregnancy.   Shingles Vaccine (Shingrix) - COST NOT COVERED BY MEDICARE PART B  2 shot series recommended in those 19 years and older who have or will have weakened immune systems or those 50 years and older     Health Maintenance Due:      Topic Date Due   • Breast Cancer Screening: Mammogram  06/18/2025   • Colorectal Cancer Screening  09/29/2026   • HIV Screening  Completed   • Hepatitis C Screening  Completed   • Cervical Cancer Screening  Discontinued     Immunizations Due:      Topic Date Due   • Influenza Vaccine (1) 09/01/2024   • COVID-19 Vaccine (6 - 2024-25 season) 09/01/2024     Advance Directives   What are advance directives?  Advance directives are legal documents that state your wishes and plans for medical care. These plans are made ahead of  time in case you lose your ability to make decisions for yourself. Advance directives can apply to any medical decision, such as the treatments you want, and if you want to donate organs.   What are the types of advance directives?  There are many types of advance directives, and each state has rules about how to use them. You may choose a combination of any of the following:  Living will:  This is a written record of the treatment you want. You can also choose which treatments you do not want, which to limit, and which to stop at a certain time. This includes surgery, medicine, IV fluid, and tube feedings.   Durable power of  for healthcare (DPAHC):  This is a written record that states who you want to make healthcare choices for you when you are unable to make them for yourself. This person, called a proxy, is usually a family member or a friend. You may choose more than 1 proxy.  Do not resuscitate (DNR) order:  A DNR order is used in case your heart stops beating or you stop breathing. It is a request not to have certain forms of treatment, such as CPR. A DNR order may be included in other types of advance directives.  Medical directive:  This covers the care that you want if you are in a coma, near death, or unable to make decisions for yourself. You can list the treatments you want for each condition. Treatment may include pain medicine, surgery, blood transfusions, dialysis, IV or tube feedings, and a ventilator (breathing machine).  Values history:  This document has questions about your views, beliefs, and how you feel and think about life. This information can help others choose the care that you would choose.  Why are advance directives important?  An advance directive helps you control your care. Although spoken wishes may be used, it is better to have your wishes written down. Spoken wishes can be misunderstood, or not followed. Treatments may be given even if you do not want them. An advance  directive may make it easier for your family to make difficult choices about your care.   Urinary Incontinence   Urinary incontinence (UI)  is when you lose control of your bladder. UI develops because your bladder cannot store or empty urine properly. The 3 most common types of UI are stress incontinence, urge incontinence, or both.  Medicines:   May be given to help strengthen your bladder control. Report any side effects of medication to your healthcare provider.  Do pelvic muscle exercises often:  Your pelvic muscles help you stop urinating. Squeeze these muscles tight for 5 seconds, then relax for 5 seconds. Gradually work up to squeezing for 10 seconds. Do 3 sets of 15 repetitions a day, or as directed. This will help strengthen your pelvic muscles and improve bladder control.  Train your bladder:  Go to the bathroom at set times, such as every 2 hours, even if you do not feel the urge to go. You can also try to hold your urine when you feel the urge to go. For example, hold your urine for 5 minutes when you feel the urge to go. As that becomes easier, hold your urine for 10 minutes.   Self-care:   Keep a UI record.  Write down how often you leak urine and how much you leak. Make a note of what you were doing when you leaked urine.  Drink liquids as directed. You may need to limit the amount of liquid you drink to help control your urine leakage. Do not drink any liquid right before you go to bed. Limit or do not have drinks that contain caffeine or alcohol.   Prevent constipation.  Eat a variety of high-fiber foods. Good examples are high-fiber cereals, beans, vegetables, and whole-grain breads. Walking is the best way to trigger your intestines to have a bowel movement.  Exercise regularly and maintain a healthy weight.  Weight loss and exercise will decrease pressure on your bladder and help you control your leakage.   Use a catheter as directed  to help empty your bladder. A catheter is a tiny, plastic  tube that is put into your bladder to drain your urine.   Go to behavior therapy as directed.  Behavior therapy may be used to help you learn to control your urge to urinate.    Weight Management   Why it is important to manage your weight:  Being overweight increases your risk of health conditions such as heart disease, high blood pressure, type 2 diabetes, and certain types of cancer. It can also increase your risk for osteoarthritis, sleep apnea, and other respiratory problems. Aim for a slow, steady weight loss. Even a small amount of weight loss can lower your risk of health problems.  How to lose weight safely:  A safe and healthy way to lose weight is to eat fewer calories and get regular exercise. You can lose up about 1 pound a week by decreasing the number of calories you eat by 500 calories each day.   Healthy meal plan for weight management:  A healthy meal plan includes a variety of foods, contains fewer calories, and helps you stay healthy. A healthy meal plan includes the following:  Eat whole-grain foods more often.  A healthy meal plan should contain fiber. Fiber is the part of grains, fruits, and vegetables that is not broken down by your body. Whole-grain foods are healthy and provide extra fiber in your diet. Some examples of whole-grain foods are whole-wheat breads and pastas, oatmeal, brown rice, and bulgur.  Eat a variety of vegetables every day.  Include dark, leafy greens such as spinach, kale, vaibhav greens, and mustard greens. Eat yellow and orange vegetables such as carrots, sweet potatoes, and winter squash.   Eat a variety of fruits every day.  Choose fresh or canned fruit (canned in its own juice or light syrup) instead of juice. Fruit juice has very little or no fiber.  Eat low-fat dairy foods.  Drink fat-free (skim) milk or 1% milk. Eat fat-free yogurt and low-fat cottage cheese. Try low-fat cheeses such as mozzarella and other reduced-fat cheeses.  Choose meat and other protein  foods that are low in fat.  Choose beans or other legumes such as split peas or lentils. Choose fish, skinless poultry (chicken or turkey), or lean cuts of red meat (beef or pork). Before you cook meat or poultry, cut off any visible fat.   Use less fat and oil.  Try baking foods instead of frying them. Add less fat, such as margarine, sour cream, regular salad dressing and mayonnaise to foods. Eat fewer high-fat foods. Some examples of high-fat foods include french fries, doughnuts, ice cream, and cakes.  Eat fewer sweets.  Limit foods and drinks that are high in sugar. This includes candy, cookies, regular soda, and sweetened drinks.  Exercise:  Exercise at least 30 minutes per day on most days of the week. Some examples of exercise include walking, biking, dancing, and swimming. You can also fit in more physical activity by taking the stairs instead of the elevator or parking farther away from stores. Ask your healthcare provider about the best exercise plan for you.      © Copyright [employer] 2018 Information is for End User's use only and may not be sold, redistributed or otherwise used for commercial purposes. All illustrations and images included in CareNotes® are the copyrighted property of [employer].A[employer]M., Inc. or [employer]

## 2024-12-03 NOTE — PROGRESS NOTES
Name: Trice Donaldson      : 1960      MRN: [id number]  Encounter Provider: Chris Ferreira MD  Encounter Date: 12/3/2024   Encounter department: [facility]    Assessment & Plan  Type 2 diabetes mellitus without complication, without long-term current use of insulin (HCC)  Patient with history of well controlled DM2 on metformin 500 mg BID  Hba1c today : 6.3    Foot exam : completed today and normal  Eye : uptodate and normal  Kidney health : MACR due and ordered today  LDL earlier this year : 56 on atorvastatin 10 mg OD    Lab Results   Component Value Date    HGBA1C 6.3 2024     Plan  Continue metformin 500 mg BID and atorvastatin 10 mg OD  Obtain MACR  Follow up in 6 months  Orders:    POCT hemoglobin A1c    Albumin / creatinine urine ratio; Future    Medicare annual wellness visit, initial         Osteoporosis screening    Orders:    DXA bone density spine hip and pelvis; Future    ALEC (obstructive sleep apnea)  Compliant with CPAP and recent follow up with Sleep medicine       Health Maintenance  Immunizations : due for flu and shingrix; administered today  Screenings : uptodate on colonoscopy; mammogram scheduled; DEXA scan ordered    Preventive health issues were discussed with patient, and age appropriate screening tests were ordered as noted in patient's After Visit Summary. Personalized health advice and appropriate referrals for health education or preventive services given if needed, as noted in patient's After Visit Summary.    History of Present Illness     Trice Donaldson is a 64/F with PMH of well controlled DM2, HLD, diverticulosis, IBS, depression, ALEC on CPAP who presents for  AWV without any acute concerns. Mentions abdominal pain improving with Bentyl prescribed by GI.        Patient Care Team:  Chris Ferreira MD as PCP - General (Internal Medicine)  Chris Delgadillo MD as PCP - [facility]CP-[facility] ([facility])  Chris Delgadillo MD as PCP -  PCP-[employer] ([facility])  MD Bart Monroy MD as Endoscopist  ALMA Seth as  Care Manager (Care Coordination)  Gabriella Stone as Community Health Worker    Review of Systems   Constitutional:  Negative for chills and fever.   HENT:  Negative for ear pain and sore throat.    Eyes:  Negative for pain and visual disturbance.   Respiratory:  Negative for cough and shortness of breath.    Cardiovascular:  Negative for chest pain and palpitations.   Gastrointestinal:  Negative for abdominal pain and vomiting.   Genitourinary:  Negative for dysuria and hematuria.   Musculoskeletal:  Negative for arthralgias and back pain.   Skin:  Negative for color change and rash.   Neurological:  Negative for seizures and syncope.   All other systems reviewed and are negative.    Medical History Reviewed by provider this encounter:       Annual Wellness Visit Questionnaire       Health Risk Assessment:   Patient rates overall health as poor. Patient feels that their physical health rating is same. Patient is satisfied with their life. Eyesight was rated as same. Hearing was rated as slightly worse. Patient feels that their emotional and mental health rating is same. Patients states they are sometimes angry. Patient states they are never, rarely unusually tired/fatigued. Pain experienced in the last 7 days has been a lot. Patient's pain rating has been 8/10. Patient states that Trice has experienced no weight loss or gain in last 6 months.     Fall Risk Screening:   In the past year, patient has experienced: no history of falling in past year      Urinary Incontinence Screening:   Patient has leaked urine accidently in the last six months.     Home Safety:  Patient has trouble with stairs inside or outside of their home. Patient has working smoke alarms and has working carbon monoxide detector. Home safety hazards include: none.     Nutrition:   Current diet is Regular.     Medications:   Patient  "is currently taking over-the-counter supplements. OTC medications include: see medication list. Patient is able to manage medications.     Activities of Daily Living (ADLs)/Instrumental Activities of Daily Living (IADLs):   Walk and transfer into and out of bed and chair?: Yes  Dress and groom yourself?: Yes    Bathe or shower yourself?: Yes    Feed yourself? Yes  Do your laundry/housekeeping?: Yes  Manage your money, pay your bills and track your expenses?: Yes  Make your own meals?: Yes    Do your own shopping?: Yes    ADL comments: Patient states \"Yes, sometimes\"- grand daughter takes care of her Monday thru thursday    Previous Hospitalizations:   Any hospitalizations or ED visits within the last 12 months?: Yes    How many hospitalizations have you had in the last year?: 1-2    PREVENTIVE SCREENINGS      Cardiovascular Screening:    General: Screening Not Indicated and History Lipid Disorder      Diabetes Screening:     General: Screening Not Indicated and History Diabetes      Colorectal Cancer Screening:     General: Screening Current      Breast Cancer Screening:     General: Screening Current      Cervical Cancer Screening:    General: Screening Not Indicated      Lung Cancer Screening:     General: Screening Not Indicated      Hepatitis C Screening:    General: Screening Current    Screening, Brief Intervention, and Referral to Treatment (SBIRT)    Screening  Typical number of drinks in a day: 0  Typical number of drinks in a week: 0  Interpretation: Low risk drinking behavior.    Single Item Drug Screening:  How often have you used an illegal drug (including marijuana) or a prescription medication for non-medical reasons in the past year? never    Single Item Drug Screen Score: 0  Interpretation: Negative screen for possible drug use disorder    Social Drivers of Health     Financial Resource Strain: Low Risk  (5/2/2024)    Overall Financial Resource Strain (CARDIA)     Difficulty of Paying Living " Expenses: Not hard at all   Food Insecurity: No Food Insecurity (5/2/2024)    Nursing - Inadequate Food Risk Classification     Worried About Running Out of Food in the Last Year: Never true     Ran Out of Food in the Last Year: Never true   Transportation Needs: No Transportation Needs (5/2/2024)    PRAPARE - Transportation     Lack of Transportation (Medical): No     Lack of Transportation (Non-Medical): No   Housing Stability: Low Risk  (5/2/2024)    Housing Stability Vital Sign     Unable to Pay for Housing in the Last Year: No     Number of Times Moved in the Last Year: 1     Homeless in the Last Year: No   Utilities: Not At Risk (5/2/2024)    [facility] Utilities     Threatened with loss of utilities: No     No results found.    Objective   /76 (BP Location: Left arm, Patient Position: Sitting, Cuff Size: Adult)   Pulse (!) 114   Temp 98.1 °F (36.7 °C) (Temporal)   Ht 5' (1.524 m)   Wt 72.6 kg (160 lb)   LMP  (LMP Unknown)   BMI 31.25 kg/m²     Physical Exam  Vitals and nursing note reviewed.   Constitutional:       General: Trice is not in acute distress.     Appearance: Trice is well-developed.   HENT:      Head: Normocephalic and atraumatic.   Eyes:      Conjunctiva/sclera: Conjunctivae normal.   Cardiovascular:      Rate and Rhythm: Normal rate and regular rhythm.      Pulses: no weak pulses.           Dorsalis pedis pulses are 2+ on the right side and 2+ on the left side.        Posterior tibial pulses are 2+ on the right side and 2+ on the left side.      Heart sounds: No murmur heard.  Pulmonary:      Effort: Pulmonary effort is normal. No respiratory distress.      Breath sounds: Normal breath sounds.   Abdominal:      Palpations: Abdomen is soft.      Tenderness: There is no abdominal tenderness.   Musculoskeletal:         General: No swelling.      Cervical back: Neck supple.   Feet:      Right foot:      Skin integrity: No ulcer, skin breakdown, erythema, warmth, callus or dry skin.      Left  foot:      Skin integrity: No ulcer, skin breakdown, erythema, warmth, callus or dry skin.   Skin:     General: Skin is warm and dry.      Capillary Refill: Capillary refill takes less than 2 seconds.   Neurological:      Mental Status: Trice is alert.   Psychiatric:         Mood and Affect: Mood normal.       Diabetic Foot Exam    Patient's shoes and socks removed.    Right Foot/Ankle   Right Foot Inspection  Skin Exam: skin normal and skin intact. No dry skin, no warmth, no callus, no erythema, no maceration, no abnormal color, no pre-ulcer, no ulcer and no callus.     Toe Exam: ROM and strength within normal limits. No swelling, no tenderness, erythema and  no right toe deformity    Sensory   Monofilament testing: intact    Vascular  Capillary refills: < 3 seconds  The right DP pulse is 2+. The right PT pulse is 2+.     Left Foot/Ankle  Left Foot Inspection  Skin Exam: skin normal and skin intact. No dry skin, no warmth, no erythema, no maceration, normal color, no pre-ulcer, no ulcer and no callus.     Toe Exam: ROM and strength within normal limits. No swelling, no tenderness, no erythema and no left toe deformity.     Sensory   Monofilament testing: intact    Vascular  Capillary refills: < 3 seconds  The left DP pulse is 2+. The left PT pulse is 2+.     Assign Risk Category  No deformity present  No loss of protective sensation  No weak pulses  Risk: 0      Administrative Statements   I have spent a total time of 45 minutes in caring for this patient on the day of the visit/encounter including Diagnostic results, Prognosis, Risks and benefits of tx options, Instructions for management, Patient and family education, Importance of tx compliance, Risk factor reductions, Impressions, Counseling / Coordination of care, Documenting in the medical record, Reviewing / ordering tests, medicine, procedures  , Obtaining or reviewing history  , and Communicating with other healthcare professionals .     Chris Ferreira,  MD  PGY-3, Internal Medicine

## 2024-12-05 RX ORDER — CLONAZEPAM 1 MG/1
TABLET ORAL
Qty: 45 TABLET | Refills: 0 | Status: SHIPPED | OUTPATIENT
Start: 2024-12-05

## 2024-12-09 LAB
DME PARACHUTE DELIVERY DATE REQUESTED: NORMAL
DME PARACHUTE ITEM DESCRIPTION: NORMAL
DME PARACHUTE ORDER STATUS: NORMAL
DME PARACHUTE SUPPLIER NAME: NORMAL
DME PARACHUTE SUPPLIER PHONE: NORMAL

## 2024-12-10 ENCOUNTER — PATIENT OUTREACH (OUTPATIENT)
Dept: INTERNAL MEDICINE CLINIC | Facility: CLINIC | Age: 64
End: 2024-12-10

## 2024-12-13 ENCOUNTER — PATIENT OUTREACH (OUTPATIENT)
Dept: INTERNAL MEDICINE CLINIC | Facility: CLINIC | Age: 64
End: 2024-12-13

## 2024-12-13 NOTE — PROGRESS NOTES
Incoming call   12/13/2024    CMOC received incoming call from patient today. Pt states she would like to obtain mental health services with [facility]. Pt wants me to call granddaughter on Monday to give her the information of [facility]. Pt stated granddaughter will be helping her set up the appointment.     Next outreach is schedule for Monday 12/16/2024.

## 2024-12-15 ENCOUNTER — APPOINTMENT (EMERGENCY)
Dept: RADIOLOGY | Facility: HOSPITAL | Age: 64
End: 2024-12-15
Payer: MEDICARE

## 2024-12-15 ENCOUNTER — HOSPITAL ENCOUNTER (EMERGENCY)
Facility: HOSPITAL | Age: 64
Discharge: HOME | End: 2024-12-15
Attending: EMERGENCY MEDICINE
Payer: MEDICARE

## 2024-12-15 VITALS
WEIGHT: 169 LBS | OXYGEN SATURATION: 98 % | TEMPERATURE: 98 F | RESPIRATION RATE: 18 BRPM | SYSTOLIC BLOOD PRESSURE: 139 MMHG | DIASTOLIC BLOOD PRESSURE: 86 MMHG | HEIGHT: 61 IN | HEART RATE: 87 BPM | BODY MASS INDEX: 31.91 KG/M2

## 2024-12-15 DIAGNOSIS — K57.92 ACUTE DIVERTICULITIS: Primary | ICD-10-CM

## 2024-12-15 DIAGNOSIS — R10.32 LEFT LOWER QUADRANT ABDOMINAL PAIN: ICD-10-CM

## 2024-12-15 DIAGNOSIS — K63.89 EPIPLOIC APPENDAGITIS: ICD-10-CM

## 2024-12-15 LAB
ALBUMIN SERPL BCG-MCNC: 4.4 G/DL (ref 3.5–5)
ALP SERPL-CCNC: 70 U/L (ref 34–104)
ALT SERPL W P-5'-P-CCNC: 12 U/L (ref 7–52)
ANION GAP SERPL CALCULATED.3IONS-SCNC: 7 MMOL/L (ref 4–13)
AST SERPL W P-5'-P-CCNC: 17 U/L (ref 13–39)
BASOPHILS # BLD AUTO: 0.05 THOUSANDS/ΜL (ref 0–0.1)
BASOPHILS NFR BLD AUTO: 1 % (ref 0–1)
BILIRUB SERPL-MCNC: 0.32 MG/DL (ref 0.2–1)
BILIRUB UR QL STRIP: NEGATIVE
BUN SERPL-MCNC: 13 MG/DL (ref 5–25)
CALCIUM SERPL-MCNC: 9.8 MG/DL (ref 8.4–10.2)
CHLORIDE SERPL-SCNC: 105 MMOL/L (ref 96–108)
CLARITY UR: CLEAR
CO2 SERPL-SCNC: 29 MMOL/L (ref 21–32)
COLOR UR: YELLOW
CREAT SERPL-MCNC: 0.95 MG/DL (ref 0.6–1.3)
EOSINOPHIL # BLD AUTO: 0.14 THOUSAND/ΜL (ref 0–0.61)
EOSINOPHIL NFR BLD AUTO: 2 % (ref 0–6)
ERYTHROCYTE [DISTWIDTH] IN BLOOD BY AUTOMATED COUNT: 13.3 % (ref 11.6–15.1)
GFR SERPL CREATININE-BSD FRML MDRD: 63 ML/MIN/1.73SQ M
GLUCOSE SERPL-MCNC: 84 MG/DL (ref 65–140)
GLUCOSE UR STRIP-MCNC: NEGATIVE MG/DL
HCT VFR BLD AUTO: 39 % (ref 34.8–46.1)
HGB BLD-MCNC: 12.1 G/DL (ref 11.5–15.4)
HGB UR QL STRIP.AUTO: NEGATIVE
IMM GRANULOCYTES # BLD AUTO: 0.03 THOUSAND/UL (ref 0–0.2)
IMM GRANULOCYTES NFR BLD AUTO: 0 % (ref 0–2)
KETONES UR STRIP-MCNC: NEGATIVE MG/DL
LEUKOCYTE ESTERASE UR QL STRIP: NEGATIVE
LIPASE SERPL-CCNC: 85 U/L (ref 11–82)
LYMPHOCYTES # BLD AUTO: 2.31 THOUSANDS/ΜL (ref 0.6–4.47)
LYMPHOCYTES NFR BLD AUTO: 29 % (ref 14–44)
MCH RBC QN AUTO: 28.2 PG (ref 26.8–34.3)
MCHC RBC AUTO-ENTMCNC: 31 G/DL (ref 31.4–37.4)
MCV RBC AUTO: 91 FL (ref 82–98)
MONOCYTES # BLD AUTO: 0.89 THOUSAND/ΜL (ref 0.17–1.22)
MONOCYTES NFR BLD AUTO: 11 % (ref 4–12)
NEUTROPHILS # BLD AUTO: 4.69 THOUSANDS/ΜL (ref 1.85–7.62)
NEUTS SEG NFR BLD AUTO: 57 % (ref 43–75)
NITRITE UR QL STRIP: NEGATIVE
NRBC BLD AUTO-RTO: 0 /100 WBCS
PH UR STRIP.AUTO: 7 [PH] (ref 4.5–8)
PLATELET # BLD AUTO: 343 THOUSANDS/UL (ref 149–390)
PMV BLD AUTO: 9.6 FL (ref 8.9–12.7)
POTASSIUM SERPL-SCNC: 3.7 MMOL/L (ref 3.5–5.3)
PROT SERPL-MCNC: 7.6 G/DL (ref 6.4–8.4)
PROT UR STRIP-MCNC: NEGATIVE MG/DL
RBC # BLD AUTO: 4.29 MILLION/UL (ref 3.81–5.12)
SODIUM SERPL-SCNC: 141 MMOL/L (ref 135–147)
SP GR UR STRIP.AUTO: 1.02 (ref 1–1.03)
UROBILINOGEN UR QL STRIP.AUTO: 0.2 E.U./DL
WBC # BLD AUTO: 8.11 THOUSAND/UL (ref 4.31–10.16)

## 2024-12-15 PROCEDURE — 80053 COMPREHEN METABOLIC PANEL: CPT

## 2024-12-15 PROCEDURE — 83690 ASSAY OF LIPASE: CPT

## 2024-12-15 PROCEDURE — 99285 EMERGENCY DEPT VISIT HI MDM: CPT | Performed by: EMERGENCY MEDICINE

## 2024-12-15 PROCEDURE — 85025 COMPLETE CBC W/AUTO DIFF WBC: CPT

## 2024-12-15 PROCEDURE — 99284 EMERGENCY DEPT VISIT MOD MDM: CPT

## 2024-12-15 PROCEDURE — 36415 COLL VENOUS BLD VENIPUNCTURE: CPT

## 2024-12-15 PROCEDURE — 74177 CT ABD & PELVIS W/CONTRAST: CPT

## 2024-12-15 PROCEDURE — 96374 THER/PROPH/DIAG INJ IV PUSH: CPT

## 2024-12-15 PROCEDURE — 81003 URINALYSIS AUTO W/O SCOPE: CPT

## 2024-12-15 RX ORDER — ONDANSETRON 4 MG/1
4 TABLET, FILM COATED ORAL EVERY 8 HOURS PRN
Qty: 20 TABLET | Refills: 0 | Status: SHIPPED | OUTPATIENT
Start: 2024-12-15

## 2024-12-15 RX ORDER — ACETAMINOPHEN 325 MG/1
650 TABLET ORAL EVERY 6 HOURS PRN
Qty: 30 TABLET | Refills: 0 | Status: SHIPPED | OUTPATIENT
Start: 2024-12-15

## 2024-12-15 RX ORDER — KETOROLAC TROMETHAMINE 30 MG/ML
15 INJECTION, SOLUTION INTRAMUSCULAR; INTRAVENOUS ONCE
Status: COMPLETED | OUTPATIENT
Start: 2024-12-15 | End: 2024-12-15

## 2024-12-15 RX ORDER — IBUPROFEN 400 MG/1
400 TABLET, FILM COATED ORAL EVERY 6 HOURS PRN
Qty: 30 TABLET | Refills: 0 | Status: SHIPPED | OUTPATIENT
Start: 2024-12-15 | End: 2024-12-22

## 2024-12-15 RX ADMIN — KETOROLAC TROMETHAMINE 15 MG: 30 INJECTION, SOLUTION INTRAMUSCULAR; INTRAVENOUS at 12:26

## 2024-12-15 RX ADMIN — AMOXICILLIN AND CLAVULANATE POTASSIUM 1 TABLET: 875; 125 TABLET, COATED ORAL at 14:47

## 2024-12-15 RX ADMIN — IOHEXOL 85 ML: 350 INJECTION, SOLUTION INTRAVENOUS at 13:48

## 2024-12-15 NOTE — ED ATTENDING ATTESTATION
Final Diagnoses:     1. Acute diverticulitis           Porfirio BELTRÁN MD, saw and evaluated the patient. All available labs and X-rays were ordered by me or the resident / non-physician and have been reviewed by myself. I discussed the patient with the resident / non-physician and agree with the resident's / non-physician practitioner's findings and plan as documented in the resident's / non-physician practicitioner's note, except where noted.   At this point, I agree with the current assessment done in the ED.   I was present during key portions of all procedures performed unless otherwise stated.     HPI:  NURSING TRIAGE:    This is a 64 y.o. female presenting for evaluation of nausea, belly pain.  Since yesterday has been having belly pain, gradually getting worse much worse today than yesterday.  No fevers chills, denies any vomiting.  No chest pain or shortness of breath.  No dizziness or lightheadedness.  No urinary symptoms including burning itching pain blood frequency.  She owns a parrot at home.  Last travel out of the state was to [place] 2 months ago.  Denies dizziness lightheadedness.  No major medical problems.  [facility] surgery years ago, denies smoking drinking drugs. Chief Complaint   Patient presents with    Abdominal Problem     Pt having left sided abd pain that started last night. No n/v/d. No b/b issues      PHYSICAL: ASSESSMENT + PLAN:   Pertinent: Focal left lower quadrant belly tenderness without rebound or guarding.  No CVA tenderness.  Rest of abdomen nontender.  Appears comfortable while laying in the bed.  No tachycardia.    General: VS reviewed  Appears in NAD  awake, alert.   Well-nourished, well-developed. Appears stated age.   Speaking normally in full sentences.   Head: Normocephalic, atraumatic  Eyes: EOM-I. No diplopia.   No hyphema.   No subconjunctival hemorrhages.  Symmetrical lids.   ENT: Atraumatic external nose and ears.    MMM  No malocclusion. No stridor. Normal  "phonation. No drooling. Normal swallowing.   Neck: No JVD.  CV: No pallor noted  Lungs:   No tachypnea  No respiratory distress  Abd: soft nd no rebound/guarding  MSK:   FROM spontaneously  Skin: Dry, intact.   Neuro: Awake, alert, GCS15, CN II-XII grossly intact.   Motor grossly intact.  Psychiatric/Behavioral: interacting normally; appropriate mood/affect.    Exam: deferred    Vitals:    12/15/24 1136   BP: 139/86   Pulse: 87   Resp: 18   Temp: 98 °F (36.7 °C)   SpO2: 98%   Weight: 76.7 kg (169 lb)   Height: 5' 1\" (1.549 m)    - given the presentation, will check CBC for marked leukocytosis  - CMP for liver enzyme elevation that could signal cholecystitis, biliary obstructive disease. Check RFTs for SUNITA / markers of dehydration.  - Lipase given abdominal pain to evaluate specifically for pancreatitis.  - Urine: will check for UTI or signs of pyelonephritis.   - Lastly, will consider abdominal imaging.  - CT AP w Contrast: r/o appendicitis, cholecystitis, bowel obstruction or other acute abdominal pathology. Would also demonstrate signs of pyelonephritis, cystitis.   - Disposition per workup.        There are no obvious limitations to social determinants of care.   Nursing note reviewed.   Vitals reviewed.   Orders placed by myself and/or advanced practitioner / resident.    Previous chart was reviewed  History obtained from: Family and Patient  Language barrier: None  Limitations to the history obtained: None    Past Medical: Past Surgical:    has a past medical history of Abnormal mammogram, Anxiety, Anxiety, Arthritis, Depression, Depression, Diabetes mellitus (HCC), Diverticulosis, GERD (gastroesophageal reflux disease), Helicobacter pylori infection, History of Helicobacter pylori infection (06/18/2018), Hyperlipidemia, Seborrheic keratosis, Sleep apnea, Sleep apnea, and Tinea pedis of left foot (09/16/2019).  has a past surgical history that includes Tubal ligation; Abdominoplasty; Tubal ligation; pr " colonoscopy flx dx w/collj spec when pfrmd (N/A, 8/11/2016); pr colonoscopy flx dx w/collj spec when pfrmd (N/A, 8/29/2017); Colonoscopy (2017); Upper gastrointestinal endoscopy (2017); and pr tendon sheath incision (Right, 3/8/2022).   Social: Cardiac (Echo/Cath)   Social History     Substance and Sexual Activity   Alcohol Use No     Social History     Tobacco Use   Smoking Status Never   Smokeless Tobacco Never     Social History     Substance and Sexual Activity   Drug Use No    No results found for this or any previous visit.    No results found for this or any previous visit.    No results found for this or any previous visit.     Labs: Imaging:   Labs Reviewed   CBC AND DIFFERENTIAL - Abnormal       Result Value Ref Range Status    WBC 8.11  4.31 - 10.16 Thousand/uL Final    RBC 4.29  3.81 - 5.12 Million/uL Final    Hemoglobin 12.1  11.5 - 15.4 g/dL Final    Hematocrit 39.0  34.8 - 46.1 % Final    MCV 91  82 - 98 fL Final    MCH 28.2  26.8 - 34.3 pg Final    MCHC 31.0 (*) 31.4 - 37.4 g/dL Final    RDW 13.3  11.6 - 15.1 % Final    MPV 9.6  8.9 - 12.7 fL Final    Platelets 343  149 - 390 Thousands/uL Final    nRBC 0  /100 WBCs Final    Segmented % 57  43 - 75 % Final    Immature Grans % 0  0 - 2 % Final    Lymphocytes % 29  14 - 44 % Final    Monocytes % 11  4 - 12 % Final    Eosinophils Relative 2  0 - 6 % Final    Basophils Relative 1  0 - 1 % Final    Absolute Neutrophils 4.69  1.85 - 7.62 Thousands/µL Final    Absolute Immature Grans 0.03  0.00 - 0.20 Thousand/uL Final    Absolute Lymphocytes 2.31  0.60 - 4.47 Thousands/µL Final    Absolute Monocytes 0.89  0.17 - 1.22 Thousand/µL Final    Eosinophils Absolute 0.14  0.00 - 0.61 Thousand/µL Final    Basophils Absolute 0.05  0.00 - 0.10 Thousands/µL Final   LIPASE - Abnormal    Lipase 85 (*) 11 - 82 u/L Final   COMPREHENSIVE METABOLIC PANEL    Sodium 141  135 - 147 mmol/L Final    Potassium 3.7  3.5 - 5.3 mmol/L Final    Chloride 105  96 - 108 mmol/L Final     CO2 29  21 - 32 mmol/L Final    ANION GAP 7  4 - 13 mmol/L Final    BUN 13  5 - 25 mg/dL Final    Creatinine 0.95  0.60 - 1.30 mg/dL Final    Comment: Standardized to IDMS reference method    Glucose 84  65 - 140 mg/dL Final    Comment: If the patient is fasting, the ADA then defines impaired fasting glucose as > 100 mg/dL and diabetes as > or equal to 123 mg/dL.    Calcium 9.8  8.4 - 10.2 mg/dL Final    AST 17  13 - 39 U/L Final    ALT 12  7 - 52 U/L Final    Comment: Specimen collection should occur prior to Sulfasalazine administration due to the potential for falsely depressed results.     Alkaline Phosphatase 70  34 - 104 U/L Final    Total Protein 7.6  6.4 - 8.4 g/dL Final    Albumin 4.4  3.5 - 5.0 g/dL Final    Total Bilirubin 0.32  0.20 - 1.00 mg/dL Final    Comment: Use of this assay is not recommended for patients undergoing treatment with eltrombopag due to the potential for falsely elevated results.  N-acetyl-p-benzoquinone imine (metabolite of Acetaminophen) will generate erroneously low results in samples for patients that have taken an overdose of Acetaminophen.    eGFR 63  ml/min/1.73sq m Final    Narrative:     National Kidney Disease Foundation guidelines for Chronic Kidney Disease (CKD):     Stage 1 with normal or high GFR (GFR > 90 mL/min/1.73 square meters)    Stage 2 Mild CKD (GFR = 60-89 mL/min/1.73 square meters)    Stage 3A Moderate CKD (GFR = 45-59 mL/min/1.73 square meters)    Stage 3B Moderate CKD (GFR = 30-44 mL/min/1.73 square meters)    Stage 4 Severe CKD (GFR = 15-29 mL/min/1.73 square meters)    Stage 5 End Stage CKD (GFR <15 mL/min/1.73 square meters)  Note: GFR calculation is accurate only with a steady state creatinine   URINE MACROSCOPIC, POC    Color, UA Yellow   Final    Clarity, UA Clear   Final    pH, UA 7.0  4.5 - 8.0 Final    Leukocytes, UA Negative  Negative Final    Nitrite, UA Negative  Negative Final    Protein, UA Negative  Negative mg/dl Final    Glucose, UA Negative   Negative mg/dl Final    Ketones, UA Negative  Negative mg/dl Final    Urobilinogen, UA 0.2  0.2, 1.0 E.U./dl E.U./dl Final    Bilirubin, UA Negative  Negative Final    Occult Blood, UA Negative  Negative Final    Specific Gravity, UA 1.020  1.003 - 1.030 Final    CT abdomen pelvis with contrast   ED Interpretation   Abnormal   LLQ diverticulitis.  No obvious free air / perforation.          Medications: Code Status:   Medications   amoxicillin-clavulanate (AUGMENTIN) 875-125 mg per tablet 1 tablet (has no administration in time range)   ketorolac (TORADOL) injection 15 mg (15 mg Intravenous Given 12/15/24 1226)   iohexol (OMNIPAQUE) 350 MG/ML injection (MULTI-DOSE) 85 mL (85 mL Intravenous Given 12/15/24 1348)    Code Status: Prior  Advance Directive and Living Will:      Power of :    POLST:       Orders Placed This Encounter   Procedures    CT abdomen pelvis with contrast    CBC and differential    Comprehensive metabolic panel    Lipase    Urine dip analyzer     Time reflects when diagnosis was documented in both MDM as applicable and the Disposition within this note       Time User Action Codes Description Comment    12/15/2024  2:43 PM Porfirio Shine Add [K57.92] Acute diverticulitis           ED Disposition       ED Disposition   Discharge    Condition   Stable    Date/Time   Sun Dec 15, 2024  2:43 PM    Comment   Trice Donaldson discharge to home/self care.                   Follow-up Information       Follow up With Specialties Details Why Contact Info Additional Information    [facility] Emergency Department Emergency Medicine Go to  If symptoms worsen [address] [address] [phone] [facility] Emergency Department, [address], [place], [address] [phone]          Patient's Medications   Discharge Prescriptions    ACETAMINOPHEN (TYLENOL) 325 MG TABLET    Take 2 tablets (650 mg total) by mouth  every 6 (six) hours as needed for mild pain       Start Date: 12/15/2024End Date: --       Order Dose: 650 mg       Quantity: 30 tablet    Refills: 0    AMOXICILLIN-CLAVULANATE (AUGMENTIN) 875-125 MG PER TABLET    Take 1 tablet by mouth every 12 (twelve) hours for 7 days       Start Date: 12/15/2024End Date: 12/22/2024       Order Dose: 1 tablet       Quantity: 14 tablet    Refills: 0    IBUPROFEN (MOTRIN) 400 MG TABLET    Take 1 tablet (400 mg total) by mouth every 6 (six) hours as needed for mild pain for up to 7 days       Start Date: 12/15/2024End Date: 12/22/2024       Order Dose: 400 mg       Quantity: 30 tablet    Refills: 0    ONDANSETRON (ZOFRAN) 4 MG TABLET    Take 1 tablet (4 mg total) by mouth every 8 (eight) hours as needed for nausea or vomiting       Start Date: 12/15/2024End Date: --       Order Dose: 4 mg       Quantity: 20 tablet    Refills: 0     No discharge procedures on file.  Prior to Admission Medications   Prescriptions Last Dose Informant Patient Reported? Taking?   ARIPiprazole (ABILIFY) 5 mg tablet   No No   Sig: Take 1 tablet (5 mg total) by mouth daily   Ascorbic Acid (vitamin C) 1000 MG tablet  Self Yes No   Sig: Take 1,000 mg by mouth daily   Blood Glucose Monitoring Suppl (OneTouch Verio Reflect) w/Device KIT  Self No No   Sig: Check blood sugars once daily. Please substitute with appropriate alternative as covered by patient's insurance. Dx: E11.65   Calcium Polycarbophil (FIBERCON PO)  Self Yes No   Sig: Take by mouth   Cequa 0.09 % SOLN   Yes No   Cholecalciferol (VITAMIN D3) 125 MCG (5000 UT) CAPS  Self Yes No   Sig: Take 1,000 Units by mouth   DULoxetine (CYMBALTA) 30 mg delayed release capsule   No No   Sig: Take 1 capsule (30 mg total) by mouth daily   Diclofenac Sodium (VOLTAREN) 1 %   No No   Sig: Apply 2 g topically 4 (four) times a day   Iron-Vitamin C (Vitron-C)  MG TABS   No No   Sig: Take 1 tablet by mouth 3 (three) times a week   Patient not taking: Reported on  "12/2/2024   Methylcobalamin (B-12) 5000 MCG TBDP  Self Yes No   Sig: Take by mouth   Pred Mild 0.12 % ophthalmic suspension   Yes No   acetaminophen (TYLENOL) 650 mg CR tablet  Self No No   Sig: Take 1 tablet (650 mg total) by mouth every 8 (eight) hours as needed for mild pain   Patient taking differently: Take 650 mg by mouth if needed for mild pain   atorvastatin (LIPITOR) 10 mg tablet   No No   Sig: Take 1 tablet (10 mg total) by mouth daily   clonazePAM (KlonoPIN) 1 mg tablet   No No   Sig: Take 1.5mg at night as needed for anxiety, depression, or their effects on severely disrupted sleep   dexamethasone sodium phosphate 0.1 % ophthalmic solution  Self Yes No   dicyclomine (BENTYL) 20 mg tablet  Self No No   Sig: Take 1 tablet (20 mg total) by mouth every 6 (six) hours   dicyclomine (BENTYL) 20 mg tablet   No No   Sig: take 1 tablet by mouth every 6 hours if needed for abdominal pain   hydroquinone 4 % cream   No No   Sig: Apply topically 2 (two) times a day   metFORMIN (GLUCOPHAGE) 500 mg tablet  Self No No   Sig: Take 1 tablet (500 mg total) by mouth 2 (two) times a day with meals   multivitamin (THERAGRAN) TABS  Self Yes No   Sig: Take 1 tablet by mouth daily   omeprazole (PriLOSEC) 20 mg delayed release capsule   No No   Sig: take 1 capsule by mouth daily   prednisoLONE sodium phosphate (INFLAMASE FORTE) 1 % ophthalmic solution   Yes No      Facility-Administered Medications: None                        Portions of the record may have been created with voice recognition software. Occasional wrong word or \"sound a like\" substitutions may have occurred due to the inherent limitations of voice recognition software. Read the chart carefully and recognize, using context, where substitutions have occurred.    Electronically signed by:  Porfirio Shine  "

## 2024-12-15 NOTE — ED PROVIDER NOTES
Time reflects when diagnosis was documented in both MDM as applicable and the Disposition within this note       Time User Action Codes Description Comment    12/15/2024  2:43 PM Fátima, Vamsi Add [K57.92] Acute diverticulitis     12/15/2024  3:17 PM El Vega Add [K63.89] Epiploic appendagitis     12/15/2024  3:18 PM El Vega Add [R10.32] Left lower quadrant abdominal pain           ED Disposition       ED Disposition   Discharge    Condition   Stable    Date/Time   Sun Dec 15, 2024  2:43 PM    Comment   Trice Donaldson discharge to home/self care.                   Assessment & Plan       Medical Decision Making  64-year-old female with a history of type 2 diabetes, hyperlipidemia, GERD, ALEC, and diverticulosis who presents for evaluation of left lower abdominal pain that began this morning.  Vitals are within the normal limits.  On exam the patient has left lower quadrant abdominal tenderness without rebound or guarding.  Remainder the patient's exam is unremarkable.  Differential diagnosis includes diverticulitis, nephrolithiasis, pyelonephritis.  Will order CBC, CMP, lipase, UA, and CT of abdomen and pelvis with IV contrast.  Will treat the patient's pain with Toradol.    Patient reports improvement of her pain.  Lipase is mildly elevated, however does not meet the diagnostic criteria for pancreatitis.  Given the patient's lab work is reviewed and without actionable derangements.  CT of the abdomen and pelvis shows diverticulitis on my interpretation.  The official CT read is epiploic appendagitis.  Will cover the patient with Bactrim given concern for diverticulitis.  Patient is instructed to follow-up with her primary care provider.  Return precautions are given and the patient is discharged.    Amount and/or Complexity of Data Reviewed  Labs: ordered.  Radiology: ordered and independent interpretation performed.    Risk  OTC drugs.  Prescription drug management.              Medications   ketorolac (TORADOL) injection 15 mg (15 mg Intravenous Given 12/15/24 1226)   iohexol (OMNIPAQUE) 350 MG/ML injection (MULTI-DOSE) 85 mL (85 mL Intravenous Given 12/15/24 1348)   amoxicillin-clavulanate (AUGMENTIN) 875-125 mg per tablet 1 tablet (1 tablet Oral Given 12/15/24 1447)       ED Risk Strat Scores                          SBIRT 22yo+      Flowsheet Row Most Recent Value   Initial Alcohol Screen: US AUDIT-C     1. How often do you have a drink containing alcohol? 0 Filed at: 12/15/2024 1139   2. How many drinks containing alcohol do you have on a typical day you are drinking?  0 Filed at: 12/15/2024 1139   3a. Male UNDER 65: How often do you have five or more drinks on one occasion? 0 Filed at: 12/15/2024 1139   3b. FEMALE Any Age, or MALE 65+: How often do you have 4 or more drinks on one occassion? 0 Filed at: 12/15/2024 1139   Audit-C Score 0 Filed at: 12/15/2024 1139   TABATHA: How many times in the past year have you...    Used an illegal drug or used a prescription medication for non-medical reasons? Never Filed at: 12/15/2024 1139                            History of Present Illness       Chief Complaint   Patient presents with    Abdominal Problem     Pt having left sided abd pain that started last night. No n/v/d. No b/b issues       Past Medical History:   Diagnosis Date    Abnormal mammogram     RESOLVED: [id number]    Anxiety     Anxiety     Arthritis     Depression     Depression     Diabetes mellitus (HCC)     Pt denies    Diverticulosis     GERD (gastroesophageal reflux disease)     Helicobacter pylori infection     History of Helicobacter pylori infection 06/18/2018    Hyperlipidemia     Seborrheic keratosis     LAST ASSESSED: [id number]    Sleep apnea     Sleep apnea     Tinea pedis of left foot 09/16/2019      Past Surgical History:   Procedure Laterality Date    ABDOMINOPLASTY      abdomin    COLONOSCOPY  2017    HI COLONOSCOPY FLX DX W/COLLJ SPEC WHEN PFRMD N/A 8/11/2016     Procedure: COLONOSCOPY;  Surgeon: Bart Espinoza MD;  Location:  GI LAB;  Service: Gastroenterology    AR COLONOSCOPY FLX DX W/COLLJ SPEC WHEN PFRMD N/A 2017    Procedure: EGD AND COLONOSCOPY;  Surgeon: Bart Espinoza MD;  Location: [facility];  Service: Gastroenterology    AR TENDON SHEATH INCISION Right 3/8/2022    Procedure: Right thumb, long, and ring finger trigger finger releases;  Surgeon: Gary Wray MD;  Location:  MAIN OR;  Service: Orthopedics    TUBAL LIGATION      TUBAL LIGATION      UPPER GASTROINTESTINAL ENDOSCOPY        Family History   Problem Relation Age of Onset    Diabetes Mother     Hypertension Mother     Heart disease Mother     Diabetes Paternal Grandfather     Alzheimer's disease Father     No Known Problems Sister     No Known Problems Brother     Depression Daughter     ADD / ADHD Son     Depression Son     Diabetes Maternal Grandmother     Stomach cancer Maternal Grandfather     No Known Problems Paternal Grandmother     No Known Problems Sister     Heart disease Sister     Intellectual disability Brother     Schizophrenia Brother     Other Son          as an infant     Other Daughter          at 1-2 mths old    Other Son          as an infant       Social History     Tobacco Use    Smoking status: Never    Smokeless tobacco: Never   Vaping Use    Vaping status: Never Used   Substance Use Topics    Alcohol use: No    Drug use: No      E-Cigarette/Vaping    E-Cigarette Use Never User       E-Cigarette/Vaping Substances    Nicotine No     THC No     CBD No     Flavoring No     Other No     Unknown No       I have reviewed and agree with the history as documented.     64-year-old female with a history of type 2 diabetes, hyperlipidemia, GERD, ALEC, and diverticulosis who presents for evaluation of left lower abdominal pain that began this morning.  Patient reports that her pain is constant, sharp, and does not radiate.  The patient denies history of similar  symptoms.  The patient denies fever, chills, chest pain, shortness of breath, nausea, vomiting, diarrhea, dysuria, hematuria.        Review of Systems   Constitutional:  Negative for chills, diaphoresis and fever.   HENT:  Negative for congestion and sore throat.    Eyes:  Negative for pain and redness.   Respiratory:  Negative for cough and shortness of breath.    Cardiovascular:  Negative for chest pain, palpitations and leg swelling.   Gastrointestinal:  Positive for abdominal pain. Negative for diarrhea, nausea and vomiting.   Genitourinary:  Negative for dysuria, flank pain and hematuria.   Musculoskeletal:  Negative for arthralgias and myalgias.   Skin:  Negative for color change, pallor and rash.   Neurological:  Negative for dizziness, syncope, weakness, light-headedness, numbness and headaches.   All other systems reviewed and are negative.          Objective       ED Triage Vitals [12/15/24 1136]   Temperature Pulse Blood Pressure Respirations SpO2 Patient Position - Orthostatic VS   98 °F (36.7 °C) 87 139/86 18 98 % Sitting      Temp src Heart Rate Source BP Location FiO2 (%) Pain Score    -- -- -- -- No Pain      Vitals      Date and Time Temp Pulse SpO2 Resp BP Pain Score FACES Pain Rating User   12/15/24 1226 -- -- -- -- -- 4 -- DJS   12/15/24 1136 98 °F (36.7 °C) 87 98 % 18 139/86 No Pain pain is 8 with any movement or touching the area -- BG            Physical Exam  Vitals and nursing note reviewed.   Constitutional:       General: Trice is not in acute distress.     Appearance: Normal appearance. Trice is not ill-appearing, toxic-appearing or diaphoretic.   HENT:      Head: Normocephalic and atraumatic.      Nose: Nose normal. No congestion or rhinorrhea.      Mouth/Throat:      Mouth: Mucous membranes are moist.      Pharynx: Oropharynx is clear.   Eyes:      General: No scleral icterus.     Extraocular Movements: Extraocular movements intact.      Conjunctiva/sclera: Conjunctivae normal.       Pupils: Pupils are equal, round, and reactive to light.   Cardiovascular:      Rate and Rhythm: Normal rate and regular rhythm.      Pulses: Normal pulses.      Heart sounds: Normal heart sounds. No murmur heard.     No friction rub. No gallop.   Pulmonary:      Effort: Pulmonary effort is normal.      Breath sounds: Normal breath sounds. No wheezing, rhonchi or rales.   Abdominal:      General: Abdomen is flat.      Palpations: Abdomen is soft.      Tenderness: There is abdominal tenderness in the left lower quadrant. There is no right CVA tenderness, left CVA tenderness, guarding or rebound.   Musculoskeletal:         General: No swelling, tenderness, deformity or signs of injury. Normal range of motion.      Cervical back: Normal range of motion and neck supple. No rigidity or tenderness.      Right lower leg: No edema.      Left lower leg: No edema.   Lymphadenopathy:      Cervical: No cervical adenopathy.   Skin:     General: Skin is warm and dry.      Capillary Refill: Capillary refill takes less than 2 seconds.      Coloration: Skin is not jaundiced or pale.      Findings: No bruising, erythema, lesion or rash.   Neurological:      General: No focal deficit present.      Mental Status: Trice is alert and oriented to person, place, and time.         Results Reviewed       Procedure Component Value Units Date/Time    Comprehensive metabolic panel [[id number]] Collected: 12/15/24 1224    Lab Status: Final result Specimen: Blood from Arm, Left Updated: 12/15/24 1314     Sodium 141 mmol/L      Potassium 3.7 mmol/L      Chloride 105 mmol/L      CO2 29 mmol/L      ANION GAP 7 mmol/L      BUN 13 mg/dL      Creatinine 0.95 mg/dL      Glucose 84 mg/dL      Calcium 9.8 mg/dL      AST 17 U/L      ALT 12 U/L      Alkaline Phosphatase 70 U/L      Total Protein 7.6 g/dL      Albumin 4.4 g/dL      Total Bilirubin 0.32 mg/dL      eGFR 63 ml/min/1.73sq m     Narrative:      National Kid[employer] Disease Foundation guidelines for  Chronic Kidney Disease (CKD):     Stage 1 with normal or high GFR (GFR > 90 mL/min/1.73 square meters)    Stage 2 Mild CKD (GFR = 60-89 mL/min/1.73 square meters)    Stage 3A Moderate CKD (GFR = 45-59 mL/min/1.73 square meters)    Stage 3B Moderate CKD (GFR = 30-44 mL/min/1.73 square meters)    Stage 4 Severe CKD (GFR = 15-29 mL/min/1.73 square meters)    Stage 5 End Stage CKD (GFR <15 mL/min/1.73 square meters)  Note: GFR calculation is accurate only with a steady state creatinine    Lipase [[id number]]  (Abnormal) Collected: 12/15/24 1224    Lab Status: Final result Specimen: Blood from Arm, Left Updated: 12/15/24 1314     Lipase 85 u/L     CBC and differential [[id number]]  (Abnormal) Collected: 12/15/24 1224    Lab Status: Final result Specimen: Blood from Arm, Left Updated: 12/15/24 1254     WBC 8.11 Thousand/uL      RBC 4.29 Million/uL      Hemoglobin 12.1 g/dL      Hematocrit 39.0 %      MCV 91 fL      MCH 28.2 pg      MCHC 31.0 g/dL      RDW 13.3 %      MPV 9.6 fL      Platelets 343 Thousands/uL      nRBC 0 /100 WBCs      Segmented % 57 %      Immature Grans % 0 %      Lymphocytes % 29 %      Monocytes % 11 %      Eosinophils Relative 2 %      Basophils Relative 1 %      Absolute Neutrophils 4.69 Thousands/µL      Absolute Immature Grans 0.03 Thousand/uL      Absolute Lymphocytes 2.31 Thousands/µL      Absolute Monocytes 0.89 Thousand/µL      Eosinophils Absolute 0.14 Thousand/µL      Basophils Absolute 0.05 Thousands/µL     Urine Macroscopic, POC [[id number]] Collected: 12/15/24 1245    Lab Status: Final result Specimen: Urine Updated: 12/15/24 1246     Color, UA Yellow     Clarity, UA Clear     pH, UA 7.0     Leukocytes, UA Negative     Nitrite, UA Negative     Protein, UA Negative mg/dl      Glucose, UA Negative mg/dl      Ketones, UA Negative mg/dl      Urobilinogen, UA 0.2 E.U./dl      Bilirubin, UA Negative     Occult Blood, UA Negative     Specific Gravity, UA 1.020            CT abdomen pelvis with  contrast   ED Interpretation by Porfirio Shine MD (12/15 0068)   Abnormal   LLQ diverticulitis.  No obvious free air / perforation.       Final Interpretation by Bird Astudillo MD (12/15 3191)      Findings consistent with epiploic appendagitis in the left lower quadrant.      Uncomplicated colonic diverticulosis.      The study was marked in EPIC for immediate notification.         Workstation performed: [id number]             Procedures    ED Medication and Procedure Management   Prior to Admission Medications   Prescriptions Last Dose Informant Patient Reported? Taking?   ARIPiprazole (ABILIFY) 5 mg tablet   No No   Sig: Take 1 tablet (5 mg total) by mouth daily   Ascorbic Acid (vitamin C) 1000 MG tablet  Self Yes No   Sig: Take 1,000 mg by mouth daily   Blood Glucose Monitoring Suppl (OneTouch Verio Reflect) w/Device KIT  Self No No   Sig: Check blood sugars once daily. Please substitute with appropriate alternative as covered by patient's insurance. Dx: E11.65   Calcium Polycarbophil (FIBERCON PO)  Self Yes No   Sig: Take by mouth   Cequa 0.09 % SOLN   Yes No   Cholecalciferol (VITAMIN D3) 125 MCG (5000 UT) CAPS  Self Yes No   Sig: Take 1,000 Units by mouth   DULoxetine (CYMBALTA) 30 mg delayed release capsule   No No   Sig: Take 1 capsule (30 mg total) by mouth daily   Diclofenac Sodium (VOLTAREN) 1 %   No No   Sig: Apply 2 g topically 4 (four) times a day   Iron-Vitamin C (Vitron-C)  MG TABS   No No   Sig: Take 1 tablet by mouth 3 (three) times a week   Patient not taking: Reported on 12/2/2024   Methylcobalamin (B-12) 5000 MCG TBDP  Self Yes No   Sig: Take by mouth   Pred Mild 0.12 % ophthalmic suspension   Yes No   acetaminophen (TYLENOL) 650 mg CR tablet  Self No No   Sig: Take 1 tablet (650 mg total) by mouth every 8 (eight) hours as needed for mild pain   Patient taking differently: Take 650 mg by mouth if needed for mild pain   atorvastatin (LIPITOR) 10 mg tablet   No No   Sig: Take 1 tablet  (10 mg total) by mouth daily   clonazePAM (KlonoPIN) 1 mg tablet   No No   Sig: Take 1.5mg at night as needed for anxiety, depression, or their effects on severely disrupted sleep   dexamethasone sodium phosphate 0.1 % ophthalmic solution  Self Yes No   dicyclomine (BENTYL) 20 mg tablet  Self No No   Sig: Take 1 tablet (20 mg total) by mouth every 6 (six) hours   dicyclomine (BENTYL) 20 mg tablet   No No   Sig: take 1 tablet by mouth every 6 hours if needed for abdominal pain   hydroquinone 4 % cream   No No   Sig: Apply topically 2 (two) times a day   metFORMIN (GLUCOPHAGE) 500 mg tablet  Self No No   Sig: Take 1 tablet (500 mg total) by mouth 2 (two) times a day with meals   multivitamin (THERAGRAN) TABS  Self Yes No   Sig: Take 1 tablet by mouth daily   omeprazole (PriLOSEC) 20 mg delayed release capsule   No No   Sig: take 1 capsule by mouth daily   prednisoLONE sodium phosphate (INFLAMASE FORTE) 1 % ophthalmic solution   Yes No      Facility-Administered Medications: None     Discharge Medication List as of 12/15/2024  3:53 PM        START taking these medications    Details   acetaminophen (TYLENOL) 325 mg tablet Take 2 tablets (650 mg total) by mouth every 6 (six) hours as needed for mild pain, Starting Sun 12/15/2024, Normal      amoxicillin-clavulanate (AUGMENTIN) 875-125 mg per tablet Take 1 tablet by mouth every 12 (twelve) hours for 7 days, Starting Sun 12/15/2024, Until Sun 12/22/2024, Normal      ibuprofen (MOTRIN) 400 mg tablet Take 1 tablet (400 mg total) by mouth every 6 (six) hours as needed for mild pain for up to 7 days, Starting Sun 12/15/2024, Until Sun 12/22/2024 at 2359, Normal      ondansetron (ZOFRAN) 4 mg tablet Take 1 tablet (4 mg total) by mouth every 8 (eight) hours as needed for nausea or vomiting, Starting Sun 12/15/2024, Normal           CONTINUE these medications which have NOT CHANGED    Details   acetaminophen (TYLENOL) 650 mg CR tablet Take 1 tablet (650 mg total) by mouth every  8 (eight) hours as needed for mild pain, Starting Tue 3/8/2022, Normal      ARIPiprazole (ABILIFY) 5 mg tablet Take 1 tablet (5 mg total) by mouth daily, Starting Wed 9/25/2024, Normal      Ascorbic Acid (vitamin C) 1000 MG tablet Take 1,000 mg by mouth daily, Historical Med      atorvastatin (LIPITOR) 10 mg tablet Take 1 tablet (10 mg total) by mouth daily, Starting Mon 12/2/2024, Normal      Blood Glucose Monitoring Suppl (OneTouch Verio Reflect) w/Device KIT Check blood sugars once daily. Please substitute with appropriate alternative as covered by patient's insurance. Dx: E11.65, Normal      Calcium Polycarbophil (FIBERCON PO) Take by mouth, Historical Med      Cequa 0.09 % SOLN Historical Med      Cholecalciferol (VITAMIN D3) 125 MCG (5000 UT) CAPS Take 1,000 Units by mouth, Historical Med      clonazePAM (KlonoPIN) 1 mg tablet Take 1.5mg at night as needed for anxiety, depression, or their effects on severely disrupted sleep, Normal      dexamethasone sodium phosphate 0.1 % ophthalmic solution Historical Med      Diclofenac Sodium (VOLTAREN) 1 % Apply 2 g topically 4 (four) times a day, Starting Mon 12/2/2024, Normal      !! dicyclomine (BENTYL) 20 mg tablet Take 1 tablet (20 mg total) by mouth every 6 (six) hours, Starting Thu 1/25/2024, Normal      !! dicyclomine (BENTYL) 20 mg tablet take 1 tablet by mouth every 6 hours if needed for abdominal pain, Normal      DULoxetine (CYMBALTA) 30 mg delayed release capsule Take 1 capsule (30 mg total) by mouth daily, Starting Wed 9/25/2024, Normal      hydroquinone 4 % cream Apply topically 2 (two) times a day, Starting Wed 9/4/2024, Normal      Iron-Vitamin C (Vitron-C)  MG TABS Take 1 tablet by mouth 3 (three) times a week, Starting Mon 9/9/2024, Normal      metFORMIN (GLUCOPHAGE) 500 mg tablet Take 1 tablet (500 mg total) by mouth 2 (two) times a day with meals, Starting Tue 2/20/2024, Normal      Methylcobalamin (B-12) 5000 MCG TBDP Take by mouth, Historical  Med      multivitamin (THERAGRAN) TABS Take 1 tablet by mouth daily, Historical Med      omeprazole (PriLOSEC) 20 mg delayed release capsule take 1 capsule by mouth daily, Starting Thu 9/26/2024, Normal      Pred Mild 0.12 % ophthalmic suspension Historical Med      prednisoLONE sodium phosphate (INFLAMASE FORTE) 1 % ophthalmic solution Historical Med       !! - Potential duplicate medications found. Please discuss with provider.        No discharge procedures on file.  ED SEPSIS DOCUMENTATION   Time reflects when diagnosis was documented in both MDM as applicable and the Disposition within this note       Time User Action Codes Description Comment    12/15/2024  2:43 PM Porfirio Shine Add [K57.92] Acute diverticulitis     12/15/2024  3:17 PM El Vega Add [K63.89] Epiploic appendagitis     12/15/2024  3:18 PM El Vega Add [R10.32] Left lower quadrant abdominal pain                  El Vega DO  12/17/24 1552

## 2024-12-17 ENCOUNTER — TELEPHONE (OUTPATIENT)
Age: 64
End: 2024-12-17

## 2024-12-17 ENCOUNTER — OFFICE VISIT (OUTPATIENT)
Dept: INTERNAL MEDICINE CLINIC | Facility: CLINIC | Age: 64
End: 2024-12-17

## 2024-12-17 VITALS
OXYGEN SATURATION: 98 % | WEIGHT: 169 LBS | HEART RATE: 93 BPM | DIASTOLIC BLOOD PRESSURE: 87 MMHG | TEMPERATURE: 98 F | BODY MASS INDEX: 31.93 KG/M2 | SYSTOLIC BLOOD PRESSURE: 127 MMHG

## 2024-12-17 DIAGNOSIS — E11.9 TYPE 2 DIABETES MELLITUS WITHOUT COMPLICATION, WITHOUT LONG-TERM CURRENT USE OF INSULIN (HCC): ICD-10-CM

## 2024-12-17 DIAGNOSIS — K63.89 EPIPLOIC APPENDAGITIS: Primary | ICD-10-CM

## 2024-12-17 PROCEDURE — G2211 COMPLEX E/M VISIT ADD ON: HCPCS | Performed by: STUDENT IN AN ORGANIZED HEALTH CARE EDUCATION/TRAINING PROGRAM

## 2024-12-17 PROCEDURE — 99213 OFFICE O/P EST LOW 20 MIN: CPT | Performed by: STUDENT IN AN ORGANIZED HEALTH CARE EDUCATION/TRAINING PROGRAM

## 2024-12-17 NOTE — TELEPHONE ENCOUNTER
"Behavioral Health Outpatient Intake Questions    Referred By   : PCP    Please advise interviewee that they need to answer all questions truthfully to allow for best care, and any misrepresentations of information may affect their ability to be seen at this clinic   => Was this discussed? Yes     If Minor Child (under age 18)    Who is/are the legal guardian(s) of the child?     Is there a custody agreement?      If \"YES\"- Custody orders must be obtained prior to scheduling the first appointment  In addition, Consent to Treatment must be signed by all legal guardians prior to scheduling the first appointment    If \"NO\"- Consent to Treatment must be signed by all legal guardians prior to scheduling the first appointment    Behavioral Health Outpatient Intake History -     Presenting Problem (in patient's own words):   Severe Depression , Anxiety    Are there any communication barriers for this patient?                                                    If yes, please describe barriers:   If there is a unique situation, please refer to Reece Kumar/Anne Cabezas for final determination.    Are you taking any psychiatric medications?      If \"YES\" -What are they Clonazepam, and two other for anxiety      If \"YES\" -Who prescribes? PCP    Has the Patient previously received outpatient Talk Therapy or Medication Management from [facility]  No        If \"YES\"- When, Where and with Whom?         If \"NO\" -Has Patient received these services elsewhere? Yes      If \"YES\" -When, Where, and with Whom? Psych    Has the Patient abused alcohol or other substances in the last 6 months ? No  No concerns of substance abuse are reported.     If \"YES\" -What substance, How much, How often?     If illegal substance: Refer to [facility] (for LYLE) or [facility].   If Alcohol in excess of 10 drinks per week:  Refer to [facility] (for L[facility]) or [facility]    Legal History-     Is this treatment court ordered? No   If \"yes " "\"send to :  Talk Therapy : Send to Reece Kumar for final determination   Med Management: Send to Dr. Hampton for final determination     Has the Patient been convicted of a felony?  No   If \"Yes\" send to -When, What?  Talk Therapy: Send to Reece Kumar for final determination   Med Management: Send to Dr. Hampton for final determination     ACCEPTED as a patient Yes  If \"Yes\" Appointment Date: 03/25/2025 12:00 pm w/ Yuly Mathias    Referred Elsewhere? No  If “Yes” - (Where? Ex: [facility], [facility], [facility], [facility], etc.)       Name of Insurance Co: Medicare; [facility]  Insurance ID# [id number]; [id number]   Insurance Phone #  If ins is primary or secondary?  If patient is a minor, parents information such as Name, D.O.B of guarantor.  "

## 2024-12-17 NOTE — ASSESSMENT & PLAN NOTE
Lab Results   Component Value Date    HGBA1C 6.3 12/03/2024     Patient inquired about starting SGLT2i or GLP-1 at this visit.  Reviewed recent A1c's, diabetes has thankfully been very well controlled with Metformin and diet.  Discussed that additional therapy isn't indicated at this time, and that we should continue her current management.

## 2024-12-17 NOTE — PROGRESS NOTES
Name: Trice Donaldson      : 1960      MRN: [id number]  Encounter Provider: Ej Ma MD  Encounter Date: 2024   Encounter department: [facility] [place]  :  Assessment & Plan  Epiploic appendagitis  As below, recent ED visit with abdominal CT imaging showing epiploic appendagitis; stable diverticulosis.  Discussed with patient she should stop taking antibiotics, continue taking Ibuprofen as prescribed for the next 4-5 days with meals and PPI.  If any worsening abdominal pain, nausea/vomiting, fever, chills or bloody bowel movements, instructed to seek urgent medical attention.         Type 2 diabetes mellitus without complication, without long-term current use of insulin ([facility])    Lab Results   Component Value Date    HGBA1C 6.3 2024     Patient inquired about starting SGLT2i or GLP-1 at this visit.  Reviewed recent A1c's, diabetes has thankfully been very well controlled with Metformin and diet.  Discussed that additional therapy isn't indicated at this time, and that we should continue her current management.                History of Present Illness     64 year old female with history of diverticulosis, IBS presenting to the clinic for ED follow up. Was seen on 12/15 for LLQ abdominal pain, at which time she underwent CT imaging showing epiploic appendagitis, as well as stable diverticulosis. Was discharged with 1 week course of Augmentin as well as NSAIDs, Zofran and Tylenol.    Since then has had improvement in lower abdominal pain, however on second day of antibiotics noted some nausea after taking Augmentin on an empty stomach; no vomiting. Bowel movements have been regular and formed, denies seeing any blood or having issues w/ consistency.       Review of Systems   Gastrointestinal:  Positive for abdominal pain.   All other systems reviewed and are negative.      Objective   /87 (BP Location: Left arm, Patient Position: Sitting, Cuff Size:  Standard)   Pulse 93   Temp 98 °F (36.7 °C) (Temporal)   Wt 76.7 kg (169 lb)   LMP  (LMP Unknown)   SpO2 98%   BMI 31.93 kg/m²      Physical Exam  Vitals reviewed.   Constitutional:       General: Trice is not in acute distress.  HENT:      Head: Normocephalic.      Mouth/Throat:      Mouth: Mucous membranes are moist.      Pharynx: Oropharynx is clear.   Eyes:      Pupils: Pupils are equal, round, and reactive to light.   Cardiovascular:      Rate and Rhythm: Normal rate and regular rhythm.   Pulmonary:      Effort: Pulmonary effort is normal. No respiratory distress.   Abdominal:      General: There is no distension.      Palpations: Abdomen is soft.      Tenderness: There is no abdominal tenderness.   Musculoskeletal:      Right lower leg: No edema.      Left lower leg: No edema.   Skin:     General: Skin is warm and dry.   Neurological:      General: No focal deficit present.      Mental Status: Trice is alert and oriented to person, place, and time. Mental status is at baseline.      Gait: Gait normal.   Psychiatric:         Mood and Affect: Mood normal.           Ej Ma MD  [facility]  Internal Medicine Residency PGY-2

## 2024-12-20 NOTE — PATIENT INSTRUCTIONS
Please take metformin 500 mg twice daily, and then increase to 1000 mg twice daily after 2 weeks  Please apply Voltaren gel to right hand 2-4 times daily as needed for pain relief  Please continue taking Tylenol as needed as well  Please continue following with Orthopedic surgery and occupational therapy  Please obtain outpatient laboratory studies  Please continue with lifestyle modifications including proper diet and exercise  Wellness Visit for Adults   AMBULATORY CARE:   A wellness visit  is when you see your healthcare provider to get screened for health problems  Your healthcare provider will also give you advice on how to stay healthy  Write down your questions so you remember to ask them  Ask your healthcare provider how often you should have a wellness visit  What happens at a wellness visit:  Your healthcare provider will ask about your health, and your family history of health problems  This includes high blood pressure, heart disease, and cancer  He or she will ask if you have symptoms that concern you, if you smoke, and about your mood  You may also be asked about your intake of medicines, supplements, food, and alcohol  Any of the following may be done:  · Your weight  will be checked  Your height may also be checked so your body mass index (BMI) can be calculated  Your BMI shows if you are at a healthy weight  · Your blood pressure  and heart rate will be checked  Your temperature may also be checked  · Blood and urine tests  may be done  Blood tests may be done to check your cholesterol levels  Abnormal cholesterol levels increase your risk for heart disease and stroke  You may also need a blood or urine test to check for diabetes if you are at increased risk  Urine tests may be done to look for signs of an infection or kidney disease  · A physical exam  includes checking your heartbeat and lungs with a stethoscope   Your healthcare provider may also check your skin to look Subjective:       Patient ID: Cayla Vu for sun damage  · Screening tests  may be recommended  A screening test is done to check for diseases that may not cause symptoms  The screening tests you may need depend on your age, gender, family history, and lifestyle habits  For example, colorectal screening may be recommended if you are 48years old or older  Screening tests you need if you are a woman:   · A Pap smear  is used to screen for cervical cancer  Pap smears are usually done every 3 to 5 years depending on your age  You may need them more often if you have had abnormal Pap smear test results in the past  Ask your healthcare provider how often you should have a Pap smear  · A mammogram  is an x-ray of your breasts to screen for breast cancer  Experts recommend mammograms every 2 years starting at age 48 years  You may need a mammogram at age 52 years or younger if you have an increased risk for breast cancer  Talk to your healthcare provider about when you should start having mammograms and how often you need them  Vaccines you may need:   · Get an influenza vaccine  every year  The influenza vaccine protects you from the flu  Several types of viruses cause the flu  The viruses change over time, so new vaccines are made each year  · Get a tetanus-diphtheria (Td) booster vaccine  every 10 years  This vaccine protects you against tetanus and diphtheria  Tetanus is a severe infection that may cause painful muscle spasms and lockjaw  Diphtheria is a severe bacterial infection that causes a thick covering in the back of your mouth and throat  · Get a human papillomavirus (HPV) vaccine  if you are female and aged 23 to 32 or male 23 to 24 and never received it  This vaccine protects you from HPV infection  HPV is the most common infection spread by sexual contact  HPV may also cause vaginal, penile, and anal cancers  · Get a pneumococcal vaccine  if you are aged 72 years or older   The pneumococcal vaccine is an injection given to protect you from pneumococcal disease  Pneumococcal disease is an infection caused by pneumococcal bacteria  The infection may cause pneumonia, meningitis, or an ear infection  · Get a shingles vaccine  if you are 60 or older, even if you have had shingles before  The shingles vaccine is an injection to protect you from the varicella-zoster virus  This is the same virus that causes chickenpox  Shingles is a painful rash that develops in people who had chickenpox or have been exposed to the virus  How to eat healthy:  My Plate is a model for planning healthy meals  It shows the types and amounts of foods that should go on your plate  Fruits and vegetables make up about half of your plate, and grains and protein make up the other half  A serving of dairy is included on the side of your plate  The amount of calories and serving sizes you need depends on your age, gender, weight, and height  Examples of healthy foods are listed below:  · Eat a variety of vegetables  such as dark green, red, and orange vegetables  You can also include canned vegetables low in sodium (salt) and frozen vegetables without added butter or sauces  · Eat a variety of fresh fruits , canned fruit in 100% juice, frozen fruit, and dried fruit  · Include whole grains  At least half of the grains you eat should be whole grains  Examples include whole-wheat bread, wheat pasta, brown rice, and whole-grain cereals such as oatmeal     · Eat a variety of protein foods such as seafood (fish and shellfish), lean meat, and poultry without skin (turkey and chicken)  Examples of lean meats include pork leg, shoulder, or tenderloin, and beef round, sirloin, tenderloin, and extra lean ground beef  Other protein foods include eggs and egg substitutes, beans, peas, soy products, nuts, and seeds  · Choose low-fat dairy products such as skim or 1% milk or low-fat yogurt, cheese, and cottage cheese      · Limit unhealthy fats  such as butter, hard technology. Verbal consent was obtained by the patient and accompanying visitor(s) for the recording of patient appointment to facilitate this note. I attest to having reviewed and edited the generated note for accuracy, though some syntax or spelling errors may persist. Please contact the author of this note for any clarification.      margarine, and shortening  Exercise:  Exercise at least 30 minutes per day on most days of the week  Some examples of exercise include walking, biking, dancing, and swimming  You can also fit in more physical activity by taking the stairs instead of the elevator or parking farther away from stores  Include muscle strengthening activities 2 days each week  Regular exercise provides many health benefits  It helps you manage your weight, and decreases your risk for type 2 diabetes, heart disease, stroke, and high blood pressure  Exercise can also help improve your mood  Ask your healthcare provider about the best exercise plan for you  General health and safety guidelines:   · Do not smoke  Nicotine and other chemicals in cigarettes and cigars can cause lung damage  Ask your healthcare provider for information if you currently smoke and need help to quit  E-cigarettes or smokeless tobacco still contain nicotine  Talk to your healthcare provider before you use these products  · Limit alcohol  A drink of alcohol is 12 ounces of beer, 5 ounces of wine, or 1½ ounces of liquor  · Lose weight, if needed  Being overweight increases your risk of certain health conditions  These include heart disease, high blood pressure, type 2 diabetes, and certain types of cancer  · Protect your skin  Do not sunbathe or use tanning beds  Use sunscreen with a SPF 15 or higher  Apply sunscreen at least 15 minutes before you go outside  Reapply sunscreen every 2 hours  Wear protective clothing, hats, and sunglasses when you are outside  · Drive safely  Always wear your seatbelt  Make sure everyone in your car wears a seatbelt  A seatbelt can save your life if you are in an accident  Do not use your cell phone when you are driving  This could distract you and cause an accident  Pull over if you need to make a call or send a text message  · Practice safe sex    Use latex condoms if are sexually active and have more than one partner  Your healthcare provider may recommend screening tests for sexually transmitted infections (STIs)  · Wear helmets, lifejackets, and protective gear  Always wear a helmet when you ride a bike or motorcycle, go skiing, or play sports that could cause a head injury  Wear protective equipment when you play sports  Wear a lifejacket when you are on a boat or doing water sports  © Copyright [employer] 2022 Information is for End User's use only and may not be sold, redistributed or otherwise used for commercial purposes  All illustrations and images included in CareNotes® are the copyrighted property of A D A M , Inc  or [facility] Rm Alaniz   The above information is an  only  It is not intended as medical advice for individual conditions or treatments  Talk to your doctor, nurse or pharmacist before following any medical regimen to see if it is safe and effective for you

## 2024-12-26 ENCOUNTER — PATIENT OUTREACH (OUTPATIENT)
Dept: INTERNAL MEDICINE CLINIC | Facility: CLINIC | Age: 64
End: 2024-12-26

## 2024-12-26 NOTE — PROGRESS NOTES
SW has reached out to pt via  ID # [id number] to f/u re OP MH referral.    ALMA notes pt has received out reach from [facility] and has been given an appointment for 3/25/24 with Dr. Mathias for Medication Management.    SW  inquired if she wants to still pursue the Virtual appointment with [facility].  SW wants to also clarify she wants TALK THERAPY which she does or prefers in person appointment.  Pt asked for SW to confirm with her granddgt who assist her with rides

## 2024-12-27 ENCOUNTER — PATIENT OUTREACH (OUTPATIENT)
Dept: INTERNAL MEDICINE CLINIC | Facility: CLINIC | Age: 64
End: 2024-12-27

## 2025-01-03 DIAGNOSIS — F41.9 ANXIETY: ICD-10-CM

## 2025-01-06 RX ORDER — CLONAZEPAM 1 MG/1
TABLET ORAL
Qty: 45 TABLET | Refills: 0 | Status: SHIPPED | OUTPATIENT
Start: 2025-01-06

## 2025-01-29 ENCOUNTER — OFFICE VISIT (OUTPATIENT)
Dept: DENTISTRY | Facility: CLINIC | Age: 65
End: 2025-01-29

## 2025-01-29 VITALS — HEART RATE: 93 BPM | DIASTOLIC BLOOD PRESSURE: 78 MMHG | SYSTOLIC BLOOD PRESSURE: 124 MMHG

## 2025-01-29 DIAGNOSIS — Z01.20 ENCOUNTER FOR DENTAL EXAMINATION: Primary | ICD-10-CM

## 2025-01-29 PROCEDURE — D0150 COMPREHENSIVE ORAL EVALUATION - NEW OR ESTABLISHED PATIENT: HCPCS

## 2025-01-29 PROCEDURE — D0210 INTRAORAL - COMPLETE SERIES OF RADIOGRAPHIC IMAGES: HCPCS

## 2025-01-29 NOTE — PROGRESS NOTES
"Comprehensive Exam    Trice Donaldson 64 y.o. female presents with self to [facility] for comprehensive exam.  PMH reviewed, no changes, ASA II. Significant medical history: ALEC, DM type II, GERD, anxiety. Significant allergies: NKDA. Significant medications: Metformin, omeprazole, clonazepam.  Pain level 2/10    Chief complaint:   \"Came for a general checkup; My lower left tooth is also broken\".    Consent:  Reviewed procedures involved with comprehensive exam including radiographs, oral exam, and periodontal probing.   Patient understands and consent was given by self via verbal consent.    Radiographs: FMX.    Oral cancer screening: normal.  Extraoral exam: no remarkable findings.  Intraoral exam:  Existing PFM crown #19 with fractured mesial and distal roots into separate halves . Moderate-to-severe gingival recession on generalized, buccal surfaces of existing dentition. Intact PFM crown on #30. Existing porcelain onlay (?) on #20.    Periodontal exam:  Hygiene - Fair.  Plaque - Moderate.  Horizontal bone loss -  UR: Mild (<15%).  UL: Mild (<15%).  LL: Mild (<15%).  LR: Mild (<15%).  Vertical bone loss - None.  Subgingival calculus - Generalized.  BOP - Generalized.  Mobility - #19 .  Furcation involvements - #19 .  Occlusal trauma - None.  Smoker - No.  Diabetic - No.  Periodontal Stage: Moderate gingivitis.  Periodontal Grade: A.  Periodontal Plan: Prophy.    Caries exam:   Caries detected: #20O  Teeth with elevated chance of needing RCT: None  Likely nonrestorable teeth: #19    Occlusal assessment:  VDO / restorative space - Normal.  AP Classification -  Right: Canine Class I; Molar Class I.  Left: Canine Class I; Molar Class I.  Overbite - 10-20%.  Overjet -  2-3 mm.  Supra-eruptions or drifting - None.  Crossbites - None.  Recommended for orthodontic referral? No.  Recommended for orthodontic consult at [facility]? No.    Other remarkable findings:  N/A    Tx plan:  Time ran out during visit for presentation " of restoring #19   Please discuss #18-20 porcelain/ceramic bridge at EXT visit.    Referral(s): None needed.  Rx: None.  Recommended recall schedule: 6 months.    Case difficulty assessment:   Criteria    Cumulative level   Medical History ASA I ASA II ASA III-V    Anesthesia No history of anesthesia problems Vasoconstrictor Intolerance History of difficulty achieving anesthesia I   Patient Disposition Cooperative and compliant Anxious but cooperative Uncooperatie I   Ability to Open Mouth No limitation Slight limitation in opening Significant limitation in opening I   Gag Reflex None Gags occasionally with radiographs/treatment Extreme gag reflex which has compromised past dental treatment I   Emergency Condition Minimal pain or swelling Moderate pain or swelling Severe pain or swelling I   Caries Risk 1 to 3 Proximal Cavities 4 to 6 Proximal Cavities >6 Proximal Cavities I   Missing Teeth No missing teeth 1-3 Non-canine Missing teeth >3 Missing teeth or missing any canine II   Periodontal Stage Healthy Stage 1 or 2  Stage 3 or 4 I   Periodontal Grade Grade A Grade B Grade C I   Diagnosis Signs and Symptoms consistent with recognized pulpal and periodontal conditions Extensive differential diagnosis of usual signs and symptoms required Confusing and complex signs and symptoms: difficult diagnosis.  History of chronic oral/facial pain. I   Radiographic Difficulties Minimal Difficulty Obtaining/Interpreting Radiographs Moderate Difficulty: high floor of mouth, narrow or low palate, amanda, etc. Extreme Difficulty: Superimposed anatomic structures, etc. I   Teeth Position Anterior/Premolar  Slight inclination <10o  Slight Rotation <10o 1st molar   Mod. Inclination 10o-30o  Mod. Rotation 10o-30o 2nd or 3rd molar   Extr. Incliniation >30o  Extr. Rotation <30o I   Teeth Isolation Routine rubber dam placement  Simple pretreatment modification for rubber dam placement Extensive pretreatment modification required for rubber  dam isolation. I   Teeth Morphology Normal original crown morphology.  Roots have slight to no curvature (<10o)  Closed apex <1mm in diameter  Canals visible and not reduced in size.  No Root resorption. Full coverage Restorations, Porcelain restorations, Bridge abutments.  Moderate deviation from normal tooth/root form (taurodontism, dens-in-dente, etc.).  Roots have moderate curvature (10-30o).  Crown axis differs moderately from root axis.  Apical opening 1-1.5mm in diameter. Canals and chambers visible but reduced in size, pulp stones.  Minimal apical root resorption. Restorations do not reflect original anatomy/alignment.  Significant deviation from normal tooth/root form (fusion, gemination, merrick cusps, etc.).  Extreme root curvature (>30o) or S-shaped curve.  Anterior tooth or Mandibular Premolar with 2 roots.  Maxillary premolar with 3 roots.  Canal divides in middle or apical 3rd.  Tooth length >25mm.  Open apex >1.5mm.  Indistinct canals or not visible.  Extensive apical, internal, or external resorption. I   Malocclusion Class I Corrected (dental or skeletal) Class II or III Non-Corrected Class II or III    Alveolar Ridge Morphology None to Mild Atrophy Moderate Atrophy Severe Atrophy I   Occlusal Stability Requirements Stable and equal intensity stops on all teeth in centric relation  Anterior guidance is in harmony with the envelope of function.  All posterior teeth disclude during mandibular protrusive movement.  All posterior teeth disclude on the non-working side during mandibular lateral movement.  All posterior teeth disclude on the working side during mandibular lateral movement. 1 to 2 requirements are compromised 3 to 5 requirements are compromised II   [place] to Occlusal Stability Correct interarch relationships  Correct crown angulation (tip).  Correct crown inclination (torque)  No rotations.  Tight contact points. 1 to 2 keys are compromised 3 to 5 Keys are compromiseed II   Case complexity  rating = Cumulative level / (60 x 100)  => Minimal    Patient dismissed ambulatory and alert.    NV: 4/22/25 for EXT #19.    Attending: Dr. Cleaning was present in clinic.

## 2025-02-11 DIAGNOSIS — F41.9 ANXIETY: ICD-10-CM

## 2025-02-11 NOTE — TELEPHONE ENCOUNTER
Received call from patient requesting a refill of her Clonazepam.     PDMP reviewed. Last filled on 1/6/25. Due for refill on 2/5/25    Please review and advise.

## 2025-02-12 RX ORDER — CLONAZEPAM 1 MG/1
TABLET ORAL
Qty: 45 TABLET | Refills: 0 | Status: SHIPPED | OUTPATIENT
Start: 2025-02-12

## 2025-02-23 DIAGNOSIS — E11.9 TYPE 2 DIABETES MELLITUS WITHOUT COMPLICATION, WITHOUT LONG-TERM CURRENT USE OF INSULIN (HCC): ICD-10-CM

## 2025-02-24 ENCOUNTER — TELEPHONE (OUTPATIENT)
Dept: DENTISTRY | Facility: CLINIC | Age: 65
End: 2025-02-24

## 2025-02-24 LAB
LEFT EYE DIABETIC RETINOPATHY: NORMAL
RIGHT EYE DIABETIC RETINOPATHY: NORMAL

## 2025-02-25 ENCOUNTER — OFFICE VISIT (OUTPATIENT)
Dept: DENTISTRY | Facility: CLINIC | Age: 65
End: 2025-02-25

## 2025-02-25 VITALS — HEART RATE: 92 BPM | SYSTOLIC BLOOD PRESSURE: 131 MMHG | DIASTOLIC BLOOD PRESSURE: 77 MMHG

## 2025-02-25 DIAGNOSIS — Z01.21 ENCOUNTER FOR DENTAL EXAMINATION AND CLEANING WITH ABNORMAL FINDINGS: Primary | ICD-10-CM

## 2025-02-25 PROCEDURE — D1330 ORAL HYGIENE INSTRUCTIONS: HCPCS

## 2025-02-25 PROCEDURE — D1110 PROPHYLAXIS - ADULT: HCPCS

## 2025-02-25 NOTE — PROGRESS NOTES
ADULT PROPHY , OHI   REVIEWED MED HX: meds, allergies, health changes reviewed in [facility]. All consents signed.  CHIEF CONCERN: no dental pain or concerns  PAIN SCALE:  0  ASA CLASS:  II  PLAQUE:  heavy  CALCULUS:  heavy  BLEEDING:   moderate  STAIN :   moderate       Hygiene Procedures:  Scaled, Polished, Flossed and Used Cavitron    Oral Hygiene Instruction: Brushing Minimum 2x daily for 2 minutes, daily flossing, Interproximal Brush, and Electric toothbrush    Dispensed: Toothbrush, Toothpaste, Floss    Visual and Tactile Intraoral/ Extraoral evaluation: Oral and Oropharyngeal cancer evaluation. No findings       Reviewed with patient clinical findings and patient verbalized understanding. All questions and concerns addressed.     REFERRALS: none       TREATMENT  PLAN :   NV: 4/22/2025 EXT. #19 and discuss missing tooth options with the patient.    Next Recall: 6 month recall with periodic exam    Last FMX : 1/29/2025

## 2025-02-25 NOTE — PROGRESS NOTES
I supervised the Advanced Practitioner. I reviewed the Advanced Practitioner note and agree.    Shey Mendez, DMD 02/25/25

## 2025-02-27 DIAGNOSIS — K21.9 GASTROESOPHAGEAL REFLUX DISEASE WITHOUT ESOPHAGITIS: ICD-10-CM

## 2025-02-27 RX ORDER — OMEPRAZOLE 20 MG/1
20 CAPSULE, DELAYED RELEASE ORAL DAILY
Qty: 30 CAPSULE | Refills: 3 | Status: SHIPPED | OUTPATIENT
Start: 2025-02-27

## 2025-03-06 ENCOUNTER — OFFICE VISIT (OUTPATIENT)
Dept: GASTROENTEROLOGY | Facility: CLINIC | Age: 65
End: 2025-03-06
Payer: MEDICARE

## 2025-03-06 VITALS
HEIGHT: 61 IN | TEMPERATURE: 98 F | SYSTOLIC BLOOD PRESSURE: 128 MMHG | WEIGHT: 163 LBS | BODY MASS INDEX: 30.78 KG/M2 | DIASTOLIC BLOOD PRESSURE: 80 MMHG

## 2025-03-06 DIAGNOSIS — K50.10 SEGMENTAL COLITIS ASSOCIATED WITH DIVERTICULOSIS  (HCC): ICD-10-CM

## 2025-03-06 DIAGNOSIS — K57.30 SEGMENTAL COLITIS ASSOCIATED WITH DIVERTICULOSIS  (HCC): ICD-10-CM

## 2025-03-06 DIAGNOSIS — K58.0 IRRITABLE BOWEL SYNDROME WITH DIARRHEA: Primary | ICD-10-CM

## 2025-03-06 DIAGNOSIS — D12.6 ADENOMATOUS POLYP OF COLON, UNSPECIFIED PART OF COLON: ICD-10-CM

## 2025-03-06 PROCEDURE — 99214 OFFICE O/P EST MOD 30 MIN: CPT | Performed by: PHYSICIAN ASSISTANT

## 2025-03-06 PROCEDURE — G2211 COMPLEX E/M VISIT ADD ON: HCPCS | Performed by: PHYSICIAN ASSISTANT

## 2025-03-06 RX ORDER — MESALAMINE 800 MG/1
800 TABLET, DELAYED RELEASE ORAL 3 TIMES DAILY
Qty: 90 TABLET | Refills: 4 | Status: SHIPPED | OUTPATIENT
Start: 2025-03-06

## 2025-03-06 RX ORDER — MESALAMINE 1.2 G/1
1200 TABLET, DELAYED RELEASE ORAL
Qty: 30 TABLET | Status: CANCELLED | OUTPATIENT
Start: 2025-03-06

## 2025-03-06 NOTE — ASSESSMENT & PLAN NOTE
64-year-old female with history of IBS, diverticulosis, colon polyps presents for follow-up, endorses ongoing diarrhea and abdominal pain. Reports side effect of constipation from dicyclomine.     She is a patient of Dr. 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 I explained mesalamine is generally considered a safe, well-tolerated medication but some people can have some nausea or vomiting or rarely pancreatitis, kidney toxicity, blood count abnormalities. I would recommend checking a CBC and CMP in about 3 months for monitoring. Patient expressed understanding and agrees to try the medication. I asked her to try this for 2 weeks, and if symptoms are not improving, we can increase the dose to 1600 mg 3 times daily.

## 2025-03-06 NOTE — PROGRESS NOTES
This is something safe that you can use long-term to maintain remission from the colitis. This is generally well-tolerated, but some people can have some nausea or vomiting or rarely pancreatitis, kidney toxicity, blood count abnormalities. If we decide to start mesalamine, I would recommend checking a CBC and CMP in about 3 months for monitoring.     Loose/water stool in morning3 times in AM sometimes  Perimbilical and lower abdominal pain   Sometimes gets better  No blood no weight loss

## 2025-03-06 NOTE — PROGRESS NOTES
Name: Trice Donaldson      : 1960      MRN: [id number]  Encounter Provider: Carmen Barrera PA-C  Encounter Date: 3/6/2025   Encounter department: [facility] [place]  :  Assessment & Plan  Irritable bowel syndrome with diarrhea  64-year-old female with history of IBS, diverticulosis, colon polyps presents for follow-up, endorses ongoing diarrhea and abdominal pain. Reports side effect of constipation from dicyclomine.     She is a patient of Dr. 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 I explained mesalamine is generally considered a safe, well-tolerated medication but some people can have some nausea or vomiting or rarely pancreatitis, kidney toxicity, blood count abnormalities. I would recommend checking a CBC and CMP in about 3 months for monitoring. Patient expressed understanding and agrees to try the medication. I asked her to try this for 2 weeks, and if symptoms are not improving, we can increase the dose to 1600 mg 3 times daily.       Segmental colitis associated with diverticulosis  (HCC)    Orders:    CBC and differential; Future    Comprehensive metabolic panel; Future    mesalamine (ASACOL) 800 MG EC tablet; Take 1 tablet (800 mg total) by mouth 3 (three) times a day    Adenomatous polyp of colon, unspecified part of colon  Due for next colonoscopy in        Follow-up in 6 months    History of Present Illness   HPI  Trice Donaldson is a 64 y.o. female with diverticulosis, colon polyps, IBS who presents for follow-up.    History obtained from: patient and patient's granddaughter who provides interpretation as patient is Arabic-speaking . She declined use of professional .      Patient reports ongoing issues with diarrhea and abdominal pain. She wakes up in the morning with periumbilical and lower  "abdominal cramping, goes to the bathroom and has about 3 episodes of loose/watery stools. Sometimes the pain gets better after she passes a bowel movement. Sometimes her symptoms last throughout the day. She denies blood in the stool and abnormal weight loss.    She tried dicyclomine as needed but this caused constipation so she stopped taking it.    Colonoscopy in 2021 showed diverticulosis and hemorrhoids with neg biopsies for microscopic colitis. Also had an EGD at that time with normal stomach and small bowel biopsies.        Objective   /80 (BP Location: Right arm, Patient Position: Sitting, Cuff Size: Adult)   Temp 98 °F (36.7 °C) (Tympanic)   Ht 5' 1\" (1.549 m)   Wt 73.9 kg (163 lb)   LMP  (LMP Unknown)   BMI 30.80 kg/m²      Physical Exam  Vitals and nursing note reviewed.   Constitutional:       General: Trice is not in acute distress.     Appearance: Trice is well-developed.   HENT:      Head: Normocephalic and atraumatic.   Eyes:      Conjunctiva/sclera: Conjunctivae normal.   Cardiovascular:      Rate and Rhythm: Normal rate and regular rhythm.      Heart sounds: No murmur heard.  Pulmonary:      Effort: Pulmonary effort is normal. No respiratory distress.      Breath sounds: Normal breath sounds.   Abdominal:      Palpations: Abdomen is soft.      Tenderness: There is no abdominal tenderness.   Musculoskeletal:         General: No swelling.      Cervical back: Neck supple.   Skin:     General: Skin is warm and dry.   Neurological:      Mental Status: Trice is alert.   Psychiatric:         Mood and Affect: Mood normal.           "

## 2025-03-10 ENCOUNTER — HOSPITAL ENCOUNTER (OUTPATIENT)
Dept: RADIOLOGY | Age: 65
Discharge: HOME | End: 2025-03-10
Payer: COMMERCIAL

## 2025-03-10 DIAGNOSIS — Z13.820 OSTEOPOROSIS SCREENING: ICD-10-CM

## 2025-03-10 PROCEDURE — 77080 DXA BONE DENSITY AXIAL: CPT

## 2025-03-12 DIAGNOSIS — F32.89 OTHER DEPRESSION: ICD-10-CM

## 2025-03-12 DIAGNOSIS — F41.9 ANXIETY: ICD-10-CM

## 2025-03-17 RX ORDER — CLONAZEPAM 1 MG/1
TABLET ORAL
Qty: 45 TABLET | Refills: 0 | OUTPATIENT
Start: 2025-03-17

## 2025-03-17 RX ORDER — ARIPIPRAZOLE 5 MG/1
5 TABLET ORAL DAILY
Qty: 90 TABLET | Refills: 1 | Status: SHIPPED | OUTPATIENT
Start: 2025-03-17 | End: 2025-03-24 | Stop reason: SDUPTHER

## 2025-03-17 RX ORDER — DULOXETIN HYDROCHLORIDE 30 MG/1
30 CAPSULE, DELAYED RELEASE ORAL DAILY
Qty: 90 CAPSULE | Refills: 3 | Status: SHIPPED | OUTPATIENT
Start: 2025-03-17 | End: 2025-03-24 | Stop reason: SDUPTHER

## 2025-03-18 NOTE — TELEPHONE ENCOUNTER
Please call pt to come in to sign controlled substance contract as previously filled by psych however has not been seen by them recently. May be with any provider, preferably as soon as possible for patient so limit withdrawal symptoms. Thanks!

## 2025-03-24 ENCOUNTER — OFFICE VISIT (OUTPATIENT)
Dept: INTERNAL MEDICINE CLINIC | Facility: CLINIC | Age: 65
End: 2025-03-24

## 2025-03-24 VITALS
SYSTOLIC BLOOD PRESSURE: 136 MMHG | BODY MASS INDEX: 31.53 KG/M2 | HEIGHT: 61 IN | DIASTOLIC BLOOD PRESSURE: 90 MMHG | WEIGHT: 167 LBS | TEMPERATURE: 98.3 F | HEART RATE: 91 BPM

## 2025-03-24 DIAGNOSIS — Z79.899 CONTROLLED SUBSTANCE AGREEMENT SIGNED: Primary | ICD-10-CM

## 2025-03-24 DIAGNOSIS — L81.4 LENTIGO: ICD-10-CM

## 2025-03-24 DIAGNOSIS — M25.562 LEFT KNEE PAIN: ICD-10-CM

## 2025-03-24 DIAGNOSIS — M25.562 ACUTE PAIN OF LEFT KNEE: ICD-10-CM

## 2025-03-24 DIAGNOSIS — M17.9 OSTEOARTHRITIS OF KNEE: ICD-10-CM

## 2025-03-24 PROCEDURE — 99212 OFFICE O/P EST SF 10 MIN: CPT | Performed by: INTERNAL MEDICINE

## 2025-03-24 PROCEDURE — G2211 COMPLEX E/M VISIT ADD ON: HCPCS | Performed by: INTERNAL MEDICINE

## 2025-03-24 RX ORDER — HYDROQUINONE 40 MG/G
CREAM TOPICAL 2 TIMES DAILY
Qty: 28 G | Refills: 3 | Status: SHIPPED | OUTPATIENT
Start: 2025-03-24

## 2025-03-25 ENCOUNTER — OFFICE VISIT (OUTPATIENT)
Dept: PSYCHIATRY | Facility: CLINIC | Age: 65
End: 2025-03-25
Payer: MEDICARE

## 2025-03-25 DIAGNOSIS — F32.89 OTHER DEPRESSION: ICD-10-CM

## 2025-03-25 DIAGNOSIS — F41.9 ANXIETY: Primary | ICD-10-CM

## 2025-03-25 DIAGNOSIS — F32.A DEPRESSION, UNSPECIFIED DEPRESSION TYPE: ICD-10-CM

## 2025-03-25 PROCEDURE — 90792 PSYCH DIAG EVAL W/MED SRVCS: CPT | Performed by: STUDENT IN AN ORGANIZED HEALTH CARE EDUCATION/TRAINING PROGRAM

## 2025-03-25 RX ORDER — CLONAZEPAM 1 MG/1
TABLET ORAL
Qty: 45 TABLET | Refills: 3 | Status: SHIPPED | OUTPATIENT
Start: 2025-03-25

## 2025-03-25 RX ORDER — ARIPIPRAZOLE 5 MG/1
5 TABLET ORAL DAILY
Qty: 90 TABLET | Refills: 3 | Status: SHIPPED | OUTPATIENT
Start: 2025-03-25

## 2025-03-25 RX ORDER — DULOXETIN HYDROCHLORIDE 30 MG/1
30 CAPSULE, DELAYED RELEASE ORAL DAILY
Qty: 90 CAPSULE | Refills: 3 | Status: SHIPPED | OUTPATIENT
Start: 2025-03-25

## 2025-03-25 NOTE — ASSESSMENT & PLAN NOTE
Not yet at goal. Pt declining to increase dose of her Cymbalta or to try another agent for depression/anxiety.   - Continue Cymbalta 30 mg once daily for depression and anxiety  - Continue Klonopion 1.5 mg qhs, pt has been taking chronically for many years at night - will continue to discuss with her risks and weaning off. Could try another agent like trazodone.  Orders:    DULoxetine (CYMBALTA) 30 mg delayed release capsule; Take 1 capsule (30 mg total) by mouth daily    clonazePAM (KlonoPIN) 1 mg tablet; Take 1.5mg at night as needed for anxiety, depression, or their effects on severely disrupted sleep    Ambulatory referral to Psych Services

## 2025-03-25 NOTE — ASSESSMENT & PLAN NOTE
Orders:    DULoxetine (CYMBALTA) 30 mg delayed release capsule; Take 1 capsule (30 mg total) by mouth daily    ARIPiprazole (ABILIFY) 5 mg tablet; Take 1 tablet (5 mg total) by mouth daily

## 2025-03-25 NOTE — ASSESSMENT & PLAN NOTE
Not yet at goal. Pt declining to increase dose of her Cymbalta or to try another agent for depression/anxiety.   - Continue Cymbalta 30 mg once daily for depression and anxiety  - Continue Abilify 5 mg for adjunct for depressed mood   - Added to talk therapy wait list for Armenian-speaking therapist  Orders:    DULoxetine (CYMBALTA) 30 mg delayed release capsule; Take 1 capsule (30 mg total) by mouth daily    ARIPiprazole (ABILIFY) 5 mg tablet; Take 1 tablet (5 mg total) by mouth daily    Ambulatory referral to Psych Services

## 2025-03-25 NOTE — PSYCH
"Psychiatric Evaluation - Behavioral Health     Identification Data:Trice Donaldson 64 y.o. female MRN: [id number]    Chief Complaint: \"I have anxiety and nervousness\"    History of present illness:     Trice is a 64 y.o. female with a history of anxiety, depression, who presents for psychiatric evaluation due to establish care after PCP referral. She is currently prescribed Cymbalta 30 mg once daily, Klonopin 1.5 mg qhs PRN (has been on for many years) and Abilify 5 mg once daily - has been on Cymbalta and Abilify for perhaps 3 years. She does have a therapist that she was seeing weekly and continues to talk to her on the phone now.  Patient was accompanied by her adult granddaughter Logan who she preferred to translate and patient declined having the language line translation.    Pt states she has anxiety, nervousness, cannot sleep at night. Has had depression for many years. She was being seen at another office for years and they stopped seeing her due to patient now has medicare/medicaid, was being seen at [facility] ([phone], [address] [facility]), she states she plans on bringing her records. She has trouble leaving the house with her depression, she mostly watches TV and stays on the couch, her depression tends to last for a \"few days\". It seems she doesn't have other hobbies but likes to talk on the phone with her son and is supported by her family, has many grandchildren and great-grandchildren. She feels like she has been depressed since her 20s. She sleeps a little at day and at nighttime has trouble with sleep, will sleep at night 4-5 hours and during the day will nap for 30 minutes. Has reduced appetite but seems to be gaining weight, will eat little including bread/coffee in the morning and a little bit of \"meat, rice, salad\" at around 2:30 PM. She states she has a tough time with showering but is able to force herself to shower, has similar feelings about " "brushing teeth and is able to 'force' herself to do this as well. Patient denies SI, states her children and family are a protective factor. She did lose 3 children in the remote past but did not wish to elaborate on this, she has 2 living children now, her son lives in [place] and she video chats with him. Pt's granddaughter states it is hard to \"pin-point\" any cause for her depression, pt doesn't seem to have stressors other than perhaps being sad about being distant from her son. Pt was tearful during interview.     Patient has trouble describing her anxiety in detail but does state she tends to isolate to home, is not able to state if any specific things make her anxious. She was also noted to have some difficulty with recall and/or attention during meeting, unclear if due to underlying depressed mood and anxiety.     Discussed today recommendation to increase her Cymbalta to a better dose for depression. Also discussed with her we could try switching to another agent as an alternative (she cannot recall, however, which medications she has tried before). She will consider bringing in her records from her previous psychiatrist. She declined both of these recommendations and we ultimately agreed to keep her medications the same which is what patient requested. She does consider speaking to her therapist and will put her on talk therapy waitlist for a Honduran speaking therapist.     Trice denies any side effects from medications.    Psychiatric Review Of Systems:    Sleep changes: As above  Appetite changes: As above  Weight changes: As above  Energy/anergy: As above  Interest/pleasure/anhedonia: As above  Guilty/hopeless: As above  Somatic symptoms: As above  Anxiety/panic: As above  Manic symptoms: None  Auditory hallucinations: Pt denies  Visual hallucinations: Thinks she sees something out of corner of eye but when she looks nothing is there.  Other hallucinations: Pt denies  Delusional thinking: Pt denies  Eating " "disorder history: None noted  Obsessive/compulsive symptoms: None noted  Self injurious behavior/risky behavior: Pt denies    Suicidal ideation: Pt denies  Homicidal ideation: Pt denies    Medical Review Of Systems:    Constitutional negative   ENT negative   Cardiovascular negative   Respiratory negative   Gastrointestinal negative   Genitourinary negative   Musculoskeletal negative   Integumentary negative   Neurological negative   Endocrine negative   Other Symptoms none, all other systems are negative       Past Psychiatric History:     Past Inpatient Psychiatric Treatment: Was admitted when her daughters were very young and felt very depressed - was crying a lot - [facility]'s in [place]  Past Outpatient Psychiatric Treatment:   Past Suicide Attempts: Pt denies  Past Self-harm: Pt denies  Past Violent Behavior: None  Past Psychiatric Medication Trials: Can't recall past trials.     Substance Abuse History:    Alcohol use: Pt denies  Nicotine use: Pt denies  Caffeine use: Pt denies  Other recreational drug use: Pt denies    Longest clean time: N/A  History of Inpatient/Outpatient rehabilitation program: N/A    Family Psychiatric History:     Psychiatric Illness:  Brother - heard voices, schizophrenia (is now passed away)     Father - passed away with \"amnesia\"  Substance Abuse:  Pt denies  Suicide Attempts:  Pt denies    Social History:    Came from [place] before the 90s.     Education: 9th grade  Learning Disabilities: None known  Developmental: No problems noted  Marital History:   Children: Has 2 living children and 3 passed away, is close with them - the son lives in [place] and [place]  Living Arrangement: Lives by self at home  Occupational History: Currently not employed  Functioning Relationships: She is supported by family, sometimes talks to granddaughts for support - has many   Legal History: None known   History: None known  Access to Firearms: Pt denies    Traumatic History: "     Abuse: None known  Other Traumatic Events: None known    Past Medical History:    History of Seizures: Pt denies  History of Head injury with loss of consciousness: Doesn't recall - has fallen down the steps before. No recent falls. Denies dizziness or sedation after taking Klonopin and we discussed this could lead to falls however pt denies it has been an issue with this medication.    Past Medical History:   Diagnosis Date    Abnormal mammogram     RESOLVED: [id number]    Anxiety     Anxiety     Arthritis     Depression     Depression     Diabetes mellitus (HCC)     Pt denies    Diverticulosis     GERD (gastroesophageal reflux disease)     Helicobacter pylori infection     History of Helicobacter pylori infection 06/18/2018    Hyperlipidemia     Seborrheic keratosis     LAST ASSESSED: [id number]    Sleep apnea     Sleep apnea     Tinea pedis of left foot 09/16/2019     Past Surgical History:   Procedure Laterality Date    ABDOMINOPLASTY      abdomin    COLONOSCOPY  2017    MA COLONOSCOPY FLX DX W/COLLJ SPEC WHEN PFRMD N/A 8/11/2016    Procedure: COLONOSCOPY;  Surgeon: aBrt Espinoza MD;  Location:  GI LAB;  Service: Gastroenterology    MA COLONOSCOPY FLX DX W/COLLJ SPEC WHEN PFRMD N/A 8/29/2017    Procedure: EGD AND COLONOSCOPY;  Surgeon: Bart Espinoza MD;  Location: [facility] GI LAB;  Service: Gastroenterology    MA TENDON SHEATH INCISION Right 3/8/2022    Procedure: Right thumb, long, and ring finger trigger finger releases;  Surgeon: Gary Wray MD;  Location:  MAIN OR;  Service: Orthopedics    TUBAL LIGATION      TUBAL LIGATION      UPPER GASTROINTESTINAL ENDOSCOPY  2017     No Known Allergies    History Review: The following portions of the patient's history were reviewed and updated as appropriate: allergies, current medications, past family history, past medical history, past social history, past surgical history, and problem list.    OBJECTIVE:    Vital signs in last 24 hours:    There were no  "vitals filed for this visit.     Mental Status Evaluation:    Appearance age appropriate, casually dressed   Behavior cooperative, calm   Speech normal rate, normal volume, normal pitch   Mood \"Depressed\"   Affect normal range and intensity, appropriate   Thought Processes organized, goal directed   Associations intact associations   Thought Content no overt delusions   Perceptual Disturbances: no auditory hallucinations, no visual hallucinations   Abnormal Thoughts  Risk Potential Suicidal ideation - None  Homicidal ideation - None  Potential for aggression - No   Orientation oriented to person, place, time/date, and situation   Memory recent and remote memory grossly intact   Consciousness alert and awake   Attention Span Concentration Span attention span and concentration are age appropriate   Intellect appears to be of average intelligence   Insight intact   Judgement intact   Muscle Strength and  Gait normal muscle strength and normal muscle tone, normal gait and normal balance   Motor Activity no abnormal movements   Language No aphasia   Fund of Knowledge adequate fund of knowledge regarding past history  adequate fund of knowledge regarding vocabulary    Pain none   Pain Scale 0       Laboratory Results: I have personally reviewed all pertinent laboratory/tests results    CBC:   Lab Results   Component Value Date    WBC 8.11 12/15/2024    RBC 4.29 12/15/2024    HGB 12.1 12/15/2024    HCT 39.0 12/15/2024    MCV 91 12/15/2024     12/15/2024    MCH 28.2 12/15/2024    MCHC 31.0 (L) 12/15/2024    RDW 13.3 12/15/2024    MPV 9.6 12/15/2024    NEUTROABS 4.69 12/15/2024     BMP:   Lab Results   Component Value Date    SODIUM 141 12/15/2024    K 3.7 12/15/2024     12/15/2024    CO2 29 12/15/2024    AGAP 7 12/15/2024    BUN 13 12/15/2024    CREATININE 0.95 12/15/2024    GLUC 84 12/15/2024    GLUF 121 (H) 05/15/2024    CALCIUM 9.8 12/15/2024    EGFR 63 12/15/2024     CMP:   Lab Results   Component Value " "Date    SODIUM 141 12/15/2024    K 3.7 12/15/2024     12/15/2024    CO2 29 12/15/2024    AGAP 7 12/15/2024    BUN 13 12/15/2024    CREATININE 0.95 12/15/2024    GLUC 84 12/15/2024    GLUF 121 (H) 05/15/2024    CALCIUM 9.8 12/15/2024    AST 17 12/15/2024    ALT 12 12/15/2024    ALKPHOS 70 12/15/2024    TP 7.6 12/15/2024    ALB 4.4 12/15/2024    TBILI 0.32 12/15/2024    EGFR 63 12/15/2024     Lipid Profile:   Lab Results   Component Value Date    CHOLESTEROL 126 05/15/2024    HDL 44 (L) 05/15/2024    TRIG 129 05/15/2024    LDLCALC 56 05/15/2024    NONHDLC 82 05/15/2024     Hemoglobin A1C:   Lab Results   Component Value Date    HGBA1C 6.3 12/03/2024     05/15/2024     Liver Enzymes:   Lab Results   Component Value Date    AST 17 12/15/2024    ALT 12 12/15/2024    ALKPHOS 70 12/15/2024    PROT 7.3 10/09/2015    BILITOT 0.27 10/09/2015     Thyroid Studies:   Lab Results   Component Value Date    PIR7HZHZGCTJ 2.526 05/15/2024    FREET4 1.14 10/09/2015     Vitamin D Level   Lab Results   Component Value Date    MGAM98UMGCFA 30.5 05/15/2024     Vitamin B12 No results found for: \"LUNQNDYU49\"  EKG   Lab Results   Component Value Date    VENTRATE 91 09/06/2016    ATRIALRATE 91 09/06/2016    PRINT 166 09/06/2016    QRSDINT 80 09/06/2016    QTINT 344 09/06/2016    PAXIS 60 09/06/2016    QRSAXIS 55 09/06/2016    TWAVEAXIS 51 09/06/2016       Suicide/Homicide Risk Assessment:    Risk of Harm to Self:  The following ratings are based on assessment at the time of the interview  Demographic risk factors include: age: over 50 or older  Historical Risk Factors include: history of depression, history of anxiety  Recent Specific Risk Factors include: current depressive symptoms, current anxiety symptoms  Protective Factors: no current suicidal ideation, ability to adapt to change, able to manage anger well, access to mental health treatment, being a parent, compliant with medications, connection to own children, having a " sense of purpose or meaning in life, no substance use problems, responsibilities and duties to others, restricted access to lethal means, stable living environment, supportive family  Weapons: none. The following steps have been taken to ensure weapons are properly secured: not applicable  Based on today's assessment, Trice presents the following risk of harm to self: minimal    Risk of Harm to Others:  The following ratings are based on assessment at the time of the interview  Demographic Risk Factors include: none.  Historical Risk Factors include: none.  Recent Specific Risk Factors include: none.  Protective Factors: no current homicidal ideation, ability to adapt to change, able to manage anger well, access to mental health treatment, being a parent, compliant with medications, connection to own children, having a sense of purpose or meaning in life, no substance use problems, responsibilities and duties to others, restricted access to lethal means, stable living environment, supportive family  Weapons: none. The following steps have been taken to ensure weapons are properly secured: not applicable  Based on today's assessment, Trice presents the following risk of harm to others: minimal    The following interventions are recommended: continue medication management    Assessment/Plan:      Diagnoses and all orders for this visit:    Anxiety  -     DULoxetine (CYMBALTA) 30 mg delayed release capsule; Take 1 capsule (30 mg total) by mouth daily  -     clonazePAM (KlonoPIN) 1 mg tablet; Take 1.5mg at night as needed for anxiety, depression, or their effects on severely disrupted sleep  -     Ambulatory referral to Psych Services    Depression, unspecified depression type  -     DULoxetine (CYMBALTA) 30 mg delayed release capsule; Take 1 capsule (30 mg total) by mouth daily  -     ARIPiprazole (ABILIFY) 5 mg tablet; Take 1 tablet (5 mg total) by mouth daily  -     Ambulatory referral to Psych Services    Other  depression          Assessment & Plan  Anxiety  Not yet at goal. Pt declining to increase dose of her Cymbalta or to try another agent for depression/anxiety.   - Continue Cymbalta 30 mg once daily for depression and anxiety  - Continue Klonopion 1.5 mg qhs, pt has been taking chronically for many years at night - will continue to discuss with her risks and weaning off. Could try another agent like trazodone.  Orders:    DULoxetine (CYMBALTA) 30 mg delayed release capsule; Take 1 capsule (30 mg total) by mouth daily    clonazePAM (KlonoPIN) 1 mg tablet; Take 1.5mg at night as needed for anxiety, depression, or their effects on severely disrupted sleep    Ambulatory referral to Psych Services    Depression, unspecified depression type  Not yet at goal. Pt declining to increase dose of her Cymbalta or to try another agent for depression/anxiety.   - Continue Cymbalta 30 mg once daily for depression and anxiety  - Continue Abilify 5 mg for adjunct for depressed mood   - Added to talk therapy wait list for Lao-speaking therapist  Orders:    DULoxetine (CYMBALTA) 30 mg delayed release capsule; Take 1 capsule (30 mg total) by mouth daily    ARIPiprazole (ABILIFY) 5 mg tablet; Take 1 tablet (5 mg total) by mouth daily    Ambulatory referral to Psych Services               Patient to follow-up in 1 month.     - Aware of 24 hour and weekend coverage for urgent situations accessed by calling [facility] main practice number  - Risks, benefits, and possible side effects of medications explained to Trice and Trice verbalizes understanding and agreement for treatment.  - Not applicable - controlled prescriptions are not prescribed by this practice    This note was not shared with the patient due to reasonable likelihood of causing patient harm    Visit Time    Visit Start Time: 12:00 PM   Visit Stop Time: 1:00 PM   Total Visit Duration:  30 minutes    Yuly Mathias MD 03/26/25      This note has  been constructed in part using a voice recognition system.   There may be translation, syntax, or grammatical errors. If you have any questions, please contact the dictating provider.

## 2025-03-26 NOTE — PROGRESS NOTES
"Name: Trice Donaldson      : 1960      MRN: [id number]  Encounter Provider: Chris Ferreira MD  Encounter Date: 3/24/2025   Encounter department: [facility] [place]  :  Assessment & Plan  Controlled substance agreement signed  Patient chronically on Clonazepam 1.5 mg HS for anxiety and insomnia   Previously prescribed by psychiatry but patient lost insurance and hence our office was prescribing it and hence patient was called in to sign a contract  Patient signed a contract after going through all the details  But patient does have psychiatry appointment to establish care tomorrow morning and will continue to obtain clonazepam from their office moving forward    History of Present Illness   Trice Donaldson is a 64/F who presents to sign controlled substance agreement without any acute complaints.       Review of Systems   Constitutional:  Negative for chills and fever.   HENT:  Negative for ear pain and sore throat.    Eyes:  Negative for pain and visual disturbance.   Respiratory:  Negative for cough and shortness of breath.    Cardiovascular:  Negative for chest pain and palpitations.   Gastrointestinal:  Negative for abdominal pain and vomiting.   Genitourinary:  Negative for dysuria and hematuria.   Musculoskeletal:  Negative for arthralgias and back pain.   Skin:  Negative for color change and rash.   Neurological:  Negative for seizures and syncope.   All other systems reviewed and are negative.      Objective   /90 (BP Location: Right arm, Patient Position: Sitting, Cuff Size: Large)   Pulse 91   Temp 98.3 °F (36.8 °C) (Temporal)   Ht 5' 1\" (1.549 m)   Wt 75.8 kg (167 lb)   LMP  (LMP Unknown)   BMI 31.55 kg/m²      Physical Exam  Vitals and nursing note reviewed.   Constitutional:       General: Trice is not in acute distress.     Appearance: Trice is well-developed.   HENT:      Head: Normocephalic and atraumatic.   Eyes:      Conjunctiva/sclera: " Conjunctivae normal.   Cardiovascular:      Rate and Rhythm: Normal rate and regular rhythm.      Heart sounds: No murmur heard.  Pulmonary:      Effort: Pulmonary effort is normal. No respiratory distress.      Breath sounds: Normal breath sounds.   Abdominal:      Palpations: Abdomen is soft.      Tenderness: There is no abdominal tenderness.   Musculoskeletal:         General: No swelling.      Cervical back: Neck supple.   Skin:     General: Skin is warm and dry.      Capillary Refill: Capillary refill takes less than 2 seconds.   Neurological:      Mental Status: Trice is alert.   Psychiatric:         Mood and Affect: Mood normal.       Chris Ferreira MD  PGY-3, Internal Medicine  [facility]

## 2025-03-29 DIAGNOSIS — K21.9 GASTROESOPHAGEAL REFLUX DISEASE WITHOUT ESOPHAGITIS: ICD-10-CM

## 2025-03-31 RX ORDER — OMEPRAZOLE 20 MG/1
20 CAPSULE, DELAYED RELEASE ORAL DAILY
Qty: 100 CAPSULE | Refills: 3 | Status: SHIPPED | OUTPATIENT
Start: 2025-03-31

## 2025-04-01 DIAGNOSIS — K57.30 SEGMENTAL COLITIS ASSOCIATED WITH DIVERTICULOSIS  (HCC): ICD-10-CM

## 2025-04-01 DIAGNOSIS — K50.10 SEGMENTAL COLITIS ASSOCIATED WITH DIVERTICULOSIS  (HCC): ICD-10-CM

## 2025-04-01 RX ORDER — MESALAMINE 800 MG/1
800 TABLET, DELAYED RELEASE ORAL 3 TIMES DAILY
Qty: 90 TABLET | Refills: 0 | Status: SHIPPED | OUTPATIENT
Start: 2025-04-01

## 2025-04-16 ENCOUNTER — TRANSCRIBE ORDERS (OUTPATIENT)
Dept: GASTROENTEROLOGY | Facility: CLINIC | Age: 65
End: 2025-04-16

## 2025-04-22 ENCOUNTER — OFFICE VISIT (OUTPATIENT)
Dept: DENTISTRY | Facility: CLINIC | Age: 65
End: 2025-04-22

## 2025-04-22 VITALS — HEART RATE: 85 BPM | SYSTOLIC BLOOD PRESSURE: 129 MMHG | DIASTOLIC BLOOD PRESSURE: 83 MMHG

## 2025-04-22 DIAGNOSIS — K08.89 NONRESTORABLE TOOTH: Primary | ICD-10-CM

## 2025-04-22 PROCEDURE — D7140 EXTRACTION, ERUPTED TOOTH OR EXPOSED ROOT (ELEVATION AND/OR FORCEPS REMOVAL): HCPCS

## 2025-04-22 NOTE — PROGRESS NOTES
Extraction #19    Trice Donaldson 64 y.o. female presents with self to [facility] for extraction #19  PMH reviewed, no changes, ASA II. Significant medical history: reviewed. Significant allergies: reviewed. Significant medications: reviewed.  Pain level 0/10    Diagnosis:  Teeth #19 indicated for extraction due to Nonrestorable caries.    Consent:  Risks of specific procedure: pain, bleeding, swelling, infection, tooth fracturing to point of requiring surgical removal, damage to adjacent teeth and/or restorations on them, .  Risks of any dental procedure: post procedural pain or sensitivity, local anesthetic side effects, allergic reaction to dental materials and medications, breakage of local anesthetic needle, aspiration of small dental tools, injury to nearby hard and soft tissues and anatomical structures.  Benefits: relieve pain or underlying infection, prevent future or further progression of infection, .  Alternatives: 2nd opinion, no tx.  Tx plan for extraction #19 reviewed, pt given opportunity to ask questions, all questions answered to degree of medical and dental certainty.  Patient understands and consent given by self via verbal consent and signed oral surgery informed consent.    Universal Protocol  Other Assisting Provider: Yes, Ovidio (assistant)  Verbal consent obtained? YES  Written consent obtained?  YES  Risks, benefits and alternatives discussed?: YES  Consent given by: self  Time Out  Immediately prior to the procedure a time out was called: YES  Time Out:  Time Out performed at:  1:55 PM  A time out verifies correct patient, procedure, equipment, support staff and site/side marked as required.  Patient states understanding of procedure being performed: YES  Patient's understanding of procedure matches consent: YES  Procedure consent matches procedure scheduled: YES  Test results available and properly labeled: N/A  Site  Verified with the patient  YES  Radiology Images displayed and  confirmed.  If images not available, report reviewed:  YES  Required items - Required blood products, implants, devices and special equipment available: YES  Patient identity confirmed:  YES    Anesthesia:  Topical 20% benzocaine.  1 carps 2% Lidocaine 1:100k epi via YULY block.  1 carps 4% Septocaine 1:100k epi via YULY block.    Procedure details:  Reflected gingiva with periosteal elevator.  Elevated, and extracted #19 with straight elevator(s) and/or forceps.  Socket curetted and irrigated with sterile saline.  Manual alveolar compression with gauze 30 seconds. Hemostasis achieved.  4-0 chromic gut sutures placed.     Post-op instructions given verbally and on paper.  Patient given ice and gauze.    Rx: None.    Patient dismissed ambulatory and alert.    NV: Recall.    Attending: Dr. Cleaning was present in clinic.

## 2025-05-05 ENCOUNTER — TELEPHONE (OUTPATIENT)
Age: 65
End: 2025-05-05

## 2025-05-05 NOTE — TELEPHONE ENCOUNTER
PA for mesalamine 800mg DENIED    Reason:(Screenshot if applicable)        Message sent to office clinical pool Yes    Denial letter scanned into Media Yes    We can gladly do an appeal but the process can take about 30-60 days to provide determination. Please Schedule a Peer to Peer at phone  . If an appeal is truly warranted please have Provider send clinical documentation to the PA department to support the appeal.   Fax number for appeal   **Please follow up with your patient regarding denial and next steps**

## 2025-05-05 NOTE — TELEPHONE ENCOUNTER
PA for Asacol HD 800MG dr tablets    SUBMITTED to [facility]    via    [x]CMM-KEY:  (Key: [id number])  PA Case ID #: [id number]      All office notes, labs and other pertaining documents and studies sent. Clinical questions answered. Awaiting determination from insurance company.     Turnaround time for your insurance to make a decision on your Prior Authorization can take 7-21 business days.

## 2025-05-06 DIAGNOSIS — K50.10 SEGMENTAL COLITIS ASSOCIATED WITH DIVERTICULOSIS  (HCC): Primary | ICD-10-CM

## 2025-05-06 DIAGNOSIS — K57.30 SEGMENTAL COLITIS ASSOCIATED WITH DIVERTICULOSIS  (HCC): Primary | ICD-10-CM

## 2025-05-06 RX ORDER — MESALAMINE 0.38 G/1
1.5 CAPSULE, EXTENDED RELEASE ORAL
Qty: 120 CAPSULE | Refills: 5 | Status: SHIPPED | OUTPATIENT
Start: 2025-05-06

## 2025-05-29 DIAGNOSIS — K50.10 SEGMENTAL COLITIS ASSOCIATED WITH DIVERTICULOSIS  (HCC): ICD-10-CM

## 2025-05-29 DIAGNOSIS — K57.30 SEGMENTAL COLITIS ASSOCIATED WITH DIVERTICULOSIS  (HCC): ICD-10-CM

## 2025-05-29 RX ORDER — MESALAMINE 0.38 G/1
CAPSULE, EXTENDED RELEASE ORAL
Qty: 360 CAPSULE | Refills: 1 | Status: SHIPPED | OUTPATIENT
Start: 2025-05-29

## 2025-06-02 DIAGNOSIS — K58.0 IRRITABLE BOWEL SYNDROME WITH DIARRHEA: ICD-10-CM

## 2025-06-02 RX ORDER — DICYCLOMINE HCL 20 MG
20 TABLET ORAL EVERY 6 HOURS PRN
Qty: 360 TABLET | Refills: 1 | Status: SHIPPED | OUTPATIENT
Start: 2025-06-02

## 2025-06-05 ENCOUNTER — OFFICE VISIT (OUTPATIENT)
Dept: INTERNAL MEDICINE CLINIC | Facility: CLINIC | Age: 65
End: 2025-06-05

## 2025-06-05 VITALS
TEMPERATURE: 98.2 F | SYSTOLIC BLOOD PRESSURE: 125 MMHG | BODY MASS INDEX: 30.72 KG/M2 | OXYGEN SATURATION: 97 % | DIASTOLIC BLOOD PRESSURE: 77 MMHG | WEIGHT: 162.6 LBS | HEART RATE: 84 BPM

## 2025-06-05 DIAGNOSIS — M25.562 CHRONIC PAIN OF LEFT KNEE: ICD-10-CM

## 2025-06-05 DIAGNOSIS — K57.92 ACUTE DIVERTICULITIS: ICD-10-CM

## 2025-06-05 DIAGNOSIS — M25.562 ACUTE PAIN OF LEFT KNEE: ICD-10-CM

## 2025-06-05 DIAGNOSIS — E78.5 HYPERLIPIDEMIA, UNSPECIFIED HYPERLIPIDEMIA TYPE: ICD-10-CM

## 2025-06-05 DIAGNOSIS — M17.9 OSTEOARTHRITIS OF KNEE: ICD-10-CM

## 2025-06-05 DIAGNOSIS — E66.811 CLASS 1 OBESITY WITHOUT SERIOUS COMORBIDITY WITH BODY MASS INDEX (BMI) OF 30.0 TO 30.9 IN ADULT, UNSPECIFIED OBESITY TYPE: Primary | ICD-10-CM

## 2025-06-05 DIAGNOSIS — K50.10 SEGMENTAL COLITIS ASSOCIATED WITH DIVERTICULOSIS  (HCC): ICD-10-CM

## 2025-06-05 DIAGNOSIS — G89.29 CHRONIC PAIN OF LEFT KNEE: ICD-10-CM

## 2025-06-05 DIAGNOSIS — K57.30 SEGMENTAL COLITIS ASSOCIATED WITH DIVERTICULOSIS  (HCC): ICD-10-CM

## 2025-06-05 DIAGNOSIS — M25.562 LEFT KNEE PAIN: ICD-10-CM

## 2025-06-05 DIAGNOSIS — K21.9 GASTROESOPHAGEAL REFLUX DISEASE WITHOUT ESOPHAGITIS: ICD-10-CM

## 2025-06-05 DIAGNOSIS — E11.9 TYPE 2 DIABETES MELLITUS WITHOUT COMPLICATION, WITHOUT LONG-TERM CURRENT USE OF INSULIN (HCC): ICD-10-CM

## 2025-06-05 LAB — SL AMB POCT HEMOGLOBIN AIC: 6.2 (ref ?–6.5)

## 2025-06-05 PROCEDURE — 99213 OFFICE O/P EST LOW 20 MIN: CPT | Performed by: STUDENT IN AN ORGANIZED HEALTH CARE EDUCATION/TRAINING PROGRAM

## 2025-06-05 PROCEDURE — G2211 COMPLEX E/M VISIT ADD ON: HCPCS | Performed by: STUDENT IN AN ORGANIZED HEALTH CARE EDUCATION/TRAINING PROGRAM

## 2025-06-05 PROCEDURE — 83036 HEMOGLOBIN GLYCOSYLATED A1C: CPT | Performed by: STUDENT IN AN ORGANIZED HEALTH CARE EDUCATION/TRAINING PROGRAM

## 2025-06-05 RX ORDER — ATORVASTATIN CALCIUM 10 MG/1
10 TABLET, FILM COATED ORAL DAILY
Qty: 90 TABLET | Refills: 3 | Status: SHIPPED | OUTPATIENT
Start: 2025-06-05

## 2025-06-05 RX ORDER — TIRZEPATIDE 2.5 MG/.5ML
2.5 INJECTION, SOLUTION SUBCUTANEOUS WEEKLY
Qty: 2 ML | Refills: 0 | Status: SHIPPED | OUTPATIENT
Start: 2025-06-05 | End: 2025-07-03

## 2025-06-05 RX ORDER — OMEPRAZOLE 20 MG/1
20 CAPSULE, DELAYED RELEASE ORAL DAILY
Qty: 100 CAPSULE | Refills: 3 | Status: SHIPPED | OUTPATIENT
Start: 2025-06-05

## 2025-06-05 RX ORDER — IBUPROFEN 400 MG/1
400 TABLET, FILM COATED ORAL EVERY 6 HOURS PRN
Qty: 30 TABLET | Refills: 0 | Status: SHIPPED | OUTPATIENT
Start: 2025-06-05 | End: 2025-06-19
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
## 2025-06-05 NOTE — ASSESSMENT & PLAN NOTE
Lab Results   Component Value Date    HGBA1C 6.3 12/03/2024   Hx of T2DM   Currently on Metformin 500mg BID  POCT A1C  today 6.3 -- well controlled   Pt was wondering if she could try something for weight loss and the diabetes   No personal or family hx of medullary thyroid cancer or pancreatitis     Plan:  -will d/c metformin  -start on Zepbound instead   -f/u in 4 weeks to reassess     Orders:    POCT hemoglobin A1c

## 2025-06-05 NOTE — PATIENT INSTRUCTIONS
Start taking Zepbound once a week for the next 4 weeks. Stop taking the metformin.     Start taking the mesalamine from GI doctor.     Go for X ray of knee, it's walk in.     Start physical therapy for knee.     Follow up with me in 4 weeks.

## 2025-06-05 NOTE — ASSESSMENT & PLAN NOTE
Follows closely with GI for hx of IBS, diverticulosis, and colon polyps  Recent saw them in 3/6/2025 and was instructed to trial mesalamine for treatment due to suspicion of segmental colitis of diverticulosis (SCAD)   Still with abdominal pain this visit  PE with LLQ abdominal pain    Plan:  -recommend starting mesalamine as previously instructed by GI                                                               -finds some relief with NSAIDs, instructed to avoid as much as possible  -f/u in 2 weeks
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
## 2025-06-11 ENCOUNTER — EVALUATION (OUTPATIENT)
Dept: PHYSICAL THERAPY | Facility: REHABILITATION | Age: 65
End: 2025-06-11
Payer: COMMERCIAL

## 2025-06-11 DIAGNOSIS — G89.29 CHRONIC PAIN OF LEFT KNEE: Primary | ICD-10-CM

## 2025-06-11 DIAGNOSIS — M25.562 CHRONIC PAIN OF LEFT KNEE: Primary | ICD-10-CM

## 2025-06-11 PROCEDURE — 97161 PT EVAL LOW COMPLEX 20 MIN: CPT

## 2025-06-11 PROCEDURE — 97112 NEUROMUSCULAR REEDUCATION: CPT

## 2025-06-12 LAB
DME PARACHUTE DELIVERY DATE ACTUAL: NORMAL
DME PARACHUTE DELIVERY DATE REQUESTED: NORMAL
DME PARACHUTE ITEM DESCRIPTION: NORMAL
DME PARACHUTE ORDER STATUS: NORMAL
DME PARACHUTE SUPPLIER NAME: NORMAL
DME PARACHUTE SUPPLIER PHONE: NORMAL

## 2025-06-19 ENCOUNTER — TELEPHONE (OUTPATIENT)
Dept: INTERNAL MEDICINE CLINIC | Facility: CLINIC | Age: 65
End: 2025-06-19

## 2025-06-19 ENCOUNTER — HOSPITAL ENCOUNTER (OUTPATIENT)
Dept: RADIOLOGY | Age: 65
Discharge: HOME | End: 2025-06-19
Payer: COMMERCIAL

## 2025-06-19 DIAGNOSIS — Z12.31 ENCOUNTER FOR SCREENING MAMMOGRAM FOR MALIGNANT NEOPLASM OF BREAST: ICD-10-CM

## 2025-06-19 PROCEDURE — 77063 BREAST TOMOSYNTHESIS BI: CPT

## 2025-06-19 PROCEDURE — 77067 SCR MAMMO BI INCL CAD: CPT

## 2025-06-19 NOTE — TELEPHONE ENCOUNTER
Nancy johnston( granddaughter) [phone] called because pharmacy stated they needed a prior auth for zepbound. Please call Nancy if you have any questions or updates

## 2025-06-24 ENCOUNTER — TELEPHONE (OUTPATIENT)
Dept: INTERNAL MEDICINE CLINIC | Facility: CLINIC | Age: 65
End: 2025-06-24

## 2025-06-24 NOTE — TELEPHONE ENCOUNTER
Zepbound has been denied by the insurance, according to [employer], this drug is not covered under Part D for weight loss.               Prior auth has been initiated with the insurance for Zepbound.

## 2025-06-25 ENCOUNTER — OFFICE VISIT (OUTPATIENT)
Dept: PHYSICAL THERAPY | Facility: REHABILITATION | Age: 65
End: 2025-06-25
Payer: COMMERCIAL

## 2025-06-25 DIAGNOSIS — G89.29 CHRONIC PAIN OF LEFT KNEE: Primary | ICD-10-CM

## 2025-06-25 DIAGNOSIS — M25.562 CHRONIC PAIN OF LEFT KNEE: Primary | ICD-10-CM

## 2025-06-25 PROCEDURE — 97110 THERAPEUTIC EXERCISES: CPT

## 2025-06-25 PROCEDURE — 97140 MANUAL THERAPY 1/> REGIONS: CPT

## 2025-06-25 NOTE — PROGRESS NOTES
Daily Note     Today's date: 2025  Patient name: Trice Donaldson  : 1960  MRN: [id number]  Referring provider: Malika Lopez*  Dx:   Encounter Diagnosis     ICD-10-CM    1. Chronic pain of left knee  M25.562     G89.29           Start Time: 1015  Stop Time: 1100  Total time in clinic (min): 45 minutes      Subjective: She starting to feel a little bit better since performing the exercises at home. Still is very painful in the inferior aspect of the knee cap. She is considering of getting disability parking plac.       Objective: See treatment diary below      Assessment: Patient tolerated treatment session fair to poor today with focus on pain management and graded exposure via exercises. She reported significant pain with crepitus with PROM. Had most difficulty with SLR with and without strap assist. Encouraged to modify her movement but to avoid stopping all together. Utilize modalities as appropriate and PRN. Reviewed soreness is to be expected the next 1-2 day. Patient will continue to be appropriate for skilled outpatient physical therapy in order to address impairments and chronic pain. 1:1 with Nathalie Parson, PT, DPT for entirety of treatment session.        Plan: Continue per plan of care.  Progress treatment as tolerated.       Precautions:  Past Medical History[1]       POC expires Unit limit Auth Expiration date PT/OT + Visit Limit?   9/3/25 bomn 25 bomn         Visit/Unit Tracking  AUTH Status:  Date        8 Visits Used 1 2        Remaining  7 6          Pertinent Findings:                                                                                             Test / Measure  25   FOTO (Predicted )    P! With TKE and end-flexion    Unable to perform SLR          Access Code: [email or web address]  URL: [email or web address]  Date: 2025  Prepared by: Nathalie Parson    Exercises  - Supine Quad Set on Towel Roll  - 1 x daily - 7 x weekly - 2 sets - 10  "reps - 3-5 seconds hold  - Supine Isometric Hamstring Set  - 1 x daily - 7 x weekly - 2 sets - 10 reps - 3-5 seconds hold  - Seated Long Arc Quad  - 1 x daily - 7 x weekly - 3 sets - 10 reps       Manuals 6/11 6/25         LKnee PROM AR  AR         L Tibiofemoral Posterior Mob   AR                      Neuro Re-Ed            SAQ   5\"x20          Eccentric Step Downs             Supine 90/90 Nerve glides            Ther Ex             HEP/Patient Education/PNE AR performed           NuStep or RB   8' L5         Heel Slides with Strap    with pball  20x         HS/Glute Setting on Pball  5\"x10      SLR with assist  Strap  10x      Heel Raises/Toe Raises    Seated                                                                               Ther Activity              Fwd Step-Ups              Lat Step-Ups             Gait Training                                         Modalities                                                         [1]   Past Medical History:  Diagnosis Date    Abnormal mammogram     RESOLVED: 14NOV2017    Anxiety     Anxiety     Arthritis     Depression     Depression     Diabetes mellitus (HCC)     Pt denies    Diverticulosis     GERD (gastroesophageal reflux disease)     Helicobacter pylori infection     History of Helicobacter pylori infection 06/18/2018    Hyperlipidemia     Seborrheic keratosis     LAST ASSESSED: 05JUN2017    Sleep apnea     Sleep apnea     Tinea pedis of left foot 09/16/2019     "

## 2025-06-30 ENCOUNTER — TELEPHONE (OUTPATIENT)
Age: 65
End: 2025-06-30

## 2025-06-30 NOTE — TELEPHONE ENCOUNTER
Patient is calling regarding cancelling an appointment.    Date/Time: 7/2 @2    Reason: Double booked    Patient was rescheduled: YES [x] NO []  If yes, when was Patient reschedule for: 10/22 @11:30    Patient requesting call back to reschedule: YES [] NO [x]

## 2025-07-01 NOTE — TELEPHONE ENCOUNTER
Please review and advise.     Per chart review, Zepbound ordered and Metformin discontinued.     Please review if script for Zepbound can be sent with diagnosis of Type 2 Diabetes.     Patient also running low on metformin. Please review if a short term dose can be ordered until approval of Zepbound.

## 2025-07-01 NOTE — TELEPHONE ENCOUNTER
Pts daughter is calling in because she said pt doesn't need medication for weight loss, she is a diabetic. Please review and complete prior auth if needed for medication with use for diabetes.    She is also low on the original diabetic medication (pill) she was taking and she will need a refill since she will run out prior to receiving the zepound. Please review pts old and current med list to see what medication she was taking for diabetes as she was unsure at the time of the call.     She also has an appt for 7/7 to fu on zepound, should she reschedule that appt, and if so, to when?

## 2025-07-02 ENCOUNTER — OFFICE VISIT (OUTPATIENT)
Dept: PHYSICAL THERAPY | Facility: REHABILITATION | Age: 65
End: 2025-07-02
Payer: COMMERCIAL

## 2025-07-02 DIAGNOSIS — G89.29 CHRONIC PAIN OF LEFT KNEE: Primary | ICD-10-CM

## 2025-07-02 DIAGNOSIS — M25.562 CHRONIC PAIN OF LEFT KNEE: Primary | ICD-10-CM

## 2025-07-02 PROCEDURE — 97112 NEUROMUSCULAR REEDUCATION: CPT

## 2025-07-02 PROCEDURE — 97140 MANUAL THERAPY 1/> REGIONS: CPT

## 2025-07-02 PROCEDURE — 97110 THERAPEUTIC EXERCISES: CPT

## 2025-07-02 NOTE — PROGRESS NOTES
Daily Note     Today's date: 2025  Patient name: Trice Donaldson  : 1960  MRN: [id number]  Referring provider: Malika Lopez*  Dx:   Encounter Diagnosis     ICD-10-CM    1. Chronic pain of left knee  M25.562     G89.29           Start Time: 1015  Stop Time: 1100  Total time in clinic (min): 45 minutes    Subjective: Pt notes that her L knee continues to bother her especially with prolonged walking.      Objective: See treatment diary below      Assessment: Tolerated treatment well. Patient would benefit from continued PT. Pt continues to be most challenged by SLR as she continues to be unable to perform without assist due to pain and strength limitations. She was introduced to LAQ and leg press to improve L quad strength and loading tolerance at the L knee and responded well but noted fatigue. She responded well to Vcs for quad recruitment during SAQ/LAQ as she displayed improved movement quality following. Monitor response to tx session NV, continue to progress as tolerated, 1:1 with Jalen Rivera DPT entirety of tx.      Plan: Continue per plan of care.      Precautions:  Past Medical History[1]       POC expires Unit limit Auth Expiration date PT/OT + Visit Limit?   9/3/25 bomn 25 bomn         Visit/Unit Tracking  AUTH Status:  Date /      8 Visits Used 1 2 3       Remaining  7 6 5         Pertinent Findings:                                                                                             Test / Measure  25   FOTO (Predicted )    P! With TKE and end-flexion    Unable to perform SLR          Access Code: [email or web address]  URL: [email or web address]  Date: 2025  Prepared by: Nathalie Parson    Exercises  - Supine Quad Set on Towel Roll  - 1 x daily - 7 x weekly - 2 sets - 10 reps - 3-5 seconds hold  - Supine Isometric Hamstring Set  - 1 x daily - 7 x weekly - 2 sets - 10 reps - 3-5 seconds hold  - Seated Long Arc Quad  - 1 x daily - 7 x weekly -  "3 sets - 10 reps       Manuals 6/11 6/25  7/2       LKnee PROM AR  AR  MC       L Tibiofemoral Posterior Mob   AR  MC                    Neuro Re-Ed            SAQ   5\"x20  5\"x20, 2# AW       LAQ   15x, 3s hold      Eccentric Step Downs             Supine 90/90 Nerve glides            Ther Ex             HEP/Patient Education/PNE AR performed           NuStep or RB   8' L5  8' L5       Heel Slides with Strap    with pball  20x  with pball  20x       HS/Glute Setting on Pball  5\"x10 5\"x10     SLR with assist  Strap  10x PT Assist, 10x     Heel Raises/Toe Raises    Seated          Leg Press      25#, 3x8                                                               Ther Activity              Fwd Step-Ups              Lat Step-Ups             Gait Training                                         Modalities                                                          [1]   Past Medical History:  Diagnosis Date    Abnormal mammogram     RESOLVED: 14NOV2017    Anxiety     Anxiety     Arthritis     Depression     Depression     Diabetes mellitus (HCC)     Pt denies    Diverticulosis     GERD (gastroesophageal reflux disease)     Helicobacter pylori infection     History of Helicobacter pylori infection 06/18/2018    Hyperlipidemia     Seborrheic keratosis     LAST ASSESSED: 05JUN2017    Sleep apnea     Sleep apnea     Tinea pedis of left foot 09/16/2019     "

## 2025-07-07 ENCOUNTER — OFFICE VISIT (OUTPATIENT)
Dept: INTERNAL MEDICINE CLINIC | Facility: CLINIC | Age: 65
End: 2025-07-07

## 2025-07-07 ENCOUNTER — TELEPHONE (OUTPATIENT)
Dept: INTERNAL MEDICINE CLINIC | Facility: CLINIC | Age: 65
End: 2025-07-07

## 2025-07-07 VITALS
SYSTOLIC BLOOD PRESSURE: 130 MMHG | BODY MASS INDEX: 30.96 KG/M2 | TEMPERATURE: 98.1 F | HEIGHT: 61 IN | HEART RATE: 88 BPM | WEIGHT: 164 LBS | DIASTOLIC BLOOD PRESSURE: 82 MMHG

## 2025-07-07 DIAGNOSIS — H91.92 HEARING DECREASED, LEFT: ICD-10-CM

## 2025-07-07 DIAGNOSIS — Z09 FOLLOW-UP EXAM: ICD-10-CM

## 2025-07-07 DIAGNOSIS — M17.12 PRIMARY OSTEOARTHRITIS OF LEFT KNEE: ICD-10-CM

## 2025-07-07 DIAGNOSIS — K29.30 CHRONIC SUPERFICIAL GASTRITIS WITHOUT BLEEDING: ICD-10-CM

## 2025-07-07 DIAGNOSIS — E11.9 TYPE 2 DIABETES MELLITUS WITHOUT COMPLICATION, WITHOUT LONG-TERM CURRENT USE OF INSULIN (HCC): Primary | ICD-10-CM

## 2025-07-07 PROCEDURE — G2211 COMPLEX E/M VISIT ADD ON: HCPCS | Performed by: HOSPITALIST

## 2025-07-07 PROCEDURE — 99214 OFFICE O/P EST MOD 30 MIN: CPT | Performed by: HOSPITALIST

## 2025-07-07 RX ORDER — TIRZEPATIDE 2.5 MG/.5ML
2.5 INJECTION, SOLUTION SUBCUTANEOUS WEEKLY
Qty: 2 ML | Refills: 0 | Status: SHIPPED | OUTPATIENT
Start: 2025-07-07 | End: 2025-07-08 | Stop reason: SDUPTHER

## 2025-07-07 RX ORDER — FAMOTIDINE 20 MG/1
20 TABLET, FILM COATED ORAL 2 TIMES DAILY
Qty: 60 TABLET | Refills: 1 | Status: CANCELLED | OUTPATIENT
Start: 2025-07-07 | End: 2025-09-05

## 2025-07-07 NOTE — TELEPHONE ENCOUNTER
Verbally spoke with dr. Serrato who stated form was completed and given back to patient, no copy was made for scanning.

## 2025-07-07 NOTE — ASSESSMENT & PLAN NOTE
Noted left knee pain from last visit. Has since started physical therapy (currently once a week), which she said has helped. Also  having benefit with Voltaren gel and Tylenol PRN. Has Rx for ibuprofen which granddaughter says she uses intermittently. Asked to stop taking ibuprofen and continue PT, Tylenol PRN, and Voltaren gel. Knee XR also ordered (last one on 3/20/2021 unremarkable), and was asked to get this as well. Brought handicap parking form, which was completed.

## 2025-07-07 NOTE — ASSESSMENT & PLAN NOTE
Lab Results   Component Value Date    HGBA1C 6.2 06/05/2025     Presenting today for follow-up exam after being started on Zepbound. Unfourtately, her insurance denied her use of Zepbound (as it was indicated for weight loss, which is not covered in her insurance plan) and instead recommended Mounjaro for her diabetes. She hasn't been started on it yet but is interested, so will start today.    Plan  -Start Mounjaro, 2.5 mg qWeekly for 4 week; stop metformin while on Mounjaro  -F/u in clinic in four weeks for recheck/up-titration    Orders:    Tirzepatide (Mounjaro) 2.5 MG/0.5ML SOAJ; Inject 2.5 mg under the skin once a week

## 2025-07-07 NOTE — PATIENT INSTRUCTIONS
Please get lab work    Please schedule ultrasound, audiology referral    If there are any issues picking up Mounjaro or if you notice any side-effects on it, please let us know.

## 2025-07-07 NOTE — TELEPHONE ENCOUNTER
Folder Color-purple     Name of Form-Disability placard     Form to be filled out by-Ama     Call patient when completed       Patient made aware of 10 business day policy.

## 2025-07-07 NOTE — ASSESSMENT & PLAN NOTE
Prior hx of gastritis. Still having symptoms. On omeprazole, 20 mg QD. Abdominal pain localized to umbilical pain. Has been going on for many years. Last had EGD on 9/30/2021 which was normal (bx negative). Had CT abdomen on 12/15/2024 which showed 2.5 x 1.1 cm LLQ epiploic appendagitis. Saw GI on 11/4/2024 where she was started on Bentyl and recommendation for 4-6 month follow up was made. Additional recommendation for mesalamine was made but on speaking with (grand)daughter, she says she hasn't been taking it due to concern for side-effects. She also GI on 3/6/2025 where mesalamine was discussed but, as mentioned in prior sentence, she hasn't been taking it. Noted prior hx of H. pylori but this is >10 years ago so low suspicion as etiology of her symptoms. In the interm, will continue omeprazole and assess mesenteric vasculature with ultrasound. Will also check CMP and lipase level. F/u in clinic in 4 weeks.     Plan  -Continue omeprazole  -Asked to stop taking ibuprofen  -Ordered vascular mesenteric duplex, lipase: CMP already ordered  -Recheck in 4 weeks  -GI appointment currently scheduled for 9/11/2025, reschedule sooner if indicated  Orders:    VAS CELIAC AND/OR MESENTERIC DUPLEX; Future    Lipase; Future

## 2025-07-07 NOTE — PROGRESS NOTES
Name: Trice Donaldson      : 1960      MRN: [id number]  Encounter Provider: Kemar Serrato MD  Encounter Date: 2025   Encounter department: [facility]    Assessment & Plan  Type 2 diabetes mellitus without complication, without long-term current use of insulin ([facility])  Follow-up exam    Lab Results   Component Value Date    HGBA1C 6.2 2025     Presenting today for follow-up exam after being started on Zepbound. Unfourtately, her insurance denied her use of Zepbound (as it was indicated for weight loss, which is not covered in her insurance plan) and instead recommended Mounjaro for her diabetes. She hasn't been started on it yet but is interested, so will start today.    Plan  -Start Mounjaro, 2.5 mg qWeekly for 4 week; stop metformin while on Mounjaro  -F/u in clinic in four weeks for recheck/up-titration    Orders:    Tirzepatide (Mounjaro) 2.5 MG/0.5ML SOAJ; Inject 2.5 mg under the skin once a week    Primary osteoarthritis of left knee  Noted left knee pain from last visit. Has since started physical therapy (currently once a week), which she said has helped. Also  having benefit with Voltaren gel and Tylenol PRN. Has Rx for ibuprofen which granddaughter says she uses intermittently. Asked to stop taking ibuprofen and continue PT, Tylenol PRN, and Voltaren gel. Knee XR also ordered (last one on 3/20/2021 unremarkable), and was asked to get this as well. Brought handicap parking form, which was completed.       Chronic superficial gastritis without bleeding  Prior hx of gastritis. Still having symptoms. On omeprazole, 20 mg QD. Abdominal pain localized to umbilical pain. Has been going on for many years. Last had EGD on 2021 which was normal (bx negative). Had CT abdomen on 12/15/2024 which showed 2.5 x 1.1 cm LLQ epiploic appendagitis. Saw GI on 2024 where she was started on Bentyl and recommendation for 4-6 month follow up was made. Additional  recommendation for mesalamine was made but on speaking with (grand)daughter, she says she hasn't been taking it due to concern for side-effects. She also GI on 3/6/2025 where mesalamine was discussed but, as mentioned in prior sentence, she hasn't been taking it. Noted prior hx of H. pylori but this is >10 years ago so low suspicion as etiology of her symptoms. In the interm, will continue omeprazole and assess mesenteric vasculature with ultrasound. Will also check CMP and lipase level. F/u in clinic in 4 weeks.     Plan  -Continue omeprazole  -Asked to stop taking ibuprofen  -Ordered vascular mesenteric duplex, lipase: CMP already ordered  -Recheck in 4 weeks  -GI appointment currently scheduled for 9/11/2025, reschedule sooner if indicated  Orders:    VAS CELIAC AND/OR MESENTERIC DUPLEX; Future    Lipase; Future    Hearing decreased, left  Noted decreased hearing on left, says as if something is blocking her left ear. Otoscope visulaization of both ears unremarkable, Henning exam lateralizes to left (which would be consistent with conductive hearing loss) but AC>BC (BC>AC in conductive hearing loss). Will refer to audiology for comprehensive hearing exam, may consider ENT eval if indicated.   Orders:    Ambulatory Referral to Audiology; Future         History of Present Illness     Trice Donaldson is a 64 y.o. female with a PMH of T2DM who presents today for follow-up exam. She was last seen in clinic on 6/5/2025. She is here today with granddaughter, Nancy who is providing interpretation per Trice's request. During the last visit, she expressed willingness to start injectables and she was started on Zepbound. However, her insurance denied it though they were ok with Mounjaro (as indication would be Diabetes, which is acceptable for her insurance). Today, she says she is willing to start Mounjaro. She has still been taking metformi in the interm. Additionally, she also had left knee pain at that appointment and  since then has been going to physical therapy, taking Tylenol, and Voltaren gel. She says the physical therapy has been helpful, as has the medication. Having long-standing gastric discomfort, abdominal pain. Also mentioned having multiple bowel movements a day. Denying any early saity, prior hx pancreatitis, thyroid conditions. She denies headache, fever, chills, nausea, vomiting, chest pain, shortness of breath, blood in stool.            Past Medical History[1]  Past Surgical History[2]  Family History[3]  Social History[4]  Medications[5]  No Known Allergies  Immunization History   Administered Date(s) Administered    COVID-19 PFIZER VACCINE 0.3 ML IM 03/29/2021, 04/19/2021, 12/28/2021, 06/09/2022    COVID-19 Pfizer Vac BIVALENT Ivan-sucrose 12 Yr+ IM 12/19/2022    INFLUENZA 12/26/2021, 12/19/2022    Influenza Quadrivalent Preservative Free 3 years and older IM 11/14/2017    Influenza Split High Dose Preservative Free IM 12/03/2024    Influenza, injectable, quadrivalent, preservative free 0.5 mL 11/16/2023    Influenza, recombinant, quadrivalent,injectable, preservative free 09/11/2018, 09/24/2020    Influenza, seasonal, injectable 08/12/2014    Pneumococcal Conjugate Vaccine 20-valent (Pcv20), Polysace 09/01/2022    Pneumococcal Polysaccharide PPV23 09/11/2018    Tdap 02/12/2015    Zoster Vaccine Recombinant 06/19/2024, 12/03/2024     Objective   LMP  (LMP Unknown)     Physical Exam  Vitals reviewed.   Constitutional:       General: Trice is not in acute distress.  HENT:      Head: Normocephalic and atraumatic.      Right Ear: Tympanic membrane, ear canal and external ear normal. There is no impacted cerumen.      Left Ear: Tympanic membrane, ear canal and external ear normal. There is no impacted cerumen.      Ears:      Henning exam findings: Lateralizes left.     Right Rinne: AC > BC.     Left Rinne: AC > BC.     Mouth/Throat:      Mouth: Mucous membranes are moist.      Pharynx: Oropharynx is clear.       Comments: Tonsil stone    Eyes:      Extraocular Movements: Extraocular movements intact.      Pupils: Pupils are equal, round, and reactive to light.       Cardiovascular:      Rate and Rhythm: Normal rate and regular rhythm.      Heart sounds: No murmur heard.     No friction rub. No gallop.   Pulmonary:      Effort: Pulmonary effort is normal. No respiratory distress.      Breath sounds: No wheezing, rhonchi or rales.   Abdominal:      General: Bowel sounds are decreased.      Palpations: Abdomen is soft.      Tenderness: There is no abdominal tenderness.     Musculoskeletal:         General: Tenderness (to palpation on left knee) present.      Right lower leg: No edema.      Left lower leg: No edema.     Skin:     General: Skin is warm and dry.      Capillary Refill: Capillary refill takes less than 2 seconds.     Neurological:      General: No focal deficit present.      Mental Status: Trice is alert.     Psychiatric:         Mood and Affect: Mood normal.         Behavior: Behavior normal.         Thought Content: Thought content normal.              [1]   Past Medical History:  Diagnosis Date    Abnormal mammogram     RESOLVED: 14NOV2017    Anxiety     Anxiety     Arthritis     Depression     Depression     Diabetes mellitus (HCC)     Pt denies    Diverticulosis     GERD (gastroesophageal reflux disease)     Helicobacter pylori infection     History of Helicobacter pylori infection 06/18/2018    Hyperlipidemia     Seborrheic keratosis     LAST ASSESSED: 05JUN2017    Sleep apnea     Sleep apnea     Tinea pedis of left foot 09/16/2019   [2]   Past Surgical History:  Procedure Laterality Date    ABDOMINOPLASTY      abdomin    COLONOSCOPY  2017    SD COLONOSCOPY FLX DX W/COLLJ SPEC WHEN PFRMD N/A 8/11/2016    Procedure: COLONOSCOPY;  Surgeon: Bart Espinoza MD;  Location: [facility];  Service: Gastroenterology    SD COLONOSCOPY FLX DX W/COLLJ SPEC WHEN PFRMD N/A 8/29/2017    Procedure: EGD AND COLONOSCOPY;  Surgeon:  Bart Espinoza MD;  Location: [facility];  Service: Gastroenterology    CO TENDON SHEATH INCISION Right 3/8/2022    Procedure: Right thumb, long, and ring finger trigger finger releases;  Surgeon: Gary Wray MD;  Location:  MAIN OR;  Service: Orthopedics    TUBAL LIGATION      TUBAL LIGATION      UPPER GASTROINTESTINAL ENDOSCOPY  2017   [3]   Family History  Problem Relation Name Age of Onset    Diabetes Mother      Hypertension Mother      Heart disease Mother      Alzheimer's disease Father      No Known Problems Sister      Colon cancer Sister      Heart disease Sister      Depression Daughter      Other Daughter           at 1-2 mths old    Diabetes Maternal Grandmother      Stomach cancer Maternal Grandfather      No Known Problems Paternal Grandmother      Diabetes Paternal Grandfather      No Known Problems Brother      Intellectual disability Brother      Schizophrenia Brother      ADD / ADHD Son      Depression Son      Other Son           as an infant     Other Son           as an infant    [4]   Social History  Tobacco Use    Smoking status: Never    Smokeless tobacco: Never   Vaping Use    Vaping status: Never Used   Substance and Sexual Activity    Alcohol use: No    Drug use: No    Sexual activity: Yes     Partners: Male     Birth control/protection: Post-menopausal   [5]   Current Outpatient Medications on File Prior to Visit   Medication Sig    dicyclomine (BENTYL) 20 mg tablet Take 1 tablet (20 mg total) by mouth every 6 (six) hours as needed (abdominal pain)    acetaminophen (TYLENOL) 325 mg tablet Take 2 tablets (650 mg total) by mouth every 6 (six) hours as needed for mild pain    acetaminophen (TYLENOL) 650 mg CR tablet Take 1 tablet (650 mg total) by mouth every 8 (eight) hours as needed for mild pain    ARIPiprazole (ABILIFY) 5 mg tablet Take 1 tablet (5 mg total) by mouth daily    Ascorbic Acid (vitamin C) 1000 MG tablet Take 1,000 mg by mouth daily    atorvastatin  (LIPITOR) 10 mg tablet Take 1 tablet (10 mg total) by mouth daily    Blood Glucose Monitoring Suppl (OneTouch Verio Reflect) w/Device KIT Check blood sugars once daily. Please substitute with appropriate alternative as covered by patient's insurance. Dx: E11.65    Calcium Polycarbophil (FIBERCON PO) Take by mouth    Cequa 0.09 % SOLN     Cholecalciferol (VITAMIN D3) 125 MCG (5000 UT) CAPS Take 1,000 Units by mouth    clonazePAM (KlonoPIN) 1 mg tablet Take 1.5mg at night as needed for anxiety, depression, or their effects on severely disrupted sleep    dexamethasone sodium phosphate 0.1 % ophthalmic solution     Diclofenac Sodium (VOLTAREN) 1 % Apply 2 g topically 4 (four) times a day    DULoxetine (CYMBALTA) 30 mg delayed release capsule Take 1 capsule (30 mg total) by mouth daily    hydroquinone 4 % cream Apply topically 2 (two) times a day    ibuprofen (MOTRIN) 400 mg tablet Take 1 tablet (400 mg total) by mouth every 6 (six) hours as needed for mild pain for up to 14 days    Iron-Vitamin C (Vitron-C)  MG TABS Take 1 tablet by mouth 3 (three) times a week    mesalamine (APRISO) 0.375 g 24 hr capsule take 4 capsules by mouth once daily IN THE EARLY MORNING    Methylcobalamin (B-12) 5000 MCG TBDP Take by mouth    multivitamin (THERAGRAN) TABS Take 1 tablet by mouth daily    omeprazole (PriLOSEC) 20 mg delayed release capsule Take 1 capsule (20 mg total) by mouth daily    ondansetron (ZOFRAN) 4 mg tablet Take 1 tablet (4 mg total) by mouth every 8 (eight) hours as needed for nausea or vomiting (Patient not taking: Reported on 3/25/2025)    Pred Mild 0.12 % ophthalmic suspension     prednisoLONE sodium phosphate (INFLAMASE FORTE) 1 % ophthalmic solution

## 2025-07-08 DIAGNOSIS — E11.9 TYPE 2 DIABETES MELLITUS WITHOUT COMPLICATION, WITHOUT LONG-TERM CURRENT USE OF INSULIN (HCC): ICD-10-CM

## 2025-07-08 RX ORDER — TIRZEPATIDE 2.5 MG/.5ML
2.5 INJECTION, SOLUTION SUBCUTANEOUS WEEKLY
Qty: 2 ML | Refills: 0 | Status: SHIPPED | OUTPATIENT
Start: 2025-07-08

## 2025-07-08 NOTE — TELEPHONE ENCOUNTER
Called [facility] and spoke to Macarena, per Macarena pineda did go thru but they will need to order for patient. They will reach out to patient once available.

## 2025-07-09 ENCOUNTER — OFFICE VISIT (OUTPATIENT)
Dept: PHYSICAL THERAPY | Facility: REHABILITATION | Age: 65
End: 2025-07-09
Payer: COMMERCIAL

## 2025-07-09 DIAGNOSIS — G89.29 CHRONIC PAIN OF LEFT KNEE: Primary | ICD-10-CM

## 2025-07-09 DIAGNOSIS — M25.562 CHRONIC PAIN OF LEFT KNEE: Primary | ICD-10-CM

## 2025-07-09 PROCEDURE — 97140 MANUAL THERAPY 1/> REGIONS: CPT

## 2025-07-09 PROCEDURE — 97112 NEUROMUSCULAR REEDUCATION: CPT

## 2025-07-09 PROCEDURE — 97110 THERAPEUTIC EXERCISES: CPT

## 2025-07-09 NOTE — PROGRESS NOTES
Daily Note     Today's date: 2025  Patient name: Trice Donaldson  : 1960  MRN: [id number]  Referring provider: Malika Lopez*  Dx:   Encounter Diagnosis     ICD-10-CM    1. Chronic pain of left knee  M25.562     G89.29             Start Time: 1215  Stop Time: 1300  Total time in clinic (min): 45 minutes      Subjective: Patient continues to be in a significant amount of pain, mostly in posterior aspect of knee. All weight bearing activities continue to be aggravating.       Objective: See treatment diary below      Assessment: Tolerated treatment well. Patient would benefit from continued PT. Pt continues to be most challenged by SLR as she continues to be unable to perform without assist due to pain and strength limitations. Pain persists in posterior aspect for all exercises and worsens with duration. I continue to suspect meniscal involvement as main cause of her symptoms. Recommended to complete X-ray order. Monitor response to tx session NV, continue to progress as tolerated, 1:1 with Nathalie Parson PT, DPT for entirety of tx.         Plan: Continue per plan of care.      Precautions:  Past Medical History[1]       POC expires Unit limit Auth Expiration date PT/OT + Visit Limit?   9/3/25 bomn 25 bomn         Visit/Unit Tracking  AUTH Status:  Date      8 Visits Used 1 2 3 4      Remaining  7 6 5 4        Pertinent Findings:                                                                                             Test / Measure  25   FOTO (Predicted )    P! With TKE and end-flexion    Unable to perform SLR          Access Code: [email or web address]  URL: [email or web address]  Date: 2025  Prepared by: Nathalie Parson    Exercises  - Supine Quad Set on Towel Roll  - 1 x daily - 7 x weekly - 2 sets - 10 reps - 3-5 seconds hold  - Supine Isometric Hamstring Set  - 1 x daily - 7 x weekly - 2 sets - 10 reps - 3-5 seconds hold  - Seated Long Arc Quad  - 1 x  "daily - 7 x weekly - 3 sets - 10 reps       Manuals 6/11 6/25 7/2 7/9     LKnee PROM AR  AR  MC  AR     L Tibiofemoral Posterior Mob   AR  MC AR                  Neuro Re-Ed            SAQ   5\"x20  5\"x20, 2# AW  5\"x20  2# aw     LAQ   15x, 3s hold 20x  3s hold    Supine 90/90 Nerve glides       Slump  10x  B/L     Ther Ex             HEP/Patient Education/PNE AR performed           NuStep or RB   8' L5  8' L5  np     Heel Slides with Strap    with pball  20x  with pball  20x  with pball  20x     HS/Glute Setting on Pball  5\"x10 5\"x10 5\"x15    HS Curl    OTB  15x, 3s hold    SLR with assist  Strap  10x PT Assist, 10x PT assist  10x       Leg Press      25#, 3x8  25#  3x8                                                             Ther Activity              Fwd Step-Ups              Lat Step-Ups             Gait Training                                         Modalities                                                            [1]   Past Medical History:  Diagnosis Date    Abnormal mammogram     RESOLVED: 14NOV2017    Anxiety     Anxiety     Arthritis     Depression     Depression     Diabetes mellitus (HCC)     Pt denies    Diverticulosis     GERD (gastroesophageal reflux disease)     Helicobacter pylori infection     History of Helicobacter pylori infection 06/18/2018    Hyperlipidemia     Seborrheic keratosis     LAST ASSESSED: 05JUN2017    Sleep apnea     Sleep apnea     Tinea pedis of left foot 09/16/2019     "

## 2025-07-16 ENCOUNTER — OFFICE VISIT (OUTPATIENT)
Dept: PHYSICAL THERAPY | Facility: REHABILITATION | Age: 65
End: 2025-07-16
Payer: COMMERCIAL

## 2025-07-16 DIAGNOSIS — M25.562 CHRONIC PAIN OF LEFT KNEE: Primary | ICD-10-CM

## 2025-07-16 DIAGNOSIS — G89.29 CHRONIC PAIN OF LEFT KNEE: Primary | ICD-10-CM

## 2025-07-16 PROCEDURE — 97110 THERAPEUTIC EXERCISES: CPT

## 2025-07-16 NOTE — PROGRESS NOTES
"Daily Note     Today's date: 2025  Patient name: Trice Donaldson  : 1960  MRN: [id number]  Referring provider: Malika Lopez*  Dx:   Encounter Diagnosis     ICD-10-CM    1. Chronic pain of left knee  M25.562     G89.29           Start Time: 1031  Stop Time: 1040  Total time in clinic (min): 9 minutes    Subjective: Pt notes that her knee has been feeling okay, she continues to have some increased pain especially with walking. She mentioned that she has been having some GI issues recently.       Objective: See treatment diary below      Assessment: Treatment time was limited as pt began to experience some GI discomfort and requested to end the session. Pt would benefit from continued PT, continue to progress as tolerated, 1:1 with Jalen Rivera DPT entirety of tx.      Plan: Continue per plan of care.      Precautions:  Past Medical History[1]       POC expires Unit limit Auth Expiration date PT/OT + Visit Limit?   9/3/25 bomn 25 bomn         Visit/Unit Tracking  AUTH Status:  Date     8 Visits Used 1 2 3 4 5     Remaining  7 6 5 4 3       Pertinent Findings:                                                                                             Test / Measure  25   FOTO (Predicted )    P! With TKE and end-flexion    Unable to perform SLR          Access Code: [email or web address]  URL: [email or web address]  Date: 2025  Prepared by: Nathalie Parson    Exercises  - Supine Quad Set on Towel Roll  - 1 x daily - 7 x weekly - 2 sets - 10 reps - 3-5 seconds hold  - Supine Isometric Hamstring Set  - 1 x daily - 7 x weekly - 2 sets - 10 reps - 3-5 seconds hold  - Seated Long Arc Quad  - 1 x daily - 7 x weekly - 3 sets - 10 reps       Manuals    LKnee PROM AR  AR  MC  AR    L Tibiofemoral Posterior Mob   AR  MC AR                 Neuro Re-Ed            SAQ   5\"x20  5\"x20, 2# AW  5\"x20  2# aw    LAQ   15x, 3s hold 20x  3s hold  " "  Supine 90/90 Nerve glides       Slump  10x  B/L    Ther Ex             HEP/Patient Education/PNE AR performed           NuStep or RB   8' L5  8' L5  np  8', lvl 5   Heel Slides with Strap    with pball  20x  with pball  20x  with pball  20x    HS/Glute Setting on Pball  5\"x10 5\"x10 5\"x15    HS Curl    OTB  15x, 3s hold    SLR with assist  Strap  10x PT Assist, 10x PT assist  10x       Leg Press      25#, 3x8  25#  3x8                                                            Ther Activity              Fwd Step-Ups              Lat Step-Ups             Gait Training                                         Modalities                                                               [1]   Past Medical History:  Diagnosis Date    Abnormal mammogram     RESOLVED: [id number]    Anxiety     Anxiety     Arthritis     Depression     Depression     Diabetes mellitus (HCC)     Pt denies    Diverticulosis     GERD (gastroesophageal reflux disease)     Helicobacter pylori infection     History of Helicobacter pylori infection 06/18/2018    Hyperlipidemia     Seborrheic keratosis     LAST ASSESSED: [id number]    Sleep apnea     Sleep apnea     Tinea pedis of left foot 09/16/2019     "

## 2025-07-17 ENCOUNTER — TELEPHONE (OUTPATIENT)
Dept: INTERNAL MEDICINE CLINIC | Facility: CLINIC | Age: 65
End: 2025-07-17

## 2025-07-17 NOTE — TELEPHONE ENCOUNTER
Patient came in on 7/7/25 and was told she should stop taking   omeprazole (PriLOSEC) 20 mg delayed release capsule and start taking another medication. Patients daughter Nancy wanted to know what medication it was because the pharmacy only had Mounjaro filled for them.  Please call Nancy @ [phone] with any updates

## 2025-07-21 ENCOUNTER — HOSPITAL ENCOUNTER (OUTPATIENT)
Dept: NON INVASIVE DIAGNOSTICS | Facility: HOSPITAL | Age: 65
Discharge: HOME | End: 2025-07-21
Payer: COMMERCIAL

## 2025-07-21 DIAGNOSIS — K29.30 CHRONIC SUPERFICIAL GASTRITIS WITHOUT BLEEDING: ICD-10-CM

## 2025-07-21 PROCEDURE — 93975 VASCULAR STUDY: CPT

## 2025-07-21 PROCEDURE — 93975 VASCULAR STUDY: CPT | Performed by: SURGERY

## 2025-07-23 ENCOUNTER — APPOINTMENT (OUTPATIENT)
Dept: PHYSICAL THERAPY | Facility: REHABILITATION | Age: 65
End: 2025-07-23
Payer: COMMERCIAL

## 2025-07-30 ENCOUNTER — EVALUATION (OUTPATIENT)
Dept: PHYSICAL THERAPY | Facility: REHABILITATION | Age: 65
End: 2025-07-30
Payer: COMMERCIAL

## 2025-07-30 DIAGNOSIS — G89.29 CHRONIC PAIN OF LEFT KNEE: Primary | ICD-10-CM

## 2025-07-30 DIAGNOSIS — M25.562 CHRONIC PAIN OF LEFT KNEE: Primary | ICD-10-CM

## 2025-07-30 PROCEDURE — 97112 NEUROMUSCULAR REEDUCATION: CPT

## 2025-07-30 PROCEDURE — 97110 THERAPEUTIC EXERCISES: CPT

## 2025-07-30 PROCEDURE — 97164 PT RE-EVAL EST PLAN CARE: CPT

## 2025-08-04 ENCOUNTER — OFFICE VISIT (OUTPATIENT)
Dept: INTERNAL MEDICINE CLINIC | Facility: CLINIC | Age: 65
End: 2025-08-04

## 2025-08-04 VITALS
DIASTOLIC BLOOD PRESSURE: 83 MMHG | HEIGHT: 61 IN | SYSTOLIC BLOOD PRESSURE: 127 MMHG | OXYGEN SATURATION: 97 % | WEIGHT: 161.4 LBS | TEMPERATURE: 98 F | BODY MASS INDEX: 30.47 KG/M2 | HEART RATE: 85 BPM

## 2025-08-04 DIAGNOSIS — F41.9 ANXIETY: ICD-10-CM

## 2025-08-04 DIAGNOSIS — E11.9 TYPE 2 DIABETES MELLITUS WITHOUT COMPLICATION, WITHOUT LONG-TERM CURRENT USE OF INSULIN (HCC): Primary | ICD-10-CM

## 2025-08-04 DIAGNOSIS — L82.1 SEBORRHEIC KERATOSIS: ICD-10-CM

## 2025-08-04 LAB
CREAT UR-MCNC: 195.7 MG/DL
MICROALBUMIN UR-MCNC: 11.5 MG/L
MICROALBUMIN/CREAT 24H UR: 6 MG/G CREATININE (ref 0–30)

## 2025-08-04 PROCEDURE — 82570 ASSAY OF URINE CREATININE: CPT

## 2025-08-04 PROCEDURE — 82043 UR ALBUMIN QUANTITATIVE: CPT

## 2025-08-04 RX ORDER — CLONAZEPAM 1 MG/1
TABLET ORAL
Qty: 15 TABLET | Refills: 0 | Status: SHIPPED | OUTPATIENT
Start: 2025-08-04 | End: 2025-08-04 | Stop reason: SDUPTHER

## 2025-08-04 RX ORDER — TIRZEPATIDE 2.5 MG/.5ML
5 INJECTION, SOLUTION SUBCUTANEOUS WEEKLY
Qty: 2 ML | Refills: 0 | Status: SHIPPED | OUTPATIENT
Start: 2025-08-04

## 2025-08-04 RX ORDER — CLONAZEPAM 1 MG/1
TABLET ORAL
Qty: 45 TABLET | Refills: 3 | Status: SHIPPED | OUTPATIENT
Start: 2025-08-04

## 2025-08-06 ENCOUNTER — OFFICE VISIT (OUTPATIENT)
Dept: PHYSICAL THERAPY | Facility: REHABILITATION | Age: 65
End: 2025-08-06
Payer: COMMERCIAL

## 2025-08-06 DIAGNOSIS — G89.29 CHRONIC PAIN OF LEFT KNEE: Primary | ICD-10-CM

## 2025-08-06 DIAGNOSIS — M25.562 CHRONIC PAIN OF LEFT KNEE: Primary | ICD-10-CM

## 2025-08-06 PROCEDURE — 97112 NEUROMUSCULAR REEDUCATION: CPT

## 2025-08-06 PROCEDURE — 97110 THERAPEUTIC EXERCISES: CPT

## 2025-08-07 ENCOUNTER — TELEPHONE (OUTPATIENT)
Dept: INTERNAL MEDICINE CLINIC | Facility: CLINIC | Age: 65
End: 2025-08-07

## 2025-08-12 ENCOUNTER — TELEPHONE (OUTPATIENT)
Dept: INTERNAL MEDICINE CLINIC | Facility: CLINIC | Age: 65
End: 2025-08-12

## 2025-08-19 ENCOUNTER — APPOINTMENT (OUTPATIENT)
Dept: LAB | Facility: CLINIC | Age: 65
End: 2025-08-19
Payer: COMMERCIAL

## 2025-08-19 DIAGNOSIS — E11.9 TYPE 2 DIABETES MELLITUS WITHOUT COMPLICATION, WITHOUT LONG-TERM CURRENT USE OF INSULIN (HCC): Primary | ICD-10-CM

## 2025-08-19 DIAGNOSIS — E83.10 IRON METABOLISM DISORDER: ICD-10-CM

## 2025-08-19 DIAGNOSIS — K29.30 CHRONIC SUPERFICIAL GASTRITIS WITHOUT BLEEDING: ICD-10-CM

## 2025-08-19 DIAGNOSIS — E11.9 TYPE 2 DIABETES MELLITUS WITHOUT COMPLICATION, WITHOUT LONG-TERM CURRENT USE OF INSULIN (HCC): ICD-10-CM

## 2025-08-19 DIAGNOSIS — K50.10 SEGMENTAL COLITIS ASSOCIATED WITH DIVERTICULOSIS  (HCC): ICD-10-CM

## 2025-08-19 DIAGNOSIS — K57.30 SEGMENTAL COLITIS ASSOCIATED WITH DIVERTICULOSIS  (HCC): ICD-10-CM

## 2025-08-19 LAB
ALBUMIN SERPL BCG-MCNC: 4.5 G/DL (ref 3.5–5)
ALP SERPL-CCNC: 62 U/L (ref 34–104)
ALT SERPL W P-5'-P-CCNC: 19 U/L (ref 7–52)
ANION GAP SERPL CALCULATED.3IONS-SCNC: 10 MMOL/L (ref 4–13)
AST SERPL W P-5'-P-CCNC: 22 U/L (ref 13–39)
BASOPHILS # BLD AUTO: 0.03 THOUSANDS/ÂΜL (ref 0–0.1)
BASOPHILS NFR BLD AUTO: 1 % (ref 0–1)
BILIRUB SERPL-MCNC: 0.46 MG/DL (ref 0.2–1)
BUN SERPL-MCNC: 14 MG/DL (ref 5–25)
CALCIUM SERPL-MCNC: 9.6 MG/DL (ref 8.4–10.2)
CHLORIDE SERPL-SCNC: 105 MMOL/L (ref 96–108)
CHOLEST SERPL-MCNC: 122 MG/DL (ref ?–200)
CO2 SERPL-SCNC: 27 MMOL/L (ref 21–32)
CREAT SERPL-MCNC: 1.07 MG/DL (ref 0.6–1.3)
EOSINOPHIL # BLD AUTO: 0.12 THOUSAND/ÂΜL (ref 0–0.61)
EOSINOPHIL NFR BLD AUTO: 2 % (ref 0–6)
ERYTHROCYTE [DISTWIDTH] IN BLOOD BY AUTOMATED COUNT: 13.5 % (ref 11.6–15.1)
FERRITIN SERPL-MCNC: 28 NG/ML (ref 30–307)
GFR SERPL CREATININE-BSD FRML MDRD: 54 ML/MIN/1.73SQ M
GLUCOSE P FAST SERPL-MCNC: 99 MG/DL (ref 65–99)
HCT VFR BLD AUTO: 39.6 % (ref 34.8–46.1)
HDLC SERPL-MCNC: 42 MG/DL
HGB BLD-MCNC: 12.7 G/DL (ref 11.5–15.4)
IMM GRANULOCYTES # BLD AUTO: 0.01 THOUSAND/UL (ref 0–0.2)
IMM GRANULOCYTES NFR BLD AUTO: 0 % (ref 0–2)
IRON SATN MFR SERPL: 18 % (ref 15–50)
IRON SERPL-MCNC: 62 UG/DL (ref 50–212)
LDLC SERPL CALC-MCNC: 53 MG/DL (ref 0–100)
LIPASE SERPL-CCNC: 62 U/L (ref 11–82)
LYMPHOCYTES # BLD AUTO: 1.84 THOUSANDS/ÂΜL (ref 0.6–4.47)
LYMPHOCYTES NFR BLD AUTO: 33 % (ref 14–44)
MCH RBC QN AUTO: 28.3 PG (ref 26.8–34.3)
MCHC RBC AUTO-ENTMCNC: 32.1 G/DL (ref 31.4–37.4)
MCV RBC AUTO: 88 FL (ref 82–98)
MONOCYTES # BLD AUTO: 0.53 THOUSAND/ÂΜL (ref 0.17–1.22)
MONOCYTES NFR BLD AUTO: 9 % (ref 4–12)
NEUTROPHILS # BLD AUTO: 3.12 THOUSANDS/ÂΜL (ref 1.85–7.62)
NEUTS SEG NFR BLD AUTO: 55 % (ref 43–75)
NRBC BLD AUTO-RTO: 0 /100 WBCS
PLATELET # BLD AUTO: 315 THOUSANDS/UL (ref 149–390)
PMV BLD AUTO: 10 FL (ref 8.9–12.7)
POTASSIUM SERPL-SCNC: 4.5 MMOL/L (ref 3.5–5.3)
PROT SERPL-MCNC: 7.5 G/DL (ref 6.4–8.4)
RBC # BLD AUTO: 4.48 MILLION/UL (ref 3.81–5.12)
SODIUM SERPL-SCNC: 142 MMOL/L (ref 135–147)
TIBC SERPL-MCNC: 345.8 UG/DL (ref 250–450)
TRANSFERRIN SERPL-MCNC: 247 MG/DL (ref 203–362)
TRIGL SERPL-MCNC: 133 MG/DL (ref ?–150)
UIBC SERPL-MCNC: 284 UG/DL (ref 155–355)
WBC # BLD AUTO: 5.65 THOUSAND/UL (ref 4.31–10.16)

## 2025-08-19 PROCEDURE — 80053 COMPREHEN METABOLIC PANEL: CPT

## 2025-08-19 PROCEDURE — 85025 COMPLETE CBC W/AUTO DIFF WBC: CPT

## 2025-08-19 PROCEDURE — 83540 ASSAY OF IRON: CPT

## 2025-08-19 PROCEDURE — 82728 ASSAY OF FERRITIN: CPT

## 2025-08-19 PROCEDURE — 83550 IRON BINDING TEST: CPT

## 2025-08-19 PROCEDURE — 83690 ASSAY OF LIPASE: CPT

## 2025-08-19 PROCEDURE — 80061 LIPID PANEL: CPT

## 2025-08-19 PROCEDURE — 36415 COLL VENOUS BLD VENIPUNCTURE: CPT

## 2025-08-19 RX ORDER — TIRZEPATIDE 5 MG/.5ML
5 INJECTION, SOLUTION SUBCUTANEOUS WEEKLY
Qty: 2 ML | Refills: 1 | Status: SHIPPED | OUTPATIENT
Start: 2025-08-19

## (undated) DEVICE — ACE WRAP 4 IN STERILE

## (undated) DEVICE — PADDING CAST 4 IN  COTTON STRL

## (undated) DEVICE — SPONGE PVP SCRUB WING STERILE

## (undated) DEVICE — GAUZE SPONGES,16 PLY: Brand: CURITY

## (undated) DEVICE — OCCLUSIVE GAUZE STRIP,3% BISMUTH TRIBROMOPHENATE IN PETROLATUM BLEND: Brand: XEROFORM

## (undated) DEVICE — SUT PROLENE 4-0 PS-2 18 IN 8682G

## (undated) DEVICE — NEEDLE 25G X 1 1/2

## (undated) DEVICE — DISPOSABLE EQUIPMENT COVER: Brand: SMALL TOWEL DRAPE

## (undated) DEVICE — GLOVE INDICATOR PI UNDERGLOVE SZ 8 BLUE

## (undated) DEVICE — STERILE BETHLEHEM PLASTIC HAND: Brand: CARDINAL HEALTH

## (undated) DEVICE — GLOVE SRG BIOGEL 7.5

## (undated) DEVICE — CUFF TOURNIQUET 18 X 4 IN QUICK CONNECT DISP 1 BLADDER